# Patient Record
Sex: MALE | Race: WHITE
[De-identification: names, ages, dates, MRNs, and addresses within clinical notes are randomized per-mention and may not be internally consistent; named-entity substitution may affect disease eponyms.]

---

## 2018-06-09 ENCOUNTER — HOSPITAL ENCOUNTER (INPATIENT)
Dept: HOSPITAL 36 - ER | Age: 63
LOS: 6 days | Discharge: NURSING, REHAB OR LONG-TERM CARE | DRG: 872 | End: 2018-06-15
Attending: FAMILY MEDICINE | Admitting: FAMILY MEDICINE
Payer: MEDICARE

## 2018-06-09 VITALS — SYSTOLIC BLOOD PRESSURE: 102 MMHG | DIASTOLIC BLOOD PRESSURE: 65 MMHG

## 2018-06-09 DIAGNOSIS — L03.116: ICD-10-CM

## 2018-06-09 DIAGNOSIS — S51.811A: ICD-10-CM

## 2018-06-09 DIAGNOSIS — A41.9: Primary | ICD-10-CM

## 2018-06-09 DIAGNOSIS — S81.811A: ICD-10-CM

## 2018-06-09 DIAGNOSIS — L03.115: ICD-10-CM

## 2018-06-09 DIAGNOSIS — E87.1: ICD-10-CM
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
## 2018-06-09 DIAGNOSIS — D22.5: ICD-10-CM

## 2018-06-09 DIAGNOSIS — E44.1: ICD-10-CM

## 2018-06-09 DIAGNOSIS — D69.6: ICD-10-CM
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
## 2018-06-09 DIAGNOSIS — Z93.3: ICD-10-CM
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
## 2018-06-09 LAB
ALBUMIN SERPL-MCNC: 3.3 GM/DL (ref 4.2–5.5)
ALBUMIN/GLOB SERPL: 0.8 {RATIO} (ref 1–1.8)
ALP SERPL-CCNC: 119 U/L (ref 34–104)
ALT SERPL-CCNC: 15 U/L (ref 7–52)
ANION GAP SERPL CALC-SCNC: 16.9 MMOL/L (ref 7–16)
AST SERPL-CCNC: 26 U/L (ref 13–39)
BASOPHILS # BLD AUTO: 0 TH/CUMM (ref 0–0.2)
BASOPHILS NFR BLD AUTO: 0.3 % (ref 0–2)
BILIRUB SERPL-MCNC: 0.8 MG/DL (ref 0.3–1)
BUN SERPL-MCNC: 17 MG/DL (ref 7–25)
CALCIUM SERPL-MCNC: 8.7 MG/DL (ref 8.6–10.3)
CHLORIDE SERPL-SCNC: 93 MEQ/L (ref 98–107)
CO2 SERPL-SCNC: 18.4 MEQ/L (ref 21–31)
CREAT SERPL-MCNC: 1.1 MG/DL (ref 0.7–1.3)
EOSINOPHIL # BLD AUTO: 0 TH/CMM (ref 0.1–0.4)
EOSINOPHIL NFR BLD AUTO: 0.2 % (ref 0–5)
ERYTHROCYTE [DISTWIDTH] IN BLOOD BY AUTOMATED COUNT: 14.8 % (ref 11.5–20)
GLOBULIN SER-MCNC: 4.1 GM/DL
GLUCOSE SERPL-MCNC: 112 MG/DL (ref 70–105)
HCT VFR BLD CALC: 39.6 % (ref 41–60)
HGB BLD-MCNC: 13.4 GM/DL (ref 12–16)
INR PPP: 1.12 (ref 0.5–1.4)
LYMPHOCYTE AB SER FC-ACNC: 0.5 TH/CMM (ref 1.5–3)
LYMPHOCYTES NFR BLD AUTO: 3.5 % (ref 20–50)
MCH RBC QN AUTO: 29.5 PG (ref 26–30)
MCHC RBC AUTO-ENTMCNC: 33.9 PG (ref 28–36)
MCV RBC AUTO: 87.2 FL (ref 80–99)
MONOCYTES # BLD AUTO: 1.2 TH/CMM (ref 0.3–1)
MONOCYTES NFR BLD AUTO: 7.9 % (ref 2–10)
NEUTROPHILS # BLD: 13.7 TH/CMM (ref 1.8–8)
NEUTROPHILS NFR BLD AUTO: 88.1 % (ref 40–80)
PLATELET # BLD: 110 TH/CMM (ref 150–400)
PMV BLD AUTO: 7.8 FL
POTASSIUM SERPL-SCNC: 3.3 MEQ/L (ref 3.5–5.1)
PROTHROMBIN TIME: 11.8 SECONDS (ref 9.5–11.5)
RBC # BLD AUTO: 4.54 MIL/CMM (ref 4.3–5.7)
SODIUM SERPL-SCNC: 125 MEQ/L (ref 136–145)
WBC # BLD AUTO: 15.4 TH/CMM (ref 4.8–10.8)

## 2018-06-09 PROCEDURE — X7704: HCPCS

## 2018-06-09 PROCEDURE — V2790 AMNIOTIC MEMBRANE: HCPCS

## 2018-06-09 PROCEDURE — X6258: HCPCS

## 2018-06-09 PROCEDURE — Z7610: HCPCS

## 2018-06-09 RX ADMIN — ENOXAPARIN SODIUM SCH MG: 100 INJECTION SUBCUTANEOUS at 23:36
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
## 2018-06-10 LAB
ALBUMIN SERPL-MCNC: 3 GM/DL (ref 4.2–5.5)
ALBUMIN/GLOB SERPL: 0.8 {RATIO} (ref 1–1.8)
ALP SERPL-CCNC: 103 U/L (ref 34–104)
ALT SERPL-CCNC: 14 U/L (ref 7–52)
ANION GAP SERPL CALC-SCNC: 11.5 MMOL/L (ref 7–16)
AST SERPL-CCNC: 29 U/L (ref 13–39)
BASOPHILS # BLD AUTO: 0.1 TH/CUMM (ref 0–0.2)
BASOPHILS NFR BLD AUTO: 0.5 % (ref 0–2)
BILIRUB SERPL-MCNC: 0.7 MG/DL (ref 0.3–1)
BUN SERPL-MCNC: 16 MG/DL (ref 7–25)
CALCIUM SERPL-MCNC: 8.5 MG/DL (ref 8.6–10.3)
CHLORIDE SERPL-SCNC: 100 MEQ/L (ref 98–107)
CO2 SERPL-SCNC: 22 MEQ/L (ref 21–31)
CREAT SERPL-MCNC: 0.8 MG/DL (ref 0.7–1.3)
EOSINOPHIL # BLD AUTO: 0 TH/CMM (ref 0.1–0.4)
EOSINOPHIL NFR BLD AUTO: 0.4 % (ref 0–5)
ERYTHROCYTE [DISTWIDTH] IN BLOOD BY AUTOMATED COUNT: 15 % (ref 11.5–20)
GLOBULIN SER-MCNC: 3.9 GM/DL
GLUCOSE SERPL-MCNC: 88 MG/DL (ref 70–105)
HCT VFR BLD CALC: 38.3 % (ref 41–60)
HGB BLD-MCNC: 12.7 GM/DL (ref 12–16)
LYMPHOCYTE AB SER FC-ACNC: 1.2 TH/CMM (ref 1.5–3)
LYMPHOCYTES NFR BLD AUTO: 10.7 % (ref 20–50)
MCH RBC QN AUTO: 29.5 PG (ref 26–30)
MCHC RBC AUTO-ENTMCNC: 33.2 PG (ref 28–36)
MCV RBC AUTO: 88.7 FL (ref 80–99)
MONOCYTES # BLD AUTO: 1.4 TH/CMM (ref 0.3–1)
MONOCYTES NFR BLD AUTO: 12.3 % (ref 2–10)
NEUTROPHILS # BLD: 8.3 TH/CMM (ref 1.8–8)
NEUTROPHILS NFR BLD AUTO: 76.1 % (ref 40–80)
PLATELET # BLD: 108 TH/CMM (ref 150–400)
PMV BLD AUTO: 8.3 FL
POTASSIUM SERPL-SCNC: 3.5 MEQ/L (ref 3.5–5.1)
RBC # BLD AUTO: 4.32 MIL/CMM (ref 4.3–5.7)
SODIUM SERPL-SCNC: 130 MEQ/L (ref 136–145)
WBC # BLD AUTO: 11 TH/CMM (ref 4.8–10.8)

## 2018-06-10 RX ADMIN — ENOXAPARIN SODIUM SCH: 100 INJECTION SUBCUTANEOUS at 21:18

## 2018-06-10 RX ADMIN — ENOXAPARIN SODIUM SCH MG: 100 INJECTION SUBCUTANEOUS at 09:33

## 2018-06-10 NOTE — GENERAL PROGRESS NOTE
Subjective





- Review of Systems


Service Date: 06/10/18


Subjective: 





awake and alert


denies pain


resting comfortably in bed


ambulatory





Objective





- Results


Result Diagrams: 


 06/10/18 05:52





 06/10/18 05:52


Recent Labs: 


 Laboratory Last Values











WBC  11.0 Th/cmm (4.8-10.8)  H  06/10/18  05:52    


 


RBC  4.32 Mil/cmm (4.30-5.70)   06/10/18  05:52    


 


Hgb  12.7 gm/dL (12-16)   06/10/18  05:52    


 


Hct  38.3 % (41.0-60)  L  06/10/18  05:52    


 


MCV  88.7 fl (80-99)   06/10/18  05:52    


 


MCH  29.5 pg (26.0-30.0)   06/10/18  05:52    


 


MCHC Differential  33.2 pg (28.0-36.0)   06/10/18  05:52    


 


RDW  15.0 % (11.5-20.0)   06/10/18  05:52    


 


Plt Count  108 Th/cmm (150-400)  L  06/10/18  05:52    


 


MPV  8.3 fl  06/10/18  05:52    


 


Neutrophils %  76.1 % (40.0-80.0)   06/10/18  05:52    


 


Lymphocytes %  10.7 % (20.0-50.0)  L  06/10/18  05:52    


 


Monocytes %  12.3 % (2.0-10.0)  H  06/10/18  05:52    


 


Eosinophils %  0.4 % (0.0-5.0)   06/10/18  05:52    


 


Basophils %  0.5 % (0.0-2.0)   06/10/18  05:52    


 


PT  11.8 SECONDS (9.5-11.5)  H  06/09/18  16:57    


 


INR  1.12  (0.5-1.4)   06/09/18  16:57    


 


PTT (Actin FS)  31.1 SECONDS (26.0-38.0)   06/09/18  16:57    


 


Sodium  130 mEq/L (136-145)  L  06/10/18  05:52    


 


Potassium  3.5 mEq/L (3.5-5.1)   06/10/18  05:52    


 


Chloride  100 mEq/L ()   06/10/18  05:52    


 


Carbon Dioxide  22.0 mEq/L (21.0-31.0)   06/10/18  05:52    


 


Anion Gap  11.5  (7.0-16.0)   06/10/18  05:52    


 


BUN  16 mg/dL (7-25)   06/10/18  05:52    


 


Creatinine  0.8 mg/dL (0.7-1.3)   06/10/18  05:52    


 


Est GFR ( Amer)  > 60.0 ml/min (>90)   06/10/18  05:52    


 


Est GFR (Non-Af Amer)  > 60.0 ml/min  06/10/18  05:52    


 


BUN/Creatinine Ratio  20.0   06/10/18  05:52    


 


Glucose  88 mg/dL ()   06/10/18  05:52    


 


Whole Bld Lactic Acid  2.29 mmol/L (0.60-1.99)  H*  06/09/18  19:35    


 


Calcium  8.5 mg/dL (8.6-10.3)  L  06/10/18  05:52    


 


Total Bilirubin  0.7 mg/dL (0.3-1.0)   06/10/18  05:52    


 


AST  29 U/L (13-39)   06/10/18  05:52    


 


ALT  14 U/L (7-52)   06/10/18  05:52    


 


Alkaline Phosphatase  103 U/L ()   06/10/18  05:52    


 


Troponin I  0.01 ng/mL (0.01-0.05)   06/09/18  16:57    


 


Total Protein  6.9 gm/dL (6.0-8.3)   06/10/18  05:52    


 


Albumin  3.0 gm/dL (4.2-5.5)  L  06/10/18  05:52    


 


Globulin  3.9 gm/dL  06/10/18  05:52    


 


Albumin/Globulin Ratio  0.8  (1.0-1.8)  L  06/10/18  05:52    














- Physical Exam


Vitals and I&O: 


 Vital Signs











Temp  97 F   06/10/18 15:53


 


Pulse  60   06/10/18 15:53


 


Resp  18   06/10/18 15:53


 


BP  102/66   06/10/18 15:53


 


Pulse Ox  94   06/10/18 15:53








 Intake & Output











 06/09/18 06/10/18 06/10/18





 18:59 06:59 18:59


 


Intake Total  1250 250


 


Balance  1250 250


 


Weight (lbs) 79.379 kg 79.379 kg 


 


Intake:   


 


  Intake, IV Amount  1250 250


 


    Sodium Chloride 0.9% 1,  1000 





    000 ml @ Wide Open IV .   





    [facility] Rx#:[id number]   


 


    Vancomycin HCl 1 gm In  250 





    Sodium Chloride 0.9% 250   





    ml @ 165 mls/hr IV 2200   





    [facility] Rx#:[id number]   


 


    Vancomycin HCl 1 gm In   250





    Sodium Chloride 0.9% 250   





    ml @ 165 mls/hr IV Q12H   





    [facility] Rx#:[id number]   


 


Other:   


 


  Stool Characteristics   Liquid





   Brown


 


  Weight Source Patient stated Bedscale 











Active Medications: 


Current Medications





Acetaminophen (Tylenol)  650 mg PO PRN PRN


   PRN Reason: TEMPERATURE >100C


   Stop: 08/08/18 21:13


   Last Admin: 06/10/18 04:06 Dose:  650 mg


Acetaminophen/Hydrocodone Bitart (Norco 10 Mg/325 Mg)  1 tab PO Q6H PRN


   PRN Reason: Pain (Severe)


   Stop: 08/08/18 22:06


Enoxaparin Sodium (Lovenox)  30 mg SUBQ Q12HR [facility]


   Stop: 08/08/18 22:29


   Last Admin: 06/10/18 09:33 Dose:  30 mg


Vancomycin HCl 1 gm/ Sodium (Chloride)  250 mls @ 165 mls/hr IV Q12H [facility]


   Stop: 08/09/18 08:59


   Last Infusion: 06/10/18 14:26 Dose:  Infused


Miscellaneous (Vancomycin Iv Per Pharmacy)  1 ea MC DAILY [facility]


   Stop: 08/09/18 08:59








General: Alert, Oriented x3, Cooperative, No acute distress


HEENT: PERRLA, EOMI


Neck: Supple, JVD


Cardiovascular: Regular rate, Normal S1, Normal S2


Lungs: Clear to auscultation


Abdomen: Bowel sounds, Soft


Extremities: Other (LLE erythema and mild edema; scattered ecchymosis, thenar 

wasting bilateral UE; large scab Rt wrist)


Neurological: Normal gait


Psych/Mental Status: Mental status NL





Assessment/Plan





- Assessment


Assessment: 





LLE cellulitis


sepsis


hyponatremia


protein malnutrition


thrombocytopenia








- Plan


Plan: 





cont IV abx


surgery per Dr Syed


lives alone but has two daughters that con support recovery
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
## 2018-06-11 LAB
ANION GAP SERPL CALC-SCNC: 9.8 MMOL/L (ref 7–16)
APPEARANCE UR: CLEAR
BACTERIA #/AREA URNS HPF: (no result) /HPF
BILIRUB UR-MCNC: NEGATIVE MG/DL
BUN SERPL-MCNC: 15 MG/DL (ref 7–25)
CALCIUM SERPL-MCNC: 8.5 MG/DL (ref 8.6–10.3)
CHLORIDE SERPL-SCNC: 103 MEQ/L (ref 98–107)
CO2 SERPL-SCNC: 21.7 MEQ/L (ref 21–31)
COLOR UR: YELLOW
CREAT SERPL-MCNC: 0.6 MG/DL (ref 0.7–1.3)
EPI CELLS URNS QL MICRO: (no result) /LPF
ERYTHROCYTE [DISTWIDTH] IN BLOOD BY AUTOMATED COUNT: 15.1 % (ref 11.5–20)
GLUCOSE SERPL-MCNC: 93 MG/DL (ref 70–105)
GLUCOSE UR STRIP-MCNC: NEGATIVE MG/DL
HCT VFR BLD CALC: 38 % (ref 41–60)
HGB BLD-MCNC: 12.8 GM/DL (ref 12–16)
INR PPP: 1.06 (ref 0.5–1.4)
KETONES UR STRIP-MCNC: NEGATIVE MG/DL
LEUKOCYTE ESTERASE UR-ACNC: NEGATIVE
LYMPHOCYTES # BLD MANUAL: 22 % (ref 20–50)
MANUAL DIFFERENTIAL PERFORMED BLD QL: YES
MCH RBC QN AUTO: 29.9 PG (ref 26–30)
MCHC RBC AUTO-ENTMCNC: 33.7 PG (ref 28–36)
MCV RBC AUTO: 88.8 FL (ref 80–99)
MICRO URNS: YES
MONOCYTES # BLD MANUAL: 19 % (ref 2–10)
NEUTROPHILS NFR BLD AUTO: 54 % (ref 40–80)
NEUTS BAND NFR BLD: 5 % (ref 0–10)
NITRITE UR QL STRIP: NEGATIVE
PH UR STRIP: 6 [PH] (ref 4.6–8)
PLATELET # BLD: 106 TH/CMM (ref 150–400)
PMV BLD AUTO: 9 FL
POTASSIUM SERPL-SCNC: 3.5 MEQ/L (ref 3.5–5.1)
PROT UR STRIP-MCNC: NEGATIVE MG/DL
PROTHROMBIN TIME: 11 SECONDS (ref 9.5–11.5)
RBC # BLD AUTO: 4.28 MIL/CMM (ref 4.3–5.7)
RBC # UR STRIP: (no result) /UL
RBC #/AREA URNS HPF: (no result) /HPF (ref 0–5)
SODIUM SERPL-SCNC: 131 MEQ/L (ref 136–145)
SP GR UR STRIP: 1.02 (ref 1–1.03)
TOTAL CELLS COUNTED BLD: 100
URINALYSIS COMPLETE PNL UR: (no result)
UROBILINOGEN UR STRIP-ACNC: 0.2 E.U./DL (ref 0.2–1)
WBC # BLD AUTO: 6.8 TH/CMM (ref 4.8–10.8)
WBC #/AREA URNS HPF: (no result) /HPF (ref 0–5)

## 2018-06-11 PROCEDURE — 0HBKXZZ EXCISION OF RIGHT LOWER LEG SKIN, EXTERNAL APPROACH: ICD-10-PCS | Performed by: FAMILY MEDICINE

## 2018-06-11 PROCEDURE — 0HBDXZZ EXCISION OF RIGHT LOWER ARM SKIN, EXTERNAL APPROACH: ICD-10-PCS | Performed by: FAMILY MEDICINE

## 2018-06-11 RX ADMIN — ENOXAPARIN SODIUM SCH: 100 INJECTION SUBCUTANEOUS at 21:58

## 2018-06-11 RX ADMIN — HYDROCODONE BITATRATE AND ACETAMINOPHEN PRN TAB: 10; 325 TABLET ORAL at 11:58

## 2018-06-11 RX ADMIN — HYDROCODONE BITATRATE AND ACETAMINOPHEN PRN TAB: 10; 325 TABLET ORAL at 16:58

## 2018-06-11 RX ADMIN — ENOXAPARIN SODIUM SCH: 100 INJECTION SUBCUTANEOUS at 08:48

## 2018-06-11 NOTE — DIAGNOSTIC IMAGING REPORT
CHEST X-RAY: AP view



INDICATION: Cough, preop



COMPARISON: None



FINDINGS: There is no focal consolidation or pleural effusions The heart

is normal in size.  Degenerative changes of the spine are noted mild

scoliosis.



IMPRESSION: 



No focal consolidation identified.

## 2018-06-11 NOTE — INTERNAL MEDICINE PROG NOTE
Internal Medicine Subjective





- Subjective


Service Date: 06/11/18


Patient seen and examined:: without staff (HE FEELS BETTER,HAD SURGERY TODAY.)


Patient is:: awake, verbal, in bed, talking


Per staff patient has:: no adverse event





Internal Medicine Objective





- Results


Result Diagrams: 


 06/11/18 05:00





 06/11/18 05:00


Recent Labs: 


 Laboratory Last Values











WBC  6.8 Th/cmm (4.8-10.8)   06/11/18  05:00    


 


RBC  4.28 Mil/cmm (4.30-5.70)  L  06/11/18  05:00    


 


Hgb  12.8 gm/dL (12-16)   06/11/18  05:00    


 


Hct  38.0 % (41.0-60)  L  06/11/18  05:00    


 


MCV  88.8 fl (80-99)   06/11/18  05:00    


 


MCH  29.9 pg (26.0-30.0)   06/11/18  05:00    


 


MCHC Differential  33.7 pg (28.0-36.0)   06/11/18  05:00    


 


RDW  15.1 % (11.5-20.0)   06/11/18  05:00    


 


Plt Count  106 Th/cmm (150-400)  L  06/11/18  05:00    


 


MPV  9.0 fl  06/11/18  05:00    


 


Neutrophils %  76.1 % (40.0-80.0)   06/10/18  05:52    


 


Band Neutrophils %  5 % (0-10)   06/11/18  05:00    


 


Lymphocytes %  10.7 % (20.0-50.0)  L  06/10/18  05:52    


 


Monocytes %  12.3 % (2.0-10.0)  H  06/10/18  05:52    


 


Eosinophils %  0.4 % (0.0-5.0)   06/10/18  05:52    


 


Basophils %  0.5 % (0.0-2.0)   06/10/18  05:52    


 


Neutrophils (Manual)  54 % (40-80)   06/11/18  05:00    


 


Lymphocytes  22 % (20-50)   06/11/18  05:00    


 


Monocytes  19 % (2-10)  H  06/11/18  05:00    


 


PT  11.0 SECONDS (9.5-11.5)   06/11/18  05:00    


 


INR  1.06  (0.5-1.4)   06/11/18  05:00    


 


PTT (Actin FS)  29.6 SECONDS (26.0-38.0)   06/11/18  05:00    


 


Sodium  131 mEq/L (136-145)  L  06/11/18  05:00    


 


Potassium  3.5 mEq/L (3.5-5.1)   06/11/18  05:00    


 


Chloride  103 mEq/L ()   06/11/18  05:00    


 


Carbon Dioxide  21.7 mEq/L (21.0-31.0)   06/11/18  05:00    


 


Anion Gap  9.8  (7.0-16.0)   06/11/18  05:00    


 


BUN  15 mg/dL (7-25)   06/11/18  05:00    


 


Creatinine  0.6 mg/dL (0.7-1.3)  L  06/11/18  05:00    


 


Est GFR ( Amer)  > 60.0 ml/min (>90)   06/11/18  05:00    


 


Est GFR (Non-Af Amer)  > 60.0 ml/min  06/11/18  05:00    


 


BUN/Creatinine Ratio  25.0   06/11/18  05:00    


 


Glucose  93 mg/dL ()   06/11/18  05:00    


 


Whole Bld Lactic Acid  2.29 mmol/L (0.60-1.99)  H*  06/09/18  19:35    


 


Calcium  8.5 mg/dL (8.6-10.3)  L  06/11/18  05:00    


 


Total Bilirubin  0.7 mg/dL (0.3-1.0)   06/10/18  05:52    


 


AST  29 U/L (13-39)   06/10/18  05:52    


 


ALT  14 U/L (7-52)   06/10/18  05:52    


 


Alkaline Phosphatase  103 U/L ()   06/10/18  05:52    


 


Troponin I  0.01 ng/mL (0.01-0.05)   06/09/18  16:57    


 


Total Protein  6.9 gm/dL (6.0-8.3)   06/10/18  05:52    


 


Albumin  3.0 gm/dL (4.2-5.5)  L  06/10/18  05:52    


 


Globulin  3.9 gm/dL  06/10/18  05:52    


 


Albumin/Globulin Ratio  0.8  (1.0-1.8)  L  06/10/18  05:52    


 


Urine Source  CLEAN C   06/11/18  03:50    


 


Urine Color  YELLOW   06/11/18  03:50    


 


Urine Clarity  CLEAR  (CLEAR)   06/11/18  03:50    


 


Urine pH  6.0  (4.6 - 8.0)   06/11/18  03:50    


 


Ur Specific Gravity  1.020  (1.005-1.030)   06/11/18  03:50    


 


Urine Protein  NEGATIVE mg/dL (NEGATIVE)   06/11/18  03:50    


 


Urine Glucose (UA)  NEGATIVE mg/dL (NEGATIVE)   06/11/18  03:50    


 


Urine Ketones  NEGATIVE mg/dL (NEGATIVE)   06/11/18  03:50    


 


Urine Blood  LARGE  (NEGATIVE)  H  06/11/18  03:50    


 


Urine Nitrate  NEGATIVE  (NEGATIVE)   06/11/18  03:50    


 


Urine Bilirubin  NEGATIVE  (NEGATIVE)   06/11/18  03:50    


 


Urine Urobilinogen  0.2 E.U./dL (0.2 - 1.0)   06/11/18  03:50    


 


Ur Leukocyte Esterase  NEGATIVE  (NEGATIVE)   06/11/18  03:50    


 


Urine RBC  0-2 /hpf (0-5)  H  06/11/18  03:50    


 


Urine WBC  0-2 /hpf (0-5)   06/11/18  03:50    


 


Ur Epithelial Cells  RARE /lpf (FEW)   06/11/18  03:50    


 


Urine Bacteria  FEW /hpf (NONE SEEN)   06/11/18  03:50    


 


Vancomycin Trough  10.4 ug/mL (5-10)  H  06/11/18  04:00    














- Physical Exam


Vitals and I&O: 


 Vital Signs











Temp  98.2 F   06/11/18 15:41


 


Pulse  64   06/11/18 15:41


 


Resp  17   06/11/18 15:41


 


BP  142/82   06/11/18 15:41


 


Pulse Ox  99   06/11/18 15:41








 Intake & Output











 06/11/18 06/11/18 06/12/18





 06:59 18:59 06:59


 


Intake Total 200 2250 200


 


Output Total 0  


 


Balance 200 2250 200


 


Weight (lbs) 79.379 kg 79.379 kg 79.379 kg


 


Intake:   


 


  Intake, IV Amount  250 


 


    Vancomycin HCl 1 gm In  250 





    Sodium Chloride 0.9% 250   





    ml @ 165 mls/hr IV Q8H   





    [facility] Rx#:[id number]   


 


  Oral 200 2000 200


 


Output:   


 


  Stool 0  


 


Other:   


 


  # Voids 3 2 


 


  # Bowel Movements 0  


 


  Stool Characteristics Liquid Liquid Liquid





 Brown Brown 


 


  Weight Source Bedscale Bedscale Patient stated











Active Medications: 


Current Medications





Acetaminophen (Tylenol)  650 mg PO PRN PRN


   PRN Reason: TEMPERATURE >100C


   Stop: 08/08/18 21:13


   Last Admin: 06/10/18 04:06 Dose:  650 mg


Acetaminophen/Hydrocodone Bitart (Norco 10 Mg/325 Mg)  1 tab PO Q6H PRN


   PRN Reason: Pain (Severe)


   Stop: 08/08/18 22:06


   Last Admin: 06/11/18 16:58 Dose:  1 tab


Enoxaparin Sodium (Lovenox)  30 mg SUBQ Q12HR [facility]


   Stop: 08/08/18 22:29


   Last Admin: 06/11/18 21:58 Dose:  Not Given


Vancomycin HCl 1 gm/ Sodium (Chloride)  250 mls @ 165 mls/hr IV Q8H [facility]


   Stop: 08/10/18 08:59


   Last Admin: 06/11/18 16:09 Dose:  165 mls/hr


Miscellaneous (Vancomycin Iv Per Pharmacy)  1 ea MC DAILY [facility]


   Stop: 08/09/18 08:59








General: alert


HEENT: NC/AT, PERRLA, EOMI, anicteric sclerae, throat clear


Neck: Supple, No JVD, No thyromegaly, No LAD


Lungs: CTAB


Cardiovascular: Normal S1, without murmur


Abdomen: non-tender, non-distended


Extremities: other (RIGHT HAND HAS WOUND AND LEFT LOWER EXTREMETY HAS OPENED 

WOUND.)





Internal Medicine Assmt/Plan





- Assessment


Assessment: 





1.RIGHT HAND SKIN LACERATION AND SP SURGICAL DEBRIDMENT.


2.LEFT LOWER EXTR.OPENED WOUND AND SP SURGICAL DEBRIDMENT.





- Plan


Plan: 





CONTINUE ON CURRENT MEDICATION AND DIET AND WOUND CARE.

## 2018-06-12 LAB
ALBUMIN SERPL-MCNC: 2.6 GM/DL (ref 4.2–5.5)
ALBUMIN/GLOB SERPL: 0.8 {RATIO} (ref 1–1.8)
ALP SERPL-CCNC: 78 U/L (ref 34–104)
ALT SERPL-CCNC: 20 U/L (ref 7–52)
ANION GAP SERPL CALC-SCNC: 9.2 MMOL/L (ref 7–16)
AST SERPL-CCNC: 33 U/L (ref 13–39)
BASOPHILS NFR BLD: 0 % (ref 0–3)
BILIRUB SERPL-MCNC: 0.5 MG/DL (ref 0.3–1)
BUN SERPL-MCNC: 12 MG/DL (ref 7–25)
CALCIUM SERPL-MCNC: 8.2 MG/DL (ref 8.6–10.3)
CHLORIDE SERPL-SCNC: 105 MEQ/L (ref 98–107)
CO2 SERPL-SCNC: 23.4 MEQ/L (ref 21–31)
CREAT SERPL-MCNC: 0.5 MG/DL (ref 0.7–1.3)
EOSINOPHIL NFR BLD: 0 % (ref 0–5)
ERYTHROCYTE [DISTWIDTH] IN BLOOD BY AUTOMATED COUNT: 15.6 % (ref 11.5–20)
GLOBULIN SER-MCNC: 3.4 GM/DL
GLUCOSE SERPL-MCNC: 102 MG/DL (ref 70–105)
HCT VFR BLD CALC: 35.1 % (ref 41–60)
HGB BLD-MCNC: 11.8 GM/DL (ref 12–16)
LYMPHOCYTES # BLD MANUAL: 28 % (ref 20–50)
MANUAL DIFFERENTIAL PERFORMED BLD QL: YES
MCH RBC QN AUTO: 29.8 PG (ref 26–30)
MCHC RBC AUTO-ENTMCNC: 33.6 PG (ref 28–36)
MCV RBC AUTO: 88.7 FL (ref 80–99)
MONOCYTES # BLD MANUAL: 19 % (ref 2–10)
NEUTROPHILS NFR BLD AUTO: 50 % (ref 40–80)
NEUTS BAND NFR BLD: 3 % (ref 0–10)
PLATELET # BLD: 100 TH/CMM (ref 150–400)
PMV BLD AUTO: 9.1 FL
POTASSIUM SERPL-SCNC: 3.6 MEQ/L (ref 3.5–5.1)
RBC # BLD AUTO: 3.96 MIL/CMM (ref 4.3–5.7)
SODIUM SERPL-SCNC: 134 MEQ/L (ref 136–145)
TOTAL CELLS COUNTED BLD: 100
WBC # BLD AUTO: 5.7 TH/CMM (ref 4.8–10.8)

## 2018-06-12 RX ADMIN — ENOXAPARIN SODIUM SCH: 100 INJECTION SUBCUTANEOUS at 08:46

## 2018-06-12 RX ADMIN — ENOXAPARIN SODIUM SCH MG: 100 INJECTION SUBCUTANEOUS at 21:12

## 2018-06-12 NOTE — GENERAL PROGRESS NOTE
Subjective





- Review of Systems


Service Date: 06/12/18


Events since last encounter: 





change dressings in AM





Objective





- Results


Result Diagrams: 


 06/12/18 04:20





 06/12/18 04:20


Recent Labs: 


 Laboratory Last Values











WBC  5.7 Th/cmm (4.8-10.8)   06/12/18  04:20    


 


RBC  3.96 Mil/cmm (4.30-5.70)  L  06/12/18  04:20    


 


Hgb  11.8 gm/dL (12-16)  L  06/12/18  04:20    


 


Hct  35.1 % (41.0-60)  L  06/12/18  04:20    


 


MCV  88.7 fl (80-99)   06/12/18  04:20    


 


MCH  29.8 pg (26.0-30.0)   06/12/18  04:20    


 


MCHC Differential  33.6 pg (28.0-36.0)   06/12/18  04:20    


 


RDW  15.6 % (11.5-20.0)   06/12/18  04:20    


 


Plt Count  100 Th/cmm (150-400)  L  06/12/18  04:20    


 


MPV  9.1 fl  06/12/18  04:20    


 


Neutrophils %  76.1 % (40.0-80.0)   06/10/18  05:52    


 


Band Neutrophils %  3 % (0-10)   06/12/18  04:20    


 


Lymphocytes %  10.7 % (20.0-50.0)  L  06/10/18  05:52    


 


Monocytes %  12.3 % (2.0-10.0)  H  06/10/18  05:52    


 


Eosinophils %  0.4 % (0.0-5.0)   06/10/18  05:52    


 


Basophils %  0.5 % (0.0-2.0)   06/10/18  05:52    


 


Neutrophils (Manual)  50 % (40-80)   06/12/18  04:20    


 


Lymphocytes  28 % (20-50)   06/12/18  04:20    


 


Monocytes  19 % (2-10)  H  06/12/18  04:20    


 


Eosinophils  0 % (0-5)   06/12/18  04:20    


 


Basophils  0 % (0-3)   06/12/18  04:20    


 


PT  11.0 SECONDS (9.5-11.5)   06/11/18  05:00    


 


INR  1.06  (0.5-1.4)   06/11/18  05:00    


 


PTT (Actin FS)  29.6 SECONDS (26.0-38.0)   06/11/18  05:00    


 


Sodium  134 mEq/L (136-145)  L  06/12/18  04:20    


 


Potassium  3.6 mEq/L (3.5-5.1)   06/12/18  04:20    


 


Chloride  105 mEq/L ()   06/12/18  04:20    


 


Carbon Dioxide  23.4 mEq/L (21.0-31.0)   06/12/18  04:20    


 


Anion Gap  9.2  (7.0-16.0)   06/12/18  04:20    


 


BUN  12 mg/dL (7-25)   06/12/18  04:20    


 


Creatinine  0.5 mg/dL (0.7-1.3)  L  06/12/18  04:20    


 


Est GFR ( Amer)  > 60.0 ml/min (>90)   06/12/18  04:20    


 


Est GFR (Non-Af Amer)  > 60.0 ml/min  06/12/18  04:20    


 


BUN/Creatinine Ratio  24.0   06/12/18  04:20    


 


Glucose  102 mg/dL ()   06/12/18  04:20    


 


Whole Bld Lactic Acid  2.29 mmol/L (0.60-1.99)  H*  06/09/18  19:35    


 


Calcium  8.2 mg/dL (8.6-10.3)  L  06/12/18  04:20    


 


Total Bilirubin  0.5 mg/dL (0.3-1.0)   06/12/18  04:20    


 


AST  33 U/L (13-39)   06/12/18  04:20    


 


ALT  20 U/L (7-52)   06/12/18  04:20    


 


Alkaline Phosphatase  78 U/L ()   06/12/18  04:20    


 


Troponin I  0.01 ng/mL (0.01-0.05)   06/09/18  16:57    


 


Total Protein  6.0 gm/dL (6.0-8.3)   06/12/18  04:20    


 


Albumin  2.6 gm/dL (4.2-5.5)  L  06/12/18  04:20    


 


Globulin  3.4 gm/dL  06/12/18  04:20    


 


Albumin/Globulin Ratio  0.8  (1.0-1.8)  L  06/12/18  04:20    


 


Urine Source  CLEAN C   06/11/18  03:50    


 


Urine Color  YELLOW   06/11/18  03:50    


 


Urine Clarity  CLEAR  (CLEAR)   06/11/18  03:50    


 


Urine pH  6.0  (4.6 - 8.0)   06/11/18  03:50    


 


Ur Specific Gravity  1.020  (1.005-1.030)   06/11/18  03:50    


 


Urine Protein  NEGATIVE mg/dL (NEGATIVE)   06/11/18  03:50    


 


Urine Glucose (UA)  NEGATIVE mg/dL (NEGATIVE)   06/11/18  03:50    


 


Urine Ketones  NEGATIVE mg/dL (NEGATIVE)   06/11/18  03:50    


 


Urine Blood  LARGE  (NEGATIVE)  H  06/11/18  03:50    


 


Urine Nitrate  NEGATIVE  (NEGATIVE)   06/11/18  03:50    


 


Urine Bilirubin  NEGATIVE  (NEGATIVE)   06/11/18  03:50    


 


Urine Urobilinogen  0.2 E.U./dL (0.2 - 1.0)   06/11/18  03:50    


 


Ur Leukocyte Esterase  NEGATIVE  (NEGATIVE)   06/11/18  03:50    


 


Urine RBC  0-2 /hpf (0-5)  H  06/11/18  03:50    


 


Urine WBC  0-2 /hpf (0-5)   06/11/18  03:50    


 


Ur Epithelial Cells  RARE /lpf (FEW)   06/11/18  03:50    


 


Urine Bacteria  FEW /hpf (NONE SEEN)   06/11/18  03:50    


 


Vancomycin Trough  16.6 ug/mL (5-10)  H  06/12/18  08:15    














- Physical Exam


Vitals and I&O: 


 Vital Signs











Temp  97.8 F   06/12/18 12:00


 


Pulse  59   06/12/18 12:00


 


Resp  19   06/12/18 12:00


 


BP  96/65   06/12/18 12:00


 


Pulse Ox  98   06/12/18 12:00








 Intake & Output











 06/11/18 06/12/18 06/12/18





 18:59 06:59 18:59


 


Intake Total 2500 870 


 


Balance 2500 870 


 


Weight (lbs) 79.379 kg 83.036 kg 


 


Intake:   


 


  Intake, IV Amount 500 250 


 


    Vancomycin HCl 1 gm In 500 250 





    Sodium Chloride 0.9% 250   





    ml @ 165 mls/hr IV Q8H   





    [facility] Rx#:[id number]   


 


  Oral 2000 620 


 


Other:   


 


  # Voids 2 3 


 


  Stool Characteristics Liquid Liquid Liquid





 Brown  Brown


 


  Weight Source Bedscale Bedscale 











Active Medications: 


Current Medications





Acetaminophen (Tylenol)  650 mg PO PRN PRN


   PRN Reason: TEMPERATURE >100C


   Stop: 08/08/18 21:13


   Last Admin: 06/10/18 04:06 Dose:  650 mg


Acetaminophen/Hydrocodone Bitart (Norco 10 Mg/325 Mg)  1 tab PO Q6H PRN


   PRN Reason: Pain (Severe)


   Stop: 08/08/18 22:06


   Last Admin: 06/11/18 16:58 Dose:  1 tab


Enoxaparin Sodium (Lovenox)  30 mg SUBQ Q12HR [facility]


   Stop: 08/08/18 22:29


   Last Admin: 06/12/18 08:46 Dose:  Not Given


Vancomycin HCl 1 gm/ Sodium (Chloride)  250 mls @ 165 mls/hr IV Q8H [facility]


   Stop: 08/10/18 08:59


   Last Admin: 06/12/18 11:05 Dose:  165 mls/hr


Miscellaneous (Vancomycin Iv Per Pharmacy)  1 ea MC DAILY [facility]


   Stop: 08/09/18 08:59








General: Alert, Oriented x3, Cooperative, No acute distress


HEENT: PERRLA, EOMI


Neck: Supple, JVD


Cardiovascular: Regular rate, Normal S1, Normal S2


Lungs: Clear to auscultation


Abdomen: Bowel sounds, Soft


Extremities: Other (LLE erythema and mild edema; scattered ecchymosis, thenar 

wasting bilateral UE; large scab Rt wrist)


Neurological: Normal gait


Psych/Mental Status: Mental status NL

## 2018-06-13 RX ADMIN — ENOXAPARIN SODIUM SCH MG: 100 INJECTION SUBCUTANEOUS at 21:22

## 2018-06-13 RX ADMIN — NEOMYCIN AND POLYMYXIN B SULFATES AND BACITRACIN ZINC SCH PKT: 400; 3.5; 5 OINTMENT TOPICAL at 13:58

## 2018-06-13 RX ADMIN — ENOXAPARIN SODIUM SCH: 100 INJECTION SUBCUTANEOUS at 08:18

## 2018-06-13 NOTE — GENERAL PROGRESS NOTE
Subjective





- Review of Systems


Service Date: 06/13/18


Events since last encounter: 





redressed, incision right leg is clean


DC with VN - change dressings 3 times a week and apply triple antibiotic oint


oral antibiotics (Cipro 500 bid for 2 weeks


to my office for follow up in 2 weeks





Objective





- Results


Result Diagrams: 


 06/12/18 04:20





 06/12/18 04:20


Recent Labs: 


 Laboratory Last Values











WBC  5.7 Th/cmm (4.8-10.8)   06/12/18  04:20    


 


RBC  3.96 Mil/cmm (4.30-5.70)  L  06/12/18  04:20    


 


Hgb  11.8 gm/dL (12-16)  L  06/12/18  04:20    


 


Hct  35.1 % (41.0-60)  L  06/12/18  04:20    


 


MCV  88.7 fl (80-99)   06/12/18  04:20    


 


MCH  29.8 pg (26.0-30.0)   06/12/18  04:20    


 


MCHC Differential  33.6 pg (28.0-36.0)   06/12/18  04:20    


 


RDW  15.6 % (11.5-20.0)   06/12/18  04:20    


 


Plt Count  100 Th/cmm (150-400)  L  06/12/18  04:20    


 


MPV  9.1 fl  06/12/18  04:20    


 


Neutrophils %  76.1 % (40.0-80.0)   06/10/18  05:52    


 


Band Neutrophils %  3 % (0-10)   06/12/18  04:20    


 


Lymphocytes %  10.7 % (20.0-50.0)  L  06/10/18  05:52    


 


Monocytes %  12.3 % (2.0-10.0)  H  06/10/18  05:52    


 


Eosinophils %  0.4 % (0.0-5.0)   06/10/18  05:52    


 


Basophils %  0.5 % (0.0-2.0)   06/10/18  05:52    


 


Neutrophils (Manual)  50 % (40-80)   06/12/18  04:20    


 


Lymphocytes  28 % (20-50)   06/12/18  04:20    


 


Monocytes  19 % (2-10)  H  06/12/18  04:20    


 


Eosinophils  0 % (0-5)   06/12/18  04:20    


 


Basophils  0 % (0-3)   06/12/18  04:20    


 


PT  11.0 SECONDS (9.5-11.5)   06/11/18  05:00    


 


INR  1.06  (0.5-1.4)   06/11/18  05:00    


 


PTT (Actin FS)  29.6 SECONDS (26.0-38.0)   06/11/18  05:00    


 


Sodium  134 mEq/L (136-145)  L  06/12/18  04:20    


 


Potassium  3.6 mEq/L (3.5-5.1)   06/12/18  04:20    


 


Chloride  105 mEq/L ()   06/12/18  04:20    


 


Carbon Dioxide  23.4 mEq/L (21.0-31.0)   06/12/18  04:20    


 


Anion Gap  9.2  (7.0-16.0)   06/12/18  04:20    


 


BUN  12 mg/dL (7-25)   06/12/18  04:20    


 


Creatinine  0.5 mg/dL (0.7-1.3)  L  06/12/18  04:20    


 


Est GFR ( Amer)  > 60.0 ml/min (>90)   06/12/18  04:20    


 


Est GFR (Non-Af Amer)  > 60.0 ml/min  06/12/18  04:20    


 


BUN/Creatinine Ratio  24.0   06/12/18  04:20    


 


Glucose  102 mg/dL ()   06/12/18  04:20    


 


Whole Bld Lactic Acid  2.29 mmol/L (0.60-1.99)  H*  06/09/18  19:35    


 


Calcium  8.2 mg/dL (8.6-10.3)  L  06/12/18  04:20    


 


Total Bilirubin  0.5 mg/dL (0.3-1.0)   06/12/18  04:20    


 


AST  33 U/L (13-39)   06/12/18  04:20    


 


ALT  20 U/L (7-52)   06/12/18  04:20    


 


Alkaline Phosphatase  78 U/L ()   06/12/18  04:20    


 


Troponin I  0.01 ng/mL (0.01-0.05)   06/09/18  16:57    


 


Total Protein  6.0 gm/dL (6.0-8.3)   06/12/18  04:20    


 


Albumin  2.6 gm/dL (4.2-5.5)  L  06/12/18  04:20    


 


Globulin  3.4 gm/dL  06/12/18  04:20    


 


Albumin/Globulin Ratio  0.8  (1.0-1.8)  L  06/12/18  04:20    


 


Urine Source  CLEAN C   06/11/18  03:50    


 


Urine Color  YELLOW   06/11/18  03:50    


 


Urine Clarity  CLEAR  (CLEAR)   06/11/18  03:50    


 


Urine pH  6.0  (4.6 - 8.0)   06/11/18  03:50    


 


Ur Specific Gravity  1.020  (1.005-1.030)   06/11/18  03:50    


 


Urine Protein  NEGATIVE mg/dL (NEGATIVE)   06/11/18  03:50    


 


Urine Glucose (UA)  NEGATIVE mg/dL (NEGATIVE)   06/11/18  03:50    


 


Urine Ketones  NEGATIVE mg/dL (NEGATIVE)   06/11/18  03:50    


 


Urine Blood  LARGE  (NEGATIVE)  H  06/11/18  03:50    


 


Urine Nitrate  NEGATIVE  (NEGATIVE)   06/11/18  03:50    


 


Urine Bilirubin  NEGATIVE  (NEGATIVE)   06/11/18  03:50    


 


Urine Urobilinogen  0.2 E.U./dL (0.2 - 1.0)   06/11/18  03:50    


 


Ur Leukocyte Esterase  NEGATIVE  (NEGATIVE)   06/11/18  03:50    


 


Urine RBC  0-2 /hpf (0-5)  H  06/11/18  03:50    


 


Urine WBC  0-2 /hpf (0-5)   06/11/18  03:50    


 


Ur Epithelial Cells  RARE /lpf (FEW)   06/11/18  03:50    


 


Urine Bacteria  FEW /hpf (NONE SEEN)   06/11/18  03:50    


 


Vancomycin Trough  16.6 ug/mL (5-10)  H  06/12/18  08:15    














- Physical Exam


Vitals and I&O: 


 Vital Signs











Temp  96.4 F   06/13/18 08:00


 


Pulse  63   06/13/18 08:00


 


Resp  19   06/13/18 08:00


 


BP  112/69   06/13/18 08:00


 


Pulse Ox  100   06/13/18 08:00








 Intake & Output











 06/12/18 06/13/18 06/13/18





 18:59 06:59 18:59


 


Intake Total 2200 250 


 


Output Total 750  


 


Balance 1450 250 


 


Weight (lbs) 83.098 kg  


 


Intake:   


 


  Intake, IV Amount 500 250 


 


    Vancomycin HCl 1 gm In 500 250 





    Sodium Chloride 0.9% 250   





    ml @ 165 mls/hr IV Q8H   





    [id number]   


 


  Oral 1700  


 


Output:   


 


  Stool 750  


 


Other:   


 


  # Voids 3  


 


  Stool Characteristics Liquid Liquid 





 Brown Brown 


 


  Weight Source Bedscale  











Active Medications: 


Current Medications





Acetaminophen (Tylenol)  650 mg PO PRN PRN


   PRN Reason: TEMPERATURE >100C


   Stop: 08/08/18 21:13


   Last Admin: 06/10/18 04:06 Dose:  650 mg


Acetaminophen/Hydrocodone Bitart (Norco 10 Mg/325 Mg)  1 tab PO Q6H PRN


   PRN Reason: Pain (Severe)


   Stop: 08/08/18 22:06


   Last Admin: 06/11/18 16:58 Dose:  1 tab


Enoxaparin Sodium (Lovenox)  30 mg SUBQ [facility]12HR [facility]


   Stop: 08/08/18 22:29


   Last Admin: 06/13/18 08:18 Dose:  Not Given


Vancomycin HCl 1 gm/ Sodium (Chloride)  250 mls @ 165 mls/hr IV [facility]H [facility]


   Stop: 08/10/18 08:59


   Last Admin: 06/13/18 08:15 Dose:  165 mls/hr


Miscellaneous (Vancomycin Iv Per Pharmacy)  1 ea [facility]


   Stop: 08/09/18 08:59








General: Alert, Oriented x3, Cooperative, No acute distress


HEENT: PERRLA, EOMI


Neck: Supple, JVD


Cardiovascular: Regular rate, Normal S1, Normal S2


Lungs: Clear to auscultation


Abdomen: Bowel sounds, Soft


Extremities: Other (LLE erythema and mild edema; scattered ecchymosis, thenar 

wasting bilateral UE; large scab Rt wrist)


Neurological: Normal gait


Psych/Mental Status: Mental status NL

## 2018-06-13 NOTE — INTERNAL MEDICINE PROG NOTE
Internal Medicine Subjective





- Subjective


Service Date: 18


Patient seen and examined:: without staff (he feels better,less pain.)


Patient is:: awake, verbal, in bed, talking


Per staff patient has:: no adverse event





Internal Medicine Objective





- Results


Result Diagrams: 


 18 04:20





 18 04:20


Recent Labs: 


 Laboratory Last Values











WBC  5.7 Th/cmm (4.8-10.8)   18  04:20    


 


RBC  3.96 Mil/cmm (4.30-5.70)  L  18  04:20    


 


Hgb  11.8 gm/dL (12-16)  L  18  04:20    


 


Hct  35.1 % (41.0-60)  L  18  04:20    


 


MCV  88.7 fl (80-99)   18  04:20    


 


MCH  29.8 pg (26.0-30.0)   18  04:20    


 


MCHC Differential  33.6 pg (28.0-36.0)   18  04:20    


 


RDW  15.6 % (11.5-20.0)   18  04:20    


 


Plt Count  100 Th/cmm (150-400)  L  18  04:20    


 


MPV  9.1 fl  18  04:20    


 


Neutrophils %  76.1 % (40.0-80.0)   06/10/18  05:52    


 


Band Neutrophils %  3 % (0-10)   18  04:20    


 


Lymphocytes %  10.7 % (20.0-50.0)  L  06/10/18  05:52    


 


Monocytes %  12.3 % (2.0-10.0)  H  06/10/18  05:52    


 


Eosinophils %  0.4 % (0.0-5.0)   06/10/18  05:52    


 


Basophils %  0.5 % (0.0-2.0)   06/10/18  05:52    


 


Neutrophils (Manual)  50 % (40-80)   18  04:20    


 


Lymphocytes  28 % (20-50)   18  04:20    


 


Monocytes  19 % (2-10)  H  18  04:20    


 


Eosinophils  0 % (0-5)   18  04:20    


 


Basophils  0 % (0-3)   18  04:20    


 


PT  11.0 SECONDS (9.5-11.5)   18  05:00    


 


INR  1.06  (0.5-1.4)   18  05:00    


 


PTT (Actin FS)  29.6 SECONDS (26.0-38.0)   18  05:00    


 


Sodium  134 mEq/L (136-145)  L  18  04:20    


 


Potassium  3.6 mEq/L (3.5-5.1)   18  04:20    


 


Chloride  105 mEq/L ()   18  04:20    


 


Carbon Dioxide  23.4 mEq/L (21.0-31.0)   18  04:20    


 


Anion Gap  9.2  (7.0-16.0)   18  04:20    


 


BUN  12 mg/dL (7-25)   18  04:20    


 


Creatinine  0.5 mg/dL (0.7-1.3)  L  18  04:20    


 


Est GFR ( Amer)  > 60.0 ml/min (>90)   18  04:20    


 


Est GFR (Non-Af Amer)  > 60.0 ml/min  18  04:20    


 


BUN/Creatinine Ratio  24.0   18  04:20    


 


Glucose  102 mg/dL ()   18  04:20    


 


Whole Bld Lactic Acid  2.29 mmol/L (0.60-1.99)  H*  18  19:35    


 


Calcium  8.2 mg/dL (8.6-10.3)  L  18  04:20    


 


Total Bilirubin  0.5 mg/dL (0.3-1.0)   18  04:20    


 


AST  33 U/L (13-39)   18  04:20    


 


ALT  20 U/L (7-52)   18  04:20    


 


Alkaline Phosphatase  78 U/L ()   18  04:20    


 


Troponin I  0.01 ng/mL (0.01-0.05)   18  16:57    


 


Total Protein  6.0 gm/dL (6.0-8.3)   18  04:20    


 


Albumin  2.6 gm/dL (4.2-5.5)  L  18  04:20    


 


Globulin  3.4 gm/dL  18  04:20    


 


Albumin/Globulin Ratio  0.8  (1.0-1.8)  L  18  04:20    


 


Urine Source  CLEAN C   18  03:50    


 


Urine Color  YELLOW   18  03:50    


 


Urine Clarity  CLEAR  (CLEAR)   18  03:50    


 


Urine pH  6.0  (4.6 - 8.0)   18  03:50    


 


Ur Specific Gravity  1.020  (1.005-1.030)   18  03:50    


 


Urine Protein  NEGATIVE mg/dL (NEGATIVE)   18  03:50    


 


Urine Glucose (UA)  NEGATIVE mg/dL (NEGATIVE)   18  03:50    


 


Urine Ketones  NEGATIVE mg/dL (NEGATIVE)   18  03:50    


 


Urine Blood  LARGE  (NEGATIVE)  H  18  03:50    


 


Urine Nitrate  NEGATIVE  (NEGATIVE)   18  03:50    


 


Urine Bilirubin  NEGATIVE  (NEGATIVE)   18  03:50    


 


Urine Urobilinogen  0.2 E.U./dL (0.2 - 1.0)   18  03:50    


 


Ur Leukocyte Esterase  NEGATIVE  (NEGATIVE)   18  03:50    


 


Urine RBC  0-2 /hpf (0-5)  H  18  03:50    


 


Urine WBC  0-2 /hpf (0-5)   18  03:50    


 


Ur Epithelial Cells  RARE /lpf (FEW)   18  03:50    


 


Urine Bacteria  FEW /hpf (NONE SEEN)   18  03:50    


 


Vancomycin Trough  16.6 ug/mL (5-10)  H  18  08:15    














- Physical Exam


Vitals and I&O: 


 Vital Signs











Temp  98.0 F   18 15:37


 


Pulse  68   18 15:37


 


Resp  18   18 15:37


 


BP  124/80   18 15:37


 


Pulse Ox  99   18 15:37








 Intake & Output











 18





 06:59 18:59 06:59


 


Intake Total 250 1400 


 


Output Total  600 


 


Balance 250 800 


 


Weight (lbs)  83.098 kg 


 


Intake:   


 


  Intake, IV Amount 250 250 


 


    Vancomycin HCl 1 gm In 250 250 





    Sodium Chloride 0.9% 250   





    ml @ 165 mls/hr IV Q8H   





    [facility] Rx#:[id number]   


 


  Oral  1150 


 


Output:   


 


  Stool  600 


 


Other:   


 


  # Voids  3 


 


  Stool Characteristics Liquid Liquid 





 Brown Brown 


 


  Weight Source  Bedscale 











Active Medications: 


Current Medications





Acetaminophen (Tylenol)  650 mg PO PRN PRN


   PRN Reason: TEMPERATURE >100C


   Stop: 18 21:13


   Last Admin: 06/10/18 04:06 Dose:  650 mg


Acetaminophen/Hydrocodone Bitart (Norco 10 Mg/325 Mg)  1 tab PO Q6H PRN


   PRN Reason: Pain (Severe)


   Stop: 18 22:06


   Last Admin: 18 16:58 Dose:  1 tab


Enoxaparin Sodium (Lovenox)  30 mg SUBQ Q12HR [facility]


   Stop: 18 22:29


   Last Admin: 18 08:18 Dose:  Not Given


Vancomycin HCl 1 gm/ Sodium (Chloride)  250 mls @ 165 mls/hr IV Q8H [facility]


   Stop: 08/10/18 08:59


   Last Admin: 18 16:08 Dose:  165 mls/hr


Miscellaneous (Vancomycin Iv Per Pharmacy)  1 ea MC DAILY [facility]


   Stop: 18 08:59


Neomycin/Polymyxin/Bacitracin (Triple Antibiotic Pkt)  1 pkt TP DAILY [facility]


   Stop: 18 11:29


   Last Admin: 18 13:58 Dose:  1 pkt








General: alert


HEENT: NC/AT, PERRLA, EOMI, anicteric sclerae, throat clear


Neck: Supple, No JVD, No thyromegaly, No LAD


Lungs: CTAB


Cardiovascular: Normal S1, without murmur


Abdomen: non-tender, non-distended


Extremities: other (RIGHT HAND HAS WOUND AND LEFT LOWER EXTREMETY HAS OPENED 

WOUND.)





Internal Medicine Assmt/Plan





- Assessment


Assessment: 





1.RIGHT HAND SKIN LACERATION AND SP SURGICAL DEBRIDMENT.


2.RIGHT LOWER EXTR.OPENED WOUND AND SP SURGICAL DEBRIDMENT.





- Plan


Plan: 





CONTINUE ON CURRENT MEDICATION AND DIET AND WOUND CARE.





Nutritional Asmnt/Malnutr-PDOC





- Dietary Evaluation


Malnutrition Findings (Please click <Entered> for more info): 








Nutritional Asmnt/Malnutrition                             Start:  18 14:

35


Text:                                                      Status: Complete    

  


Freq:                                                                          

  


Protocol:                                                                      

  


 Document     18 14:35  DOMINIKKINSEY  (Rec: 18 14:39  DOMINIKKINSEY [id number])


 Nutritional Asmnt/Malnutrition


     Patient General Information


      Nutritional Screening                      Moderate Risk


      Diagnosis                                  right lower leg cellulitis


      Pertinent Medical Hx/Surgical Hx           negative


      Subjective Information                     Pt seen sitting up in bed,


                                                 awake and alert s/p debredment


                                                 on . Pt stated good


                                                 appetite. Pt has colostomy bag


                                                 noted. Per EMR, PO intake 100


                                                 %.


      Current Diet Order/ Nutrition Support      regular


      Pertinent Medications                      vancomycin


      Pertinent Labs                              Na 134, Cr 0.5, Ca 8.2


     Nutritional Hx/Data


      Height                                     1.88 m


      Height (Calculated Centimeters)            188.0


      Current Weight (lbs)                       83.007 kg


      Weight (Calculated Kilograms)              83.0


      Weight (Calculated Grams)                  15296.4


      Ideal Body Weight                          190


      Body Mass Index (BMI)                      23.5


      Weight Status                              Approriate


     GI Symptoms


      GI Symptoms                                None


      Last BM                                    none


      Difficult in:                              None


      Skin Integrity/Comment:                    non pitting edema, bruise


      Current %PO                                Good (%)


     Estimated Nutritional Goals


      BEE in Kcals:                              Using Current wt


      Calories/Kcals/Kg                          25-30


      Kcals Calculated                           8020-3627


      Protein:                                   Using Current wt


      Protein g/k


      Protein Calculated                         83


      Fluid: ml                                  -2490ml (1ml/kcal)


     Nutritional Problem


      No current Nutrition Prob


       Problem                                   N/A


     Malnutrition Alert


      Is there a minimum of two criteria         No


       selected?                                 


       Query Text:Check all the applicable       


       criteria. A minimum of two criteria are   


       recommended for diagnosis of either       


       severe or non-severe malnutrition.        


     Malnutrition Related to Morbid Obesity


      Malnutrition related to morbid obesity     No


     Intervention/Recommendation


      Comments                                   1. Continue with current diet


                                                 as ordered.


                                                 2. Monitor PO intake, wt, labs


                                                 and skin integrity


                                                 3. F/U as low risk in 7 days,


                                                 


     Expected Outcomes/Goals


      Expected Outcomes/Goals                    1. PO intake to meet at least


                                                 75% of nutritional needs.


                                                 2. Wt stability, skin to


                                                 remain intact, labs to


                                                 approach WNL.

## 2018-06-14 LAB
ANION GAP SERPL CALC-SCNC: 9.4 MMOL/L (ref 7–16)
BUN SERPL-MCNC: 8 MG/DL (ref 7–25)
CALCIUM SERPL-MCNC: 8.9 MG/DL (ref 8.6–10.3)
CHLORIDE SERPL-SCNC: 105 MEQ/L (ref 98–107)
CO2 SERPL-SCNC: 25.4 MEQ/L (ref 21–31)
CREAT SERPL-MCNC: 0.5 MG/DL (ref 0.7–1.3)
GLUCOSE SERPL-MCNC: 104 MG/DL (ref 70–105)
POTASSIUM SERPL-SCNC: 3.8 MEQ/L (ref 3.5–5.1)
SODIUM SERPL-SCNC: 136 MEQ/L (ref 136–145)

## 2018-06-14 RX ADMIN — NEOMYCIN AND POLYMYXIN B SULFATES AND BACITRACIN ZINC SCH PKT: 400; 3.5; 5 OINTMENT TOPICAL at 09:24

## 2018-06-14 RX ADMIN — ENOXAPARIN SODIUM SCH MG: 100 INJECTION SUBCUTANEOUS at 09:24

## 2018-06-14 NOTE — INTERNAL MEDICINE PROG NOTE
Internal Medicine Subjective





- Subjective


Service Date: 18


Patient seen and examined:: without staff (HE FEELS BETTER,LESS PAIN)


Patient is:: awake, verbal, in bed, talking


Per staff patient has:: no adverse event





Internal Medicine Objective





- Results


Result Diagrams: 


 18 04:20





 18 08:09


Recent Labs: 


 Laboratory Last Values











WBC  5.7 Th/cmm (4.8-10.8)   18  04:20    


 


RBC  3.96 Mil/cmm (4.30-5.70)  L  18  04:20    


 


Hgb  11.8 gm/dL (12-16)  L  18  04:20    


 


Hct  35.1 % (41.0-60)  L  18  04:20    


 


MCV  88.7 fl (80-99)   18  04:20    


 


MCH  29.8 pg (26.0-30.0)   18  04:20    


 


MCHC Differential  33.6 pg (28.0-36.0)   18  04:20    


 


RDW  15.6 % (11.5-20.0)   18  04:20    


 


Plt Count  100 Th/cmm (150-400)  L  18  04:20    


 


MPV  9.1 fl  18  04:20    


 


Neutrophils %  76.1 % (40.0-80.0)   06/10/18  05:52    


 


Band Neutrophils %  3 % (0-10)   18  04:20    


 


Lymphocytes %  10.7 % (20.0-50.0)  L  06/10/18  05:52    


 


Monocytes %  12.3 % (2.0-10.0)  H  06/10/18  05:52    


 


Eosinophils %  0.4 % (0.0-5.0)   06/10/18  05:52    


 


Basophils %  0.5 % (0.0-2.0)   06/10/18  05:52    


 


Neutrophils (Manual)  50 % (40-80)   18  04:20    


 


Lymphocytes  28 % (20-50)   18  04:20    


 


Monocytes  19 % (2-10)  H  18  04:20    


 


Eosinophils  0 % (0-5)   18  04:20    


 


Basophils  0 % (0-3)   18  04:20    


 


PT  11.0 SECONDS (9.5-11.5)   18  05:00    


 


INR  1.06  (0.5-1.4)   18  05:00    


 


PTT (Actin FS)  29.6 SECONDS (26.0-38.0)   18  05:00    


 


Sodium  136 mEq/L (136-145)   18  08:09    


 


Potassium  3.8 mEq/L (3.5-5.1)   18  08:09    


 


Chloride  105 mEq/L ()   18  08:09    


 


Carbon Dioxide  25.4 mEq/L (21.0-31.0)   18  08:09    


 


Anion Gap  9.4  (7.0-16.0)   18  08:09    


 


BUN  8 mg/dL (7-25)   18  08:09    


 


Creatinine  0.5 mg/dL (0.7-1.3)  L  18  08:09    


 


Est GFR ( Amer)  > 60.0 ml/min (>90)   18  08:09    


 


Est GFR (Non-Af Amer)  > 60.0 ml/min  18  08:09    


 


BUN/Creatinine Ratio  16.0   18  08:09    


 


Glucose  104 mg/dL ()   18  08:09    


 


Whole Bld Lactic Acid  2.29 mmol/L (0.60-1.99)  H*  18  19:35    


 


Calcium  8.9 mg/dL (8.6-10.3)   18  08:09    


 


Total Bilirubin  0.5 mg/dL (0.3-1.0)   18  04:20    


 


AST  33 U/L (13-39)   18  04:20    


 


ALT  20 U/L (7-52)   18  04:20    


 


Alkaline Phosphatase  78 U/L ()   18  04:20    


 


Troponin I  0.01 ng/mL (0.01-0.05)   18  16:57    


 


Total Protein  6.0 gm/dL (6.0-8.3)   18  04:20    


 


Albumin  2.6 gm/dL (4.2-5.5)  L  18  04:20    


 


Globulin  3.4 gm/dL  18  04:20    


 


Albumin/Globulin Ratio  0.8  (1.0-1.8)  L  18  04:20    


 


Urine Source  CLEAN C   18  03:50    


 


Urine Color  YELLOW   18  03:50    


 


Urine Clarity  CLEAR  (CLEAR)   18  03:50    


 


Urine pH  6.0  (4.6 - 8.0)   18  03:50    


 


Ur Specific Gravity  1.020  (1.005-1.030)   18  03:50    


 


Urine Protein  NEGATIVE mg/dL (NEGATIVE)   18  03:50    


 


Urine Glucose (UA)  NEGATIVE mg/dL (NEGATIVE)   18  03:50    


 


Urine Ketones  NEGATIVE mg/dL (NEGATIVE)   18  03:50    


 


Urine Blood  LARGE  (NEGATIVE)  H  18  03:50    


 


Urine Nitrate  NEGATIVE  (NEGATIVE)   18  03:50    


 


Urine Bilirubin  NEGATIVE  (NEGATIVE)   18  03:50    


 


Urine Urobilinogen  0.2 E.U./dL (0.2 - 1.0)   18  03:50    


 


Ur Leukocyte Esterase  NEGATIVE  (NEGATIVE)   18  03:50    


 


Urine RBC  0-2 /hpf (0-5)  H  18  03:50    


 


Urine WBC  0-2 /hpf (0-5)   18  03:50    


 


Ur Epithelial Cells  RARE /lpf (FEW)   18  03:50    


 


Urine Bacteria  FEW /hpf (NONE SEEN)   18  03:50    


 


Vancomycin Trough  17.1 ug/mL (5-10)  H  18  08:09    














- Physical Exam


Vitals and I&O: 


 Vital Signs











Temp  97.9 F   18 11:45


 


Pulse  63   18 11:45


 


Resp  18   18 11:45


 


BP  107/71   18 11:45


 


Pulse Ox  100   18 11:45








 Intake & Output











 18





 18:59 06:59 18:59


 


Intake Total 1650 250 


 


Output Total 600  


 


Balance 1050 250 


 


Weight (lbs) 83.098 kg 83.007 kg 


 


Intake:   


 


  Intake, IV Amount 500 250 


 


    Vancomycin HCl 1 gm In 500 250 





    Sodium Chloride 0.9% 250   





    ml @ 165 mls/hr IV Q8H   





    [facility] Rx#:[id number]   


 


  Oral 1150  


 


Output:   


 


  Stool 600  


 


Other:   


 


  # Voids 3  


 


  Stool Characteristics Liquid Liquid 





 Brown Brown 


 


  Weight Source Bedscale Bedscale 











Active Medications: 


Current Medications





Acetaminophen (Tylenol)  650 mg PO PRN PRN


   PRN Reason: TEMPERATURE >100C


   Stop: 18 21:13


   Last Admin: 06/10/18 04:06 Dose:  650 mg


Acetaminophen/Hydrocodone Bitart (Norco 10 Mg/325 Mg)  1 tab PO Q6H PRN


   PRN Reason: Pain (Severe)


   Stop: 18 22:06


   Last Admin: 18 16:58 Dose:  1 tab


Enoxaparin Sodium (Lovenox)  30 mg SUBQ Q12HR [facility]


   Stop: 18 22:29


   Last Admin: 18 09:24 Dose:  30 mg


Vancomycin HCl 1 gm/ Sodium (Chloride)  250 mls @ 165 mls/hr IV Q8H [facility]


   Stop: 08/10/18 08:59


   Last Admin: 18 09:24 Dose:  165 mls/hr


Miscellaneous (Vancomycin Iv Per Pharmacy)  1 ea MC DAILY [facility]


   Stop: 18 08:59


Neomycin/Polymyxin/Bacitracin (Triple Antibiotic Pkt)  1 pkt TP DAILY [facility]


   Stop: 18 11:29


   Last Admin: 18 09:24 Dose:  1 pkt








General: alert


HEENT: NC/AT, PERRLA, EOMI, anicteric sclerae, throat clear


Neck: Supple, No JVD, No thyromegaly, No LAD


Lungs: CTAB


Cardiovascular: Normal S1, without murmur


Abdomen: non-tender, non-distended


Extremities: other (RIGHT HAND HAS WOUND AND LEFT LOWER EXTREMETY HAS OPENED 

WOUND.)





Internal Medicine Assmt/Plan





- Assessment


Assessment: 





1.RIGHT HAND SKIN LACERATION AND SP SURGICAL DEBRIDMENT.


2.RIGHT LOWER EXTR.OPENED WOUND AND SP SURGICAL DEBRIDMENT.





- Plan


Plan: 





CONTINUE ON CURRENT MEDICATION AND DIET AND WOUND CARE.





Nutritional Asmnt/Malnutr-PDOC





- Dietary Evaluation


Malnutrition Findings (Please click <Entered> for more info): 








Nutritional Asmnt/Malnutrition                             Start:  18 14:

35


Text:                                                      Status: Complete    

  


Freq:                                                                          

  


Protocol:                                                                      

  


 Document     18 14:35  LCKINSEY  (Rec: 18 14:39  DOMINIKKINSEY [email or web address])


 Nutritional Asmnt/Malnutrition


     Patient General Information


      Nutritional Screening                      Moderate Risk


      Diagnosis                                  right lower leg cellulitis


      Pertinent Medical Hx/Surgical Hx           negative


      Subjective Information                     Pt seen sitting up in bed,


                                                 awake and alert s/p debredment


                                                 on . Pt stated good


                                                 appetite. Pt has colostomy bag


                                                 noted. Per EMR, PO intake 100


                                                 %.


      Current Diet Order/ Nutrition Support      regular


      Pertinent Medications                      vancomycin


      Pertinent Labs                              Na 134, Cr 0.5, Ca 8.2


     Nutritional Hx/Data


      Height                                     1.88 m


      Height (Calculated Centimeters)            188.0


      Current Weight (lbs)                       83.007 kg


      Weight (Calculated Kilograms)              83.0


      Weight (Calculated Grams)                  22882.4


      Ideal Body Weight                          190


      Body Mass Index (BMI)                      23.5


      Weight Status                              Approriate


     GI Symptoms


      GI Symptoms                                None


      Last BM                                    none


      Difficult in:                              None


      Skin Integrity/Comment:                    non pitting edema, bruise


      Current %PO                                Good (%)


     Estimated Nutritional Goals


      BEE in Kcals:                              Using Current wt


      Calories/Kcals/Kg                          25-30


      Kcals Calculated                           7852-3263


      Protein:                                   Using Current wt


      Protein g/k


      Protein Calculated                         83


      Fluid: ml                                  5-2490ml (1ml/kcal)


     Nutritional Problem


      No current Nutrition Prob


       Problem                                   N/A


     Malnutrition Alert


      Is there a minimum of two criteria         No


       selected?                                 


       Query Text:Check all the applicable       


       criteria. A minimum of two criteria are   


       recommended for diagnosis of either       


       severe or non-severe malnutrition.        


     Malnutrition Related to Morbid Obesity


      Malnutrition related to morbid obesity     No


     Intervention/Recommendation


      Comments                                   1. Continue with current diet


                                                 as ordered.


                                                 2. Monitor PO intake, wt, labs


                                                 and skin integrity


                                                 3. F/U as low risk in 7 days,


                                                 


     Expected Outcomes/Goals


      Expected Outcomes/Goals                    1. PO intake to meet at least


                                                 75% of nutritional needs.


                                                 2. Wt stability, skin to


                                                 remain intact, labs to


                                                 approach WNL.

## 2018-06-15 LAB
BASOPHILS # BLD AUTO: 0.1 TH/CUMM (ref 0–0.2)
BASOPHILS NFR BLD AUTO: 0.9 % (ref 0–2)
EOSINOPHIL # BLD AUTO: 0.1 TH/CMM (ref 0.1–0.4)
EOSINOPHIL NFR BLD AUTO: 1.5 % (ref 0–5)
ERYTHROCYTE [DISTWIDTH] IN BLOOD BY AUTOMATED COUNT: 15.6 % (ref 11.5–20)
HCT VFR BLD CALC: 37.8 % (ref 41–60)
HGB BLD-MCNC: 12.6 GM/DL (ref 12–16)
LYMPHOCYTE AB SER FC-ACNC: 1.9 TH/CMM (ref 1.5–3)
LYMPHOCYTES NFR BLD AUTO: 25.6 % (ref 20–50)
MCH RBC QN AUTO: 29.6 PG (ref 26–30)
MCHC RBC AUTO-ENTMCNC: 33.4 PG (ref 28–36)
MCV RBC AUTO: 88.8 FL (ref 80–99)
MONOCYTES # BLD AUTO: 0.7 TH/CMM (ref 0.3–1)
MONOCYTES NFR BLD AUTO: 10.2 % (ref 2–10)
NEUTROPHILS # BLD: 4.5 TH/CMM (ref 1.8–8)
NEUTROPHILS NFR BLD AUTO: 61.8 % (ref 40–80)
PLATELET # BLD: 211 TH/CMM (ref 150–400)
PMV BLD AUTO: 8.5 FL
RBC # BLD AUTO: 4.26 MIL/CMM (ref 4.3–5.7)
WBC # BLD AUTO: 7.3 TH/CMM (ref 4.8–10.8)

## 2018-06-15 RX ADMIN — NEOMYCIN AND POLYMYXIN B SULFATES AND BACITRACIN ZINC SCH PKT: 400; 3.5; 5 OINTMENT TOPICAL at 09:42

## 2018-06-15 RX ADMIN — ENOXAPARIN SODIUM SCH MG: 100 INJECTION SUBCUTANEOUS at 11:56

## 2018-06-15 RX ADMIN — HYDROCODONE BITATRATE AND ACETAMINOPHEN PRN TAB: 10; 325 TABLET ORAL at 00:58

## 2018-06-15 RX ADMIN — ENOXAPARIN SODIUM SCH: 100 INJECTION SUBCUTANEOUS at 00:47
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
## 2018-06-15 NOTE — GENERAL PROGRESS NOTE
Subjective





- Review of Systems


Service Date: 06/15/18


Events since last encounter: 





right leg wound is clean and healing


patients wants 1. reversal of colostomy 2. biopsy of mole left chest


will schedule for Monday





Objective





- Results


Result Diagrams: 


 06/15/18 09:44





 18 08:09


Recent Labs: 


 Laboratory Last Values











WBC  7.3 Th/cmm (4.8-10.8)   06/15/18  09:44    


 


RBC  4.26 Mil/cmm (4.30-5.70)  L  06/15/18  09:44    


 


Hgb  12.6 gm/dL (12-16)   06/15/18  09:44    


 


Hct  37.8 % (41.0-60)  L  06/15/18  09:44    


 


MCV  88.8 fl (80-99)   06/15/18  09:44    


 


MCH  29.6 pg (26.0-30.0)   06/15/18  09:44    


 


MCHC Differential  33.4 pg (28.0-36.0)   06/15/18  09:44    


 


RDW  15.6 % (11.5-20.0)   06/15/18  09:44    


 


Plt Count  211 Th/cmm (150-400)   06/15/18  09:44    


 


MPV  8.5 fl  06/15/18  09:44    


 


Neutrophils %  61.8 % (40.0-80.0)   06/15/18  09:44    


 


Band Neutrophils %  3 % (0-10)   18  04:20    


 


Lymphocytes %  25.6 % (20.0-50.0)   06/15/18  09:44    


 


Monocytes %  10.2 % (2.0-10.0)  H  06/15/18  09:44    


 


Eosinophils %  1.5 % (0.0-5.0)   06/15/18  09:44    


 


Basophils %  0.9 % (0.0-2.0)   06/15/18  09:44    


 


Neutrophils (Manual)  50 % (40-80)   18  04:20    


 


Lymphocytes  28 % (20-50)   18  04:20    


 


Monocytes  19 % (2-10)  H  18  04:20    


 


Eosinophils  0 % (0-5)   18  04:20    


 


Basophils  0 % (0-3)   18  04:20    


 


PT  11.0 SECONDS (9.5-11.5)   18  05:00    


 


INR  1.06  (0.5-1.4)   18  05:00    


 


PTT (Actin FS)  29.6 SECONDS (26.0-38.0)   18  05:00    


 


Sodium  136 mEq/L (136-145)   18  08:09    


 


Potassium  3.8 mEq/L (3.5-5.1)   18  08:09    


 


Chloride  105 mEq/L ()   18  08:09    


 


Carbon Dioxide  25.4 mEq/L (21.0-31.0)   18  08:09    


 


Anion Gap  9.4  (7.0-16.0)   18  08:09    


 


BUN  8 mg/dL (7-25)   18  08:09    


 


Creatinine  0.5 mg/dL (0.7-1.3)  L  18  08:09    


 


Est GFR ( Amer)  > 60.0 ml/min (>90)   18  08:09    


 


Est GFR (Non-Af Amer)  > 60.0 ml/min  18  08:09    


 


BUN/Creatinine Ratio  16.0   18  08:09    


 


Glucose  104 mg/dL ()   18  08:09    


 


Whole Bld Lactic Acid  2.29 mmol/L (0.60-1.99)  H*  18  19:35    


 


Calcium  8.9 mg/dL (8.6-10.3)   18  08:09    


 


Total Bilirubin  0.5 mg/dL (0.3-1.0)   18  04:20    


 


AST  33 U/L (13-39)   18  04:20    


 


ALT  20 U/L (7-52)   18  04:20    


 


Alkaline Phosphatase  78 U/L ()   18  04:20    


 


Troponin I  0.01 ng/mL (0.01-0.05)   18  16:57    


 


Total Protein  6.0 gm/dL (6.0-8.3)   18  04:20    


 


Albumin  2.6 gm/dL (4.2-5.5)  L  18  04:20    


 


Globulin  3.4 gm/dL  18  04:20    


 


Albumin/Globulin Ratio  0.8  (1.0-1.8)  L  18  04:20    


 


Urine Source  CLEAN C   18  03:50    


 


Urine Color  YELLOW   18  03:50    


 


Urine Clarity  CLEAR  (CLEAR)   18  03:50    


 


Urine pH  6.0  (4.6 - 8.0)   18  03:50    


 


Ur Specific Gravity  1.020  (1.005-1.030)   18  03:50    


 


Urine Protein  NEGATIVE mg/dL (NEGATIVE)   18  03:50    


 


Urine Glucose (UA)  NEGATIVE mg/dL (NEGATIVE)   18  03:50    


 


Urine Ketones  NEGATIVE mg/dL (NEGATIVE)   18  03:50    


 


Urine Blood  LARGE  (NEGATIVE)  H  18  03:50    


 


Urine Nitrate  NEGATIVE  (NEGATIVE)   18  03:50    


 


Urine Bilirubin  NEGATIVE  (NEGATIVE)   18  03:50    


 


Urine Urobilinogen  0.2 E.U./dL (0.2 - 1.0)   18  03:50    


 


Ur Leukocyte Esterase  NEGATIVE  (NEGATIVE)   18  03:50    


 


Urine RBC  0-2 /hpf (0-5)  H  18  03:50    


 


Urine WBC  0-2 /hpf (0-5)   18  03:50    


 


Ur Epithelial Cells  RARE /lpf (FEW)   18  03:50    


 


Urine Bacteria  FEW /hpf (NONE SEEN)   18  03:50    


 


Vancomycin Trough  17.1 ug/mL (5-10)  H  18  08:09    














- Physical Exam


Vitals and I&O: 


 Vital Signs











Temp  97 F   06/15/18 07:55


 


Pulse  54   06/15/18 07:55


 


Resp  18   06/15/18 07:55


 


BP  117/73   06/15/18 07:55


 


Pulse Ox  98   06/15/18 07:55








 Intake & Output











 06/14/18 06/15/18 06/15/18





 18:59 06:59 18:59


 


Intake Total 250 500 


 


Balance 250 500 


 


Weight (lbs)  83.007 kg 


 


Intake:   


 


  Intake, IV Amount 250 500 


 


    Vancomycin HCl 1 gm In 250 500 





    Sodium Chloride 0.9% 250   





    ml @ 165 mls/hr IV Q8H   





    [id number]   


 


Other:   


 


  Stool Characteristics  Soft 


 


  Weight Source  Patient stated 











Active Medications: 


Current Medications





Acetaminophen (Tylenol)  650 mg PO PRN PRN


   PRN Reason: TEMPERATURE >100C


   Stop: 18 21:13


   Last Admin: 06/10/18 04:06 Dose:  650 mg


Acetaminophen/Hydrocodone Bitart (Norco 10 Mg/325 Mg)  1 tab PO Q6H PRN


   PRN Reason: Pain (Severe)


   Stop: 18 22:06


   Last Admin: 06/15/18 00:58 Dose:  1 tab


Enoxaparin Sodium (Lovenox)  30 mg SUBQ [facility]HR [facility]


   Stop: 18 22:29


   Last Admin: 06/15/18 00:47 Dose:  Not Given


Vancomycin HCl 1 gm/ Sodium (Chloride)  250 mls @ 165 mls/hr IV [facility]H [facility]


   Stop: 08/10/18 08:59


   Last Admin: 06/15/18 09:42 Dose:  165 mls/hr


Miscellaneous (Vancomycin Iv Per Pharmacy)  1 ea [facility]ILY [facility]


   Stop: 18 08:59


Neomycin/Polymyxin/Bacitracin (Triple Antibiotic Pkt)  1 pkt TP [facility]ILY [facility]


   Stop: 18 11:29


   Last Admin: 06/15/18 09:42 Dose:  1 pkt








General: Alert, Oriented x3, Cooperative, No acute distress


HEENT: PERRLA, EOMI


Neck: Supple, JVD


Cardiovascular: Regular rate, Normal S1, Normal S2


Lungs: Clear to auscultation


Abdomen: Bowel sounds, Soft


Extremities: Other (LLE erythema and mild edema; scattered ecchymosis, thenar 

wasting bilateral UE; large scab Rt wrist)


Neurological: Normal gait


Psych/Mental Status: Mental status NL





Nutritional Asmnt/Malnutr-PDOC





- Dietary Evaluation


Malnutrition Findings (Please click <Entered> for more info): 








Nutritional Asmnt/Malnutrition                             Start:  18 14:

35


Text:                                                      Status: Complete    

  


Freq:                                                                          

  


Protocol:                                                                      

  


 Document     18 14:35  LCHENG  (Rec: 18 14:39  KAYE  [id number])


 Nutritional Asmnt/Malnutrition


     Patient General Information


      Nutritional Screening                      Moderate Risk


      Diagnosis                                  right lower leg cellulitis


      Pertinent Medical Hx/Surgical Hx           negative


      Subjective Information                     Pt seen sitting up in bed,


                                                 awake and alert s/p debredment


                                                 on . Pt stated good


                                                 appetite. Pt has colostomy bag


                                                 noted. Per EMR, PO intake 100


                                                 %.


      Current Diet Order/ Nutrition Support      regular


      Pertinent Medications                      vancomycin


      Pertinent Labs                              Na 134, Cr 0.5, Ca 8.2


     Nutritional Hx/Data


      Height                                     1.88 m


      Height (Calculated Centimeters)            188.0


      Current Weight (lbs)                       83.007 kg


      Weight (Calculated Kilograms)              83.0


      Weight (Calculated Grams)                  18984.4


      Ideal Body Weight                          190


      Body Mass Index (BMI)                      23.5


      Weight Status                              Approriate


     GI Symptoms


      GI Symptoms                                None


      Last BM                                    none


      Difficult in:                              None


      Skin Integrity/Comment:                    non pitting edema, bruise


      Current %PO                                Good (%)


     Estimated Nutritional Goals


      BEE in Kcals:                              Using Current wt


      Calories/Kcals/Kg                          25-30


      Kcals Calculated                           9783-4219


      Protein:                                   Using Current wt


      Protein g/k


      Protein Calculated                         83


      Fluid: ml                                  2075-2490ml (1ml/kcal)


     Nutritional Problem


      No current Nutrition Prob


       Problem                                   N/A


     Malnutrition Alert


      Is there a minimum of two criteria         No


       selected?                                 


       Query Text:Check all the applicable       


       criteria. A minimum of two criteria are   


       recommended for diagnosis of either       


       severe or non-severe malnutrition.        


     Malnutrition Related to Morbid Obesity


      Malnutrition related to morbid obesity     No


     Intervention/Recommendation


      Comments                                   1. Continue with current diet


                                                 as ordered.


                                                 2. Monitor PO intake, wt, labs


                                                 and skin integrity


                                                 3. F/U as low risk in 7 days,


                                                 


     Expected Outcomes/Goals


      Expected Outcomes/Goals                    1. PO intake to meet at least


                                                 75% of nutritional needs.


                                                 2. Wt stability, skin to


                                                 remain intact, labs to


                                                 approach WNL.

## 2018-06-18 ENCOUNTER — HOSPITAL ENCOUNTER (INPATIENT)
Dept: HOSPITAL 36 - MSI | Age: 63
LOS: 17 days | Discharge: NURSING, REHAB OR LONG-TERM CARE | DRG: 853 | End: 2018-07-05
Attending: FAMILY MEDICINE | Admitting: FAMILY MEDICINE
Payer: MEDICARE

## 2018-06-18 DIAGNOSIS — J69.0: ICD-10-CM

## 2018-06-18 DIAGNOSIS — N17.9: ICD-10-CM

## 2018-06-18 DIAGNOSIS — A41.9: Primary | ICD-10-CM
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
## 2018-06-18 DIAGNOSIS — K56.609: ICD-10-CM

## 2018-06-18 DIAGNOSIS — E87.6: ICD-10-CM
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
## 2018-06-18 DIAGNOSIS — R19.7: ICD-10-CM

## 2018-06-18 DIAGNOSIS — J44.0: ICD-10-CM

## 2018-06-18 DIAGNOSIS — Z99.11: ICD-10-CM

## 2018-06-18 DIAGNOSIS — S51.811A: ICD-10-CM

## 2018-06-18 DIAGNOSIS — J96.00: ICD-10-CM

## 2018-06-18 DIAGNOSIS — E43: ICD-10-CM

## 2018-06-18 DIAGNOSIS — E87.1: ICD-10-CM

## 2018-06-18 DIAGNOSIS — Z87.891: ICD-10-CM

## 2018-06-18 DIAGNOSIS — Z43.3: ICD-10-CM

## 2018-06-18 DIAGNOSIS — L03.116: ICD-10-CM

## 2018-06-18 DIAGNOSIS — J44.9: ICD-10-CM
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
## 2018-06-18 LAB
ALBUMIN SERPL-MCNC: 3 GM/DL (ref 4.2–5.5)
ALBUMIN/GLOB SERPL: 0.7 {RATIO} (ref 1–1.8)
ALP SERPL-CCNC: 81 U/L (ref 34–104)
ALT SERPL-CCNC: 57 U/L (ref 7–52)
ANION GAP SERPL CALC-SCNC: 11.2 MMOL/L (ref 7–16)
AST SERPL-CCNC: 52 U/L (ref 13–39)
BASOPHILS # BLD AUTO: 0.1 TH/CUMM (ref 0–0.2)
BASOPHILS NFR BLD AUTO: 1.4 % (ref 0–2)
BILIRUB SERPL-MCNC: 0.6 MG/DL (ref 0.3–1)
BUN SERPL-MCNC: 10 MG/DL (ref 7–25)
CALCIUM SERPL-MCNC: 9 MG/DL (ref 8.6–10.3)
CHLORIDE SERPL-SCNC: 104 MEQ/L (ref 98–107)
CO2 SERPL-SCNC: 25.7 MEQ/L (ref 21–31)
CREAT SERPL-MCNC: 0.6 MG/DL (ref 0.7–1.3)
EOSINOPHIL # BLD AUTO: 0.1 TH/CMM (ref 0.1–0.4)
EOSINOPHIL NFR BLD AUTO: 0.8 % (ref 0–5)
ERYTHROCYTE [DISTWIDTH] IN BLOOD BY AUTOMATED COUNT: 15.2 % (ref 11.5–20)
GLOBULIN SER-MCNC: 4.1 GM/DL
GLUCOSE SERPL-MCNC: 93 MG/DL (ref 70–105)
HCT VFR BLD CALC: 38.4 % (ref 41–60)
HGB BLD-MCNC: 12.8 GM/DL (ref 12–16)
INR PPP: 1.13 (ref 0.5–1.4)
LYMPHOCYTE AB SER FC-ACNC: 1.4 TH/CMM (ref 1.5–3)
LYMPHOCYTES NFR BLD AUTO: 20.2 % (ref 20–50)
MCH RBC QN AUTO: 29.5 PG (ref 26–30)
MCHC RBC AUTO-ENTMCNC: 33.3 PG (ref 28–36)
MCV RBC AUTO: 88.7 FL (ref 80–99)
MONOCYTES # BLD AUTO: 0.6 TH/CMM (ref 0.3–1)
MONOCYTES NFR BLD AUTO: 9.6 % (ref 2–10)
NEUTROPHILS # BLD: 4.5 TH/CMM (ref 1.8–8)
NEUTROPHILS NFR BLD AUTO: 68 % (ref 40–80)
PLATELET # BLD: 243 TH/CMM (ref 150–400)
PMV BLD AUTO: 8.4 FL
POTASSIUM SERPL-SCNC: 4.9 MEQ/L (ref 3.5–5.1)
PROTHROMBIN TIME: 11.9 SECONDS (ref 9.5–11.5)
RBC # BLD AUTO: 4.33 MIL/CMM (ref 4.3–5.7)
SODIUM SERPL-SCNC: 136 MEQ/L (ref 136–145)
WBC # BLD AUTO: 6.7 TH/CMM (ref 4.8–10.8)

## 2018-06-18 PROCEDURE — 0DNB0ZZ RELEASE ILEUM, OPEN APPROACH: ICD-10-PCS

## 2018-06-18 PROCEDURE — Z7610: HCPCS

## 2018-06-18 PROCEDURE — X6024: HCPCS

## 2018-06-18 PROCEDURE — X5716: HCPCS

## 2018-06-18 PROCEDURE — X6598: HCPCS

## 2018-06-18 PROCEDURE — X6258: HCPCS

## 2018-06-18 PROCEDURE — V2790 AMNIOTIC MEMBRANE: HCPCS

## 2018-06-18 PROCEDURE — C9113 INJ PANTOPRAZOLE SODIUM, VIA: HCPCS

## 2018-06-18 PROCEDURE — 0DBB0ZZ EXCISION OF ILEUM, OPEN APPROACH: ICD-10-PCS

## 2018-06-18 PROCEDURE — 0HB5XZZ EXCISION OF CHEST SKIN, EXTERNAL APPROACH: ICD-10-PCS

## 2018-06-18 RX ADMIN — METRONIDAZOLE SCH MLS/HR: 500 INJECTION, SOLUTION INTRAVENOUS at 20:24

## 2018-06-18 RX ADMIN — HYDROMORPHONE HYDROCHLORIDE PRN MG: 1 INJECTION, SOLUTION INTRAMUSCULAR; INTRAVENOUS; SUBCUTANEOUS at 20:11

## 2018-06-18 NOTE — HISTORY & PHYSICAL
ADMIT DATE:  06/18/2018



CHIEF COMPLAINT:  Revision of colostomy and excision and biopsy of the skin

lesion on the left side of the chest.



HISTORY OF PRESENT ILLNESS:  The patient is a 62-year-old male who was admitted

to [facility] 2 weeks ago with acute laceration of the skin of the right

upper and right lower extremities.  The patient underwent surgical debridement

by Dr. Syed.  The patient transferred to [facility] for

continuation of medication ____.  Also, the patient was diagnosed with a skin

lesion on the left side of the chest wall and the patient requests to reverse

the colostomy, which he had it two years ago.  The patient is scheduled for

surgery on the 06/18/2018 and the patient underwent a revision of the colostomy

and excision and biopsy of the lesion on the left chest wall by Dr. Syed.  The

patient tolerated the surgery well.  He has some pain at the site of surgery. 

No fever, no chills, no nausea, no vomiting.



PAST MEDICAL HISTORY:  Significant for acute perforation of diverticulitis,

status post exploratory laparotomy and diverting colostomy placement.  The

patient has a history of alcohol dependency.



PAST SURGICAL HISTORY:  As mentioned above.



ALLERGIES:  None.



MEDICATIONS:  Follow admission reconciliation.



SOCIAL HISTORY:  No smoking, no drugs.



FAMILY HISTORY:  Noncontributory.



REVIEW OF SYSTEMS:

IMMUNOSYSTEM:  No history of chronic renal disorder.

CARDIOVASCULAR SYSTEM:  No coronary artery disease.

ENDOCRINE SYSTEM:  No diabetes or thyroid problem.

GASTROINTESTINAL SYSTEM:  No upper or lower GI bleeding.

NEUROLOGICAL SYSTEM:  No seizure disorder.

MUSCULOSKELETAL SYSTEM:  No muscular dystrophy.

HEMATOLOGIC SYSTEM:  No bleeding tendencies.

RESPIRATORY SYSTEM:  No asthma.

GENITOURINARY:  No dysuria or hematuria.



PHYSICAL EXAMINATION:

GENERAL:  He is awake, alert, oriented, in no distress.

VITAL SIGNS:  Temperature is 98.4, heart rate 81, and blood pressure 102/74.

HEENT:  Normocephalic.  Pupils reactive to light and accommodation.  Sclerae

clear.

NECK:  Supple.  Negative for lymphadenopathy, JVD, or bruit.

CHEST:  Air bilateral normal.  No rhonchi or wheezing.

HEART:  S1, S2 normal.  No gallop rhythm.

ABDOMEN:  Soft, bowel sounds positive.

EXTREMITIES:  No edema.

NEUROLOGIC:  He is awake, alert, and oriented.  No focal motor or sensory

deficit.  Cranial nerves II through XII is intact.



LABORATORY DATA:  White blood 6.7, hemoglobin 12.8, hematocrit 38.4, and

platelet is 243.  INR 1.13.  Sodium 136, potassium 4.9, BUN 10, and creatinine

0.6.



ASSESSMENT:

1.  Status post revision of diverting colostomy.

2.  Status post excision biopsy of the left-sided chest wall lesion.

3.  History of skin laceration of the right upper and lower extremities.

4.  History of surgical debridement of the right upper and lower extremities,

skin laceration.



PLAN:  The patient admitted to the hospital under Dr. Stephens's service.  Start

him on IV fluid, pain medication, resume his medication and diet.





DD: 06/18/2018 19:37

DT: 06/18/2018 21:03

JOB# [id number]  [id number]

## 2018-06-18 NOTE — DIAGNOSTIC IMAGING REPORT
CHEST X-RAY: AP view



INDICATION: Cough, preop



COMPARISON: 6/11/2018



FINDINGS: Mild chronic lung changes are noted.  There is no focal

consolidation or pleural effusions The heart is normal in size. The

osseous structures demonstrate degenerative changes.



IMPRESSION: 



Mild chronic lung changes and possible COPD.  No focal consolidation

identified.

## 2018-06-19 LAB
ALBUMIN SERPL-MCNC: 2.7 GM/DL (ref 4.2–5.5)
ALBUMIN/GLOB SERPL: 0.8 {RATIO} (ref 1–1.8)
ALP SERPL-CCNC: 66 U/L (ref 34–104)
ALT SERPL-CCNC: 47 U/L (ref 7–52)
ANION GAP SERPL CALC-SCNC: 8.5 MMOL/L (ref 7–16)
AST SERPL-CCNC: 36 U/L (ref 13–39)
BASOPHILS # BLD AUTO: 0 TH/CUMM (ref 0–0.2)
BASOPHILS NFR BLD AUTO: 0.4 % (ref 0–2)
BILIRUB SERPL-MCNC: 0.7 MG/DL (ref 0.3–1)
BUN SERPL-MCNC: 11 MG/DL (ref 7–25)
CALCIUM SERPL-MCNC: 8.5 MG/DL (ref 8.6–10.3)
CHLORIDE SERPL-SCNC: 103 MEQ/L (ref 98–107)
CO2 SERPL-SCNC: 30 MEQ/L (ref 21–31)
CREAT SERPL-MCNC: 0.7 MG/DL (ref 0.7–1.3)
EOSINOPHIL # BLD AUTO: 0 TH/CMM (ref 0.1–0.4)
EOSINOPHIL NFR BLD AUTO: 0.4 % (ref 0–5)
ERYTHROCYTE [DISTWIDTH] IN BLOOD BY AUTOMATED COUNT: 15.3 % (ref 11.5–20)
GLOBULIN SER-MCNC: 3.6 GM/DL
GLUCOSE SERPL-MCNC: 110 MG/DL (ref 70–105)
HCT VFR BLD CALC: 36 % (ref 41–60)
HGB BLD-MCNC: 12.2 GM/DL (ref 12–16)
LYMPHOCYTE AB SER FC-ACNC: 1.5 TH/CMM (ref 1.5–3)
LYMPHOCYTES NFR BLD AUTO: 12.7 % (ref 20–50)
MCH RBC QN AUTO: 30.1 PG (ref 26–30)
MCHC RBC AUTO-ENTMCNC: 33.9 PG (ref 28–36)
MCV RBC AUTO: 88.6 FL (ref 80–99)
MONOCYTES # BLD AUTO: 0.8 TH/CMM (ref 0.3–1)
MONOCYTES NFR BLD AUTO: 6.5 % (ref 2–10)
NEUTROPHILS # BLD: 9.8 TH/CMM (ref 1.8–8)
NEUTROPHILS NFR BLD AUTO: 80 % (ref 40–80)
PLATELET # BLD: 236 TH/CMM (ref 150–400)
PMV BLD AUTO: 8.6 FL
POTASSIUM SERPL-SCNC: 4.5 MEQ/L (ref 3.5–5.1)
RBC # BLD AUTO: 4.07 MIL/CMM (ref 4.3–5.7)
SODIUM SERPL-SCNC: 137 MEQ/L (ref 136–145)
WBC # BLD AUTO: 12.1 TH/CMM (ref 4.8–10.8)

## 2018-06-19 RX ADMIN — ENOXAPARIN SODIUM SCH MG: 100 INJECTION SUBCUTANEOUS at 21:08

## 2018-06-19 RX ADMIN — METRONIDAZOLE SCH MLS/HR: 500 INJECTION, SOLUTION INTRAVENOUS at 22:35

## 2018-06-19 RX ADMIN — METRONIDAZOLE SCH MLS/HR: 500 INJECTION, SOLUTION INTRAVENOUS at 14:12

## 2018-06-19 RX ADMIN — METRONIDAZOLE SCH MLS/HR: 500 INJECTION, SOLUTION INTRAVENOUS at 04:03

## 2018-06-19 NOTE — INTERNAL MEDICINE PROG NOTE
Internal Medicine Subjective





- Subjective


Service Date: 06/19/18


Patient seen and examined:: with staff (HE FEELS BETTER)


Patient is:: awake, verbal, in bed, talking


Per staff patient has:: no adverse event





Internal Medicine Objective





- Results


Result Diagrams: 


 06/19/18 05:20





 06/19/18 05:20


Recent Labs: 


 Laboratory Last Values











WBC  12.1 Th/cmm (4.8-10.8)  H D 06/19/18  05:20    


 


RBC  4.07 Mil/cmm (4.30-5.70)  L  06/19/18  05:20    


 


Hgb  12.2 gm/dL (12-16)   06/19/18  05:20    


 


Hct  36.0 % (41.0-60)  L  06/19/18  05:20    


 


MCV  88.6 fl (80-99)   06/19/18  05:20    


 


MCH  30.1 pg (26.0-30.0)  H  06/19/18  05:20    


 


MCHC Differential  33.9 pg (28.0-36.0)   06/19/18  05:20    


 


RDW  15.3 % (11.5-20.0)   06/19/18  05:20    


 


Plt Count  236 Th/cmm (150-400)   06/19/18  05:20    


 


MPV  8.6 fl  06/19/18  05:20    


 


Neutrophils %  80.0 % (40.0-80.0)   06/19/18  05:20    


 


Lymphocytes %  12.7 % (20.0-50.0)  L  06/19/18  05:20    


 


Monocytes %  6.5 % (2.0-10.0)   06/19/18  05:20    


 


Eosinophils %  0.4 % (0.0-5.0)   06/19/18  05:20    


 


Basophils %  0.4 % (0.0-2.0)   06/19/18  05:20    


 


PT  11.9 SECONDS (9.5-11.5)  H  06/18/18  12:08    


 


INR  1.13  (0.5-1.4)   06/18/18  12:08    


 


PTT (Actin FS)  22.3 SECONDS (26.0-38.0)  L  06/18/18  12:08    


 


Sodium  137 mEq/L (136-145)   06/19/18  05:20    


 


Potassium  4.5 mEq/L (3.5-5.1)   06/19/18  05:20    


 


Chloride  103 mEq/L ()   06/19/18  05:20    


 


Carbon Dioxide  30.0 mEq/L (21.0-31.0)   06/19/18  05:20    


 


Anion Gap  8.5  (7.0-16.0)   06/19/18  05:20    


 


BUN  11 mg/dL (7-25)   06/19/18  05:20    


 


Creatinine  0.7 mg/dL (0.7-1.3)   06/19/18  05:20    


 


Est GFR ( Amer)  > 60.0 ml/min (>90)   06/19/18  05:20    


 


Est GFR (Non-Af Amer)  > 60.0 ml/min  06/19/18  05:20    


 


BUN/Creatinine Ratio  15.7   06/19/18  05:20    


 


Glucose  110 mg/dL ()  H  06/19/18  05:20    


 


Calcium  8.5 mg/dL (8.6-10.3)  L  06/19/18  05:20    


 


Total Bilirubin  0.7 mg/dL (0.3-1.0)   06/19/18  05:20    


 


AST  36 U/L (13-39)   06/19/18  05:20    


 


ALT  47 U/L (7-52)   06/19/18  05:20    


 


Alkaline Phosphatase  66 U/L ()   06/19/18  05:20    


 


Total Protein  6.3 gm/dL (6.0-8.3)   06/19/18  05:20    


 


Albumin  2.7 gm/dL (4.2-5.5)  L  06/19/18  05:20    


 


Globulin  3.6 gm/dL  06/19/18  05:20    


 


Albumin/Globulin Ratio  0.8  (1.0-1.8)  L  06/19/18  05:20    














- Physical Exam


Vitals and I&O: 


 Vital Signs











Temp  97.8 F   06/19/18 11:53


 


Pulse  77   06/19/18 11:53


 


Resp  18   06/19/18 11:53


 


BP  114/75   06/19/18 11:53


 


Pulse Ox  95   06/19/18 11:53








 Intake & Output











 06/19/18 06/19/18 06/20/18





 06:59 18:59 06:59


 


Intake Total 500 850 


 


Output Total 600 300 


 


Balance -100 550 


 


Weight (lbs) 89.176 kg 89.176 kg 


 


Intake:   


 


  Intake, IV Amount 300 50 


 


    Piperacillin Sodium/ 100 50 





    Tazobact 3.375 gm In   





    Sodium Chloride 0.9% 50   





    ml @ 100 mls/hr IV Q8HR   





    [facility] Rx#:[id number]   


 


    metroNIDAZOLE 500mg/  





    100mL 500 mg In 100 ml @   





    100 mls/hr IV Q8HR [facility] Rx   





    #:[id number]   


 


  Oral 200 800 


 


Output:   


 


  Urine 600 300 


 


Other:   


 


  # Bowel Movements 0  


 


  Stool Characteristics Liquid  


 


  Weight Source Bedscale Bedscale 











Active Medications: 


Current Medications





Hydromorphone HCl (Dilaudid)  1 mg IVP Q3HR PRN


   PRN Reason: SEVERE PAIN


   Stop: 08/17/18 16:25


   Last Admin: 06/18/18 20:11 Dose:  1 mg


Dextrose/Sodium Chloride (D5-0.9%Ns)  1,000 mls @ 100 mls/hr IV .Q10H [facility]


   Stop: 08/17/18 16:29


Piperacillin Sod/Tazobactam (Sod 3.375 gm/ Sodium Chloride)  50 mls @ 100 mls/

hr IV Q8HR [facility]


   Stop: 08/17/18 20:59


   Last Infusion: 06/19/18 13:33 Dose:  Infused


Metronidazole (Flagyl)  500 mg in 100 mls @ 100 mls/hr IV Q8HR [facility]


   Stop: 08/17/18 20:59


   Last Admin: 06/19/18 14:12 Dose:  100 mls/hr


Ketorolac Tromethamine (Toradol)  30 mg IVP Q6HR PRN


   PRN Reason: Pain (Mild)


   Stop: 06/23/18 16:26


   Last Admin: 06/19/18 15:40 Dose:  30 mg








General: alert


HEENT: NC/AT, PERRLA, EOMI, anicteric sclerae, throat clear


Neck: Supple, No JVD, No thyromegaly, +2 carotid pulse wo bruit, No LAD


Lungs: CTAB


Cardiovascular: RRR, Normal S1, Normal S2, without murmur


Abdomen: tender


Extremities: clear


Neurological: no change





- Procedures


Procedures: 


 Procedures











Procedure Code Date


 


EXCISION OF RIGHT LOWER ARM SKIN, EXTERNAL APPROACH 0HBDXZZ 06/09/18


 


EXCISION OF RIGHT LOWER LEG SKIN, EXTERNAL APPROACH 0HBKXZZ 06/09/18














Internal Medicine Assmt/Plan





- Assessment


Assessment: 





1.SP COLOSTOMY REVISION.


2.SP EXCISION BIOPSY FOR CHEST WALL LESION.





- Plan


Plan: 





CONTINUE ON CURRENT MEDICATION AND DIET.

## 2018-06-19 NOTE — OPERATIVE REPORT
DATE OF SURGERY:     06/18/2018



PREOPERATIVE DIAGNOSES:  

1.  Diverting ileostomy.

2.  Mole left chest.

3.  Lacerations right lower leg and forearm.



POSTOPERATIVE DIAGNOSES:

1.  Diverting ileostomy.

2.  Mole left chest.

3.  Lacerations right lower leg and forearm.



OPERATION DONE:  Exploratory laparotomy with:

1.  Resection of a diverting ileostomy.

2.  Ileocolic anastomosis.

3.  Lysis of dense adhesions.

4.  Excision of mole, left chest.



SURGEON:  Dr. Syed.



ANESTHESIA:  General.



ANESTHESIOLOGIST:  Renetta Carlin M.D.



INDICATIONS FOR SURGERY:  The patient with diverting colostomy per his

information, but actually was an ileostomy.  The patient also has a growing mole

in the left chest, which has darkened, raising the suspicion for malignancy.



OPERATIVE FINDINGS:  What was thought to be a diverting colostomy was in fact

distal ileum with stoma in the left lower quadrant.  The whole large bowel was

traced.  No mass was noted and no pathology except a dilated cecum with feces

that were soft. Rest of abdominal exploration was unremarkable except for dense

adhesions of the small bowel. The liver is chirrhotic.



DESCRIPTION OF PROCEDURE:  The patient was given general anesthesia.  The

abdomen was prepped with Betadine and draped in appropriate manner.  A midline

incision was made.  Bleeders were electrocoagulated.  The incision was extended

superiorly to determine the course of the whole colon which was traced all the

way from the cecum to the distal sigmoid.  The small bowel also was traced from

the ligament of Treitz.  There were adhesions along the course and these were

lysed.  Finally, the terminal ileum was in fact placed as a diverting ileostomy

in the left lower quadrant of the abdomen.



Following satisfactory removal of all adhesions, the whole small bowel was

straightened out.  The distal ileum was transected at the level of the abdominal

wall and the cecum was identified. A side-to-side anastomosis was then performed

between the ileum and the cecum utilizing a NILSA instrument. There was a culture

taken of the stool in the colon. 



The anastomosis was reinforced with a 3-0 silk and the mesentery was closed with

same material. Following satisfactory hemostasis, a Sylvester drain was placed in

the pelvis and in the right lateral gutter. The stoma of the ileostomy was

removed and the fascial defect was closed with #1 Vicryl. The incision was then

closed with a running suture of #1 PDS from above and below meeting at the

middle.  This was then tied together. The skin was closed with staples.  The

ileostomy stoma was closed with 3-0 Vicryl subcutaneously and the skin with

staples.



The left chest lesion was prepped with Betadine and draped.  Wide excision was

done to allow for margins about 6 mm in width.  The lesion was sent for

histologic examination.  The skin was closed with 4-0 nylon.  Sterile dressing

placed over this.  The patient tolerated the procedure well.





DD: 06/19/2018 10:52

DT: 06/19/2018 16:13

JOB# [id number]  [id number]

[email or web address]

## 2018-06-19 NOTE — GENERAL PROGRESS NOTE
Subjective





- Review of Systems


Service Date: 06/19/18


Events since last encounter: 





labs noted


moderate pain


Sylvester  80 cc, serous


may have ice chips


bladder train and DC watts





Objective





- Results


Result Diagrams: 


 06/19/18 05:20





 06/19/18 05:20


Recent Labs: 


 Laboratory Last Values











WBC  12.1 Th/cmm (4.8-10.8)  H D 06/19/18  05:20    


 


RBC  4.07 Mil/cmm (4.30-5.70)  L  06/19/18  05:20    


 


Hgb  12.2 gm/dL (12-16)   06/19/18  05:20    


 


Hct  36.0 % (41.0-60)  L  06/19/18  05:20    


 


MCV  88.6 fl (80-99)   06/19/18  05:20    


 


MCH  30.1 pg (26.0-30.0)  H  06/19/18  05:20    


 


MCHC Differential  33.9 pg (28.0-36.0)   06/19/18  05:20    


 


RDW  15.3 % (11.5-20.0)   06/19/18  05:20    


 


Plt Count  236 Th/cmm (150-400)   06/19/18  05:20    


 


MPV  8.6 fl  06/19/18  05:20    


 


Neutrophils %  80.0 % (40.0-80.0)   06/19/18  05:20    


 


Lymphocytes %  12.7 % (20.0-50.0)  L  06/19/18  05:20    


 


Monocytes %  6.5 % (2.0-10.0)   06/19/18  05:20    


 


Eosinophils %  0.4 % (0.0-5.0)   06/19/18  05:20    


 


Basophils %  0.4 % (0.0-2.0)   06/19/18  05:20    


 


PT  11.9 SECONDS (9.5-11.5)  H  06/18/18  12:08    


 


INR  1.13  (0.5-1.4)   06/18/18  12:08    


 


PTT (Actin FS)  22.3 SECONDS (26.0-38.0)  L  06/18/18  12:08    


 


Sodium  137 mEq/L (136-145)   06/19/18  05:20    


 


Potassium  4.5 mEq/L (3.5-5.1)   06/19/18  05:20    


 


Chloride  103 mEq/L ()   06/19/18  05:20    


 


Carbon Dioxide  30.0 mEq/L (21.0-31.0)   06/19/18  05:20    


 


Anion Gap  8.5  (7.0-16.0)   06/19/18  05:20    


 


BUN  11 mg/dL (7-25)   06/19/18  05:20    


 


Creatinine  0.7 mg/dL (0.7-1.3)   06/19/18  05:20    


 


Est GFR ( Amer)  > 60.0 ml/min (>90)   06/19/18  05:20    


 


Est GFR (Non-Af Amer)  > 60.0 ml/min  06/19/18  05:20    


 


BUN/Creatinine Ratio  15.7   06/19/18  05:20    


 


Glucose  110 mg/dL ()  H  06/19/18  05:20    


 


Calcium  8.5 mg/dL (8.6-10.3)  L  06/19/18  05:20    


 


Total Bilirubin  0.7 mg/dL (0.3-1.0)   06/19/18  05:20    


 


AST  36 U/L (13-39)   06/19/18  05:20    


 


ALT  47 U/L (7-52)   06/19/18  05:20    


 


Alkaline Phosphatase  66 U/L ()   06/19/18  05:20    


 


Total Protein  6.3 gm/dL (6.0-8.3)   06/19/18  05:20    


 


Albumin  2.7 gm/dL (4.2-5.5)  L  06/19/18  05:20    


 


Globulin  3.6 gm/dL  06/19/18  05:20    


 


Albumin/Globulin Ratio  0.8  (1.0-1.8)  L  06/19/18  05:20    














- Physical Exam


Vitals and I&O: 


 Vital Signs











Temp  97.6 F   06/19/18 07:44


 


Pulse  60   06/19/18 08:08


 


Resp  16   06/19/18 08:08


 


BP  99/67   06/19/18 07:44


 


Pulse Ox  95   06/19/18 08:08








 Intake & Output











 06/18/18 06/19/18 06/19/18





 18:59 06:59 18:59


 


Intake Total  350 


 


Output Total 200 600 


 


Balance -200 -250 


 


Weight (lbs) 83.007 kg 89.176 kg 


 


Intake:   


 


  Intake, IV Amount  150 


 


    Piperacillin Sodium/  50 





    Tazobact 3.375 gm In   





    Sodium Chloride 0.9% 50   





    ml @ 100 mls/hr IV Q8HR   





    [facility] Rx#:[id number]   


 


    metroNIDAZOLE 500mg/NS  100 





    100mL 500 mg In 100 ml @   





    100 mls/hr IV Q8HR [facility] Rx   





    #:[id number]   


 


  Oral  200 


 


Output:   


 


  Urine 200 600 


 


Other:   


 


  # Bowel Movements 0 0 


 


  Stool Characteristics Liquid Liquid 


 


  Weight Source Bedscale Bedscale 











Active Medications: 


Current Medications





Hydromorphone HCl (Dilaudid)  1 mg IVP Q3HR PRN


   PRN Reason: SEVERE PAIN


   Stop: 08/17/18 16:25


   Last Admin: 06/18/18 20:11 Dose:  1 mg


Dextrose/Sodium Chloride (D5-0.9%Ns)  1,000 mls @ 100 mls/hr IV .Q10H [facility]


   Stop: 08/17/18 16:29


Piperacillin Sod/Tazobactam (Sod 3.375 gm/ Sodium Chloride)  50 mls @ 100 mls/

hr IV Q8HR [facility]


   Stop: 08/17/18 20:59


   Last Admin: 06/19/18 05:32 Dose:  100 mls/hr


Metronidazole (Flagyl)  500 mg in 100 mls @ 100 mls/hr IV Q8HR [facility]


   Stop: 08/17/18 20:59


   Last Admin: 06/19/18 04:03 Dose:  100 mls/hr


Ketorolac Tromethamine (Toradol)  30 mg IVP Q6HR PRN


   PRN Reason: Pain (Mild)


   Stop: 06/23/18 16:26


   Last Admin: 06/19/18 01:16 Dose:  30 mg











- Procedures


Procedures: 


 Procedures











Procedure Code Date


 


EXCISION OF RIGHT LOWER ARM SKIN, EXTERNAL APPROACH 0HBDXZZ 06/09/18


 


EXCISION OF RIGHT LOWER LEG SKIN, EXTERNAL APPROACH 0HBKXZZ 06/09/18

## 2018-06-19 NOTE — OPERATIVE REPORT
DATE OF SURGERY:     06/11/2018



PREOPERATIVE DIAGNOSES:

1.  Laceration, right leg.

2.  Abrasions, right forearm.

3.  Status post diverting ileostomy.



POSTOPERATIVE DIAGNOSES:  

1.  Laceration, right leg.

2.  Abrasions, right forearm.

3.  Status post diverting ileostomy.



OPERATION DONE:

1.  Debridement of right leg laceration with primary closure

2.  Debridement of right forearm abrasions.



SIZE OF LESIONS:

1.  Right leg anterior lateral aspect, 10 cm with a flap.

2.  Abrasion, right forearm stage abrading the epidermis, size, distal is 3 x 2

cm and more proximal in the forearm, 2 x 3 cm. 



ANESTHESIA:  MAC.



ANESTHESIOLOGIST: Renetta Carlin M.D.



SURGEON:  Miguel Syed M.D. 



PROCEDURE:  The patient was given IV sedation.  The right leg was prepped with

Betadine and draped.  Debridement was carried out of necrotic tissues. 

Following satisfactory hemostasis and determination of viability of the flap,

the wound was closed with running suture of 4-0 nylon.  Sterile dressing placed

over this.



The right forearm was prepped with Betadine and the scales and scabs were

completely removed.  This is about 10 days old.  Antibiotic ointment was then

placed over the wounds.  The patient tolerated the procedure.





DD: 06/19/2018 10:44

DT: 06/19/2018 15:24

JOB# [id number]  [id number]

[email or web address]

## 2018-06-20 LAB
ALBUMIN SERPL-MCNC: 2.5 GM/DL (ref 4.2–5.5)
ALBUMIN/GLOB SERPL: 0.8 {RATIO} (ref 1–1.8)
ALP SERPL-CCNC: 56 U/L (ref 34–104)
ALT SERPL-CCNC: 31 U/L (ref 7–52)
ANION GAP SERPL CALC-SCNC: 7.8 MMOL/L (ref 7–16)
AST SERPL-CCNC: 24 U/L (ref 13–39)
BASOPHILS # BLD AUTO: 0.1 TH/CUMM (ref 0–0.2)
BASOPHILS NFR BLD AUTO: 1.2 % (ref 0–2)
BILIRUB SERPL-MCNC: 0.9 MG/DL (ref 0.3–1)
BUN SERPL-MCNC: 8 MG/DL (ref 7–25)
CALCIUM SERPL-MCNC: 7.9 MG/DL (ref 8.6–10.3)
CHLORIDE SERPL-SCNC: 104 MEQ/L (ref 98–107)
CO2 SERPL-SCNC: 24.6 MEQ/L (ref 21–31)
CREAT SERPL-MCNC: 0.7 MG/DL (ref 0.7–1.3)
EOSINOPHIL # BLD AUTO: 0.1 TH/CMM (ref 0.1–0.4)
EOSINOPHIL NFR BLD AUTO: 0.9 % (ref 0–5)
ERYTHROCYTE [DISTWIDTH] IN BLOOD BY AUTOMATED COUNT: 15.2 % (ref 11.5–20)
GLOBULIN SER-MCNC: 3.2 GM/DL
GLUCOSE SERPL-MCNC: 107 MG/DL (ref 70–105)
HCT VFR BLD CALC: 32.6 % (ref 41–60)
HGB BLD-MCNC: 10.9 GM/DL (ref 12–16)
LYMPHOCYTE AB SER FC-ACNC: 1.1 TH/CMM (ref 1.5–3)
LYMPHOCYTES NFR BLD AUTO: 13 % (ref 20–50)
MCH RBC QN AUTO: 29.9 PG (ref 26–30)
MCHC RBC AUTO-ENTMCNC: 33.6 PG (ref 28–36)
MCV RBC AUTO: 89 FL (ref 80–99)
MONOCYTES # BLD AUTO: 0.7 TH/CMM (ref 0.3–1)
MONOCYTES NFR BLD AUTO: 9.1 % (ref 2–10)
NEUTROPHILS # BLD: 6.2 TH/CMM (ref 1.8–8)
NEUTROPHILS NFR BLD AUTO: 75.8 % (ref 40–80)
PLATELET # BLD: 178 TH/CMM (ref 150–400)
PMV BLD AUTO: 8.3 FL
POTASSIUM SERPL-SCNC: 3.4 MEQ/L (ref 3.5–5.1)
RBC # BLD AUTO: 3.66 MIL/CMM (ref 4.3–5.7)
SODIUM SERPL-SCNC: 133 MEQ/L (ref 136–145)
WBC # BLD AUTO: 8.2 TH/CMM (ref 4.8–10.8)

## 2018-06-20 RX ADMIN — DEXTROSE AND SODIUM CHLORIDE SCH MLS/HR: 5; .9 INJECTION, SOLUTION INTRAVENOUS at 15:41

## 2018-06-20 RX ADMIN — HYDROCODONE BITATRATE AND ACETAMINOPHEN PRN TAB: 10; 325 TABLET ORAL at 17:23

## 2018-06-20 RX ADMIN — ENOXAPARIN SODIUM SCH MG: 100 INJECTION SUBCUTANEOUS at 09:18

## 2018-06-20 RX ADMIN — METRONIDAZOLE SCH MLS/HR: 500 INJECTION, SOLUTION INTRAVENOUS at 20:14

## 2018-06-20 RX ADMIN — DEXTROSE AND SODIUM CHLORIDE SCH MLS/HR: 5; .9 INJECTION, SOLUTION INTRAVENOUS at 04:38

## 2018-06-20 RX ADMIN — METRONIDAZOLE SCH MLS/HR: 500 INJECTION, SOLUTION INTRAVENOUS at 12:59

## 2018-06-20 RX ADMIN — METRONIDAZOLE SCH MLS/HR: 500 INJECTION, SOLUTION INTRAVENOUS at 06:04

## 2018-06-20 RX ADMIN — ENOXAPARIN SODIUM SCH MG: 100 INJECTION SUBCUTANEOUS at 20:14

## 2018-06-20 RX ADMIN — HYDROMORPHONE HYDROCHLORIDE PRN MG: 1 INJECTION, SOLUTION INTRAMUSCULAR; INTRAVENOUS; SUBCUTANEOUS at 04:39

## 2018-06-20 NOTE — INTERNAL MEDICINE PROG NOTE
Internal Medicine Subjective





- Subjective


Service Date: 18


Patient seen and examined:: with staff (he feels better,less pain)


Patient is:: awake, verbal, in bed, talking


Per staff patient has:: no adverse event





Internal Medicine Objective





- Results


Result Diagrams: 


 18 05:45





 18 05:45


Recent Labs: 


 Laboratory Last Values











WBC  8.2 Th/cmm (4.8-10.8)   18  05:45    


 


RBC  3.66 Mil/cmm (4.30-5.70)  L  18  05:45    


 


Hgb  10.9 gm/dL (12-16)  L  18  05:45    


 


Hct  32.6 % (41.0-60)  L  18  05:45    


 


MCV  89.0 fl (80-99)   18  05:45    


 


MCH  29.9 pg (26.0-30.0)   18  05:45    


 


MCHC Differential  33.6 pg (28.0-36.0)   18  05:45    


 


RDW  15.2 % (11.5-20.0)   18  05:45    


 


Plt Count  178 Th/cmm (150-400)   18  05:45    


 


MPV  8.3 fl  18  05:45    


 


Neutrophils %  75.8 % (40.0-80.0)   18  05:45    


 


Lymphocytes %  13.0 % (20.0-50.0)  L  18  05:45    


 


Monocytes %  9.1 % (2.0-10.0)   18  05:45    


 


Eosinophils %  0.9 % (0.0-5.0)   18  05:45    


 


Basophils %  1.2 % (0.0-2.0)   18  05:45    


 


PT  11.9 SECONDS (9.5-11.5)  H  18  12:08    


 


INR  1.13  (0.5-1.4)   18  12:08    


 


PTT (Actin FS)  22.3 SECONDS (26.0-38.0)  L  18  12:08    


 


Sodium  133 mEq/L (136-145)  L  18  05:45    


 


Potassium  3.4 mEq/L (3.5-5.1)  L  18  05:45    


 


Chloride  104 mEq/L ()   18  05:45    


 


Carbon Dioxide  24.6 mEq/L (21.0-31.0)   18  05:45    


 


Anion Gap  7.8  (7.0-16.0)   18  05:45    


 


BUN  8 mg/dL (7-25)   18  05:45    


 


Creatinine  0.7 mg/dL (0.7-1.3)   18  05:45    


 


Est GFR ( Amer)  > 60.0 ml/min (>90)   18  05:45    


 


Est GFR (Non-Af Amer)  > 60.0 ml/min  18  05:45    


 


BUN/Creatinine Ratio  11.4   18  05:45    


 


Glucose  107 mg/dL ()  H  18  05:45    


 


Calcium  7.9 mg/dL (8.6-10.3)  L  18  05:45    


 


Total Bilirubin  0.9 mg/dL (0.3-1.0)   18  05:45    


 


AST  24 U/L (13-39)   18  05:45    


 


ALT  31 U/L (7-52)   18  05:45    


 


Alkaline Phosphatase  56 U/L ()   18  05:45    


 


Total Protein  5.7 gm/dL (6.0-8.3)  L  18  05:45    


 


Albumin  2.5 gm/dL (4.2-5.5)  L  18  05:45    


 


Globulin  3.2 gm/dL  18  05:45    


 


Albumin/Globulin Ratio  0.8  (1.0-1.8)  L  18  05:45    














- Physical Exam


Vitals and I&O: 


 Vital Signs











Temp  99.1 F   18 20:00


 


Pulse  80   18 20:00


 


Resp  20   18 20:00


 


BP  128/77   18 20:00


 


Pulse Ox  95   18 20:00








 Intake & Output











 06/20/18 06/20/18 06/21/18





 06:59 18:59 06:59


 


Intake Total 200 1250 


 


Output Total 460  150


 


Balance -260 1250 -150


 


Weight (lbs) 86.636 kg  78.925 kg


 


Intake:   


 


  Intake, IV Amount 200 1250 


 


    D5-0.9%Ns 1,000 ml @ 100  1000 





    mls/hr IV .Q10H [facility] Rx#:   





    [id number]   


 


    Piperacillin Sodium/ 100 50 





    Tazobact 3.375 gm In   





    Sodium Chloride 0.9% 50   





    ml @ 100 mls/hr IV Q8HR   





    [facility] Rx#:[id number]   


 


    metroNIDAZOLE 500mg/ 200 





    100mL 500 mg In 100 ml @   





    100 mls/hr IV Q8HR [facility] Rx   





    #:[id number]   


 


Output:   


 


  Drainage 460  150


 


    Right Lower Abdomen 460  150


 


Other:   


 


  Weight Source Bedscale  Estimated











Active Medications: 


Current Medications





Acetaminophen/Hydrocodone Bitart (Norco 10 Mg/325 Mg)  1 tab PO Q6H PRN


   PRN Reason: Pain (Severe)


   Stop: 18 20:04


   Last Admin: 18 17:23 Dose:  1 tab


Enoxaparin Sodium (Lovenox)  30 mg SUBQ Q12HR [facility]


   Stop: 18 20:59


   Last Admin: 18 20:14 Dose:  30 mg


Hydromorphone HCl (Dilaudid)  1 mg IVP Q3HR PRN


   PRN Reason: SEVERE PAIN


   Stop: 18 16:25


   Last Admin: 18 04:39 Dose:  1 mg


Dextrose/Sodium Chloride (D5-0.9%Ns)  1,000 mls @ 100 mls/hr IV .Q10H [facility]


   Stop: 18 16:29


   Last Admin: 18 15:41 Dose:  100 mls/hr


Piperacillin Sod/Tazobactam (Sod 3.375 gm/ Sodium Chloride)  50 mls @ 100 mls/

hr IV Q8HR [facility]


   Stop: 18 20:59


   Last Admin: 18 21:24 Dose:  100 mls/hr


Metronidazole (Flagyl)  500 mg in 100 mls @ 100 mls/hr IV Q8HR [facility]


   Stop: 18 20:59


   Last Admin: 18 20:14 Dose:  100 mls/hr


Vancomycin HCl 1.5 gm/ Sodium (Chloride)  500 mls @ 250 mls/hr IV Q12HR@0900,

2100 [facility]


   Stop: 18 09:59


   Last Admin: 18 12:51 Dose:  250 mls/hr


Ketorolac Tromethamine (Toradol)  30 mg IVP Q6HR PRN


   PRN Reason: Pain (Mild)


   Stop: 18 16:26


   Last Admin: 18 15:40 Dose:  30 mg


Miscellaneous (Vancomycin Iv Per Pharmacy)  1 ea [facility] DAILY [facility]


   Stop: 18 09:59


   Last Admin: 18 13:04 Dose:  1 ea








General: alert


HEENT: NC/AT, PERRLA, EOMI, anicteric sclerae, throat clear


Neck: Supple, No JVD, No thyromegaly, +2 carotid pulse wo bruit, No LAD


Lungs: CTAB


Cardiovascular: RRR, Normal S1, Normal S2, without murmur


Abdomen: tender


Extremities: clear


Neurological: no change





- Procedures


Procedures: 


 Procedures











Procedure Code Date


 


EXCISION OF CHEST SKIN, EXTERNAL APPROACH [id number] 18


 


EXCISION OF ILEUM, OPEN APPROACH [id number] 18


 


EXCISION OF RIGHT LOWER ARM SKIN, EXTERNAL APPROACH [id number] 18


 


EXCISION OF RIGHT LOWER LEG SKIN, EXTERNAL APPROACH [id number] 18


 


RELEASE ILEUM, OPEN APPROACH [id number] 18














Internal Medicine Assmt/Plan





- Assessment


Assessment: 





1.SP COLOSTOMY REVISION.


2.SP EXCISION BIOPSY FOR CHEST WALL LESION.





- Plan


Plan: 





CONTINUE ON CURRENT MEDICATION AND DIET.





Nutritional Asmnt/Malnutr-PDOC





- Dietary Evaluation


Malnutrition Findings (Please click <Entered> for more info): 








Nutritional Asmnt/Malnutrition                             Start:  18 14:

24


Text:                                                      Status: Active      

  


Freq:                                                                          

  


Protocol:                                                                      

  


 Document     18 14:24  KAYE  (Rec: 18 14:35  KAYE [email or web address])


 Nutritional Asmnt/Malnutrition


     Patient General Information


      Nutritional Screening                      High Risk


                                                 Consult


      Diagnosis                                  exploratory laparotomy with


                                                 recersal colostomy


      Pertinent Medical Hx/Surgical Hx           acute perforation of


                                                 diverticulitis, s/p


                                                 exploratory laparotomy and


                                                 divertin gcolostomy placement


      Subjective Information                     Pt seen lying in bed at time


                                                 of visit, awake and alert. Pt


                                                 stated he tolerated clear


                                                 liquid well, adequte amount


                                                 for him at this time. Pt has


                                                 RAQUEL drain noted. Per EMR, PO


                                                 intake % of clear liquid


                                                 .


      Current Diet Order/ Nutrition Support      clear liquid


      Pertinent Medications                      D5-0.9%ns, piperacillin,


                                                 vancomycin


      Pertinent Labs                              Na 133, K 3.4, Cr 0.7,


                                                 glucose 107, Ca 7.9


     Nutritional Hx/Data


      Height                                     1.88 m


      Height (Calculated Centimeters)            188.0


      Current Weight (lbs)                       86.636 kg


      Weight (Calculated Kilograms)              86.6


      Weight (Calculated Grams)                  21683.1


      Ideal Body Weight                          190


      Body Mass Index (BMI)                      24.5


      Weight Status                              Approriate


     GI Symptoms


      GI Symptoms                                None


      Last BM                                    none


      Difficult in:                              None


      Skin Integrity/Comment:                    RIGHT LOWER LEG HEALING


                                                 incision to right arm


     Estimated Nutritional Goals


      BEE in Kcals:                              Using Current wt


      Calories/Kcals/Kg                          25-30


      Kcals Calculated                           2235-6964


      Protein:                                   Using Current wt


      Protein g/k.8-1


      Protein Calculated                         69-86


      Fluid: ml                                  2150-2580ml (1ml/kcal)


     Nutritional Problem


      1. Problem


       Problem                                   altered GI function


       Etiology                                  s/p reversal colostomy


       Signs/Symptoms:                           pt on clear liquid


     Intervention/Recommendation


      Comments                                   1. Continue with clear liquid


                                                 diet as ordered.


                                                 2. Monitor PO intake, wt, labs


                                                 and skin integrity


                                                 3. F/U as high risk in 2-3


                                                 days, -


     Expected Outcomes/Goals


      Expected Outcomes/Goals                    1. PO intake to meet at least


                                                 75% of nutritional needs.


                                                 2. Wt stability, skin to


                                                 remain intact, labs to


                                                 approach WNL.

## 2018-06-20 NOTE — GENERAL PROGRESS NOTE
Subjective





- Review of Systems


Service Date: 06/20/18


Events since last encounter: 





labs ok


had a lot of ascites drainage


cont liquids, oob to chair





Objective





- Results


Result Diagrams: 


 06/20/18 05:45





 06/20/18 05:45


Recent Labs: 


 Laboratory Last Values











WBC  8.2 Th/cmm (4.8-10.8)   06/20/18  05:45    


 


RBC  3.66 Mil/cmm (4.30-5.70)  L  06/20/18  05:45    


 


Hgb  10.9 gm/dL (12-16)  L  06/20/18  05:45    


 


Hct  32.6 % (41.0-60)  L  06/20/18  05:45    


 


MCV  89.0 fl (80-99)   06/20/18  05:45    


 


MCH  29.9 pg (26.0-30.0)   06/20/18  05:45    


 


MCHC Differential  33.6 pg (28.0-36.0)   06/20/18  05:45    


 


RDW  15.2 % (11.5-20.0)   06/20/18  05:45    


 


Plt Count  178 Th/cmm (150-400)   06/20/18  05:45    


 


MPV  8.3 fl  06/20/18  05:45    


 


Neutrophils %  75.8 % (40.0-80.0)   06/20/18  05:45    


 


Lymphocytes %  13.0 % (20.0-50.0)  L  06/20/18  05:45    


 


Monocytes %  9.1 % (2.0-10.0)   06/20/18  05:45    


 


Eosinophils %  0.9 % (0.0-5.0)   06/20/18  05:45    


 


Basophils %  1.2 % (0.0-2.0)   06/20/18  05:45    


 


PT  11.9 SECONDS (9.5-11.5)  H  06/18/18  12:08    


 


INR  1.13  (0.5-1.4)   06/18/18  12:08    


 


PTT (Actin FS)  22.3 SECONDS (26.0-38.0)  L  06/18/18  12:08    


 


Sodium  133 mEq/L (136-145)  L  06/20/18  05:45    


 


Potassium  3.4 mEq/L (3.5-5.1)  L  06/20/18  05:45    


 


Chloride  104 mEq/L ()   06/20/18  05:45    


 


Carbon Dioxide  24.6 mEq/L (21.0-31.0)   06/20/18  05:45    


 


Anion Gap  7.8  (7.0-16.0)   06/20/18  05:45    


 


BUN  8 mg/dL (7-25)   06/20/18  05:45    


 


Creatinine  0.7 mg/dL (0.7-1.3)   06/20/18  05:45    


 


Est GFR ( Amer)  > 60.0 ml/min (>90)   06/20/18  05:45    


 


Est GFR (Non-Af Amer)  > 60.0 ml/min  06/20/18  05:45    


 


BUN/Creatinine Ratio  11.4   06/20/18  05:45    


 


Glucose  107 mg/dL ()  H  06/20/18  05:45    


 


Calcium  7.9 mg/dL (8.6-10.3)  L  06/20/18  05:45    


 


Total Bilirubin  0.9 mg/dL (0.3-1.0)   06/20/18  05:45    


 


AST  24 U/L (13-39)   06/20/18  05:45    


 


ALT  31 U/L (7-52)   06/20/18  05:45    


 


Alkaline Phosphatase  56 U/L ()   06/20/18  05:45    


 


Total Protein  5.7 gm/dL (6.0-8.3)  L  06/20/18  05:45    


 


Albumin  2.5 gm/dL (4.2-5.5)  L  06/20/18  05:45    


 


Globulin  3.2 gm/dL  06/20/18  05:45    


 


Albumin/Globulin Ratio  0.8  (1.0-1.8)  L  06/20/18  05:45    














- Physical Exam


Vitals and I&O: 


 Vital Signs











Temp  98.7 F   06/19/18 23:00


 


Pulse  67   06/19/18 23:00


 


Resp  16   06/19/18 23:00


 


BP  116/78   06/19/18 23:00


 


Pulse Ox  94   06/19/18 23:00








 Intake & Output











 06/19/18 06/20/18 06/20/18





 18:59 06:59 18:59


 


Intake Total 950 150 


 


Output Total 300 460 


 


Balance 650 -310 


 


Weight (lbs) 89.176 kg 86.636 kg 


 


Intake:   


 


  Intake, IV Amount 150 150 


 


    Piperacillin Sodium/ 50 50 





    Tazobact 3.375 gm In   





    Sodium Chloride 0.9% 50   





    ml @ 100 mls/hr IV Q8HR   





    [facility] Rx#:[id number]   


 


    metroNIDAZOLE 500mg/ 100 





    100mL 500 mg In 100 ml @   





    100 mls/hr IV Q8HR [facility] Rx   





    #:[id number]   


 


  Oral 800  


 


Output:   


 


  Drainage  460 


 


    Right Lower Abdomen  460 


 


  Urine 300  


 


Other:   


 


  Weight Source Bedscale Bedscale 











Active Medications: 


Current Medications





Acetaminophen/Hydrocodone Bitart (Norco 10 Mg/325 Mg)  1 tab PO Q6H PRN


   PRN Reason: Pain (Severe)


   Stop: 08/18/18 20:04


Enoxaparin Sodium (Lovenox)  30 mg SUBQ Q12HR [facility]


   Stop: 08/18/18 20:59


   Last Admin: 06/19/18 21:08 Dose:  30 mg


Hydromorphone HCl (Dilaudid)  1 mg IVP Q3HR PRN


   PRN Reason: SEVERE PAIN


   Stop: 08/17/18 16:25


   Last Admin: 06/20/18 04:39 Dose:  1 mg


Dextrose/Sodium Chloride (D5-0.9%Ns)  1,000 mls @ 100 mls/hr IV .Q10H [facility]


   Stop: 08/17/18 16:29


   Last Admin: 06/20/18 04:38 Dose:  100 mls/hr


Piperacillin Sod/Tazobactam (Sod 3.375 gm/ Sodium Chloride)  50 mls @ 100 mls/

hr IV Q8HR [facility]


   Stop: 08/17/18 20:59


   Last Admin: 06/20/18 04:39 Dose:  100 mls/hr


Metronidazole (Flagyl)  500 mg in 100 mls @ 100 mls/hr IV Q8HR [facility]


   Stop: 08/17/18 20:59


   Last Admin: 06/20/18 06:04 Dose:  100 mls/hr


Ketorolac Tromethamine (Toradol)  30 mg IVP Q6HR PRN


   PRN Reason: Pain (Mild)


   Stop: 06/23/18 16:26


   Last Admin: 06/19/18 15:40 Dose:  30 mg


Miscellaneous (Vancomycin Hcl [Vancomycin])  1 gm IV Q12H [facility]


   Stop: 08/18/18 20:14











- Procedures


Procedures: 


 Procedures











Procedure Code Date


 


EXCISION OF RIGHT LOWER ARM SKIN, EXTERNAL APPROACH [id number] 06/09/18


 


EXCISION OF RIGHT LOWER LEG SKIN, EXTERNAL APPROACH [id number] 06/09/18

## 2018-06-21 LAB
ALBUMIN SERPL-MCNC: 2.5 GM/DL (ref 4.2–5.5)
ALBUMIN/GLOB SERPL: 0.8 {RATIO} (ref 1–1.8)
ALP SERPL-CCNC: 53 U/L (ref 34–104)
ALT SERPL-CCNC: 22 U/L (ref 7–52)
ANION GAP SERPL CALC-SCNC: 8.3 MMOL/L (ref 7–16)
AST SERPL-CCNC: 17 U/L (ref 13–39)
BASOPHILS # BLD AUTO: 0 TH/CUMM (ref 0–0.2)
BASOPHILS NFR BLD AUTO: 0 % (ref 0–2)
BILIRUB SERPL-MCNC: 0.8 MG/DL (ref 0.3–1)
BUN SERPL-MCNC: 5 MG/DL (ref 7–25)
CALCIUM SERPL-MCNC: 7.9 MG/DL (ref 8.6–10.3)
CHLORIDE SERPL-SCNC: 104 MEQ/L (ref 98–107)
CO2 SERPL-SCNC: 24.9 MEQ/L (ref 21–31)
CREAT SERPL-MCNC: 0.6 MG/DL (ref 0.7–1.3)
EOSINOPHIL # BLD AUTO: 0.1 TH/CMM (ref 0.1–0.4)
EOSINOPHIL NFR BLD AUTO: 0.9 % (ref 0–5)
ERYTHROCYTE [DISTWIDTH] IN BLOOD BY AUTOMATED COUNT: 15 % (ref 11.5–20)
GLOBULIN SER-MCNC: 3.1 GM/DL
GLUCOSE SERPL-MCNC: 107 MG/DL (ref 70–105)
HCT VFR BLD CALC: 32 % (ref 41–60)
HGB BLD-MCNC: 10.7 GM/DL (ref 12–16)
LYMPHOCYTE AB SER FC-ACNC: 1.3 TH/CMM (ref 1.5–3)
LYMPHOCYTES NFR BLD AUTO: 15.7 % (ref 20–50)
MCH RBC QN AUTO: 30 PG (ref 26–30)
MCHC RBC AUTO-ENTMCNC: 33.5 PG (ref 28–36)
MCV RBC AUTO: 89.5 FL (ref 80–99)
MONOCYTES # BLD AUTO: 0.4 TH/CMM (ref 0.3–1)
MONOCYTES NFR BLD AUTO: 5.5 % (ref 2–10)
NEUTROPHILS # BLD: 6.3 TH/CMM (ref 1.8–8)
NEUTROPHILS NFR BLD AUTO: 77.9 % (ref 40–80)
PLATELET # BLD: 190 TH/CMM (ref 150–400)
PMV BLD AUTO: 8.6 FL
POTASSIUM SERPL-SCNC: 3.2 MEQ/L (ref 3.5–5.1)
RBC # BLD AUTO: 3.57 MIL/CMM (ref 4.3–5.7)
SODIUM SERPL-SCNC: 134 MEQ/L (ref 136–145)
WBC # BLD AUTO: 8.1 TH/CMM (ref 4.8–10.8)

## 2018-06-21 RX ADMIN — ENOXAPARIN SODIUM SCH MG: 100 INJECTION SUBCUTANEOUS at 08:15

## 2018-06-21 RX ADMIN — HYDROMORPHONE HYDROCHLORIDE PRN MG: 1 INJECTION, SOLUTION INTRAMUSCULAR; INTRAVENOUS; SUBCUTANEOUS at 12:57

## 2018-06-21 RX ADMIN — DEXTROSE AND SODIUM CHLORIDE SCH MLS/HR: 5; .9 INJECTION, SOLUTION INTRAVENOUS at 08:13

## 2018-06-21 RX ADMIN — METRONIDAZOLE SCH MLS/HR: 500 INJECTION, SOLUTION INTRAVENOUS at 04:03

## 2018-06-21 RX ADMIN — ENOXAPARIN SODIUM SCH MG: 100 INJECTION SUBCUTANEOUS at 20:50

## 2018-06-21 RX ADMIN — HYDROMORPHONE HYDROCHLORIDE PRN MG: 1 INJECTION, SOLUTION INTRAMUSCULAR; INTRAVENOUS; SUBCUTANEOUS at 03:47

## 2018-06-21 RX ADMIN — METRONIDAZOLE SCH MLS/HR: 500 INJECTION, SOLUTION INTRAVENOUS at 21:41

## 2018-06-21 RX ADMIN — METRONIDAZOLE SCH MLS/HR: 500 INJECTION, SOLUTION INTRAVENOUS at 13:07

## 2018-06-21 NOTE — GENERAL PROGRESS NOTE
Subjective





- Review of Systems


Service Date: 18


Events since last encounter: 





feels  bloated


labs ok


decrease ascitis


fleet enema





Objective





- Results


Result Diagrams: 


 18 07:43





 18 07:43


Recent Labs: 


 Laboratory Last Values











WBC  8.1 Th/cmm (4.8-10.8)   18  07:43    


 


RBC  3.57 Mil/cmm (4.30-5.70)  L  18  07:43    


 


Hgb  10.7 gm/dL (12-16)  L  18  07:43    


 


Hct  32.0 % (41.0-60)  L  18  07:43    


 


MCV  89.5 fl (80-99)   18  07:43    


 


MCH  30.0 pg (26.0-30.0)   18  07:43    


 


MCHC Differential  33.5 pg (28.0-36.0)   18  07:43    


 


RDW  15.0 % (11.5-20.0)   18  07:43    


 


Plt Count  190 Th/cmm (150-400)   18  07:43    


 


MPV  8.6 fl  18  07:43    


 


Neutrophils %  77.9 % (40.0-80.0)   18  07:43    


 


Lymphocytes %  15.7 % (20.0-50.0)  L  18  07:43    


 


Monocytes %  5.5 % (2.0-10.0)   18  07:43    


 


Eosinophils %  0.9 % (0.0-5.0)   18  07:43    


 


Basophils %  0.0 % (0.0-2.0)   18  07:43    


 


PT  11.9 SECONDS (9.5-11.5)  H  18  12:08    


 


INR  1.13  (0.5-1.4)   18  12:08    


 


PTT (Actin FS)  22.3 SECONDS (26.0-38.0)  L  18  12:08    


 


Sodium  134 mEq/L (136-145)  L  18  07:43    


 


Potassium  3.2 mEq/L (3.5-5.1)  L  18  07:43    


 


Chloride  104 mEq/L ()   18  07:43    


 


Carbon Dioxide  24.9 mEq/L (21.0-31.0)   18  07:43    


 


Anion Gap  8.3  (7.0-16.0)   18  07:43    


 


BUN  5 mg/dL (7-25)  L  18  07:43    


 


Creatinine  0.6 mg/dL (0.7-1.3)  L  18  07:43    


 


Est GFR ( Amer)  > 60.0 ml/min (>90)   18  07:43    


 


Est GFR (Non-Af Amer)  > 60.0 ml/min  18  07:43    


 


BUN/Creatinine Ratio  8.3   18  07:43    


 


Glucose  107 mg/dL ()  H  18  07:43    


 


Calcium  7.9 mg/dL (8.6-10.3)  L  18  07:43    


 


Total Bilirubin  0.8 mg/dL (0.3-1.0)   18  07:43    


 


AST  17 U/L (13-39)   18  07:43    


 


ALT  22 U/L (7-52)   18  07:43    


 


Alkaline Phosphatase  53 U/L ()   18  07:43    


 


Total Protein  5.6 gm/dL (6.0-8.3)  L  18  07:43    


 


Albumin  2.5 gm/dL (4.2-5.5)  L  18  07:43    


 


Globulin  3.1 gm/dL  18  07:43    


 


Albumin/Globulin Ratio  0.8  (1.0-1.8)  L  18  07:43    


 


Vancomycin Trough  14.7 ug/mL (5-10)  H  18  07:43    














- Physical Exam


Vitals and I&O: 


 Vital Signs











Temp  98.6 F   18 07:41


 


Pulse  63   18 07:41


 


Resp  17   18 07:41


 


BP  112/73   18 07:41


 


Pulse Ox  95   18 07:41








 Intake & Output











 18





 18:59 06:59 18:59


 


Intake Total 4645 100 4752


 


Output Total  310 


 


Balance 4910 723 4127


 


Weight (lbs)  78.925 kg 


 


Intake:   


 


  Intake, IV Amount 7722 165 5652


 


    D5-0.9%Ns 1,000 ml @ 100 1000  1000





    mls/hr IV .Q10H [facility] Rx#:   





    [id number]   


 


    Piperacillin Sodium/ 50 50 





    Tazobact 3.375 gm In   





    Sodium Chloride 0.9% 50   





    ml @ 100 mls/hr IV Q8HR   





    [facility] Rx#:[id number]   


 


    Vancomycin HCl 1.5 gm In 500 500 





    Sodium Chloride 0.9% 500   





    ml @ 250 mls/hr IV Q12HR@   





    0900,2100 [facility] Rx#:   





    [id number]   


 


    metroNIDAZOLE 500mg/ 100 





    100mL 500 mg In 100 ml @   





    100 mls/hr IV Q8HR [facility] Rx   





    #:[id number]   


 


Output:   


 


  Drainage  310 


 


    Right Lower Abdomen  310 


 


Other:   


 


  Weight Source  Bedscale 











Active Medications: 


Current Medications





Acetaminophen/Hydrocodone Bitart (Norco 10 Mg/325 Mg)  1 tab PO Q6H PRN


   PRN Reason: Pain (Severe)


   Stop: 18 20:04


   Last Admin: 18 17:23 Dose:  1 tab


Enoxaparin Sodium (Lovenox)  30 mg SUBQ Q12HR [facility]


   Stop: 18 20:59


   Last Admin: 18 08:15 Dose:  30 mg


Hydromorphone HCl (Dilaudid)  1 mg IVP Q3HR PRN


   PRN Reason: SEVERE PAIN


   Stop: 18 16:25


   Last Admin: 18 03:47 Dose:  1 mg


Dextrose/Sodium Chloride (D5-0.9%Ns)  1,000 mls @ 100 mls/hr IV .Q10H [facility]


   Stop: 18 16:29


   Last Admin: 18 08:13 Dose:  100 mls/hr


Piperacillin Sod/Tazobactam (Sod 3.375 gm/ Sodium Chloride)  50 mls @ 100 mls/

hr IV Q8HR [facility]


   Stop: 18 20:59


   Last Admin: 18 05:13 Dose:  100 mls/hr


Metronidazole (Flagyl)  500 mg in 100 mls @ 100 mls/hr IV Q8HR [facility]


   Stop: 18 20:59


   Last Admin: 18 04:03 Dose:  100 mls/hr


Vancomycin HCl 1.5 gm/ Sodium (Chloride)  500 mls @ 250 mls/hr IV Q12HR@0900,

2100 [facility]


   Stop: 18 09:59


   Last Admin: 18 10:03 Dose:  250 mls/hr


Ketorolac Tromethamine (Toradol)  30 mg IVP Q6HR PRN


   PRN Reason: Pain (Mild)


   Stop: 18 16:26


   Last Admin: 18 15:40 Dose:  30 mg


Miscellaneous (Vancomycin Iv Per Pharmacy)  1 ea [facility] DAILY [facility]


   Stop: 18 09:59


   Last Admin: 18 13:04 Dose:  1 ea


Sodium Phosphate (Fleet Enema)  135 ml RC X1 ONE


   Stop: 18 11:00











- Procedures


Procedures: 


 Procedures











Procedure Code Date


 


EXCISION OF CHEST SKIN, EXTERNAL APPROACH [id number] 18


 


EXCISION OF ILEUM, OPEN APPROACH 3MGH1DP 18


 


EXCISION OF RIGHT LOWER ARM SKIN, EXTERNAL APPROACH [id number] 18


 


EXCISION OF RIGHT LOWER LEG SKIN, EXTERNAL APPROACH [id number] 18


 


RELEASE ILEUM, OPEN APPROACH 8FUV1MP 18














Nutritional Asmnt/Malnutr-PDOC





- Dietary Evaluation


Malnutrition Findings (Please click <Entered> for more info): 








Nutritional Asmnt/Malnutrition                             Start:  18 14:

24


Text:                                                      Status: Active      

  


Freq:                                                                          

  


Protocol:                                                                      

  


 Document     18 14:24  LCHENG  (Rec: 18 14:35  LCHEN  [id number])


 Nutritional Asmnt/Malnutrition


     Patient General Information


      Nutritional Screening                      High Risk


                                                 Consult


      Diagnosis                                  exploratory laparotomy with


                                                 recersal colostomy


      Pertinent Medical Hx/Surgical Hx           acute perforation of


                                                 diverticulitis, s/p


                                                 exploratory laparotomy and


                                                 divertin gcolostomy placement


      Subjective Information                     Pt seen lying in bed at time


                                                 of visit, awake and alert. Pt


                                                 stated he tolerated clear


                                                 liquid well, adequte amount


                                                 for him at this time. Pt has


                                                 RAQUEL drain noted. Per EMR, PO


                                                 intake % of clear liquid


                                                 .


      Current Diet Order/ Nutrition Support      clear liquid


      Pertinent Medications                      D5-0.9%ns, piperacillin,


                                                 vancomycin


      Pertinent Labs                              Na 133, K 3.4, Cr 0.7,


                                                 glucose 107, Ca 7.9


     Nutritional Hx/Data


      Height                                     1.88 m


      Height (Calculated Centimeters)            188.0


      Current Weight (lbs)                       86.636 kg


      Weight (Calculated Kilograms)              86.6


      Weight (Calculated Grams)                  70643.1


      Ideal Body Weight                          190


      Body Mass Index (BMI)                      24.5


      Weight Status                              Approriate


     GI Symptoms


      GI Symptoms                                None


      Last BM                                    none


      Difficult in:                              None


      Skin Integrity/Comment:                    RIGHT LOWER LEG HEALING


                                                 incision to right arm


     Estimated Nutritional Goals


      BEE in Kcals:                              Using Current wt


      Calories/Kcals/Kg                          25-30


      Kcals Calculated                           1945-1730


      Protein:                                   Using Current wt


      Protein g/k.8-1


      Protein Calculated                         69-86


      Fluid: ml                                  2150-2580ml (1ml/kcal)


     Nutritional Problem


      1. Problem


       Problem                                   altered GI function


       Etiology                                  s/p reversal colostomy


       Signs/Symptoms:                           pt on clear liquid


     Intervention/Recommendation


      Comments                                   1. Continue with clear liquid


                                                 diet as ordered.


                                                 2. Monitor PO intake, wt, labs


                                                 and skin integrity


                                                 3. F/U as high risk in 2-3


                                                 days, -


     Expected Outcomes/Goals


      Expected Outcomes/Goals                    1. PO intake to meet at least


                                                 75% of nutritional needs.


                                                 2. Wt stability, skin to


                                                 remain intact, labs to


                                                 approach WNL.

## 2018-06-21 NOTE — PATHOLOGY REPORT
Collection date:  06/18/2018

Surgeon:  Dr. SARMAD Syed

Specimen Description:  

1. Ileostomy stoma

2. Left chest mole



Gross Description: Part I:  Received in formalin is a 4.5 cm in length tubular

portion of tan-pink tissue with a diameter of 2.0 cm.  The outer surface of the

specimen shows a donut shaped opening measuring 0.9 cm and is lined by tan

glistening mucosa.  Opening the specimen and sectioning shows no focal lesions. 

Representative sections are submitted in three cassettes labeled A1 to A3. 



Gross Description: Part II:  Received in formalin is an elliptical excision of

tan-gray skin measuring 2.7 cm in length x 1.4 cm in width, excised to a depth

of 1.0 cm.  The surface of the skin shows a slightly raised bumpy dark brown

lesion measuring  0.9 cm.  The margins are inked in blue color and the specimen

is sectioned and totally submitted in three cassettes labeled B1 to B3.

  

Microscopic Description:  Part I:  The histologic sections show a portion of

small bowel consistent with ileum and ileostomy.  The mucosa is lined by intact

intestinal villi and muscular wall with no focal lesions identified.  



Diagnosis:   Part I: Portion of ileum consistent with reversal of ileostomy.  



Microscopic Description:  Part II:  The histologic sections show a portion of

skin with a benign proliferation of squamous cells with a raised hyperkeratotic

and hyperpigmented surface. 



Diagnosis:   Part II: Benign seborrheic keratosis, left chest.







DD: 06/21/2018 09:43

DT: 06/21/2018 10:23

[facility]# [id number]  [id number]

[facility]

## 2018-06-21 NOTE — INTERNAL MEDICINE PROG NOTE
Internal Medicine Subjective





- Subjective


Service Date: 18


Patient seen and examined:: without staff (HE FEELS BETTER)


Patient is:: awake, verbal, in bed, talking


Per staff patient has:: no adverse event





Internal Medicine Objective





- Results


Result Diagrams: 


 18 07:43





 18 07:43


Recent Labs: 


 Laboratory Last Values











WBC  8.1 Th/cmm (4.8-10.8)   18  07:43    


 


RBC  3.57 Mil/cmm (4.30-5.70)  L  18  07:43    


 


Hgb  10.7 gm/dL (12-16)  L  18  07:43    


 


Hct  32.0 % (41.0-60)  L  18  07:43    


 


MCV  89.5 fl (80-99)   18  07:43    


 


MCH  30.0 pg (26.0-30.0)   18  07:43    


 


MCHC Differential  33.5 pg (28.0-36.0)   18  07:43    


 


RDW  15.0 % (11.5-20.0)   18  07:43    


 


Plt Count  190 Th/cmm (150-400)   18  07:43    


 


MPV  8.6 fl  18  07:43    


 


Neutrophils %  77.9 % (40.0-80.0)   18  07:43    


 


Lymphocytes %  15.7 % (20.0-50.0)  L  18  07:43    


 


Monocytes %  5.5 % (2.0-10.0)   18  07:43    


 


Eosinophils %  0.9 % (0.0-5.0)   18  07:43    


 


Basophils %  0.0 % (0.0-2.0)   18  07:43    


 


PT  11.9 SECONDS (9.5-11.5)  H  18  12:08    


 


INR  1.13  (0.5-1.4)   18  12:08    


 


PTT (Actin FS)  22.3 SECONDS (26.0-38.0)  L  18  12:08    


 


Sodium  134 mEq/L (136-145)  L  18  07:43    


 


Potassium  3.2 mEq/L (3.5-5.1)  L  18  07:43    


 


Chloride  104 mEq/L ()   18  07:43    


 


Carbon Dioxide  24.9 mEq/L (21.0-31.0)   18  07:43    


 


Anion Gap  8.3  (7.0-16.0)   18  07:43    


 


BUN  5 mg/dL (7-25)  L  18  07:43    


 


Creatinine  0.6 mg/dL (0.7-1.3)  L  18  07:43    


 


Est GFR ( Amer)  > 60.0 ml/min (>90)   18  07:43    


 


Est GFR (Non-Af Amer)  > 60.0 ml/min  18  07:43    


 


BUN/Creatinine Ratio  8.3   18  07:43    


 


Glucose  107 mg/dL ()  H  18  07:43    


 


Calcium  7.9 mg/dL (8.6-10.3)  L  18  07:43    


 


Total Bilirubin  0.8 mg/dL (0.3-1.0)   18  07:43    


 


AST  17 U/L (13-39)   18  07:43    


 


ALT  22 U/L (7-52)   18  07:43    


 


Alkaline Phosphatase  53 U/L ()   18  07:43    


 


Total Protein  5.6 gm/dL (6.0-8.3)  L  18  07:43    


 


Albumin  2.5 gm/dL (4.2-5.5)  L  18  07:43    


 


Globulin  3.1 gm/dL  18  07:43    


 


Albumin/Globulin Ratio  0.8  (1.0-1.8)  L  18  07:43    


 


Vancomycin Trough  14.7 ug/mL (5-10)  H  18  07:43    














- Physical Exam


Vitals and I&O: 


 Vital Signs











Temp  99.5 F   18 15:54


 


Pulse  99   18 19:49


 


Resp  18   18 19:49


 


BP  115/81   18 15:54


 


Pulse Ox  93   18 19:49








 Intake & Output











 18





 06:59 18:59 06:59


 


Intake Total 800 1000 


 


Output Total 310  


 


Balance 490 1000 


 


Weight (lbs) 78.925 kg  


 


Intake:   


 


  Intake, IV Amount 800 1000 


 


    D5-0.9%Ns 1,000 ml @ 100  1000 





    mls/hr IV .Q10H [facility] Rx#:   





    [id number]   


 


    Piperacillin Sodium/ 100  





    Tazobact 3.375 gm In   





    Sodium Chloride 0.9% 50   





    ml @ 100 mls/hr IV Q8HR   





    [facility] Rx#:[id number]   


 


    Vancomycin HCl 1.5 gm In 500  





    Sodium Chloride 0.9% 500   





    ml @ 250 mls/hr IV Q12HR@   





    0900,2100 [facility] Rx#:   





    [id number]   


 


    metroNIDAZOLE 500mg/  





    100mL 500 mg In 100 ml @   





    100 mls/hr IV Q8HR [facility] Rx   





    #:[id number]   


 


Output:   


 


  Drainage 310  


 


    Right Lower Abdomen 310  


 


Other:   


 


  Weight Source Bedscale  











Active Medications: 


Current Medications





Acetaminophen/Hydrocodone Bitart (Norco 10 Mg/325 Mg)  1 tab PO Q6H PRN


   PRN Reason: Pain (Severe)


   Stop: 18 20:04


   Last Admin: 18 17:23 Dose:  1 tab


Enoxaparin Sodium (Lovenox)  30 mg SUBQ Q12HR [facility]


   Stop: 18 20:59


   Last Admin: 18 08:15 Dose:  30 mg


Hydromorphone HCl (Dilaudid)  1 mg IVP Q3HR PRN


   PRN Reason: SEVERE PAIN


   Stop: 18 16:25


   Last Admin: 18 12:57 Dose:  1 mg


Dextrose/Sodium Chloride (D5-0.9%Ns)  1,000 mls @ 100 mls/hr IV .Q10H [facility]


   Stop: 18 16:29


   Last Admin: 18 08:13 Dose:  100 mls/hr


Piperacillin Sod/Tazobactam (Sod 3.375 gm/ Sodium Chloride)  50 mls @ 100 mls/

hr IV Q8HR [facility]


   Stop: 18 20:59


   Last Admin: 18 13:56 Dose:  100 mls/hr


Metronidazole (Flagyl)  500 mg in 100 mls @ 100 mls/hr IV Q8HR [facility]


   Stop: 18 20:59


   Last Admin: 18 13:07 Dose:  100 mls/hr


Vancomycin HCl 1.5 gm/ Sodium (Chloride)  500 mls @ 250 mls/hr IV Q12HR@0900,

2100 [facility]


   Stop: 18 09:59


   Last Admin: 18 10:03 Dose:  250 mls/hr


Ketorolac Tromethamine (Toradol)  30 mg IVP Q6HR PRN


   PRN Reason: Pain (Mild)


   Stop: 18 16:26


   Last Admin: 18 15:40 Dose:  30 mg


Miscellaneous (Vancomycin Iv Per Pharmacy)  1 ea [facility] DAILY [facility]


   Stop: 18 09:59


   Last Admin: 18 13:04 Dose:  1 ea








General: alert


HEENT: NC/AT, PERRLA, EOMI, anicteric sclerae, throat clear


Neck: Supple, No JVD, No thyromegaly, +2 carotid pulse wo bruit, No LAD


Lungs: CTAB


Cardiovascular: RRR, Normal S1, Normal S2, without murmur


Abdomen: tender


Extremities: clear


Neurological: no change





- Procedures


Procedures: 


 Procedures











Procedure Code Date


 


EXCISION OF CHEST SKIN, EXTERNAL APPROACH [id number] 18


 


EXCISION OF ILEUM, OPEN APPROACH [id number] 18


 


EXCISION OF RIGHT LOWER ARM SKIN, EXTERNAL APPROACH [id number] 18


 


EXCISION OF RIGHT LOWER LEG SKIN, EXTERNAL APPROACH 0HBKXZZ 18


 


RELEASE ILEUM, OPEN APPROACH [id number] 18














Internal Medicine Assmt/Plan





- Assessment


Assessment: 





1.SP COLOSTOMY REVISION.


2.SP EXCISION BIOPSY FOR CHEST WALL LESION.





- Plan


Plan: 





CONTINUE ON CURRENT MEDICATION AND DIET.





Nutritional Asmnt/Malnutr-PDOC





- Dietary Evaluation


Malnutrition Findings (Please click <Entered> for more info): 








Nutritional Asmnt/Malnutrition                             Start:  18 14:

24


Text:                                                      Status: Active      

  


Freq:                                                                          

  


Protocol:                                                                      

  


 Document     18 14:24  KAYE  (Rec: 18 14:35  [facility]  [id number])


 Nutritional Asmnt/Malnutrition


     Patient General Information


      Nutritional Screening                      High Risk


                                                 Consult


      Diagnosis                                  exploratory laparotomy with


                                                 recersal colostomy


      Pertinent Medical Hx/Surgical Hx           acute perforation of


                                                 diverticulitis, s/p


                                                 exploratory laparotomy and


                                                 divertin gcolostomy placement


      Subjective Information                     Pt seen lying in bed at time


                                                 of visit, awake and alert. Pt


                                                 stated he tolerated clear


                                                 liquid well, adequte amount


                                                 for him at this time. Pt has


                                                 RAQUEL drain noted. Per EMR, PO


                                                 intake % of clear liquid


                                                 .


      Current Diet Order/ Nutrition Support      clear liquid


      Pertinent Medications                      D5-0.9%ns, piperacillin,


                                                 vancomycin


      Pertinent Labs                              Na 133, K 3.4, Cr 0.7,


                                                 glucose 107, Ca 7.9


     Nutritional Hx/Data


      Height                                     1.88 m


      Height (Calculated Centimeters)            188.0


      Current Weight (lbs)                       86.636 kg


      Weight (Calculated Kilograms)              86.6


      Weight (Calculated Grams)                  97576.1


      Ideal Body Weight                          190


      Body Mass Index (BMI)                      24.5


      Weight Status                              Approriate


     GI Symptoms


      GI Symptoms                                None


      Last BM                                    none


      Difficult in:                              None


      Skin Integrity/Comment:                    RIGHT LOWER LEG HEALING


                                                 incision to right arm


     Estimated Nutritional Goals


      BEE in Kcals:                              Using Current wt


      Calories/Kcals/Kg                          25-30


      Kcals Calculated                           2837-2009


      Protein:                                   Using Current wt


      Protein g/k.8-1


      Protein Calculated                         69-86


      Fluid: ml                                  2150-2580ml (1ml/kcal)


     Nutritional Problem


      1. Problem


       Problem                                   altered GI function


       Etiology                                  s/p reversal colostomy


       Signs/Symptoms:                           pt on clear liquid


     Intervention/Recommendation


      Comments                                   1. Continue with clear liquid


                                                 diet as ordered.


                                                 2. Monitor PO intake, wt, labs


                                                 and skin integrity


                                                 3. F/U as high risk in 2-3


                                                 days, -


     Expected Outcomes/Goals


      Expected Outcomes/Goals                    1. PO intake to meet at least


                                                 75% of nutritional needs.


                                                 2. Wt stability, skin to


                                                 remain intact, labs to


                                                 approach WNL.

## 2018-06-22 LAB
ANION GAP SERPL CALC-SCNC: 12.8 MMOL/L (ref 7–16)
BUN SERPL-MCNC: 8 MG/DL (ref 7–25)
CALCIUM SERPL-MCNC: 8 MG/DL (ref 8.6–10.3)
CHLORIDE SERPL-SCNC: 102 MEQ/L (ref 98–107)
CO2 SERPL-SCNC: 22.9 MEQ/L (ref 21–31)
CREAT SERPL-MCNC: 1.1 MG/DL (ref 0.7–1.3)
GLUCOSE SERPL-MCNC: 167 MG/DL (ref 70–105)
POTASSIUM SERPL-SCNC: 2.7 MEQ/L (ref 3.5–5.1)
SODIUM SERPL-SCNC: 135 MEQ/L (ref 136–145)

## 2018-06-22 RX ADMIN — ENOXAPARIN SODIUM SCH MG: 100 INJECTION SUBCUTANEOUS at 20:48

## 2018-06-22 RX ADMIN — HYDROMORPHONE HYDROCHLORIDE PRN MG: 1 INJECTION, SOLUTION INTRAMUSCULAR; INTRAVENOUS; SUBCUTANEOUS at 12:36

## 2018-06-22 RX ADMIN — METRONIDAZOLE SCH MLS/HR: 500 INJECTION, SOLUTION INTRAVENOUS at 05:00

## 2018-06-22 RX ADMIN — POTASSIUM CHLORIDE SCH MLS/HR: 14.9 INJECTION, SOLUTION INTRAVENOUS at 14:19

## 2018-06-22 RX ADMIN — HYDROMORPHONE HYDROCHLORIDE PRN MG: 1 INJECTION, SOLUTION INTRAMUSCULAR; INTRAVENOUS; SUBCUTANEOUS at 02:09

## 2018-06-22 RX ADMIN — POTASSIUM CHLORIDE SCH MLS/HR: 14.9 INJECTION, SOLUTION INTRAVENOUS at 18:06

## 2018-06-22 RX ADMIN — ENOXAPARIN SODIUM SCH MG: 100 INJECTION SUBCUTANEOUS at 08:23

## 2018-06-22 RX ADMIN — DEXTROSE AND SODIUM CHLORIDE SCH MLS/HR: 5; .9 INJECTION, SOLUTION INTRAVENOUS at 05:03

## 2018-06-22 RX ADMIN — HYDROMORPHONE HYDROCHLORIDE PRN MG: 1 INJECTION, SOLUTION INTRAMUSCULAR; INTRAVENOUS; SUBCUTANEOUS at 16:38

## 2018-06-22 NOTE — INTERNAL MEDICINE PROG NOTE
Internal Medicine Subjective





- Subjective


Service Date: 18


Patient seen and examined:: with staff (H3E FEELS BETTER)


Patient is:: awake, verbal, in bed, talking


Per staff patient has:: no adverse event





Internal Medicine Objective





- Results


Result Diagrams: 


 18 07:43





 18 08:45


Recent Labs: 


 Laboratory Last Values











WBC  8.1 Th/cmm (4.8-10.8)   18  07:43    


 


RBC  3.57 Mil/cmm (4.30-5.70)  L  18  07:43    


 


Hgb  10.7 gm/dL (12-16)  L  18  07:43    


 


Hct  32.0 % (41.0-60)  L  18  07:43    


 


MCV  89.5 fl (80-99)   18  07:43    


 


MCH  30.0 pg (26.0-30.0)   18  07:43    


 


MCHC Differential  33.5 pg (28.0-36.0)   18  07:43    


 


RDW  15.0 % (11.5-20.0)   18  07:43    


 


Plt Count  190 Th/cmm (150-400)   18  07:43    


 


MPV  8.6 fl  18  07:43    


 


Neutrophils %  77.9 % (40.0-80.0)   18  07:43    


 


Lymphocytes %  15.7 % (20.0-50.0)  L  18  07:43    


 


Monocytes %  5.5 % (2.0-10.0)   18  07:43    


 


Eosinophils %  0.9 % (0.0-5.0)   18  07:43    


 


Basophils %  0.0 % (0.0-2.0)   18  07:43    


 


PT  11.9 SECONDS (9.5-11.5)  H  18  12:08    


 


INR  1.13  (0.5-1.4)   18  12:08    


 


PTT (Actin FS)  22.3 SECONDS (26.0-38.0)  L  18  12:08    


 


Sodium  135 mEq/L (136-145)  L  18  08:45    


 


Potassium  2.7 mEq/L (3.5-5.1)  L*  18  08:45    


 


Chloride  102 mEq/L ()   18  08:45    


 


Carbon Dioxide  22.9 mEq/L (21.0-31.0)   18  08:45    


 


Anion Gap  12.8  (7.0-16.0)   18  08:45    


 


BUN  8 mg/dL (7-25)   18  08:45    


 


Creatinine  1.1 mg/dL (0.7-1.3)   18  08:45    


 


Est GFR ( Amer)  > 60.0 ml/min (>90)   18  08:45    


 


Est GFR (Non-Af Amer)  > 60.0 ml/min  18  08:45    


 


BUN/Creatinine Ratio  7.3   18  08:45    


 


Glucose  167 mg/dL ()  H  18  08:45    


 


Calcium  8.0 mg/dL (8.6-10.3)  L  18  08:45    


 


Total Bilirubin  0.8 mg/dL (0.3-1.0)   18  07:43    


 


AST  17 U/L (13-39)   18  07:43    


 


ALT  22 U/L (7-52)   18  07:43    


 


Alkaline Phosphatase  53 U/L ()   18  07:43    


 


Total Protein  5.6 gm/dL (6.0-8.3)  L  18  07:43    


 


Albumin  2.5 gm/dL (4.2-5.5)  L  18  07:43    


 


Globulin  3.1 gm/dL  18  07:43    


 


Albumin/Globulin Ratio  0.8  (1.0-1.8)  L  18  07:43    


 


Vancomycin Trough  14.7 ug/mL (5-10)  H  18  07:43    














- Physical Exam


Vitals and I&O: 


 Vital Signs











Temp  98.3 F   18 12:00


 


Pulse  87   18 12:00


 


Resp  20   18 12:00


 


BP  140/81   18 12:00


 


Pulse Ox  100   06/22/18 12:00








 Intake & Output











 1818 18





 18:59 06:59 18:59


 


Intake Total 2650 68.334 600


 


Output Total  330 353


 


Balance 2650 -261.666 247


 


Weight (lbs)   78.925 kg


 


Intake:   


 


  Intake, IV Amount 2650 68.334 


 


    D5-0.9%Ns 1,000 ml @ 100 2000  





    mls/hr IV .Q10H [facility] Rx#:   





    [id number]   


 


    Piperacillin Sodium/ 50 68.334 





    Tazobact 3.375 gm In   





    Sodium Chloride 0.9% 50   





    ml @ 100 mls/hr IV Q8HR   





    [facility] Rx#:[id number]   


 


    Vancomycin HCl 1.5 gm In 500  





    Sodium Chloride 0.9% 500   





    ml @ 250 mls/hr IV Q12HR@   





    0900,2100 [facility] Rx#:   





    [id number]   


 


    metroNIDAZOLE 500mg/  





    100mL 500 mg In 100 ml @   





    100 mls/hr IV Q8HR [facility] Rx   





    #:[id number]   


 


  Oral   600


 


Output:   


 


  Drainage  330 350


 


    Right Lower Abdomen  330 350


 


  Stool   3


 


Other:   


 


  # Voids   5


 


  # Bowel Movements   3


 


  Stool Characteristics   Soft


 


  Weight Source   Bedscale











Active Medications: 


Current Medications





Acetaminophen/Hydrocodone Bitart (Norco 10 Mg/325 Mg)  1 tab PO Q6H PRN


   PRN Reason: Pain (Severe)


   Stop: 18 20:04


   Last Admin: 18 17:23 Dose:  1 tab


Enoxaparin Sodium (Lovenox)  30 mg SUBQ Q12HR [facility]


   Stop: 18 20:59


   Last Admin: 18 08:23 Dose:  30 mg


Hydromorphone HCl (Dilaudid)  1 mg IVP Q3HR PRN


   PRN Reason: SEVERE PAIN


   Stop: 18 16:25


   Last Admin: 18 12:36 Dose:  1 mg


Dextrose/Sodium Chloride (D5-0.9%Ns)  1,000 mls @ 100 mls/hr IV .Q10H [facility]


   Stop: 18 16:29


   Last Admin: 18 05:03 Dose:  100 mls/hr


Piperacillin Sod/Tazobactam (Sod 3.375 gm/ Sodium Chloride)  50 mls @ 100 mls/

hr IV Q8HR [facility]


   Stop: 18 20:59


   Last Admin: 18 14:27 Dose:  100 mls/hr


Metronidazole (Flagyl)  500 mg in 100 mls @ 100 mls/hr IV Q8HR [facility]


   Stop: 18 20:59


   Last Admin: 18 21:41 Dose:  100 mls/hr


Vancomycin HCl 1.5 gm/ Sodium (Chloride)  500 mls @ 250 mls/hr IV Q12HR@0900,

2100 [facility]


   Stop: 18 09:59


   Last Admin: 18 22:55 Dose:  250 mls/hr


Potassium Chloride (Potassium Chloride)  20 meq in 100 mls @ 50 mls/hr IV Q2H 

[facility]


   Stop: 18 15:59


   Last Admin: 18 14:19 Dose:  50 mls/hr


Ketorolac Tromethamine (Toradol)  30 mg IVP Q6HR PRN


   PRN Reason: Pain (Mild)


   Stop: 18 16:26


   Last Admin: 18 15:40 Dose:  30 mg


Miscellaneous (Vancomycin Iv Per Pharmacy)  1 ea MC DAILY [facility]


   Stop: 18 09:59


   Last Admin: 18 13:04 Dose:  1 ea


Ondansetron HCl (Zofran)  4 mg IV Q4H PRN


   PRN Reason: Nausea / Vomiting


   Stop: 18 05:53








General: alert


HEENT: NC/AT, PERRLA, EOMI, anicteric sclerae, throat clear


Neck: Supple, No JVD, No thyromegaly, +2 carotid pulse wo bruit, No LAD


Lungs: CTAB


Cardiovascular: RRR, Normal S1, Normal S2, without murmur


Abdomen: tender


Extremities: clear


Neurological: no change





- Procedures


Procedures: 


 Procedures











Procedure Code Date


 


EXCISION OF CHEST SKIN, EXTERNAL APPROACH [id number] 18


 


EXCISION OF ILEUM, OPEN APPROACH [id number] 18


 


EXCISION OF RIGHT LOWER ARM SKIN, EXTERNAL APPROACH [id number] 18


 


EXCISION OF RIGHT LOWER LEG SKIN, EXTERNAL APPROACH [id number] 18


 


RELEASE ILEUM, OPEN APPROACH 0ZKA4MP 18














Internal Medicine Assmt/Plan





- Assessment


Assessment: 





1.SP COLOSTOMY REVISION.


2.SP EXCISION BIOPSY FOR CHEST WALL LESION.





- Plan


Plan: 





CONTINUE ON CURRENT MEDICATION AND DIET.





Nutritional Asmnt/Malnutr-PDOC





- Dietary Evaluation


Malnutrition Findings (Please click <Entered> for more info): 








Nutritional Asmnt/Malnutrition                             Start:  18 14:

24


Text:                                                      Status: Active      

  


Freq:                                                                          

  


Protocol:                                                                      

  


 Document     18 14:24  LCHENG  (Rec: 18 14:35  LCHENG  [email or web address])


 Nutritional Asmnt/Malnutrition


     Patient General Information


      Nutritional Screening                      High Risk


                                                 Consult


      Diagnosis                                  exploratory laparotomy with


                                                 recersal colostomy


      Pertinent Medical Hx/Surgical Hx           acute perforation of


                                                 diverticulitis, s/p


                                                 exploratory laparotomy and


                                                 divertin gcolostomy placement


      Subjective Information                     Pt seen lying in bed at time


                                                 of visit, awake and alert. Pt


                                                 stated he tolerated clear


                                                 liquid well, adequte amount


                                                 for him at this time. Pt has


                                                 RAQUEL drain noted. Per EMR, PO


                                                 intake % of clear liquid


                                                 .


      Current Diet Order/ Nutrition Support      clear liquid


      Pertinent Medications                      D5-0.9%ns, piperacillin,


                                                 vancomycin


      Pertinent Labs                              Na 133, K 3.4, Cr 0.7,


                                                 glucose 107, Ca 7.9


     Nutritional Hx/Data


      Height                                     1.88 m


      Height (Calculated Centimeters)            188.0


      Current Weight (lbs)                       86.636 kg


      Weight (Calculated Kilograms)              86.6


      Weight (Calculated Grams)                  24069.1


      Ideal Body Weight                          190


      Body Mass Index (BMI)                      24.5


      Weight Status                              Approriate


     GI Symptoms


      GI Symptoms                                None


      Last BM                                    none


      Difficult in:                              None


      Skin Integrity/Comment:                    RIGHT LOWER LEG HEALING


                                                 incision to right arm


     Estimated Nutritional Goals


      BEE in Kcals:                              Using Current wt


      Calories/Kcals/Kg                          25-30


      Kcals Calculated                           8876-6278


      Protein:                                   Using Current wt


      Protein g/k.8-1


      Protein Calculated                         69-86


      Fluid: ml                                  2150-2580ml (1ml/kcal)


     Nutritional Problem


      1. Problem


       Problem                                   altered GI function


       Etiology                                  s/p reversal colostomy


       Signs/Symptoms:                           pt on clear liquid


     Intervention/Recommendation


      Comments                                   1. Continue with clear liquid


                                                 diet as ordered.


                                                 2. Monitor PO intake, wt, labs


                                                 and skin integrity


                                                 3. F/U as high risk in 2-3


                                                 days, -


     Expected Outcomes/Goals


      Expected Outcomes/Goals                    1. PO intake to meet at least


                                                 75% of nutritional needs.


                                                 2. Wt stability, skin to


                                                 remain intact, labs to


                                                 approach WNL.

## 2018-06-22 NOTE — GENERAL PROGRESS NOTE
Subjective





- Review of Systems


Service Date: 18


Events since last encounter: 





labs ok


has had BM


incision dressings changed, healing well


increase diet with possible DC, Sylvester drain still high (serous) due to ascites





Objective





- Results


Result Diagrams: 


 18 07:43





 18 07:43


Recent Labs: 


 Laboratory Last Values











WBC  8.1 Th/cmm (4.8-10.8)   18  07:43    


 


RBC  3.57 Mil/cmm (4.30-5.70)  L  18  07:43    


 


Hgb  10.7 gm/dL (12-16)  L  18  07:43    


 


Hct  32.0 % (41.0-60)  L  18  07:43    


 


MCV  89.5 fl (80-99)   18  07:43    


 


MCH  30.0 pg (26.0-30.0)   18  07:43    


 


MCHC Differential  33.5 pg (28.0-36.0)   18  07:43    


 


RDW  15.0 % (11.5-20.0)   18  07:43    


 


Plt Count  190 Th/cmm (150-400)   18  07:43    


 


MPV  8.6 fl  18  07:43    


 


Neutrophils %  77.9 % (40.0-80.0)   18  07:43    


 


Lymphocytes %  15.7 % (20.0-50.0)  L  18  07:43    


 


Monocytes %  5.5 % (2.0-10.0)   18  07:43    


 


Eosinophils %  0.9 % (0.0-5.0)   18  07:43    


 


Basophils %  0.0 % (0.0-2.0)   18  07:43    


 


PT  11.9 SECONDS (9.5-11.5)  H  18  12:08    


 


INR  1.13  (0.5-1.4)   18  12:08    


 


PTT (Actin FS)  22.3 SECONDS (26.0-38.0)  L  18  12:08    


 


Sodium  134 mEq/L (136-145)  L  18  07:43    


 


Potassium  3.2 mEq/L (3.5-5.1)  L  18  07:43    


 


Chloride  104 mEq/L ()   18  07:43    


 


Carbon Dioxide  24.9 mEq/L (21.0-31.0)   18  07:43    


 


Anion Gap  8.3  (7.0-16.0)   18  07:43    


 


BUN  5 mg/dL (7-25)  L  18  07:43    


 


Creatinine  0.6 mg/dL (0.7-1.3)  L  18  07:43    


 


Est GFR ( Amer)  > 60.0 ml/min (>90)   18  07:43    


 


Est GFR (Non-Af Amer)  > 60.0 ml/min  18  07:43    


 


BUN/Creatinine Ratio  8.3   18  07:43    


 


Glucose  107 mg/dL ()  H  18  07:43    


 


Calcium  7.9 mg/dL (8.6-10.3)  L  18  07:43    


 


Total Bilirubin  0.8 mg/dL (0.3-1.0)   18  07:43    


 


AST  17 U/L (13-39)   18  07:43    


 


ALT  22 U/L (7-52)   18  07:43    


 


Alkaline Phosphatase  53 U/L ()   18  07:43    


 


Total Protein  5.6 gm/dL (6.0-8.3)  L  18  07:43    


 


Albumin  2.5 gm/dL (4.2-5.5)  L  18  07:43    


 


Globulin  3.1 gm/dL  18  07:43    


 


Albumin/Globulin Ratio  0.8  (1.0-1.8)  L  18  07:43    


 


Vancomycin Trough  14.7 ug/mL (5-10)  H  18  07:43    














- Physical Exam


Vitals and I&O: 


 Vital Signs











Temp  98.0 F   18 04:00


 


Pulse  61   18 04:00


 


Resp  18   18 04:00


 


BP  117/69   18 04:00


 


Pulse Ox  98   18 04:00








 Intake & Output











 18





 18:59 06:59 18:59


 


Intake Total 2650 68.334 600


 


Output Total  330 353


 


Balance 2650 -261.666 247


 


Weight (lbs)   78.925 kg


 


Intake:   


 


  Intake, IV Amount 2650 68.334 


 


    D5-0.9%Ns 1,000 ml @ 100 2000  





    mls/hr IV .Q10H [facility] Rx#:   





    [id number]   


 


    Piperacillin Sodium/ 50 68.334 





    Tazobact 3.375 gm In   





    Sodium Chloride 0.9% 50   





    ml @ 100 mls/hr IV Q8HR   





    [facility] Rx#:[id number]   


 


    Vancomycin HCl 1.5 gm In 500  





    Sodium Chloride 0.9% 500   





    ml @ 250 mls/hr IV Q12HR@   





    0900,2100 [facility] Rx#:   





    [id number]   


 


    metroNIDAZOLE 500mg/  





    100mL 500 mg In 100 ml @   





    100 mls/hr IV Q8HR [facility] Rx   





    #:[id number]   


 


  Oral   600


 


Output:   


 


  Drainage  330 350


 


    Right Lower Abdomen  330 350


 


  Stool   3


 


Other:   


 


  # Voids   5


 


  # Bowel Movements   3


 


  Weight Source   Bedscale











Active Medications: 


Current Medications





Acetaminophen/Hydrocodone Bitart (Norco 10 Mg/325 Mg)  1 tab PO Q6H PRN


   PRN Reason: Pain (Severe)


   Stop: 18 20:04


   Last Admin: 18 17:23 Dose:  1 tab


Enoxaparin Sodium (Lovenox)  30 mg SUBQ Q12HR [facility]


   Stop: 18 20:59


   Last Admin: 18 08:23 Dose:  30 mg


Hydromorphone HCl (Dilaudid)  1 mg IVP Q3HR PRN


   PRN Reason: SEVERE PAIN


   Stop: 18 16:25


   Last Admin: 18 02:09 Dose:  1 mg


Dextrose/Sodium Chloride (D5-0.9%Ns)  1,000 mls @ 100 mls/hr IV .Q10H [facility]


   Stop: 18 16:29


   Last Admin: 18 05:03 Dose:  100 mls/hr


Piperacillin Sod/Tazobactam (Sod 3.375 gm/ Sodium Chloride)  50 mls @ 100 mls/

hr IV Q8HR [facility]


   Stop: 18 20:59


   Last Infusion: 18 05:02 Dose:  Infused


Metronidazole (Flagyl)  500 mg in 100 mls @ 100 mls/hr IV Q8HR [facility]


   Stop: 18 20:59


   Last Admin: 18 21:41 Dose:  100 mls/hr


Vancomycin HCl 1.5 gm/ Sodium (Chloride)  500 mls @ 250 mls/hr IV Q12HR@0900,

2100 [facility]


   Stop: 18 09:59


   Last Admin: 18 22:55 Dose:  250 mls/hr


Ketorolac Tromethamine (Toradol)  30 mg IVP Q6HR PRN


   PRN Reason: Pain (Mild)


   Stop: 18 16:26


   Last Admin: 18 15:40 Dose:  30 mg


Miscellaneous (Vancomycin Iv Per Pharmacy)  1 ea MC DAILY [facility]


   Stop: 18 09:59


   Last Admin: 18 13:04 Dose:  1 ea


Ondansetron HCl (Zofran)  4 mg IV Q4H PRN


   PRN Reason: Nausea / Vomiting


   Stop: 18 05:53


Potassium Chloride (Klor-Con)  40 meq PO X1 [facility]


   Stop: 18 07:49











- Procedures


Procedures: 


 Procedures











Procedure Code Date


 


EXCISION OF CHEST SKIN, EXTERNAL APPROACH [id number] 18


 


EXCISION OF ILEUM, OPEN APPROACH 8WFM1SE 18


 


EXCISION OF RIGHT LOWER ARM SKIN, EXTERNAL APPROACH [id number] 18


 


EXCISION OF RIGHT LOWER LEG SKIN, EXTERNAL APPROACH [id number] 18


 


RELEASE ILEUM, OPEN APPROACH [id number] 18














Nutritional Asmnt/Malnutr-PDOC





- Dietary Evaluation


Malnutrition Findings (Please click <Entered> for more info): 








Nutritional Asmnt/Malnutrition                             Start:  18 14:

24


Text:                                                      Status: Active      

  


Freq:                                                                          

  


Protocol:                                                                      

  


 Document     18 14:24  KAYE  (Rec: 18 14:35  KAYE [email or web address])


 Nutritional Asmnt/Malnutrition


     Patient General Information


      Nutritional Screening                      High Risk


                                                 Consult


      Diagnosis                                  exploratory laparotomy with


                                                 recersal colostomy


      Pertinent Medical Hx/Surgical Hx           acute perforation of


                                                 diverticulitis, s/p


                                                 exploratory laparotomy and


                                                 divertin gcolostomy placement


      Subjective Information                     Pt seen lying in bed at time


                                                 of visit, awake and alert. Pt


                                                 stated he tolerated clear


                                                 liquid well, adequte amount


                                                 for him at this time. Pt has


                                                 RAQUEL drain noted. Per EMR, PO


                                                 intake % of clear liquid


                                                 .


      Current Diet Order/ Nutrition Support      clear liquid


      Pertinent Medications                      D5-0.9%ns, piperacillin,


                                                 vancomycin


      Pertinent Labs                              Na 133, K 3.4, Cr 0.7,


                                                 glucose 107, Ca 7.9


     Nutritional Hx/Data


      Height                                     1.88 m


      Height (Calculated Centimeters)            188.0


      Current Weight (lbs)                       86.636 kg


      Weight (Calculated Kilograms)              86.6


      Weight (Calculated Grams)                  09497.1


      Ideal Body Weight                          190


      Body Mass Index (BMI)                      24.5


      Weight Status                              Approriate


     GI Symptoms


      GI Symptoms                                None


      Last BM                                    none


      Difficult in:                              None


      Skin Integrity/Comment:                    RIGHT LOWER LEG HEALING


                                                 incision to right arm


     Estimated Nutritional Goals


      BEE in Kcals:                              Using Current wt


      Calories/Kcals/Kg                          25-30


      Kcals Calculated                           2098-0002


      Protein:                                   Using Current wt


      Protein g/k.8-1


      Protein Calculated                         69-86


      Fluid: ml                                  2150-2580ml (1ml/kcal)


     Nutritional Problem


      1. Problem


       Problem                                   altered GI function


       Etiology                                  s/p reversal colostomy


       Signs/Symptoms:                           pt on clear liquid


     Intervention/Recommendation


      Comments                                   1. Continue with clear liquid


                                                 diet as ordered.


                                                 2. Monitor PO intake, wt, labs


                                                 and skin integrity


                                                 3. F/U as high risk in 2-3


                                                 days, -


     Expected Outcomes/Goals


      Expected Outcomes/Goals                    1. PO intake to meet at least


                                                 75% of nutritional needs.


                                                 2. Wt stability, skin to


                                                 remain intact, labs to


                                                 approach WNL.

## 2018-06-23 VITALS — DIASTOLIC BLOOD PRESSURE: 79 MMHG | SYSTOLIC BLOOD PRESSURE: 118 MMHG

## 2018-06-23 LAB
AMORPH SED URNS QL MICRO: (no result)
ANION GAP SERPL CALC-SCNC: 11.2 MMOL/L (ref 7–16)
APPEARANCE UR: (no result)
BACTERIA #/AREA URNS HPF: (no result) /HPF
BASOPHILS # BLD AUTO: 0 TH/CUMM (ref 0–0.2)
BASOPHILS NFR BLD AUTO: 0.4 % (ref 0–2)
BILIRUB UR-MCNC: NEGATIVE MG/DL
BUN SERPL-MCNC: 14 MG/DL (ref 7–25)
CALCIUM SERPL-MCNC: 8 MG/DL (ref 8.6–10.3)
CHLORIDE SERPL-SCNC: 104 MEQ/L (ref 98–107)
CO2 SERPL-SCNC: 23.7 MEQ/L (ref 21–31)
COLOR UR: YELLOW
CREAT SERPL-MCNC: 1.6 MG/DL (ref 0.7–1.3)
EOSINOPHIL # BLD AUTO: 0.1 TH/CMM (ref 0.1–0.4)
EOSINOPHIL NFR BLD AUTO: 0.7 % (ref 0–5)
EPI CELLS URNS QL MICRO: (no result) /LPF
ERYTHROCYTE [DISTWIDTH] IN BLOOD BY AUTOMATED COUNT: 14.9 % (ref 11.5–20)
GLUCOSE SERPL-MCNC: 132 MG/DL (ref 70–105)
GLUCOSE UR STRIP-MCNC: NEGATIVE MG/DL
HCT VFR BLD CALC: 32.1 % (ref 41–60)
HGB BLD-MCNC: 10.9 GM/DL (ref 12–16)
KETONES UR STRIP-MCNC: NEGATIVE MG/DL
LEUKOCYTE ESTERASE UR-ACNC: (no result)
LYMPHOCYTE AB SER FC-ACNC: 0.8 TH/CMM (ref 1.5–3)
LYMPHOCYTES NFR BLD AUTO: 8.7 % (ref 20–50)
MCH RBC QN AUTO: 30.1 PG (ref 27–31)
MCHC RBC AUTO-ENTMCNC: 34 PG (ref 28–36)
MCV RBC AUTO: 88.7 FL (ref 80–99)
MICRO URNS: YES
MONOCYTES # BLD AUTO: 1 TH/CMM (ref 0.3–1)
MONOCYTES NFR BLD AUTO: 11.1 % (ref 2–10)
NEUTROPHILS # BLD: 6.8 TH/CMM (ref 1.8–8)
NEUTROPHILS NFR BLD AUTO: 79.1 % (ref 40–80)
NITRITE UR QL STRIP: NEGATIVE
PH UR STRIP: 5 [PH] (ref 4.6–8)
PLATELET # BLD: 203 TH/CMM (ref 150–400)
PMV BLD AUTO: 8.6 FL
POTASSIUM SERPL-SCNC: 2.9 MEQ/L (ref 3.5–5.1)
PROT UR STRIP-MCNC: 30 MG/DL
RBC # BLD AUTO: 3.62 MIL/CMM (ref 3.8–5.8)
RBC # UR STRIP: (no result) /UL
RBC #/AREA URNS HPF: (no result) /HPF (ref 0–5)
SODIUM SERPL-SCNC: 136 MEQ/L (ref 136–145)
SP GR UR STRIP: 1.02 (ref 1–1.03)
URINALYSIS COMPLETE PNL UR: (no result)
UROBILINOGEN UR STRIP-ACNC: 0.2 E.U./DL (ref 0.2–1)
WBC # BLD AUTO: 8.7 TH/CMM (ref 4.8–10.8)
WBC #/AREA URNS HPF: (no result) /HPF (ref 0–5)

## 2018-06-23 RX ADMIN — HYDROMORPHONE HYDROCHLORIDE PRN MG: 1 INJECTION, SOLUTION INTRAMUSCULAR; INTRAVENOUS; SUBCUTANEOUS at 03:20

## 2018-06-23 RX ADMIN — METRONIDAZOLE SCH MLS/HR: 500 INJECTION, SOLUTION INTRAVENOUS at 13:07

## 2018-06-23 RX ADMIN — ENOXAPARIN SODIUM SCH MG: 100 INJECTION SUBCUTANEOUS at 08:28

## 2018-06-23 RX ADMIN — POTASSIUM CHLORIDE SCH MLS/HR: 14.9 INJECTION, SOLUTION INTRAVENOUS at 12:24

## 2018-06-23 RX ADMIN — ENOXAPARIN SODIUM SCH MG: 100 INJECTION SUBCUTANEOUS at 21:01

## 2018-06-23 RX ADMIN — DEXTROSE AND SODIUM CHLORIDE SCH MLS/HR: 5; .9 INJECTION, SOLUTION INTRAVENOUS at 18:07

## 2018-06-23 RX ADMIN — DEXTROSE AND SODIUM CHLORIDE SCH MLS/HR: 5; .9 INJECTION, SOLUTION INTRAVENOUS at 03:33

## 2018-06-23 RX ADMIN — POTASSIUM CHLORIDE SCH MLS/HR: 14.9 INJECTION, SOLUTION INTRAVENOUS at 10:15

## 2018-06-23 NOTE — GENERAL PROGRESS NOTE
Subjective





- Review of Systems


Service Date: 18


Events since last encounter: 





18


post op #4


having BM, tolerating diet


K 2.6, additional Kcl po


Sylvester drain out





Objective





- Results


Result Diagrams: 


 18 06:28





 18 06:28


Recent Labs: 


 Laboratory Last Values











WBC  8.7 Th/cmm (4.8-10.8)   18  06:28    


 


RBC  3.62 Mil/cmm (3.80-5.80)  L  18  06:28    


 


Hgb  10.9 gm/dL (12-16)  L  18  06:28    


 


Hct  32.1 % (41.0-60)  L  18  06:    


 


MCV  88.7 fl (80-99)   18  06:    


 


MCH  30.1 pg (27.0-31.0)   18  06:    


 


MCHC Differential  34.0 pg (28.0-36.0)   18  06:28    


 


RDW  14.9 % (11.5-20.0)   18  06:28    


 


Plt Count  203 Th/cmm (150-400)   18  06:28    


 


MPV  8.6 fl  18  06:28    


 


Neutrophils %  79.1 % (40.0-80.0)   18  06:    


 


Lymphocytes %  8.7 % (20.0-50.0)  L  18  06:    


 


Monocytes %  11.1 % (2.0-10.0)  H  18  06:28    


 


Eosinophils %  0.7 % (0.0-5.0)   18  06:28    


 


Basophils %  0.4 % (0.0-2.0)   18  06:28    


 


PT  11.9 SECONDS (9.5-11.5)  H  18  12:08    


 


INR  1.13  (0.5-1.4)   18  12:08    


 


PTT (Actin FS)  22.3 SECONDS (26.0-38.0)  L  18  12:08    


 


Sodium  136 mEq/L (136-145)   18  06:28    


 


Potassium  2.9 mEq/L (3.5-5.1)  L*  18  06:28    


 


Chloride  104 mEq/L ()   18  06:28    


 


Carbon Dioxide  23.7 mEq/L (21.0-31.0)   18  06:28    


 


Anion Gap  11.2  (7.0-16.0)   18  06:28    


 


BUN  14 mg/dL (7-25)   18  06:28    


 


Creatinine  1.6 mg/dL (0.7-1.3)  H  18  06:28    


 


Est GFR ( Amer)  56.1 ml/min (>90)   18  06:28    


 


Est GFR (Non-Af Amer)  46.3 ml/min  18  06:28    


 


BUN/Creatinine Ratio  8.8   18  06:28    


 


Glucose  132 mg/dL ()  H  18  06:28    


 


Calcium  8.0 mg/dL (8.6-10.3)  L  18  06:28    


 


Total Bilirubin  0.8 mg/dL (0.3-1.0)   18  07:43    


 


AST  17 U/L (13-39)   18  07:43    


 


ALT  22 U/L (7-52)   18  07:43    


 


Alkaline Phosphatase  53 U/L ()   18  07:43    


 


Total Protein  5.6 gm/dL (6.0-8.3)  L  18  07:43    


 


Albumin  2.5 gm/dL (4.2-5.5)  L  18  07:43    


 


Globulin  3.1 gm/dL  18  07:43    


 


Albumin/Globulin Ratio  0.8  (1.0-1.8)  L  18  07:43    


 


Vancomycin Trough  16.8 ug/mL (5-10)  H  18  09:30    














- Physical Exam


Vitals and I&O: 


 Vital Signs











Temp  96.7 F   18 09:09


 


Pulse  64   18 09:09


 


Resp  18   18 09:09


 


BP  125/80   18 09:09


 


Pulse Ox  94   18 09:09








 Intake & Output











 18





 18:59 06:59 18:59


 


Intake Total 1750 2100 


 


Output Total 378 398 


 


Balance 1372 1702 


 


Weight (lbs) 81.647 kg 81.647 kg 


 


Intake:   


 


  Intake, IV Amount 1150 50 


 


    D5-0.9%Ns 1,000 ml @ 100 1000  





    mls/hr IV .Q10H [facility] Rx#:   





    [id number]   


 


    KCL 20mEq/100mL Premix 20 100  





    meq In 100 ml @ 50 mls/   





    hr IV Q2H [facility] Rx#:   





    [id number]   


 


    Piperacillin Sodium/ 50 50 





    Tazobact 3.375 gm In   





    Sodium Chloride 0.9% 50   





    ml @ 100 mls/hr IV Q8HR   





    [facility] Rx#:[id number]   


 


  Oral 600 1800 


 


  Other  250 


 


Output:   


 


  Drainage 375 398 


 


    Right Lower Abdomen 375 398 


 


  Stool 3  


 


Other:   


 


  # Voids 5 2 


 


  # Bowel Movements 3 1 


 


  Stool Characteristics Soft Soft 


 


  Weight Source Estimated Bedscale 











Active Medications: 


Current Medications





Acetaminophen/Hydrocodone Bitart (Norco 10 Mg/325 Mg)  1 tab PO Q6H PRN


   PRN Reason: Pain (Severe)


   Stop: 18 20:04


   Last Admin: 18 17:23 Dose:  1 tab


Enoxaparin Sodium (Lovenox)  30 mg SUBQ Q12HR [facility]


   Stop: 18 20:59


   Last Admin: 18 08:28 Dose:  30 mg


Hydromorphone HCl (Dilaudid)  1 mg IVP Q3HR PRN


   PRN Reason: SEVERE PAIN


   Stop: 18 16:25


   Last Admin: 18 03:20 Dose:  1 mg


Dextrose/Sodium Chloride (D5-0.9%Ns)  1,000 mls @ 100 mls/hr IV .Q10H [facility]


   Stop: 18 16:29


   Last Admin: 18 03:33 Dose:  100 mls/hr


Piperacillin Sod/Tazobactam (Sod 3.375 gm/ Sodium Chloride)  50 mls @ 100 mls/

hr IV Q8HR [facility]


   Stop: 18 20:59


   Last Admin: 18 04:56 Dose:  100 mls/hr


Metronidazole (Flagyl)  500 mg in 100 mls @ 100 mls/hr IV Q8HR [facility]


   Stop: 18 20:59


   Last Admin: 18 05:00 Dose:  100 mls/hr


Potassium Chloride (Potassium Chloride)  20 meq in 100 mls @ 50 mls/hr IV Q2H 

[facility]


   Stop: 18 12:14


Vancomycin HCl 1.5 gm/ Sodium (Chloride)  500 mls @ 250 mls/hr IV 1200 ONE


   Stop: 18 13:59


Ketorolac Tromethamine (Toradol)  30 mg IVP Q6HR PRN


   PRN Reason: Pain (Mild)


   Stop: 18 16:26


   Last Admin: 18 15:40 Dose:  30 mg


Miscellaneous (Vancomycin Iv Per Pharmacy)  1 ea [facility] DAILY [facility]


   Stop: 18 09:59


   Last Admin: 18 13:04 Dose:  1 ea


Ondansetron HCl (Zofran)  4 mg IV Q4H PRN


   PRN Reason: Nausea / Vomiting


   Stop: 18 05:53


Potassium Chloride (Klor-Con)  40 meq PO X1 [facility]


   Stop: 18 10:16











- Procedures


Procedures: 


 Procedures











Procedure Code Date


 


EXCISION OF CHEST SKIN, EXTERNAL APPROACH 9LL6LUF 18


 


EXCISION OF ILEUM, OPEN APPROACH [id number] 18


 


EXCISION OF RIGHT LOWER ARM SKIN, EXTERNAL APPROACH [id number] 18


 


EXCISION OF RIGHT LOWER LEG SKIN, EXTERNAL APPROACH [id number] 18


 


RELEASE ILEUM, OPEN APPROACH [id number] 18














Nutritional Asmnt/Malnutr-PDOC





- Dietary Evaluation


Malnutrition Findings (Please click <Entered> for more info): 








Nutritional Asmnt/Malnutrition                             Start:  18 14:

24


Text:                                                      Status: Active      

  


Freq:                                                                          

  


Protocol:                                                                      

  


 Document     18 14:24  LCHENG  (Rec: 18 14:35  LCHENG  [id number])


 Nutritional Asmnt/Malnutrition


     Patient General Information


      Nutritional Screening                      High Risk


                                                 Consult


      Diagnosis                                  exploratory laparotomy with


                                                 recersal colostomy


      Pertinent Medical Hx/Surgical Hx           acute perforation of


                                                 diverticulitis, s/p


                                                 exploratory laparotomy and


                                                 divertin gcolostomy placement


      Subjective Information                     Pt seen lying in bed at time


                                                 of visit, awake and alert. Pt


                                                 stated he tolerated clear


                                                 liquid well, adequte amount


                                                 for him at this time. Pt has


                                                 RAQUEL drain noted. Per EMR, PO


                                                 intake % of clear liquid


                                                 .


      Current Diet Order/ Nutrition Support      clear liquid


      Pertinent Medications                      D5-0.9%ns, piperacillin,


                                                 vancomycin


      Pertinent Labs                              Na 133, K 3.4, Cr 0.7,


                                                 glucose 107, Ca 7.9


     Nutritional Hx/Data


      Height                                     1.88 m


      Height (Calculated Centimeters)            188.0


      Current Weight (lbs)                       86.636 kg


      Weight (Calculated Kilograms)              86.6


      Weight (Calculated Grams)                  54007.1


      Ideal Body Weight                          190


      Body Mass Index (BMI)                      24.5


      Weight Status                              Approriate


     GI Symptoms


      GI Symptoms                                None


      Last BM                                    none


      Difficult in:                              None


      Skin Integrity/Comment:                    RIGHT LOWER LEG HEALING


                                                 incision to right arm


     Estimated Nutritional Goals


      BEE in Kcals:                              Using Current wt


      Calories/Kcals/Kg                          25-30


      Kcals Calculated                           3480-3566


      Protein:                                   Using Current wt


      Protein g/k.8-1


      Protein Calculated                         69-86


      Fluid: ml                                  2150-2580ml (1ml/kcal)


     Nutritional Problem


      1. Problem


       Problem                                   altered GI function


       Etiology                                  s/p reversal colostomy


       Signs/Symptoms:                           pt on clear liquid


     Intervention/Recommendation


      Comments                                   1. Continue with clear liquid


                                                 diet as ordered.


                                                 2. Monitor PO intake, wt, labs


                                                 and skin integrity


                                                 3. F/U as high risk in 2-3


                                                 days, -


     Expected Outcomes/Goals


      Expected Outcomes/Goals                    1. PO intake to meet at least


                                                 75% of nutritional needs.


                                                 2. Wt stability, skin to


                                                 remain intact, labs to


                                                 approach WNL.

## 2018-06-24 LAB
ALBUMIN SERPL-MCNC: 2.5 GM/DL (ref 4.2–5.5)
ALBUMIN/GLOB SERPL: 0.8 {RATIO} (ref 1–1.8)
ALP SERPL-CCNC: 42 U/L (ref 34–104)
ALT SERPL-CCNC: 14 U/L (ref 7–52)
ANION GAP SERPL CALC-SCNC: 12.5 MMOL/L (ref 7–16)
AST SERPL-CCNC: 18 U/L (ref 13–39)
BASOPHILS # BLD AUTO: 0 TH/CUMM (ref 0–0.2)
BASOPHILS NFR BLD: 1 % (ref 0–3)
BILIRUB SERPL-MCNC: 0.6 MG/DL (ref 0.3–1)
BUN SERPL-MCNC: 14 MG/DL (ref 7–25)
CALCIUM SERPL-MCNC: 7.7 MG/DL (ref 8.6–10.3)
CHLORIDE SERPL-SCNC: 105 MEQ/L (ref 98–107)
CO2 SERPL-SCNC: 18.4 MEQ/L (ref 21–31)
CREAT SERPL-MCNC: 1.7 MG/DL (ref 0.7–1.3)
EOSINOPHIL # BLD AUTO: 0.1 TH/CMM (ref 0.1–0.4)
EOSINOPHIL NFR BLD: 1 % (ref 0–5)
ERYTHROCYTE [DISTWIDTH] IN BLOOD BY AUTOMATED COUNT: 15 % (ref 11.5–20)
GLOBULIN SER-MCNC: 3 GM/DL
GLUCOSE SERPL-MCNC: 114 MG/DL (ref 70–105)
HCT VFR BLD CALC: 31.8 % (ref 41–60)
HGB BLD-MCNC: 10.6 GM/DL (ref 12–16)
LYMPHOCYTE AB SER FC-ACNC: 0.6 TH/CMM (ref 1.5–3)
LYMPHOCYTES # BLD MANUAL: 12 % (ref 20–50)
MCH RBC QN AUTO: 29.6 PG (ref 27–31)
MCHC RBC AUTO-ENTMCNC: 33.4 PG (ref 28–36)
MCV RBC AUTO: 88.8 FL (ref 80–99)
MONOCYTES # BLD AUTO: 1 TH/CMM (ref 0.3–1)
MONOCYTES # BLD MANUAL: 16 % (ref 2–10)
NEUTROPHILS # BLD: 4.3 TH/CMM (ref 1.8–8)
NEUTROPHILS NFR BLD AUTO: 59 % (ref 40–80)
NEUTS BAND NFR BLD: 11 % (ref 0–10)
PLATELET # BLD EST: ADEQUATE 10*3/UL
PLATELET # BLD: 172 TH/CMM (ref 150–400)
PMV BLD AUTO: 8.9 FL
POTASSIUM SERPL-SCNC: 2.9 MEQ/L (ref 3.5–5.1)
RBC # BLD AUTO: 3.58 MIL/CMM (ref 3.8–5.8)
SODIUM SERPL-SCNC: 133 MEQ/L (ref 136–145)
TOTAL CELLS COUNTED BLD: 100
WBC # BLD AUTO: 6 TH/CMM (ref 4.8–10.8)

## 2018-06-24 RX ADMIN — METRONIDAZOLE SCH MLS/HR: 500 INJECTION, SOLUTION INTRAVENOUS at 06:17

## 2018-06-24 RX ADMIN — HYDROMORPHONE HYDROCHLORIDE PRN MG: 1 INJECTION, SOLUTION INTRAMUSCULAR; INTRAVENOUS; SUBCUTANEOUS at 12:36

## 2018-06-24 RX ADMIN — ENOXAPARIN SODIUM SCH MG: 100 INJECTION SUBCUTANEOUS at 08:53

## 2018-06-24 RX ADMIN — HYDROMORPHONE HYDROCHLORIDE PRN MG: 1 INJECTION, SOLUTION INTRAMUSCULAR; INTRAVENOUS; SUBCUTANEOUS at 03:35

## 2018-06-24 RX ADMIN — POTASSIUM CHLORIDE SCH MLS/HR: 14.9 INJECTION, SOLUTION INTRAVENOUS at 11:05

## 2018-06-24 RX ADMIN — HYDROCODONE BITATRATE AND ACETAMINOPHEN PRN TAB: 10; 325 TABLET ORAL at 19:56

## 2018-06-24 RX ADMIN — METRONIDAZOLE SCH MLS/HR: 500 INJECTION, SOLUTION INTRAVENOUS at 22:00

## 2018-06-24 RX ADMIN — METRONIDAZOLE SCH MLS/HR: 500 INJECTION, SOLUTION INTRAVENOUS at 12:34

## 2018-06-24 RX ADMIN — DEXTROSE AND SODIUM CHLORIDE SCH MLS/HR: 5; .9 INJECTION, SOLUTION INTRAVENOUS at 05:14

## 2018-06-24 RX ADMIN — HYDROMORPHONE HYDROCHLORIDE PRN MG: 1 INJECTION, SOLUTION INTRAMUSCULAR; INTRAVENOUS; SUBCUTANEOUS at 23:37

## 2018-06-24 RX ADMIN — POTASSIUM CHLORIDE SCH MLS/HR: 14.9 INJECTION, SOLUTION INTRAVENOUS at 08:52

## 2018-06-24 RX ADMIN — ENOXAPARIN SODIUM SCH MG: 100 INJECTION SUBCUTANEOUS at 22:33

## 2018-06-24 RX ADMIN — HYDROMORPHONE HYDROCHLORIDE PRN MG: 1 INJECTION, SOLUTION INTRAMUSCULAR; INTRAVENOUS; SUBCUTANEOUS at 23:49

## 2018-06-24 NOTE — CONSULTATION
DATE OF CONSULTATION:     06/24/2018



INFECTIOUS DISEASE CONSULTATION



REFERRING PHYSICIAN:  Fortunato Stephens M.D.



REASON FOR CONSULTATION:  Diarrhea, rule out C. diff.



HISTORY OF PRESENT ILLNESS:  The patient is a 62-year-old male with a past

medical history of small bowel perforation requiring small bowel resection and

ileostomy 2 years ago.  At this time, he came to the ER for a laceration wound

of the right lower extremity after [place] the side of [place].  The laceration wound of the right leg and debridement of the

right forearm performed on 06/09/2018.  Besides this, he had resection of

diverting ileostomy and ileocolic anastomosis performed by exploratory

laparotomy on 06/18/218 by Dr. Syed.  Afterwards, he developed some diarrhea. 

Meanwhile, he was receiving Zosyn and Flagyl was added.  ID consult was called

for further antibiotic management.



PAST MEDICAL HISTORY:  None significant.



ALLERGIES:  NKDA.



PAST SURGICAL HISTORY:  Includes small bowel resection for small bowel

perforation 2 years ago, ileostomy.  Ileostomy was closed at this time.



MEDICATIONS:  As per medication reconciliation sheet.  Antibiotic wise, the

patient is receiving Zosyn and Flagyl.



SOCIAL HISTORY:  The patient is a chronic smoker.  He drinks socially.  No

drugs.



FAMILY HISTORY:  Noncontributory.



REVIEW OF SYSTEMS:

GENERAL:  The patient has no fever, no chills.

HEENT:  No diplopia.  No photophobia.  No sore throat.

RESPIRATORY:  No cough.  No shortness of breath.

CVS:  No chest pain.  No palpitation.

GASTROINTESTINAL:  No nausea.  No vomiting.  The patient has had 6 bowel

movements yesterday in small amounts, brown in color.

MUSCULOSKELETAL:  No muscle pain.  No joint pain.

NEUROLOGIC:  No headache.  No dizziness.  No focal weakness.



PHYSICAL EXAMINATION:

CURRENT VITAL SIGNS:  Showed temperature is 98.8 degrees Fahrenheit, pulse 87,

respirations 18, blood pressure 129/88.

GENERAL:  The patient is comfortable, lying in the bed, not in acute distress.

HEENT:  Head is normocephalic, atraumatic.  Oral cavity moist.  Pink tongue. 

Eyes:  No pallor.  No icterus.  PERRLA, EOMI.

NECK:  Supple.  No JVD.  No carotid bruit.  Trachea in midline.

CHEST:  Bilateral breath sounds.  No crackles or wheezing.

CARDIOVASCULAR:  S1, S2 within normal limits.  Regular rhythm.  No murmur.  No

gallop.

ABDOMEN:  Soft, nontender, nondistended.  Surgical incision is intact with

staples, although there is some serous drainage soaking the gauze.  There is no

active discharge or erythema of the incision.  Even, there is no significant

drainage or erythema around the closed colostomy site on the left lower

quadrant.

EXTREMITIES:  No cyanosis.  No clubbing.  No edema.

NEUROLOGIC:  Alert, awake, oriented x 3.



LABORATORY DATA:  Current lab shows WBC count is 6000, hemoglobin 10.6,

hematocrit 31.8, platelets are 172,000, neutrophils 59%, bands are 11%.  Sodium

133, potassium 2.9, chloride 105, bicarbonate is 18.4, BUN is 14, creatinine

1.7, glucose is 114.  Urinalysis showed leukocyte esterase trace, wbc 0-2, and a

few bacteria.  Vancomycin level was 18.4.  Ileostomy site wound grew E. coli,

Klebsiella oxytoca and Bacteroides fragilis.  MRSA screen is negative.



RADIOLOGY:  Chest x-ray showed mild chronic lung disease, possible COPD.



IMPRESSION:

1.  Diarrhea, rule out Clostridium difficile.

2.  Colostomy cultures are basically stool cultures, and the patient has

received 5 days of Zosyn, appropriate antibiotics for 5 days.

3.  Chronic obstructive pulmonary disease.

4.  Status post debridement of laceration wound of the right leg and right

forearm.

5.  Acute renal failure, acute kidney injury.



RECOMMENDATIONS:  Discontinue vancomycin IV and Zosyn.  Continue Flagyl.

 Wound care.  Monitor labs.  Check stool for C. diff toxin; if negative, 

discontinue Flagyl. Start Questran.



Thank you, Dr. Stephens for involving me in taking care of this patient.





DD: 06/24/2018 09:11

DT: 06/24/2018 14:09

JOB# [id number]  [id number]

GRACIELA

## 2018-06-24 NOTE — CONSULTATION
Consult Note





- Consult Note


Service Date: 06/24/18


Consult Note: 


PHYSICIAN Consultation Note:





Date of Admission: 06/18/18





Purpose of Consultation: 





Chief Complaint: 





History of Present Illness:


Patient HUSAM JOHNSON was admitted to [facility] Telemetry with EXPLORATORY 

LAPAROTOMY W/ REVERSAL COLOSTOMY.


Admitted to hospital for left leg infection


now noted increased serum creat


on vanco


Past Medical History: 





Diagnoses





ENCOUNTER FOR ATTENTION TO COLOSTOMY (06/18/18)





Allergies











Allergy/AdvReac Type Severity Reaction Status Date / Time


 


No Known Allergies Allergy   Verified 06/09/18 16:22








 Vital Signs











Temp  98.8 F   06/24/18 13:00


 


Pulse  78   06/24/18 13:00


 


Resp  17   06/24/18 13:00


 


BP  127/75   06/24/18 13:00


 


Pulse Ox  96   06/24/18 13:00








 Intake & Output











 06/23/18 06/24/18 06/24/18





 18:59 06:59 18:59


 


Intake Total 2850 1350 200


 


Output Total 3  


 


Balance 2847 1350 200


 


Weight (lbs) 81.647 kg  


 


Intake:   


 


  Intake, IV Amount 1850 1350 200


 


    D5-0.9%Ns 1,000 ml @ 100 1000 800 





    mls/hr IV .Q10H [facility] Rx#:   





    [id number]   


 


    KCL 20mEq/100mL Premix 20 100  





    meq In 100 ml @ 50 mls/   





    hr IV Q2H [facility] Rx#:   





    [id number]   


 


    KCL 20mEq/100mL Premix 20   100





    meq In 100 ml @ 50 mls/   





    hr IV Q2H [facility] Rx#:   





    [id number]   


 


    Piperacillin Sodium/ 50 50 





    Tazobact 3.375 gm In   





    Sodium Chloride 0.9% 50   





    ml @ 100 mls/hr IV Q8HR   





    [facility] Rx#:[id number]   


 


    metroNIDAZOLE 500mg/  100





    100mL 500 mg In 100 ml @   





    100 mls/hr IV Q8HR [facility] Rx   





    #:[id number]   


 


  Oral 1000  


 


Output:   


 


  Stool 3  


 


Other:   


 


  # Voids 4  


 


  # Bowel Movements 3  


 


  Stool Characteristics Soft Soft Soft


 


  Weight Source Bedscale  








 Laboratory Results - last 24 hr











  06/23/18 06/24/18 06/24/18





  17:35 06:05 06:05


 


WBC   


 


RBC   


 


Hgb   


 


Hct   


 


MCV   


 


MCH   


 


MCHC Differential   


 


RDW   


 


Plt Count   


 


MPV   


 


Band Neutrophils %   


 


Neutrophils (Manual)   


 


Lymphocytes   


 


Monocytes   


 


Eosinophils   


 


Basophils   


 


Platelet Estimate   


 


Sodium    133 L


 


Potassium    2.9 L*


 


Chloride    105


 


Carbon Dioxide    18.4 L


 


Anion Gap    12.5


 


BUN    14


 


Creatinine    1.7 H


 


Est GFR ( Amer)    52.3


 


Est GFR (Non-Af Amer)    43.2


 


BUN/Creatinine Ratio    8.2


 


Glucose    114 H


 


Calcium    7.7 L


 


Total Bilirubin    0.6


 


AST    18


 


ALT    14


 


Alkaline Phosphatase    42


 


Total Protein    5.5 L


 


Albumin    2.5 L


 


Globulin    3.0


 


Albumin/Globulin Ratio    0.8 L


 


Urine Source  CLEAN C  


 


Urine Color  YELLOW  


 


Urine Clarity  HAZY  


 


Urine pH  5.0  


 


Ur Specific Gravity  1.020  


 


Urine Protein  30 H  


 


Urine Glucose (UA)  NEGATIVE  


 


Urine Ketones  NEGATIVE  


 


Urine Blood  TRACE  


 


Urine Nitrate  NEGATIVE  


 


Urine Bilirubin  NEGATIVE  


 


Urine Urobilinogen  0.2  


 


Ur Leukocyte Esterase  TRACE H  


 


Urine RBC  0-2 H  


 


Urine WBC  0-2  


 


Ur Epithelial Cells  OCCASIONAL  


 


Amorphous Sediment  FEW URATES  


 


Urine Bacteria  FEW  


 


Random Vancomycin   18.4 














  06/24/18





  06:05


 


WBC  6.0


 


RBC  3.58 L


 


Hgb  10.6 L


 


Hct  31.8 L


 


MCV  88.8


 


MCH  29.6


 


MCHC Differential  33.4


 


RDW  15.0


 


Plt Count  172


 


MPV  8.9


 


Band Neutrophils %  11 H


 


Neutrophils (Manual)  59


 


Lymphocytes  12 L


 


Monocytes  16 H


 


Eosinophils  1


 


Basophils  1


 


Platelet Estimate  ADEQUATE


 


Sodium 


 


Potassium 


 


Chloride 


 


Carbon Dioxide 


 


Anion Gap 


 


BUN 


 


Creatinine 


 


Est GFR ( Amer) 


 


Est GFR (Non-Af Amer) 


 


BUN/Creatinine Ratio 


 


Glucose 


 


Calcium 


 


Total Bilirubin 


 


AST 


 


ALT 


 


Alkaline Phosphatase 


 


Total Protein 


 


Albumin 


 


Globulin 


 


Albumin/Globulin Ratio 


 


Urine Source 


 


Urine Color 


 


Urine Clarity 


 


Urine pH 


 


Ur Specific Gravity 


 


Urine Protein 


 


Urine Glucose (UA) 


 


Urine Ketones 


 


Urine Blood 


 


Urine Nitrate 


 


Urine Bilirubin 


 


Urine Urobilinogen 


 


Ur Leukocyte Esterase 


 


Urine RBC 


 


Urine WBC 


 


Ur Epithelial Cells 


 


Amorphous Sediment 


 


Urine Bacteria 


 


Random Vancomycin 








Home Medication











 Medication  Instructions  Recorded  Type


 


Acetaminophen [Tylenol] 650 mg PO PRN PRN  tab 06/15/18 Rx


 


Bacitracin Zinc/Neomy/Poly [Triple 1 pkt TP DAILY  packet 06/15/18 Rx





Antibiotic Pkt]   


 


Enoxaparin [Lovenox] 30 mg SUBQ Q12HR  syr 06/15/18 Rx


 


Hydrocodone/APAP 10 mg/325 mg 1 tab PO Q6H PRN  tab 06/15/18 Rx





[Norco 10 mg/325 mg]   


 


Vancomycin HCl [Vancomycin] 1 gm IV Q12H  vial 06/15/18 Rx








Current Medications











Generic Name Dose Route Start Last Admin





  Trade Name Freq  PRN Reason Stop Dose Admin


 


Acetaminophen/Hydrocodone Bitart  1 tab  06/19/18 20:05  06/20/18 17:23





  Norco 10 Mg/325 Mg  PO  08/18/18 20:04  1 tab





  Q6H PRN   Administration





  Pain (Severe)   





     





     





     


 


Cholestyramine Resin  4 gm  06/24/18 17:00  





  Questran  PO  08/23/18 16:59  





  BID KATHY   





     





     





     





     


 


Enoxaparin Sodium  30 mg  06/19/18 21:00  06/24/18 08:53





  Lovenox  SUBQ  08/18/18 20:59  30 mg





  Q12HR [facility]   Administration





     





     





     





     


 


Famotidine  20 mg  06/23/18 22:00  06/24/18 08:53





  Pepcid  PO  08/22/18 21:59  20 mg





  BID KATHY   Administration





     





     





     





     


 


Hydromorphone HCl  1 mg  06/18/18 16:26  06/24/18 12:36





  Dilaudid  IVP  08/17/18 16:25  1 mg





  Q3HR PRN   Administration





  SEVERE PAIN   





     





     





     


 


Dextrose/Sodium Chloride  1,000 mls @ 100 mls/hr  06/18/18 16:30  06/24/18 05:14





  D5-0.9%Ns  IV  08/17/18 16:29  100 mls/hr





  .Q10H [facility]   Administration





     





     





     





     


 


Metronidazole  500 mg in 100 mls @ 100 mls/hr  06/18/18 21:00  06/24/18 12:34





  Flagyl  IV  08/17/18 20:59  100 mls/hr





  Q8HR [facility]   Administration





     





     





     





     


 


Miscellaneous  1 ea  06/20/18 10:00  06/20/18 13:04





  Vancomycin Iv Per Pharmacy    08/19/18 09:59  1 ea





  DAILY [facility]   Administration





     





     





     





     


 


Ondansetron HCl  4 mg  06/22/18 05:54  





  Zofran  IV  08/21/18 05:53  





  Q4H PRN   





  Nausea / Vomiting   





     





     





     


 


Zolpidem Tartrate  5 mg  06/23/18 20:56  06/23/18 22:07





  Ambien  PO  08/22/18 20:55  5 mg





  HS PRN   Administration





  Insomnia   





     





     





     








Review of Systems:


A 12 point ROS was reviewed with the pertinent positive and negatives noted in 

the HPI.





Social History





Smoking Status                   Never smoker


Drug Use                         No


Alcohol Use                      No





Family Medical History





Family Medical History                                     Start:  06/18/18 11:

41


Freq:   ONCE                                               Status: Active      

  


Protocol:                                                                      

  


 Document     06/18/18 13:13  DAYSI  (Rec: 06/18/18 13:13  DAYSI [email or web address])


 Family Medical History


     Mother


      History Unknown                            Yes





Physical Exam:





General: alert and oriented x 3





HEENT: elliot eomi





Neck: supple no jvd





Cardio: s1s2





Respiratory: clear





Abdominal: soft





Extremities: no edema





Neurological: alert non focal





Assessment: 


ARF 


left leg cellulitis








Plan: 


iv fluids


check renal us


ua








Signed,





Gumaro Hairston M.D.


06/[id number]

## 2018-06-24 NOTE — GENERAL PROGRESS NOTE
Subjective





- Review of Systems


Service Date: 18


Subjective: 





awake and alert


no distress


still has diarrhea





Objective





- Results


Result Diagrams: 


 18 06:05





 18 06:05


Recent Labs: 


 Laboratory Last Values











WBC  6.0 Th/cmm (4.8-10.8)   18  06:05    


 


RBC  3.58 Mil/cmm (3.80-5.80)  L  18  06:05    


 


Hgb  10.6 gm/dL (12-16)  L  18  06:05    


 


Hct  31.8 % (41.0-60)  L  18  06:05    


 


MCV  88.8 fl (80-99)   18  06:05    


 


MCH  29.6 pg (27.0-31.0)   18  06:05    


 


MCHC Differential  33.4 pg (28.0-36.0)   18  06:05    


 


RDW  15.0 % (11.5-20.0)   18  06:05    


 


Plt Count  172 Th/cmm (150-400)   18  06:05    


 


MPV  8.9 fl  18  06:05    


 


Neutrophils %  79.1 % (40.0-80.0)   18  06:28    


 


Band Neutrophils %  11 % (0-10)  H  18  06:05    


 


Lymphocytes %  8.7 % (20.0-50.0)  L  18  06:28    


 


Monocytes %  11.1 % (2.0-10.0)  H  18  06:28    


 


Eosinophils %  0.7 % (0.0-5.0)   18  06:28    


 


Basophils %  0.4 % (0.0-2.0)   18  06:28    


 


Neutrophils (Manual)  59 % (40-80)   18  06:05    


 


Lymphocytes  12 % (20-50)  L  18  06:05    


 


Monocytes  16 % (2-10)  H  18  06:05    


 


Eosinophils  1 % (0-5)   18  06:05    


 


Basophils  1 % (0-3)   18  06:05    


 


Platelet Estimate  ADEQUATE  (NORMAL)   18  06:05    


 


PT  11.9 SECONDS (9.5-11.5)  H  18  12:08    


 


INR  1.13  (0.5-1.4)   18  12:08    


 


PTT (Actin FS)  22.3 SECONDS (26.0-38.0)  L  18  12:08    


 


Sodium  133 mEq/L (136-145)  L  18  06:05    


 


Potassium  2.9 mEq/L (3.5-5.1)  L*  18  06:05    


 


Chloride  105 mEq/L ()   18  06:05    


 


Carbon Dioxide  18.4 mEq/L (21.0-31.0)  L  18  06:05    


 


Anion Gap  12.5  (7.0-16.0)   18  06:05    


 


BUN  14 mg/dL (7-25)   18  06:05    


 


Creatinine  1.7 mg/dL (0.7-1.3)  H  18  06:05    


 


Est GFR ( Amer)  52.3 ml/min (>90)   18  06:05    


 


Est GFR (Non-Af Amer)  43.2 ml/min  18  06:05    


 


BUN/Creatinine Ratio  8.2   18  06:05    


 


Glucose  114 mg/dL ()  H  18  06:05    


 


Calcium  7.7 mg/dL (8.6-10.3)  L  18  06:05    


 


Total Bilirubin  0.6 mg/dL (0.3-1.0)   18  06:05    


 


AST  18 U/L (13-39)   18  06:05    


 


ALT  14 U/L (7-52)   18  06:05    


 


Alkaline Phosphatase  42 U/L ()   18  06:05    


 


Total Protein  5.5 gm/dL (6.0-8.3)  L  18  06:05    


 


Albumin  2.5 gm/dL (4.2-5.5)  L  18  06:05    


 


Globulin  3.0 gm/dL  18  06:05    


 


Albumin/Globulin Ratio  0.8  (1.0-1.8)  L  06/24/18  06:05    


 


Urine Source  CLEAN C   18  17:35    


 


Urine Color  YELLOW   18  17:35    


 


Urine Clarity  HAZY  (CLEAR)   18  17:35    


 


Urine pH  5.0  (4.6 - 8.0)   18  17:35    


 


Ur Specific Gravity  1.020  (1.005-1.030)   18  17:35    


 


Urine Protein  30 mg/dL (NEGATIVE)  H  18  17:35    


 


Urine Glucose (UA)  NEGATIVE mg/dL (NEGATIVE)   18  17:35    


 


Urine Ketones  NEGATIVE mg/dL (NEGATIVE)   18  17:35    


 


Urine Blood  TRACE  (NEGATIVE)   18  17:35    


 


Urine Nitrate  NEGATIVE  (NEGATIVE)   18  17:35    


 


Urine Bilirubin  NEGATIVE  (NEGATIVE)   18  17:35    


 


Urine Urobilinogen  0.2 E.U./dL (0.2 - 1.0)   18  17:35    


 


Ur Leukocyte Esterase  TRACE  (NEGATIVE)  H  18  17:35    


 


Urine RBC  0-2 /hpf (0-5)  H  18  17:35    


 


Urine WBC  0-2 /hpf (0-5)   18  17:35    


 


Ur Epithelial Cells  OCCASIONAL /lpf (FEW)   18  17:35    


 


Amorphous Sediment  FEW URATES  (NONE SEEN)   18  17:35    


 


Urine Bacteria  FEW /hpf (NONE SEEN)   18  17:35    


 


Vancomycin Trough  16.8 ug/mL (5-10)  H  18  09:30    


 


Random Vancomycin  18.4 ug/mL (5.0-40.0)   18  06:05    














- Physical Exam


Vitals and I&O: 


 Vital Signs











Temp  98.8 F   18 13:00


 


Pulse  78   18 13:00


 


Resp  17   18 13:00


 


BP  127/75   18 13:00


 


Pulse Ox  96   18 13:00








 Intake & Output











 18





 18:59 06:59 18:59


 


Intake Total 2850 1350 200


 


Output Total 3  


 


Balance 2847 1350 200


 


Weight (lbs) 81.647 kg  


 


Intake:   


 


  Intake, IV Amount 1850 1350 200


 


    D5-0.9%Ns 1,000 ml @ 100 1000 800 





    mls/hr IV .Q10H [facility] Rx#:   





    [id number]   


 


    KCL 20mEq/100mL Premix 20 100  





    meq In 100 ml @ 50 mls/   





    hr IV Q2H [facility] Rx#:   





    [id number]   


 


    KCL 20mEq/100mL Premix 20   100





    meq In 100 ml @ 50 mls/   





    hr IV Q2H [facility] Rx#:   





    [id number]   


 


    Piperacillin Sodium/ 50 50 





    Tazobact 3.375 gm In   





    Sodium Chloride 0.9% 50   





    ml @ 100 mls/hr IV Q8HR   





    [facility] Rx#:[id number]   


 


    metroNIDAZOLE 500mg/  100





    100mL 500 mg In 100 ml @   





    100 mls/hr IV Q8HR [facility] Rx   





    #:[id number]   


 


  Oral 1000  


 


Output:   


 


  Stool 3  


 


Other:   


 


  # Voids 4  


 


  # Bowel Movements 3  


 


  Stool Characteristics Soft Soft Soft


 


  Weight Source Bedscale  











Active Medications: 


Current Medications





Acetaminophen/Hydrocodone Bitart (Norco 10 Mg/325 Mg)  1 tab PO Q6H PRN


   PRN Reason: Pain (Severe)


   Stop: 18 20:04


   Last Admin: 18 17:23 Dose:  1 tab


Cholestyramine Resin (Questran)  4 gm PO BID [facility]


   Stop: 18 16:59


Enoxaparin Sodium (Lovenox)  30 mg SUBQ Q12HR [facility]


   Stop: 18 20:59


   Last Admin: 18 08:53 Dose:  30 mg


Famotidine (Pepcid)  20 mg PO BID [facility]


   Stop: 18 21:59


   Last Admin: 18 08:53 Dose:  20 mg


Hydromorphone HCl (Dilaudid)  1 mg IVP Q3HR PRN


   PRN Reason: SEVERE PAIN


   Stop: 18 16:25


   Last Admin: 18 12:36 Dose:  1 mg


Dextrose/Sodium Chloride (D5-0.9%Ns)  1,000 mls @ 100 mls/hr IV .Q10H [facility]


   Stop: 18 16:29


   Last Admin: 18 05:14 Dose:  100 mls/hr


Metronidazole (Flagyl)  500 mg in 100 mls @ 100 mls/hr IV Q8HR [facility]


   Stop: 18 20:59


   Last Admin: 18 12:34 Dose:  100 mls/hr


Miscellaneous (Vancomycin Iv Per Pharmacy)  1 ea [facility] DAILY [facility]


   Stop: 18 09:59


   Last Admin: 18 14:16 Dose:  1 ea


Ondansetron HCl (Zofran)  4 mg IV Q4H PRN


   PRN Reason: Nausea / Vomiting


   Stop: 18 05:53


Zolpidem Tartrate (Ambien)  5 mg PO HS PRN


   PRN Reason: Insomnia


   Stop: 18 20:55


   Last Admin: 18 22:07 Dose:  5 mg








General: Alert, Cooperative, No acute distress


HEENT: Atraumatic


Neck: Supple


Cardiovascular: Regular rate, Normal S1, Normal S2


Lungs: Clear to auscultation


Abdomen: Bowel sounds, Soft





- Procedures


Procedures: 


 Procedures











Procedure Code Date


 


EXCISION OF CHEST SKIN, EXTERNAL APPROACH [id number] 18


 


EXCISION OF ILEUM, OPEN APPROACH [id number] 18


 


EXCISION OF RIGHT LOWER ARM SKIN, EXTERNAL APPROACH [id number] 18


 


EXCISION OF RIGHT LOWER LEG SKIN, EXTERNAL APPROACH 0HBKXZZ 18


 


RELEASE ILEUM, OPEN APPROACH [id number] 18














Assessment/Plan





- Assessment


Assessment: 





1.SP COLOSTOMY REVISION.


2.SP EXCISION BIOPSY FOR CHEST WALL LESION.


3.Left leg Infection


4.Hypokalemia





- Plan


Plan: 





cont current treatment


strict I&O


D/W RN, patient


awaiting C diff culture


replete K





Nutritional Asmnt/Malnutr-PDOC





- Dietary Evaluation


Malnutrition Findings (Please click <Entered> for more info): 








Nutritional Asmnt/Malnutrition                             Start:  18 14:

24


Text:                                                      Status: Active      

  


Freq:                                                                          

  


Protocol:                                                                      

  


 Document     18 14:24  LCHENG  (Rec: 18 14:35  LCHENG  [id number])


 Nutritional Asmnt/Malnutrition


     Patient General Information


      Nutritional Screening                      High Risk


                                                 Consult


      Diagnosis                                  exploratory laparotomy with


                                                 recersal colostomy


      Pertinent Medical Hx/Surgical Hx           acute perforation of


                                                 diverticulitis, s/p


                                                 exploratory laparotomy and


                                                 divertin gcolostomy placement


      Subjective Information                     Pt seen lying in bed at time


                                                 of visit, awake and alert. Pt


                                                 stated he tolerated clear


                                                 liquid well, adequte amount


                                                 for him at this time. Pt has


                                                 RAQUEL drain noted. Per EMR, PO


                                                 intake % of clear liquid


                                                 .


      Current Diet Order/ Nutrition Support      clear liquid


      Pertinent Medications                      D5-0.9%ns, piperacillin,


                                                 vancomycin


      Pertinent Labs                              Na 133, K 3.4, Cr 0.7,


                                                 glucose 107, Ca 7.9


     Nutritional Hx/Data


      Height                                     1.88 m


      Height (Calculated Centimeters)            188.0


      Current Weight (lbs)                       86.636 kg


      Weight (Calculated Kilograms)              86.6


      Weight (Calculated Grams)                  70689.1


      Ideal Body Weight                          190


      Body Mass Index (BMI)                      24.5


      Weight Status                              Approriate


     GI Symptoms


      GI Symptoms                                None


      Last BM                                    none


      Difficult in:                              None


      Skin Integrity/Comment:                    RIGHT LOWER LEG HEALING


                                                 incision to right arm


     Estimated Nutritional Goals


      BEE in Kcals:                              Using Current wt


      Calories/Kcals/Kg                          25-30


      Kcals Calculated                           0928-3034


      Protein:                                   Using Current wt


      Protein g/k.8-1


      Protein Calculated                         69-86


      Fluid: ml                                  2150-2580ml (1ml/kcal)


     Nutritional Problem


      1. Problem


       Problem                                   altered GI function


       Etiology                                  s/p reversal colostomy


       Signs/Symptoms:                           pt on clear liquid


     Intervention/Recommendation


      Comments                                   1. Continue with clear liquid


                                                 diet as ordered.


                                                 2. Monitor PO intake, wt, labs


                                                 and skin integrity


                                                 3. F/U as high risk in 2-3


                                                 days, -


     Expected Outcomes/Goals


      Expected Outcomes/Goals                    1. PO intake to meet at least


                                                 75% of nutritional needs.


                                                 2. Wt stability, skin to


                                                 remain intact, labs to


                                                 approach WNL.

## 2018-06-25 LAB
ANION GAP SERPL CALC-SCNC: 10.8 MMOL/L (ref 7–16)
ANION GAP SERPL CALC-SCNC: 12.2 MMOL/L (ref 7–16)
BASOPHILS # BLD AUTO: 0 TH/CUMM (ref 0–0.2)
BASOPHILS NFR BLD: 0 % (ref 0–3)
BASOPHILS NFR BLD: 0 % (ref 0–3)
BUN SERPL-MCNC: 15 MG/DL (ref 7–25)
BUN SERPL-MCNC: 17 MG/DL (ref 7–25)
CALCIUM SERPL-MCNC: 8 MG/DL (ref 8.6–10.3)
CALCIUM SERPL-MCNC: 8.1 MG/DL (ref 8.6–10.3)
CHLORIDE SERPL-SCNC: 105 MEQ/L (ref 98–107)
CHLORIDE SERPL-SCNC: 105 MEQ/L (ref 98–107)
CO2 SERPL-SCNC: 21 MEQ/L (ref 21–31)
CO2 SERPL-SCNC: 22.8 MEQ/L (ref 21–31)
CREAT SERPL-MCNC: 1.7 MG/DL (ref 0.7–1.3)
CREAT SERPL-MCNC: 1.7 MG/DL (ref 0.7–1.3)
EOSINOPHIL # BLD AUTO: 0 TH/CMM (ref 0.1–0.4)
EOSINOPHIL NFR BLD: 0 % (ref 0–5)
EOSINOPHIL NFR BLD: 0 % (ref 0–5)
ERYTHROCYTE [DISTWIDTH] IN BLOOD BY AUTOMATED COUNT: 14.6 % (ref 11.5–20)
ERYTHROCYTE [DISTWIDTH] IN BLOOD BY AUTOMATED COUNT: 14.9 % (ref 11.5–20)
ERYTHROCYTE [DISTWIDTH] IN BLOOD BY AUTOMATED COUNT: 15 % (ref 11.5–20)
GLUCOSE SERPL-MCNC: 138 MG/DL (ref 70–105)
GLUCOSE SERPL-MCNC: 138 MG/DL (ref 70–105)
HCT VFR BLD CALC: 35.7 % (ref 41–60)
HCT VFR BLD CALC: 36.4 % (ref 41–60)
HCT VFR BLD CALC: 38.7 % (ref 41–60)
HGB BLD-MCNC: 12 GM/DL (ref 12–16)
HGB BLD-MCNC: 12.3 GM/DL (ref 12–16)
HGB BLD-MCNC: 12.9 GM/DL (ref 12–16)
LYMPHOCYTE AB SER FC-ACNC: 0.8 TH/CMM (ref 1.5–3)
LYMPHOCYTES # BLD MANUAL: 17 % (ref 20–50)
LYMPHOCYTES # BLD MANUAL: 18 % (ref 20–50)
LYMPHOCYTES # BLD MANUAL: 28 % (ref 20–50)
MCH RBC QN AUTO: 29.4 PG (ref 27–31)
MCH RBC QN AUTO: 29.4 PG (ref 27–31)
MCH RBC QN AUTO: 29.9 PG (ref 27–31)
MCHC RBC AUTO-ENTMCNC: 33.2 PG (ref 28–36)
MCHC RBC AUTO-ENTMCNC: 33.5 PG (ref 28–36)
MCHC RBC AUTO-ENTMCNC: 33.7 PG (ref 28–36)
MCV RBC AUTO: 87.7 FL (ref 80–99)
MCV RBC AUTO: 88.5 FL (ref 80–99)
MCV RBC AUTO: 88.7 FL (ref 80–99)
MONOCYTES # BLD AUTO: 1.6 TH/CMM (ref 0.3–1)
MONOCYTES # BLD MANUAL: 10 % (ref 2–10)
MONOCYTES # BLD MANUAL: 10 % (ref 2–10)
MONOCYTES # BLD MANUAL: 15 % (ref 2–10)
NEUTROPHILS # BLD: 6.6 TH/CMM (ref 1.8–8)
NEUTROPHILS NFR BLD AUTO: 40 % (ref 40–80)
NEUTROPHILS NFR BLD AUTO: 58 % (ref 40–80)
NEUTROPHILS NFR BLD AUTO: 59 % (ref 40–80)
NEUTS BAND NFR BLD: 14 % (ref 0–10)
NEUTS BAND NFR BLD: 22 % (ref 0–10)
NEUTS BAND NFR BLD: 9 % (ref 0–10)
PLAT MORPH BLD: NORMAL
PLAT MORPH BLD: NORMAL
PLATELET # BLD EST: ADEQUATE 10*3/UL
PLATELET # BLD EST: ADEQUATE 10*3/UL
PLATELET # BLD: 229 TH/CMM (ref 150–400)
PLATELET # BLD: 237 TH/CMM (ref 150–400)
PLATELET # BLD: 267 TH/CMM (ref 150–400)
PMV BLD AUTO: 8.4 FL
PMV BLD AUTO: 8.6 FL
PMV BLD AUTO: 8.8 FL
POTASSIUM SERPL-SCNC: 3.2 MEQ/L (ref 3.5–5.1)
POTASSIUM SERPL-SCNC: 3.6 MEQ/L (ref 3.5–5.1)
RBC # BLD AUTO: 4.07 MIL/CMM (ref 3.8–5.8)
RBC # BLD AUTO: 4.11 MIL/CMM (ref 3.8–5.8)
RBC # BLD AUTO: 4.37 MIL/CMM (ref 3.8–5.8)
RBC MORPH BLD: NORMAL
RBC MORPH BLD: NORMAL
SODIUM SERPL-SCNC: 135 MEQ/L (ref 136–145)
SODIUM SERPL-SCNC: 135 MEQ/L (ref 136–145)
TOTAL CELLS COUNTED BLD: 100
WBC # BLD AUTO: 7.3 TH/CMM (ref 4.8–10.8)
WBC # BLD AUTO: 8 TH/CMM (ref 4.8–10.8)
WBC # BLD AUTO: 9 TH/CMM (ref 4.8–10.8)

## 2018-06-25 RX ADMIN — METRONIDAZOLE SCH MLS/HR: 500 INJECTION, SOLUTION INTRAVENOUS at 22:15

## 2018-06-25 RX ADMIN — ENOXAPARIN SODIUM SCH MG: 100 INJECTION SUBCUTANEOUS at 20:18

## 2018-06-25 RX ADMIN — METRONIDAZOLE SCH MLS/HR: 500 INJECTION, SOLUTION INTRAVENOUS at 12:30

## 2018-06-25 RX ADMIN — ENOXAPARIN SODIUM SCH MG: 100 INJECTION SUBCUTANEOUS at 08:08

## 2018-06-25 RX ADMIN — SODIUM PHOSPHATE, DIBASIC AND SODIUM PHOSPHATE, MONOBASIC ONE: 7; 19 ENEMA RECTAL at 09:07

## 2018-06-25 RX ADMIN — METRONIDAZOLE SCH MLS/HR: 500 INJECTION, SOLUTION INTRAVENOUS at 05:17

## 2018-06-25 RX ADMIN — IPRATROPIUM BROMIDE AND ALBUTEROL SULFATE SCH ML: .5; 3 SOLUTION RESPIRATORY (INHALATION) at 22:56

## 2018-06-25 RX ADMIN — SODIUM PHOSPHATE, DIBASIC AND SODIUM PHOSPHATE, MONOBASIC ONE ML: 7; 19 ENEMA RECTAL at 08:09

## 2018-06-25 RX ADMIN — DEXTROSE AND SODIUM CHLORIDE SCH MLS/HR: 5; .9 INJECTION, SOLUTION INTRAVENOUS at 10:40

## 2018-06-25 RX ADMIN — HYDROCODONE BITATRATE AND ACETAMINOPHEN PRN TAB: 10; 325 TABLET ORAL at 03:03

## 2018-06-25 RX ADMIN — IPRATROPIUM BROMIDE AND ALBUTEROL SULFATE SCH ML: .5; 3 SOLUTION RESPIRATORY (INHALATION) at 18:38

## 2018-06-25 RX ADMIN — POTASSIUM CHLORIDE SCH MLS/HR: 14.9 INJECTION, SOLUTION INTRAVENOUS at 17:38

## 2018-06-25 RX ADMIN — METOCLOPRAMIDE SCH MG: 5 INJECTION, SOLUTION INTRAMUSCULAR; INTRAVENOUS at 17:37

## 2018-06-25 RX ADMIN — HYDROMORPHONE HYDROCHLORIDE PRN MG: 1 INJECTION, SOLUTION INTRAMUSCULAR; INTRAVENOUS; SUBCUTANEOUS at 16:09

## 2018-06-25 RX ADMIN — HYDROMORPHONE HYDROCHLORIDE PRN MG: 1 INJECTION, SOLUTION INTRAMUSCULAR; INTRAVENOUS; SUBCUTANEOUS at 22:50

## 2018-06-25 RX ADMIN — POTASSIUM CHLORIDE SCH MLS/HR: 14.9 INJECTION, SOLUTION INTRAVENOUS at 20:18

## 2018-06-25 NOTE — INFECTIOUS DISEASE PROG NOTE
Infectious Disease Subjective





- Review of Systems


Service Date: 18


Events since last encounter: 





Patient had had low grade fever, 100.7 F,yesterday, and had low urine output. 

abdomen distended.  short of breath on oxygen via NC.


Subjective: 





There is no new change, no fever. Diarrhea better.





Infectious Disease Objective





- Results


Result Diagrams: 


 18 04:50





 18 04:50


Recent Labs: 


 Laboratory Last Values











WBC  8.0 Th/cmm (4.8-10.8)   18  04:50    


 


RBC  4.07 Mil/cmm (3.80-5.80)   18  04:50    


 


Hgb  12.0 gm/dL (12-16)   18  04:50    


 


Hct  35.7 % (41.0-60)  L  18  04:50    


 


MCV  87.7 fl (80-99)   18  04:50    


 


MCH  29.4 pg (27.0-31.0)   18  04:50    


 


MCHC Differential  33.5 pg (28.0-36.0)   18  04:50    


 


RDW  14.9 % (11.5-20.0)   18  04:50    


 


Plt Count  229 Th/cmm (150-400)  D 18  04:50    


 


MPV  8.4 fl  18  04:50    


 


Neutrophils %  79.1 % (40.0-80.0)   18  06:28    


 


Band Neutrophils %  9 % (0-10)   18  04:50    


 


Lymphocytes %  8.7 % (20.0-50.0)  L  18  06:28    


 


Monocytes %  11.1 % (2.0-10.0)  H  18  06:28    


 


Eosinophils %  0.7 % (0.0-5.0)   18  06:28    


 


Basophils %  0.4 % (0.0-2.0)   18  06:28    


 


Neutrophils (Manual)  59 % (40-80)   18  04:50    


 


Lymphocytes  17 % (20-50)  L  18  04:50    


 


Monocytes  15 % (2-10)  H  18  04:50    


 


Eosinophils  1 % (0-5)   18  06:05    


 


Basophils  1 % (0-3)   18  06:05    


 


Platelet Estimate  ADEQUATE  (NORMAL)   18  06:05    


 


PT  11.9 SECONDS (9.5-11.5)  H  18  12:08    


 


INR  1.13  (0.5-1.4)   18  12:08    


 


PTT (Actin FS)  22.3 SECONDS (26.0-38.0)  L  18  12:08    


 


Sodium  135 mEq/L (136-145)  L  18  04:50    


 


Potassium  3.6 mEq/L (3.5-5.1)   18  04:50    


 


Chloride  105 mEq/L ()   18  04:50    


 


Carbon Dioxide  22.8 mEq/L (21.0-31.0)   18  04:50    


 


Anion Gap  10.8  (7.0-16.0)   18  04:50    


 


BUN  15 mg/dL (7-25)   18  04:50    


 


Creatinine  1.7 mg/dL (0.7-1.3)  H  18  04:50    


 


Est GFR ( Amer)  52.3 ml/min (>90)   18  04:50    


 


Est GFR (Non-Af Amer)  43.2 ml/min  18  04:50    


 


BUN/Creatinine Ratio  8.8   18  04:50    


 


Glucose  138 mg/dL ()  H  18  04:50    


 


Calcium  8.0 mg/dL (8.6-10.3)  L  18  04:50    


 


Total Bilirubin  0.6 mg/dL (0.3-1.0)   18  06:05    


 


AST  18 U/L (13-39)   18  06:05    


 


ALT  14 U/L (7-52)   18  06:05    


 


Alkaline Phosphatase  42 U/L ()   18  06:05    


 


Total Protein  5.5 gm/dL (6.0-8.3)  L  18  06:05    


 


Albumin  2.5 gm/dL (4.2-5.5)  L  18  06:05    


 


Globulin  3.0 gm/dL  18  06:05    


 


Albumin/Globulin Ratio  0.8  (1.0-1.8)  L  18  06:05    


 


Urine Source  CLEAN C   18  17:35    


 


Urine Color  YELLOW   18  17:35    


 


Urine Clarity  HAZY  (CLEAR)   18  17:35    


 


Urine pH  5.0  (4.6 - 8.0)   18  17:35    


 


Ur Specific Gravity  1.020  (1.005-1.030)   18  17:35    


 


Urine Protein  30 mg/dL (NEGATIVE)  H  18  17:35    


 


Urine Glucose (UA)  NEGATIVE mg/dL (NEGATIVE)   18  17:35    


 


Urine Ketones  NEGATIVE mg/dL (NEGATIVE)   18  17:35    


 


Urine Blood  TRACE  (NEGATIVE)   18  17:35    


 


Urine Nitrate  NEGATIVE  (NEGATIVE)   18  17:35    


 


Urine Bilirubin  NEGATIVE  (NEGATIVE)   18  17:35    


 


Urine Urobilinogen  0.2 E.U./dL (0.2 - 1.0)   18  17:35    


 


Ur Leukocyte Esterase  TRACE  (NEGATIVE)  H  18  17:35    


 


Urine RBC  0-2 /hpf (0-5)  H  18  17:35    


 


Urine WBC  0-2 /hpf (0-5)   18  17:35    


 


Ur Epithelial Cells  OCCASIONAL /lpf (FEW)   18  17:35    


 


Amorphous Sediment  FEW URATES  (NONE SEEN)   18  17:35    


 


Urine Bacteria  FEW /hpf (NONE SEEN)   18  17:35    


 


Vancomycin Trough  16.8 ug/mL (5-10)  H  18  09:30    


 


Random Vancomycin  19.2 ug/mL (5.0-40.0)   18  04:50    














- Physical Exam


Vitals and I&O: 


 Vital Signs











Temp  98.3 F   18 11:48


 


Pulse  104   18 11:48


 


Resp  18   18 11:48


 


BP  114/80   18 11:48


 


Pulse Ox  100   18 11:48








 Intake & Output











 06/24/18 06/25/18 06/25/18





 18:59 06:59 18:59


 


Intake Total 4400 740 


 


Output Total 650 850 


 


Balance 3750 -110 


 


Weight (lbs) 81.647 kg 90.718 kg 


 


Intake:   


 


  Intake, IV Amount 1200 300 


 


    D5-0.9%Ns 1,000 ml @ 100 1000  





    mls/hr IV .Q10H [facility] Rx#:   





    [id number]   


 


    KCL 20mEq/100mL Premix 20 100  





    meq In 100 ml @ 50 mls/   





    hr IV Q2H [facility] Rx#:   





    [id number]   


 


    metroNIDAZOLE 500mg/ 300 





    100mL 500 mg In 100 ml @   





    100 mls/hr IV Q8HR [facility] Rx   





    #:[id number]   


 


  Oral 3200 440 


 


Output:   


 


  Urine 650 800 


 


  Emesis  50 


 


Other:   


 


  # Bowel Movements 5 1 


 


  Stool Characteristics Soft Soft 


 


  Weight Source Bedscale Bedscale 











Active Medications: 


Current Medications





Acetaminophen/Hydrocodone Bitart (Norco 10 Mg/325 Mg)  1 tab PO Q6H PRN


   PRN Reason: Pain (Severe)


   Stop: 18 20:04


   Last Admin: 18 03:03 Dose:  1 tab


Cholestyramine Resin (Questran)  4 gm PO BID [facility]


   Stop: 18 16:59


   Last Admin: 18 08:08 Dose:  4 gm


Enoxaparin Sodium (Lovenox)  30 mg SUBQ Q12HR [facility]


   Stop: 18 20:59


   Last Admin: 18 08:08 Dose:  30 mg


Famotidine (Pepcid)  20 mg PO BID [facility]


   Stop: 18 21:59


   Last Admin: 18 08:08 Dose:  20 mg


Hydromorphone HCl (Dilaudid)  1 mg IVP Q3HR PRN


   PRN Reason: SEVERE PAIN


   Stop: 18 16:25


   Last Admin: 18 23:49 Dose:  1 mg


Dextrose/Sodium Chloride (D5-0.9%Ns)  1,000 mls @ 100 mls/hr IV .Q10H [facility]


   Stop: 18 16:29


   Last Admin: 18 10:40 Dose:  100 mls/hr


Metronidazole (Flagyl)  500 mg in 100 mls @ 100 mls/hr IV Q8HR [facility]


   Stop: 18 20:59


   Last Admin: 18 12:30 Dose:  100 mls/hr


Vancomycin HCl 1.5 gm/ Sodium (Chloride)  500 mls @ 250 mls/hr IV ONCE ONE


   Stop: 18 22:59


Miscellaneous (Vancomycin Iv Per Pharmacy)  1 ea [facility] DAILY [facility]


   Stop: 18 09:59


   Last Admin: 18 08:14 Dose:  Not Given


Ondansetron HCl (Zofran)  4 mg IV Q4H PRN


   PRN Reason: Nausea / Vomiting


   Stop: 18 05:53


   Last Admin: 18 03:50 Dose:  4 mg


Zolpidem Tartrate (Ambien)  5 mg PO HS PRN


   PRN Reason: Insomnia


   Stop: 18 20:55


   Last Admin: 18 22:32 Dose:  5 mg








General: no acute distress, well developed, well nourished


HEENT: atraumatic, normocephalic, PERRLA


Neck: supple, no thyromegaly


Cardiovascular: S1S2, regular


Lungs: clear to auscultation bilaterally, clear to percussion


Abdomen: soft, tender, distended, other (surgical wound ok.  Left lower 

quadrant ileostomy wound closure, okay.), no mass, no rebound


Extremities: no cyanosis, no clubbing


Neurological: awake, alert, oriented


Skin: intact





- Procedures


Procedures: 


 Procedures











Procedure Code Date


 


EXCISION OF CHEST SKIN, EXTERNAL APPROACH 3AE2KML 18


 


EXCISION OF ILEUM, OPEN APPROACH [id number] 18


 


EXCISION OF RIGHT LOWER ARM SKIN, EXTERNAL APPROACH [id number] 18


 


EXCISION OF RIGHT LOWER LEG SKIN, EXTERNAL APPROACH 0HBKXZZ 18


 


RELEASE ILEUM, OPEN APPROACH 8ZBP0HX 18














Infectious Disease Assmt/Plan





- Assessment


Assessment: 





1. Diarrhea improving.  Stool for C. difficile negative.


2.  Fever, will do fever w/u.


3.  COPD.


4.  Status post closure of diverting ileostomy.


5.  Status post debridement of laceration wound of the right leg and right 

forearm.


6.  Acute renal failure, acute kidney injury.


7.  Distended abdomen, likely ileus.





- Plan


Plan: 





Sepsis workup.  KUB.  He started Levaquin and continue Flagyl.





Nutritional Asmnt/Malnutr-PDOC





- Dietary Evaluation


Malnutrition Findings (Please click <Entered> for more info): 








Nutritional Asmnt/Malnutrition                             Start:  18 14:

24


Text:                                                      Status: Active      

  


Freq:                                                                          

  


Protocol:                                                                      

  


 Document     18 14:24  DOMINIKKINSEY  (Rec: 18 14:35  DOMINIKKINSEY [email or web address])


 Nutritional Asmnt/Malnutrition


     Patient General Information


      Nutritional Screening                      High Risk


                                                 Consult


      Diagnosis                                  exploratory laparotomy with


                                                 recersal colostomy


      Pertinent Medical Hx/Surgical Hx           acute perforation of


                                                 diverticulitis, s/p


                                                 exploratory laparotomy and


                                                 divertin gcolostomy placement


      Subjective Information                     Pt seen lying in bed at time


                                                 of visit, awake and alert. Pt


                                                 stated he tolerated clear


                                                 liquid well, adequte amount


                                                 for him at this time. Pt has


                                                 RAQUEL drain noted. Per EMR, PO


                                                 intake % of clear liquid


                                                 .


      Current Diet Order/ Nutrition Support      clear liquid


      Pertinent Medications                      D5-0.9%ns, piperacillin,


                                                 vancomycin


      Pertinent Labs                              Na 133, K 3.4, Cr 0.7,


                                                 glucose 107, Ca 7.9


     Nutritional Hx/Data


      Height                                     1.88 m


      Height (Calculated Centimeters)            188.0


      Current Weight (lbs)                       86.636 kg


      Weight (Calculated Kilograms)              86.6


      Weight (Calculated Grams)                  23875.1


      Ideal Body Weight                          190


      Body Mass Index (BMI)                      24.5


      Weight Status                              Approriate


     GI Symptoms


      GI Symptoms                                None


      Last BM                                    none


      Difficult in:                              None


      Skin Integrity/Comment:                    RIGHT LOWER LEG HEALING


                                                 incision to right arm


     Estimated Nutritional Goals


      BEE in Kcals:                              Using Current wt


      Calories/Kcals/Kg                          25-30


      Kcals Calculated                           4777-0165


      Protein:                                   Using Current wt


      Protein g/k.8-1


      Protein Calculated                         69-86


      Fluid: ml                                  2150-2580ml (1ml/kcal)


     Nutritional Problem


      1. Problem


       Problem                                   altered GI function


       Etiology                                  s/p reversal colostomy


       Signs/Symptoms:                           pt on clear liquid


     Intervention/Recommendation


      Comments                                   1. Continue with clear liquid


                                                 diet as ordered.


                                                 2. Monitor PO intake, wt, labs


                                                 and skin integrity


                                                 3. F/U as high risk in 2-3


                                                 days, -


     Expected Outcomes/Goals


      Expected Outcomes/Goals                    1. PO intake to meet at least


                                                 75% of nutritional needs.


                                                 2. Wt stability, skin to


                                                 remain intact, labs to


                                                 approach WNL.

## 2018-06-25 NOTE — GENERAL PROGRESS NOTE
Subjective





- Review of Systems


Service Date: 18


Events since last encounter: 





abdomen distended from ascites


having BM, tolerating oral intake








Objective





- Results


Result Diagrams: 


 18 04:50





 18 04:50


Recent Labs: 


 Laboratory Last Values











WBC  8.0 Th/cmm (4.8-10.8)   18  04:50    


 


RBC  4.07 Mil/cmm (3.80-5.80)   18  04:50    


 


Hgb  12.0 gm/dL (12-16)   18  04:50    


 


Hct  35.7 % (41.0-60)  L  18  04:50    


 


MCV  87.7 fl (80-99)   18  04:50    


 


MCH  29.4 pg (27.0-31.0)   18  04:50    


 


MCHC Differential  33.5 pg (28.0-36.0)   18  04:50    


 


RDW  14.9 % (11.5-20.0)   18  04:50    


 


Plt Count  229 Th/cmm (150-400)  D 18  04:50    


 


MPV  8.4 fl  18  04:50    


 


Neutrophils %  79.1 % (40.0-80.0)   18  06:28    


 


Band Neutrophils %  9 % (0-10)   18  04:50    


 


Lymphocytes %  8.7 % (20.0-50.0)  L  18  06:28    


 


Monocytes %  11.1 % (2.0-10.0)  H  18  06:28    


 


Eosinophils %  0.7 % (0.0-5.0)   18  06:28    


 


Basophils %  0.4 % (0.0-2.0)   18  06:28    


 


Neutrophils (Manual)  59 % (40-80)   18  04:50    


 


Lymphocytes  17 % (20-50)  L  18  04:50    


 


Monocytes  15 % (2-10)  H  18  04:50    


 


Eosinophils  1 % (0-5)   18  06:05    


 


Basophils  1 % (0-3)   18  06:05    


 


Platelet Estimate  ADEQUATE  (NORMAL)   18  06:05    


 


PT  11.9 SECONDS (9.5-11.5)  H  18  12:08    


 


INR  1.13  (0.5-1.4)   18  12:08    


 


PTT (Actin FS)  22.3 SECONDS (26.0-38.0)  L  18  12:08    


 


Sodium  135 mEq/L (136-145)  L  18  04:50    


 


Potassium  3.6 mEq/L (3.5-5.1)   18  04:50    


 


Chloride  105 mEq/L ()   18  04:50    


 


Carbon Dioxide  22.8 mEq/L (21.0-31.0)   18  04:50    


 


Anion Gap  10.8  (7.0-16.0)   18  04:50    


 


BUN  15 mg/dL (7-25)   18  04:50    


 


Creatinine  1.7 mg/dL (0.7-1.3)  H  18  04:50    


 


Est GFR ( Amer)  52.3 ml/min (>90)   18  04:50    


 


Est GFR (Non-Af Amer)  43.2 ml/min  18  04:50    


 


BUN/Creatinine Ratio  8.8   18  04:50    


 


Glucose  138 mg/dL ()  H  18  04:50    


 


Calcium  8.0 mg/dL (8.6-10.3)  L  18  04:50    


 


Total Bilirubin  0.6 mg/dL (0.3-1.0)   18  06:05    


 


AST  18 U/L (13-39)   18  06:05    


 


ALT  14 U/L (7-52)   18  06:05    


 


Alkaline Phosphatase  42 U/L ()   18  06:05    


 


Total Protein  5.5 gm/dL (6.0-8.3)  L  18  06:05    


 


Albumin  2.5 gm/dL (4.2-5.5)  L  18  06:05    


 


Globulin  3.0 gm/dL  18  06:05    


 


Albumin/Globulin Ratio  0.8  (1.0-1.8)  L  18  06:05    


 


Urine Source  CLEAN C   18  17:35    


 


Urine Color  YELLOW   18  17:35    


 


Urine Clarity  HAZY  (CLEAR)   18  17:35    


 


Urine pH  5.0  (4.6 - 8.0)   18  17:35    


 


Ur Specific Gravity  1.020  (1.005-1.030)   18  17:35    


 


Urine Protein  30 mg/dL (NEGATIVE)  H  18  17:35    


 


Urine Glucose (UA)  NEGATIVE mg/dL (NEGATIVE)   18  17:35    


 


Urine Ketones  NEGATIVE mg/dL (NEGATIVE)   18  17:35    


 


Urine Blood  TRACE  (NEGATIVE)   18  17:35    


 


Urine Nitrate  NEGATIVE  (NEGATIVE)   18  17:35    


 


Urine Bilirubin  NEGATIVE  (NEGATIVE)   18  17:35    


 


Urine Urobilinogen  0.2 E.U./dL (0.2 - 1.0)   18  17:35    


 


Ur Leukocyte Esterase  TRACE  (NEGATIVE)  H  18  17:35    


 


Urine RBC  0-2 /hpf (0-5)  H  18  17:35    


 


Urine WBC  0-2 /hpf (0-5)   18  17:35    


 


Ur Epithelial Cells  OCCASIONAL /lpf (FEW)   18  17:35    


 


Amorphous Sediment  FEW URATES  (NONE SEEN)   18  17:35    


 


Urine Bacteria  FEW /hpf (NONE SEEN)   18  17:35    


 


Vancomycin Trough  16.8 ug/mL (5-10)  H  18  09:30    


 


Random Vancomycin  19.2 ug/mL (5.0-40.0)   18  04:50    














- Physical Exam


Vitals and I&O: 


 Vital Signs











Temp  96.5 F   18 07:32


 


Pulse  106   18 07:32


 


Resp  18   18 07:32


 


BP  141/93   18 07:32


 


Pulse Ox  97   18 07:32








 Intake & Output











 18





 18:59 06:59 18:59


 


Intake Total 3400 640 


 


Output Total 650 850 


 


Balance 2750 -210 


 


Weight (lbs) 81.647 kg 90.718 kg 


 


Intake:   


 


  Intake, IV Amount 200 200 


 


    KCL 20mEq/100mL Premix 20 100  





    meq In 100 ml @ 50 mls/   





    hr IV Q2H [facility]#:   





    [id number]   


 


    metroNIDAZOLE 500mg/ 200 





    100mL 500 mg In 100 ml @   





    100 mls/hr IV Q8HR [facility] Rx   





    #:[id number]   


 


  Oral 3200 440 


 


Output:   


 


  Urine 650 800 


 


  Emesis  50 


 


Other:   


 


  # Bowel Movements 5 1 


 


  Stool Characteristics Soft Soft 


 


  Weight Source Bedscale Bedscale 











Active Medications: 


Current Medications





Acetaminophen/Hydrocodone Bitart (Norco 10 Mg/325 Mg)  1 tab PO Q6H PRN


   PRN Reason: Pain (Severe)


   Stop: 18 20:04


   Last Admin: 18 03:03 Dose:  1 tab


Cholestyramine Resin (Questran)  4 gm PO BID [facility]


   Stop: 18 16:59


   Last Admin: 18 08:08 Dose:  4 gm


Enoxaparin Sodium (Lovenox)  30 mg SUBQ Q12HR [facility]


   Stop: 18 20:59


   Last Admin: 18 08:08 Dose:  30 mg


Famotidine (Pepcid)  20 mg PO BID [facility]


   Stop: 18 21:59


   Last Admin: 18 08:08 Dose:  20 mg


Hydromorphone HCl (Dilaudid)  1 mg IVP Q3HR PRN


   PRN Reason: SEVERE PAIN


   Stop: 18 16:25


   Last Admin: 18 23:49 Dose:  1 mg


Dextrose/Sodium Chloride (D5-0.9%Ns)  1,000 mls @ 100 mls/hr IV .Q10H [facility]


   Stop: 18 16:29


   Last Admin: 18 05:14 Dose:  100 mls/hr


Metronidazole (Flagyl)  500 mg in 100 mls @ 100 mls/hr IV Q8HR [facility]


   Stop: 18 20:59


   Last Admin: 18 05:17 Dose:  100 mls/hr


Vancomycin HCl 1.5 gm/ Sodium (Chloride)  500 mls @ 250 mls/hr IV ONCE ONE


   Stop: 18 22:59


Miscellaneous (Vancomycin Iv Per Pharmacy)  1 ea [facility] DAILY [facility]


   Stop: 18 09:59


   Last Admin: 18 08:14 Dose:  Not Given


Ondansetron HCl (Zofran)  4 mg IV Q4H PRN


   PRN Reason: Nausea / Vomiting


   Stop: 18 05:53


   Last Admin: 18 03:50 Dose:  4 mg


Zolpidem Tartrate (Ambien)  5 mg PO HS PRN


   PRN Reason: Insomnia


   Stop: 18 20:55


   Last Admin: 18 22:32 Dose:  5 mg








General: Alert, Cooperative, No acute distress


HEENT: Atraumatic


Neck: Supple


Cardiovascular: Regular rate, Normal S1, Normal S2


Lungs: Clear to auscultation


Abdomen: Bowel sounds, Soft





- Procedures


Procedures: 


 Procedures











Procedure Code Date


 


EXCISION OF CHEST SKIN, EXTERNAL APPROACH 3HW8TYB 18


 


EXCISION OF ILEUM, OPEN APPROACH [id number] 18


 


EXCISION OF RIGHT LOWER ARM SKIN, EXTERNAL APPROACH [id number] 18


 


EXCISION OF RIGHT LOWER LEG SKIN, EXTERNAL APPROACH 0HBKXZZ 18


 


RELEASE ILEUM, OPEN APPROACH [id number] 18














Nutritional Asmnt/Malnutr-PDOC





- Dietary Evaluation


Malnutrition Findings (Please click <Entered> for more info): 








Nutritional Asmnt/Malnutrition                             Start:  18 14:

24


Text:                                                      Status: Active      

  


Freq:                                                                          

  


Protocol:                                                                      

  


 Document     18 14:24  HEN  (Rec: 18 14:35  HEN  [id number])


 Nutritional Asmnt/Malnutrition


     Patient General Information


      Nutritional Screening                      High Risk


                                                 Consult


      Diagnosis                                  exploratory laparotomy with


                                                 recersal colostomy


      Pertinent Medical Hx/Surgical Hx           acute perforation of


                                                 diverticulitis, s/p


                                                 exploratory laparotomy and


                                                 divertin gcolostomy placement


      Subjective Information                     Pt seen lying in bed at time


                                                 of visit, awake and alert. Pt


                                                 stated he tolerated clear


                                                 liquid well, adequte amount


                                                 for him at this time. Pt has


                                                 RAQUEL drain noted. Per EMR, PO


                                                 intake % of clear liquid


                                                 .


      Current Diet Order/ Nutrition Support      clear liquid


      Pertinent Medications                      D5-0.9%ns, piperacillin,


                                                 vancomycin


      Pertinent Labs                              Na 133, K 3.4, Cr 0.7,


                                                 glucose 107, Ca 7.9


     Nutritional Hx/Data


      Height                                     1.88 m


      Height (Calculated Centimeters)            188.0


      Current Weight (lbs)                       86.636 kg


      Weight (Calculated Kilograms)              86.6


      Weight (Calculated Grams)                  83938.1


      Ideal Body Weight                          190


      Body Mass Index (BMI)                      24.5


      Weight Status                              Approriate


     GI Symptoms


      GI Symptoms                                None


      Last BM                                    none


      Difficult in:                              None


      Skin Integrity/Comment:                    RIGHT LOWER LEG HEALING


                                                 incision to right arm


     Estimated Nutritional Goals


      BEE in Kcals:                              Using Current wt


      Calories/Kcals/Kg                          25-30


      Kcals Calculated                           4485-1742


      Protein:                                   Using Current wt


      Protein g/k.8-1


      Protein Calculated                         69-86


      Fluid: ml                                  2150-2580ml (1ml/kcal)


     Nutritional Problem


      1. Problem


       Problem                                   altered GI function


       Etiology                                  s/p reversal colostomy


       Signs/Symptoms:                           pt on clear liquid


     Intervention/Recommendation


      Comments                                   1. Continue with clear liquid


                                                 diet as ordered.


                                                 2. Monitor PO intake, wt, labs


                                                 and skin integrity


                                                 3. F/U as high risk in 2-3


                                                 days, -


     Expected Outcomes/Goals


      Expected Outcomes/Goals                    1. PO intake to meet at least


                                                 75% of nutritional needs.


                                                 2. Wt stability, skin to


                                                 remain intact, labs to


                                                 approach WNL.

## 2018-06-25 NOTE — DIAGNOSTIC IMAGING REPORT
KUB abdominal film



HISTORY: Abdominal distention



The exam demonstrates several mildly dilated loops of large and small

bowel.  Findings may be associated with an a dynamic ileus.  However,

clinical correlation is needed.  Surgical changes with staples noted

over the abdomen.  No definite free intraperitoneal air is seen.  Severe

degenerative changes noted to the spine.



IMPRESSION:

1.  Mildly dilated large and small bowel.  The combination suggest

changes associated with a dynamic ileus.  However, clinical correlation

and follow-up recommended.

2.  Surgical changes

## 2018-06-25 NOTE — DIAGNOSTIC IMAGING REPORT
Portable chest x-ray



HISTORY: Shortness of breath



There is a very poor inspiration.  The heart is enlarged.  There is poor

visualization of the left lower lobe with haziness along the left

hemidiaphragm.  Infiltrate within the left lower lobe or small effusion

cannot be excluded.  Clinical correlation and follow-up recommended.



IMPRESSION:

1.  Poor inspiration

2.  Slight increased density along the left hemidiaphragm.  Faint

infiltrate or pleural fluid cannot be excluded.  Clinical correlation

and follow-up recommended

2.  Cardiomegaly

## 2018-06-25 NOTE — DIAGNOSTIC IMAGING REPORT
Renal ultrasound



HISTORY: Abnormal renal function test



The right kidney somewhat generous in size (12.5 x 5.8 x 5.3 cm).  No

focal lesions.  No hydronephrosis.



The left kidney is also increased in size (13.3 x 6.4 x 6.3 cm.  No

focal lesions.  No hydronephrosis.



The urinary bladder cannot be evaluated due to lack of distention.



IMPRESSION:

1.  Increase in renal sizes bilaterally

2.  Somewhat poor delineation of the margins of the left kidney.  No

focal lesions or hydronephrosis.

## 2018-06-25 NOTE — GENERAL PROGRESS NOTE
Subjective





- Review of Systems


Service Date: 18


Subjective: 


pt is in mild respiratory distress


sitting up


Green vomit








Objective





- Results


Result Diagrams: 


 18 14:10





 18 14:10


Recent Labs: 


 Laboratory Last Values











WBC  7.3 Th/cmm (4.8-10.8)   18  14:10    


 


RBC  4.11 Mil/cmm (3.80-5.80)   18  14:10    


 


Hgb  12.3 gm/dL (12-16)   18  14:10    


 


Hct  36.4 % (41.0-60)  L  18  14:10    


 


MCV  88.7 fl (80-99)   18  14:10    


 


MCH  29.9 pg (27.0-31.0)   18  14:10    


 


MCHC Differential  33.7 pg (28.0-36.0)   18  14:10    


 


RDW  14.6 % (11.5-20.0)   18  14:10    


 


Plt Count  237 Th/cmm (150-400)   18  14:10    


 


MPV  8.8 fl  18  14:10    


 


Neutrophils %  79.1 % (40.0-80.0)   18  06:28    


 


Band Neutrophils %  14 % (0-10)  H  18  14:10    


 


Lymphocytes %  8.7 % (20.0-50.0)  L  18  06:28    


 


Monocytes %  11.1 % (2.0-10.0)  H  18  06:28    


 


Eosinophils %  0.7 % (0.0-5.0)   18  06:28    


 


Basophils %  0.4 % (0.0-2.0)   18  06:28    


 


Neutrophils (Manual)  58 % (40-80)   18  14:10    


 


Lymphocytes  18 % (20-50)  L  18  14:10    


 


Monocytes  10 % (2-10)   18  14:10    


 


Eosinophils  0 % (0-5)   18  14:10    


 


Basophils  0 % (0-3)   18  14:10    


 


Platelet Estimate  ADEQUATE  (NORMAL)   18  14:10    


 


Platelet Morphology  NORMAL  (NORMAL)   18  14:10    


 


RBC Morph Micro Appear  NORMAL  (NORMAL)   18  14:10    


 


PT  11.9 SECONDS (9.5-11.5)  H  18  12:08    


 


INR  1.13  (0.5-1.4)   18  12:08    


 


PTT (Actin FS)  22.3 SECONDS (26.0-38.0)  L  18  12:08    


 


Sodium  135 mEq/L (136-145)  L  18  14:10    


 


Potassium  3.2 mEq/L (3.5-5.1)  L  18  14:10    


 


Chloride  105 mEq/L ()   18  14:10    


 


Carbon Dioxide  21.0 mEq/L (21.0-31.0)   18  14:10    


 


Anion Gap  12.2  (7.0-16.0)   18  14:10    


 


BUN  17 mg/dL (7-25)   18  14:10    


 


Creatinine  1.7 mg/dL (0.7-1.3)  H  18  14:10    


 


Est GFR ( Amer)  52.3 ml/min (>90)   18  14:10    


 


Est GFR (Non-Af Amer)  43.2 ml/min  18  14:10    


 


BUN/Creatinine Ratio  10.0   18  14:10    


 


Glucose  138 mg/dL ()  H  18  14:10    


 


Calcium  8.1 mg/dL (8.6-10.3)  L  18  14:10    


 


Total Bilirubin  0.6 mg/dL (0.3-1.0)   18  06:05    


 


AST  18 U/L (13-39)   18  06:05    


 


ALT  14 U/L (7-52)   18  06:05    


 


Alkaline Phosphatase  42 U/L ()   18  06:05    


 


Total Protein  5.5 gm/dL (6.0-8.3)  L  18  06:05    


 


Albumin  2.5 gm/dL (4.2-5.5)  L  18  06:05    


 


Globulin  3.0 gm/dL  18  06:05    


 


Albumin/Globulin Ratio  0.8  (1.0-1.8)  L  18  06:05    


 


Urine Source  CLEAN C   18  17:35    


 


Urine Color  YELLOW   18  17:35    


 


Urine Clarity  HAZY  (CLEAR)   18  17:35    


 


Urine pH  5.0  (4.6 - 8.0)   18  17:35    


 


Ur Specific Gravity  1.020  (1.005-1.030)   18  17:35    


 


Urine Protein  30 mg/dL (NEGATIVE)  H  18  17:35    


 


Urine Glucose (UA)  NEGATIVE mg/dL (NEGATIVE)   18  17:35    


 


Urine Ketones  NEGATIVE mg/dL (NEGATIVE)   18  17:35    


 


Urine Blood  TRACE  (NEGATIVE)   18  17:35    


 


Urine Nitrate  NEGATIVE  (NEGATIVE)   18  17:35    


 


Urine Bilirubin  NEGATIVE  (NEGATIVE)   18  17:35    


 


Urine Urobilinogen  0.2 E.U./dL (0.2 - 1.0)   18  17:35    


 


Ur Leukocyte Esterase  TRACE  (NEGATIVE)  H  18  17:35    


 


Urine RBC  0-2 /hpf (0-5)  H  18  17:35    


 


Urine WBC  0-2 /hpf (0-5)   18  17:35    


 


Ur Epithelial Cells  OCCASIONAL /lpf (FEW)   18  17:35    


 


Amorphous Sediment  FEW URATES  (NONE SEEN)   18  17:35    


 


Urine Bacteria  FEW /hpf (NONE SEEN)   18  17:35    


 


Vancomycin Trough  16.8 ug/mL (5-10)  H  18  09:30    


 


Random Vancomycin  19.2 ug/mL (5.0-40.0)   18  04:50    














- Physical Exam


Vitals and I&O: 


 Vital Signs











Temp  98.3 F   18 11:48


 


Pulse  104   18 11:48


 


Resp  18   18 11:48


 


BP  114/80   18 11:48


 


Pulse Ox  100   18 11:48








 Intake & Output











 18





 18:59 06:59 18:59


 


Intake Total 4400 740 


 


Output Total 650 850 


 


Balance 3750 -110 


 


Weight (lbs) 81.647 kg 90.718 kg 


 


Intake:   


 


  Intake, IV Amount 1200 300 


 


    D5-0.9%Ns 1,000 ml @ 100 1000  





    mls/hr IV .Q10H [facility] Rx#:   





    [id number]   


 


    KCL 20mEq/100mL Premix 20 100  





    meq In 100 ml @ 50 mls/   





    hr IV Q2H [facility] Rx#:   





    [id number]   


 


    metroNIDAZOLE 500mg/ 300 





    100mL 500 mg In 100 ml @   





    100 mls/hr IV Q8HR [facility] Rx   





    #:[id number]   


 


  Oral 3200 440 


 


Output:   


 


  Urine 650 800 


 


  Emesis  50 


 


Other:   


 


  # Bowel Movements 5 1 


 


  Stool Characteristics Soft Soft 


 


  Weight Source Bedscale Bedscale 











Active Medications: 


Current Medications





Acetaminophen/Hydrocodone Bitart (Norco 10 Mg/325 Mg)  1 tab PO Q6H PRN


   PRN Reason: Pain (Severe)


   Stop: 18 20:04


   Last Admin: 18 03:03 Dose:  1 tab


Cholestyramine Resin (Questran)  4 gm PO BID [facility]


   Stop: 18 16:59


   Last Admin: 18 16:09 Dose:  4 gm


Enoxaparin Sodium (Lovenox)  30 mg SUBQ Q12HR [facility]


   Stop: 18 20:59


   Last Admin: 18 08:08 Dose:  30 mg


Famotidine (Pepcid)  20 mg PO BID [facility]


   Stop: 18 21:59


   Last Admin: 18 16:09 Dose:  20 mg


Hydromorphone HCl (Dilaudid)  1 mg IVP Q3HR PRN


   PRN Reason: SEVERE PAIN


   Stop: 18 16:25


   Last Admin: 18 16:09 Dose:  1 mg


Dextrose/Sodium Chloride (D5-0.9%Ns)  1,000 mls @ 100 mls/hr IV .Q10H [facility]


   Stop: 18 16:29


   Last Admin: 18 10:40 Dose:  100 mls/hr


Metronidazole (Flagyl)  500 mg in 100 mls @ 100 mls/hr IV Q8HR [facility]


   Stop: 18 20:59


   Last Admin: 18 12:30 Dose:  100 mls/hr


Vancomycin HCl 1.5 gm/ Sodium (Chloride)  500 mls @ 250 mls/hr IV ONCE ONE


   Stop: 18 22:59


Metoclopramide HCl (Reglan)  10 mg IVP Q6HR [facility]


   Stop: 18 17:59


Miscellaneous (Vancomycin Iv Per Pharmacy)  1 ea MC DAILY [facility]


   Stop: 18 09:59


   Last Admin: 18 08:14 Dose:  Not Given


Ondansetron HCl (Zofran)  4 mg IV Q4H PRN


   PRN Reason: Nausea / Vomiting


   Stop: 18 05:53


   Last Admin: 18 03:50 Dose:  4 mg


Zolpidem Tartrate (Ambien)  5 mg PO HS PRN


   PRN Reason: Insomnia


   Stop: 18 20:55


   Last Admin: 18 22:32 Dose:  5 mg








General: Alert, Cooperative, No acute distress


HEENT: Atraumatic


Neck: Supple


Cardiovascular: Regular rate, Normal S1, Normal S2


Lungs: Clear to auscultation


Abdomen: Distended





- Procedures


Procedures: 


 Procedures











Procedure Code Date


 


EXCISION OF CHEST SKIN, EXTERNAL APPROACH [id number] 18


 


EXCISION OF ILEUM, OPEN APPROACH [id number] 18


 


EXCISION OF RIGHT LOWER ARM SKIN, EXTERNAL APPROACH [id number] 18


 


EXCISION OF RIGHT LOWER LEG SKIN, EXTERNAL APPROACH [id number] 18


 


RELEASE ILEUM, OPEN APPROACH [id number] 18














Assessment/Plan





- Assessment


Assessment: 





distended abdomen


arf


resp distress





- Plan


Plan: 


transfer to ICU


get CT of abdomen





Nutritional Asmnt/Malnutr-PDOC





- Dietary Evaluation


Malnutrition Findings (Please click <Entered> for more info): 








Nutritional Asmnt/Malnutrition                             Start:  18 14:

24


Text:                                                      Status: Active      

  


Freq:                                                                          

  


Protocol:                                                                      

  


 Document     18 14:24  KAYE  (Rec: 18 14:35  KAYE  [id number])


 Nutritional Asmnt/Malnutrition


     Patient General Information


      Nutritional Screening                      High Risk


                                                 Consult


      Diagnosis                                  exploratory laparotomy with


                                                 recersal colostomy


      Pertinent Medical Hx/Surgical Hx           acute perforation of


                                                 diverticulitis, s/p


                                                 exploratory laparotomy and


                                                 divertin gcolostomy placement


      Subjective Information                     Pt seen lying in bed at time


                                                 of visit, awake and alert. Pt


                                                 stated he tolerated clear


                                                 liquid well, adequte amount


                                                 for him at this time. Pt has


                                                 RAQUEL drain noted. Per EMR, PO


                                                 intake % of clear liquid


                                                 .


      Current Diet Order/ Nutrition Support      clear liquid


      Pertinent Medications                      D5-0.9%ns, piperacillin,


                                                 vancomycin


      Pertinent Labs                              Na 133, K 3.4, Cr 0.7,


                                                 glucose 107, Ca 7.9


     Nutritional Hx/Data


      Height                                     1.88 m


      Height (Calculated Centimeters)            188.0


      Current Weight (lbs)                       86.636 kg


      Weight (Calculated Kilograms)              86.6


      Weight (Calculated Grams)                  06812.1


      Ideal Body Weight                          190


      Body Mass Index (BMI)                      24.5


      Weight Status                              Approriate


     GI Symptoms


      GI Symptoms                                None


      Last BM                                    none


      Difficult in:                              None


      Skin Integrity/Comment:                    RIGHT LOWER LEG HEALING


                                                 incision to right arm


     Estimated Nutritional Goals


      BEE in Kcals:                              Using Current wt


      Calories/Kcals/Kg                          25-30


      Kcals Calculated                           5068-5099


      Protein:                                   Using Current wt


      Protein g/k.8-1


      Protein Calculated                         69-86


      Fluid: ml                                  2150-2580ml (1ml/kcal)


     Nutritional Problem


      1. Problem


       Problem                                   altered GI function


       Etiology                                  s/p reversal colostomy


       Signs/Symptoms:                           pt on clear liquid


     Intervention/Recommendation


      Comments                                   1. Continue with clear liquid


                                                 diet as ordered.


                                                 2. Monitor PO intake, wt, labs


                                                 and skin integrity


                                                 3. F/U as high risk in 2-3


                                                 days, -


     Expected Outcomes/Goals


      Expected Outcomes/Goals                    1. PO intake to meet at least


                                                 75% of nutritional needs.


                                                 2. Wt stability, skin to


                                                 remain intact, labs to


                                                 approach WNL.

## 2018-06-25 NOTE — INTERNAL MEDICINE PROG NOTE
Internal Medicine Subjective





- Subjective


Service Date: 18


Patient seen and examined:: with staff (HE HAS ABDOMINAL DIASENTION NAND 

TACHYCARDIA.CT OF ABDOMIN SIGNIFICANT FOR ACUTE ILIUS)


Patient is:: awake, verbal, in bed, talking


Per staff patient has:: no adverse event





Internal Medicine Objective





- Results


Result Diagrams: 


 18 14:10





 18 14:10


Recent Labs: 


 Laboratory Last Values











WBC  7.3 Th/cmm (4.8-10.8)   18  14:10    


 


RBC  4.11 Mil/cmm (3.80-5.80)   18  14:10    


 


Hgb  12.3 gm/dL (12-16)   18  14:10    


 


Hct  36.4 % (41.0-60)  L  18  14:10    


 


MCV  88.7 fl (80-99)   18  14:10    


 


MCH  29.9 pg (27.0-31.0)   18  14:10    


 


MCHC Differential  33.7 pg (28.0-36.0)   18  14:10    


 


RDW  14.6 % (11.5-20.0)   18  14:10    


 


Plt Count  237 Th/cmm (150-400)   18  14:10    


 


MPV  8.8 fl  18  14:10    


 


Neutrophils %  79.1 % (40.0-80.0)   18  06:28    


 


Band Neutrophils %  14 % (0-10)  H  18  14:10    


 


Lymphocytes %  8.7 % (20.0-50.0)  L  18  06:28    


 


Monocytes %  11.1 % (2.0-10.0)  H  18  06:28    


 


Eosinophils %  0.7 % (0.0-5.0)   18  06:28    


 


Basophils %  0.4 % (0.0-2.0)   18  06:28    


 


Neutrophils (Manual)  58 % (40-80)   18  14:10    


 


Lymphocytes  18 % (20-50)  L  18  14:10    


 


Monocytes  10 % (2-10)   18  14:10    


 


Eosinophils  0 % (0-5)   18  14:10    


 


Basophils  0 % (0-3)   18  14:10    


 


Platelet Estimate  ADEQUATE  (NORMAL)   18  14:10    


 


Platelet Morphology  NORMAL  (NORMAL)   18  14:10    


 


RBC Morph Micro Appear  NORMAL  (NORMAL)   18  14:10    


 


PT  11.9 SECONDS (9.5-11.5)  H  18  12:08    


 


INR  1.13  (0.5-1.4)   18  12:08    


 


PTT (Actin FS)  22.3 SECONDS (26.0-38.0)  L  18  12:08    


 


Sodium  135 mEq/L (136-145)  L  18  14:10    


 


Potassium  3.2 mEq/L (3.5-5.1)  L  18  14:10    


 


Chloride  105 mEq/L ()   18  14:10    


 


Carbon Dioxide  21.0 mEq/L (21.0-31.0)   18  14:10    


 


Anion Gap  12.2  (7.0-16.0)   18  14:10    


 


BUN  17 mg/dL (7-25)   18  14:10    


 


Creatinine  1.7 mg/dL (0.7-1.3)  H  18  14:10    


 


Est GFR ( Amer)  52.3 ml/min (>90)   18  14:10    


 


Est GFR (Non-Af Amer)  43.2 ml/min  18  14:10    


 


BUN/Creatinine Ratio  10.0   18  14:10    


 


Glucose  138 mg/dL ()  H  18  14:10    


 


Calcium  8.1 mg/dL (8.6-10.3)  L  18  14:10    


 


Total Bilirubin  0.6 mg/dL (0.3-1.0)   18  06:05    


 


AST  18 U/L (13-39)   18  06:05    


 


ALT  14 U/L (7-52)   18  06:05    


 


Alkaline Phosphatase  42 U/L ()   18  06:05    


 


Total Protein  5.5 gm/dL (6.0-8.3)  L  18  06:05    


 


Albumin  2.5 gm/dL (4.2-5.5)  L  18  06:05    


 


Globulin  3.0 gm/dL  18  06:05    


 


Albumin/Globulin Ratio  0.8  (1.0-1.8)  L  18  06:05    


 


Urine Source  CLEAN C   18  17:35    


 


Urine Color  YELLOW   18  17:35    


 


Urine Clarity  HAZY  (CLEAR)   18  17:35    


 


Urine pH  5.0  (4.6 - 8.0)   18  17:35    


 


Ur Specific Gravity  1.020  (1.005-1.030)   18  17:35    


 


Urine Protein  30 mg/dL (NEGATIVE)  H  18  17:35    


 


Urine Glucose (UA)  NEGATIVE mg/dL (NEGATIVE)   18  17:35    


 


Urine Ketones  NEGATIVE mg/dL (NEGATIVE)   18  17:35    


 


Urine Blood  TRACE  (NEGATIVE)   18  17:35    


 


Urine Nitrate  NEGATIVE  (NEGATIVE)   18  17:35    


 


Urine Bilirubin  NEGATIVE  (NEGATIVE)   18  17:35    


 


Urine Urobilinogen  0.2 E.U./dL (0.2 - 1.0)   18  17:35    


 


Ur Leukocyte Esterase  TRACE  (NEGATIVE)  H  18  17:35    


 


Urine RBC  0-2 /hpf (0-5)  H  18  17:35    


 


Urine WBC  0-2 /hpf (0-5)   18  17:35    


 


Ur Epithelial Cells  OCCASIONAL /lpf (FEW)   18  17:35    


 


Amorphous Sediment  FEW URATES  (NONE SEEN)   18  17:35    


 


Urine Bacteria  FEW /hpf (NONE SEEN)   18  17:35    


 


Vancomycin Trough  16.8 ug/mL (5-10)  H  18  09:30    


 


Random Vancomycin  19.2 ug/mL (5.0-40.0)   18  04:50    














- Physical Exam


Vitals and I&O: 


 Vital Signs











Temp  98.3 F   18 11:48


 


Pulse  95   18 16:50


 


Resp  28   18 16:50


 


BP  114/80   18 11:48


 


Pulse Ox  118   18 16:50








 Intake & Output











 18





 18:59 06:59 18:59


 


Intake Total 4400 740 


 


Output Total 650 850 


 


Balance 3750 -110 


 


Weight (lbs) 81.647 kg 90.718 kg 


 


Intake:   


 


  Intake, IV Amount 1200 300 


 


    D5-0.9%Ns 1,000 ml @ 100 1000  





    mls/hr IV .Q10H [facility] Rx#:   





    [id number]   


 


    KCL 20mEq/100mL Premix 20 100  





    meq In 100 ml @ 50 mls/   





    hr IV Q2H [facility] Rx#:   





    [id number]   


 


    metroNIDAZOLE 500mg/ 300 





    100mL 500 mg In 100 ml @   





    100 mls/hr IV Q8HR [facility] Rx   





    #:[id number]   


 


  Oral 3200 440 


 


Output:   


 


  Urine 650 800 


 


  Emesis  50 


 


Other:   


 


  # Bowel Movements 5 1 


 


  Stool Characteristics Soft Soft 


 


  Weight Source Bedscale Bedscale 











Active Medications: 


Current Medications





Acetaminophen/Hydrocodone Bitart (Norco 10 Mg/325 Mg)  1 tab PO Q6H PRN


   PRN Reason: Pain (Severe)


   Stop: 18 20:04


   Last Admin: 18 03:03 Dose:  1 tab


Albuterol/Ipratropium (Duoneb Neb)  3 ml HHN Q4HRT [facility]; Protocol


   Stop: 18 18:59


Cholestyramine Resin (Questran)  4 gm PO BID [facility]


   Stop: 18 16:59


   Last Admin: 18 16:09 Dose:  4 gm


Enoxaparin Sodium (Lovenox)  30 mg SUBQ Q12HR [facility]


   Stop: 18 20:59


   Last Admin: 18 08:08 Dose:  30 mg


Hydromorphone HCl (Dilaudid)  1 mg IVP Q3HR PRN


   PRN Reason: SEVERE PAIN


   Stop: 18 16:25


   Last Admin: 18 16:09 Dose:  1 mg


Dextrose/Sodium Chloride (D5-0.9%Ns)  1,000 mls @ 100 mls/hr IV .Q10H [facility]


   Stop: 18 16:29


   Last Admin: 18 10:40 Dose:  100 mls/hr


Vancomycin HCl 1.5 gm/ Sodium (Chloride)  500 mls @ 250 mls/hr IV ONCE ONE


   Stop: 18 22:59


Potassium Chloride (Potassium Chloride)  20 meq in 100 mls @ 50 mls/hr IV Q2H 

[facility]


   Stop: 18 20:29


   Last Admin: 18 17:38 Dose:  50 mls/hr


Metronidazole (Flagyl)  500 mg in 100 mls @ 100 mls/hr IV Q8H [facility]


   Stop: 18 20:59


Metoclopramide HCl (Reglan)  10 mg IVP Q6HR [facility]


   Stop: 18 17:59


   Last Admin: 18 17:37 Dose:  10 mg


Miscellaneous (Vancomycin Iv Per Pharmacy)  1 ea MC DAILY [facility]


   Stop: 18 09:59


   Last Admin: 18 08:14 Dose:  Not Given


Ondansetron HCl (Zofran)  4 mg IV Q4H PRN


   PRN Reason: Nausea / Vomiting


   Stop: 18 05:53


   Last Admin: 18 03:50 Dose:  4 mg


Pantoprazole Sodium (Protonix)  40 mg IVP BID [facility]


   Stop: 18 08:59


Zolpidem Tartrate (Ambien)  5 mg PO HS PRN


   PRN Reason: Insomnia


   Stop: 18 20:55


   Last Admin: 18 22:32 Dose:  5 mg








General: alert


HEENT: NC/AT, PERRLA, EOMI, anicteric sclerae, throat clear


Neck: Supple, No JVD, No thyromegaly, +2 carotid pulse wo bruit, No LAD


Lungs: CTAB


Cardiovascular: RRR, Normal S1, Normal S2, without murmur


Abdomen: tender, distended


Extremities: clear


Neurological: no change





- Procedures


Procedures: 


 Procedures











Procedure Code Date


 


EXCISION OF CHEST SKIN, EXTERNAL APPROACH [id number] 18


 


EXCISION OF ILEUM, OPEN APPROACH [id number] 18


 


EXCISION OF RIGHT LOWER ARM SKIN, EXTERNAL APPROACH [id number] 18


 


EXCISION OF RIGHT LOWER LEG SKIN, EXTERNAL APPROACH [id number] 18


 


RELEASE ILEUM, OPEN APPROACH [id number] 18














Internal Medicine Assmt/Plan





- Assessment


Assessment: 





1.SP COLOSTOMY REVISION.


2.SP EXCISION BIOPSY FOR CHEST WALL LESION.


3.ACUTE BOWEL OBSTRUCTION





- Plan


Plan: 


1.NPO.


2.NGT PLACEMENT UNDER SUCTION.


3.IV PROTONIX.


4.CBC AND CMP IN AM.





Nutritional Asmnt/Malnutr-PDOC





- Dietary Evaluation


Malnutrition Findings (Please click <Entered> for more info): 








Nutritional Asmnt/Malnutrition                             Start:  18 14:

24


Text:                                                      Status: Active      

  


Freq:                                                                          

  


Protocol:                                                                      

  


 Document     18 14:24  KAYE  (Rec: 18 14:35  [id number])


 Nutritional Asmnt/Malnutrition


     Patient General Information


      Nutritional Screening                      High Risk


                                                 Consult


      Diagnosis                                  exploratory laparotomy with


                                                 recersal colostomy


      Pertinent Medical Hx/Surgical Hx           acute perforation of


                                                 diverticulitis, s/p


                                                 exploratory laparotomy and


                                                 divertin gcolostomy placement


      Subjective Information                     Pt seen lying in bed at time


                                                 of visit, awake and alert. Pt


                                                 stated he tolerated clear


                                                 liquid well, adequte amount


                                                 for him at this time. Pt has


                                                 RAQUEL drain noted. Per EMR, PO


                                                 intake % of clear liquid


                                                 .


      Current Diet Order/ Nutrition Support      clear liquid


      Pertinent Medications                      D5-0.9%ns, piperacillin,


                                                 vancomycin


      Pertinent Labs                              Na 133, K 3.4, Cr 0.7,


                                                 glucose 107, Ca 7.9


     Nutritional Hx/Data


      Height                                     1.88 m


      Height (Calculated Centimeters)            188.0


      Current Weight (lbs)                       86.636 kg


      Weight (Calculated Kilograms)              86.6


      Weight (Calculated Grams)                  14028.1


      Ideal Body Weight                          190


      Body Mass Index (BMI)                      24.5


      Weight Status                              Approriate


     GI Symptoms


      GI Symptoms                                None


      Last BM                                    none


      Difficult in:                              None


      Skin Integrity/Comment:                    RIGHT LOWER LEG HEALING


                                                 incision to right arm


     Estimated Nutritional Goals


      BEE in Kcals:                              Using Current wt


      Calories/Kcals/Kg                          25-30


      Kcals Calculated                           0757-6701


      Protein:                                   Using Current wt


      Protein g/k.8-1


      Protein Calculated                         69-86


      Fluid: ml                                  2150-2580ml (1ml/kcal)


     Nutritional Problem


      1. Problem


       Problem                                   altered GI function


       Etiology                                  s/p reversal colostomy


       Signs/Symptoms:                           pt on clear liquid


     Intervention/Recommendation


      Comments                                   1. Continue with clear liquid


                                                 diet as ordered.


                                                 2. Monitor PO intake, wt, labs


                                                 and skin integrity


                                                 3. F/U as high risk in 2-3


                                                 days, -


     Expected Outcomes/Goals


      Expected Outcomes/Goals                    1. PO intake to meet at least


                                                 75% of nutritional needs.


                                                 2. Wt stability, skin to


                                                 remain intact, labs to


                                                 approach WNL.

## 2018-06-26 LAB
ALBUMIN SERPL-MCNC: 2.7 GM/DL (ref 4.2–5.5)
ALBUMIN/GLOB SERPL: 0.8 {RATIO} (ref 1–1.8)
ALP SERPL-CCNC: 42 U/L (ref 34–104)
ALT SERPL-CCNC: 12 U/L (ref 7–52)
ANION GAP SERPL CALC-SCNC: 10.7 MMOL/L (ref 7–16)
AST SERPL-CCNC: 14 U/L (ref 13–39)
BASOPHILS # BLD AUTO: 0 TH/CUMM (ref 0–0.2)
BASOPHILS NFR BLD AUTO: 0.2 % (ref 0–2)
BILIRUB SERPL-MCNC: 0.5 MG/DL (ref 0.3–1)
BUN SERPL-MCNC: 22 MG/DL (ref 7–25)
CALCIUM SERPL-MCNC: 8 MG/DL (ref 8.6–10.3)
CHLORIDE SERPL-SCNC: 107 MEQ/L (ref 98–107)
CO2 SERPL-SCNC: 24.2 MEQ/L (ref 21–31)
CREAT SERPL-MCNC: 1.9 MG/DL (ref 0.7–1.3)
EOSINOPHIL # BLD AUTO: 0 TH/CMM (ref 0.1–0.4)
EOSINOPHIL NFR BLD AUTO: 0.1 % (ref 0–5)
ERYTHROCYTE [DISTWIDTH] IN BLOOD BY AUTOMATED COUNT: 14.9 % (ref 11.5–20)
GLOBULIN SER-MCNC: 3.4 GM/DL
GLUCOSE SERPL-MCNC: 135 MG/DL (ref 70–105)
HCT VFR BLD CALC: 37.4 % (ref 41–60)
HGB BLD-MCNC: 12.4 GM/DL (ref 12–16)
LYMPHOCYTE AB SER FC-ACNC: 1 TH/CMM (ref 1.5–3)
LYMPHOCYTES NFR BLD AUTO: 9.7 % (ref 20–50)
MAGNESIUM SERPL-MCNC: 1.6 MG/DL (ref 1.9–2.7)
MCH RBC QN AUTO: 29.2 PG (ref 27–31)
MCHC RBC AUTO-ENTMCNC: 33.1 PG (ref 28–36)
MCV RBC AUTO: 88.2 FL (ref 80–99)
MONOCYTES # BLD AUTO: 1.5 TH/CMM (ref 0.3–1)
MONOCYTES NFR BLD AUTO: 14.9 % (ref 2–10)
NEUTROPHILS # BLD: 7.6 TH/CMM (ref 1.8–8)
NEUTROPHILS NFR BLD AUTO: 75.1 % (ref 40–80)
PCO2 BLDA: 55 MMHG (ref 35–45)
PHOSPHATE SERPL-MCNC: 2.9 MG/DL (ref 2.5–5)
PLATELET # BLD: 256 TH/CMM (ref 150–400)
PMV BLD AUTO: 8.8 FL
PO2 BLDA: 80 MMHG (ref 80–100)
POTASSIUM SERPL-SCNC: 3.9 MEQ/L (ref 3.5–5.1)
RBC # BLD AUTO: 4.24 MIL/CMM (ref 3.8–5.8)
SAO2 % BLDA: 94 % (ref 92–100)
SODIUM SERPL-SCNC: 138 MEQ/L (ref 136–145)
WBC # BLD AUTO: 10.1 TH/CMM (ref 4.8–10.8)

## 2018-06-26 PROCEDURE — 02HV33Z INSERTION OF INFUSION DEVICE INTO SUPERIOR VENA CAVA, PERCUTANEOUS APPROACH: ICD-10-PCS

## 2018-06-26 RX ADMIN — HYDROMORPHONE HYDROCHLORIDE PRN MG: 1 INJECTION, SOLUTION INTRAMUSCULAR; INTRAVENOUS; SUBCUTANEOUS at 20:53

## 2018-06-26 RX ADMIN — IPRATROPIUM BROMIDE AND ALBUTEROL SULFATE SCH ML: .5; 3 SOLUTION RESPIRATORY (INHALATION) at 14:50

## 2018-06-26 RX ADMIN — HYDROMORPHONE HYDROCHLORIDE PRN MG: 1 INJECTION, SOLUTION INTRAMUSCULAR; INTRAVENOUS; SUBCUTANEOUS at 16:46

## 2018-06-26 RX ADMIN — INSULIN ASPART SCH: 100 INJECTION, SOLUTION INTRAVENOUS; SUBCUTANEOUS at 17:01

## 2018-06-26 RX ADMIN — METOCLOPRAMIDE SCH MG: 5 INJECTION, SOLUTION INTRAMUSCULAR; INTRAVENOUS at 17:01

## 2018-06-26 RX ADMIN — DEXTROSE AND SODIUM CHLORIDE SCH MLS/HR: 5; .9 INJECTION, SOLUTION INTRAVENOUS at 15:00

## 2018-06-26 RX ADMIN — IPRATROPIUM BROMIDE AND ALBUTEROL SULFATE SCH ML: .5; 3 SOLUTION RESPIRATORY (INHALATION) at 22:26

## 2018-06-26 RX ADMIN — ENOXAPARIN SODIUM SCH MG: 100 INJECTION SUBCUTANEOUS at 09:16

## 2018-06-26 RX ADMIN — IPRATROPIUM BROMIDE AND ALBUTEROL SULFATE SCH ML: .5; 3 SOLUTION RESPIRATORY (INHALATION) at 07:27

## 2018-06-26 RX ADMIN — IPRATROPIUM BROMIDE AND ALBUTEROL SULFATE SCH ML: .5; 3 SOLUTION RESPIRATORY (INHALATION) at 10:28

## 2018-06-26 RX ADMIN — INSULIN ASPART SCH: 100 INJECTION, SOLUTION INTRAVENOUS; SUBCUTANEOUS at 11:52

## 2018-06-26 RX ADMIN — HYDROMORPHONE HYDROCHLORIDE PRN MG: 1 INJECTION, SOLUTION INTRAMUSCULAR; INTRAVENOUS; SUBCUTANEOUS at 13:50

## 2018-06-26 RX ADMIN — METRONIDAZOLE SCH MLS/HR: 500 INJECTION, SOLUTION INTRAVENOUS at 04:42

## 2018-06-26 RX ADMIN — HYDROMORPHONE HYDROCHLORIDE PRN MG: 1 INJECTION, SOLUTION INTRAMUSCULAR; INTRAVENOUS; SUBCUTANEOUS at 03:57

## 2018-06-26 RX ADMIN — ENOXAPARIN SODIUM SCH MG: 100 INJECTION SUBCUTANEOUS at 20:30

## 2018-06-26 RX ADMIN — METOCLOPRAMIDE SCH MG: 5 INJECTION, SOLUTION INTRAMUSCULAR; INTRAVENOUS at 11:39

## 2018-06-26 RX ADMIN — INSULIN ASPART SCH UNITS: 100 INJECTION, SOLUTION INTRAVENOUS; SUBCUTANEOUS at 23:59

## 2018-06-26 RX ADMIN — HYDROMORPHONE HYDROCHLORIDE PRN MG: 1 INJECTION, SOLUTION INTRAMUSCULAR; INTRAVENOUS; SUBCUTANEOUS at 09:16

## 2018-06-26 RX ADMIN — IPRATROPIUM BROMIDE AND ALBUTEROL SULFATE SCH ML: .5; 3 SOLUTION RESPIRATORY (INHALATION) at 05:00

## 2018-06-26 RX ADMIN — METOCLOPRAMIDE SCH MG: 5 INJECTION, SOLUTION INTRAMUSCULAR; INTRAVENOUS at 05:30

## 2018-06-26 RX ADMIN — METOCLOPRAMIDE SCH MG: 5 INJECTION, SOLUTION INTRAMUSCULAR; INTRAVENOUS at 00:09

## 2018-06-26 RX ADMIN — METRONIDAZOLE SCH MLS/HR: 500 INJECTION, SOLUTION INTRAVENOUS at 12:03

## 2018-06-26 RX ADMIN — METOCLOPRAMIDE SCH MG: 5 INJECTION, SOLUTION INTRAMUSCULAR; INTRAVENOUS at 23:47

## 2018-06-26 RX ADMIN — IPRATROPIUM BROMIDE AND ALBUTEROL SULFATE SCH ML: .5; 3 SOLUTION RESPIRATORY (INHALATION) at 18:14

## 2018-06-26 RX ADMIN — METRONIDAZOLE SCH MLS/HR: 500 INJECTION, SOLUTION INTRAVENOUS at 20:30

## 2018-06-26 NOTE — DIAGNOSTIC IMAGING REPORT
CHEST X-RAY: AP view



INDICATION: NG tube placement



COMPARISON: Chest x-ray 6/25/2018 at 1344



FINDINGS: NG tube is in place with tip in the stomach.  Distended

stomach is noted.  Distended generalized gaseous filled loops of bowel

noted.  Postsurgical changes of the abdomen are noted.  Low lung lungs

are noted.



IMPRESSION:



NG tube in the stomach.



Distended stomach.



Probable postoperative ileus.

## 2018-06-26 NOTE — GENERAL PROGRESS NOTE
Subjective





- Review of Systems


Service Date: 19


Events since last encounter: 





post op day # 8


labs ok


had 2500 cc per NGT since insertion last night


Ct scan compatible with ileus - on Reglan


has ascites, drained around 500 cc daily post op before removal few days ago





Objective





- Results


Result Diagrams: 


 18 04:15





 18 04:15


Recent Labs: 


 Laboratory Last Values











WBC  10.1 Th/cmm (4.8-10.8)   18  04:15    


 


RBC  4.24 Mil/cmm (3.80-5.80)   18  04:15    


 


Hgb  12.4 gm/dL (12-16)   18  04:15    


 


Hct  37.4 % (41.0-60)  L  18  04:15    


 


MCV  88.2 fl (80-99)   18  04:15    


 


MCH  29.2 pg (27.0-31.0)   18  04:15    


 


MCHC Differential  33.1 pg (28.0-36.0)   18  04:15    


 


RDW  14.9 % (11.5-20.0)   18  04:15    


 


Plt Count  256 Th/cmm (150-400)   18  04:15    


 


MPV  8.8 fl  18  04:15    


 


Neutrophils %  75.1 % (40.0-80.0)   18  04:15    


 


Band Neutrophils %  22 % (0-10)  H  18  18:24    


 


Lymphocytes %  9.7 % (20.0-50.0)  L  18  04:15    


 


Monocytes %  14.9 % (2.0-10.0)  H  18  04:15    


 


Eosinophils %  0.1 % (0.0-5.0)   18  04:15    


 


Basophils %  0.2 % (0.0-2.0)   18  04:15    


 


Neutrophils (Manual)  40 % (40-80)   18  18:24    


 


Lymphocytes  28 % (20-50)   18  18:24    


 


Monocytes  10 % (2-10)   18  18:24    


 


Eosinophils  0 % (0-5)   18  18:24    


 


Basophils  0 % (0-3)   18  18:24    


 


Platelet Estimate  ADEQUATE  (NORMAL)   18  18:24    


 


Platelet Morphology  NORMAL  (NORMAL)   18  18:24    


 


RBC Morph Micro Appear  NORMAL  (NORMAL)   18  18:24    


 


PT  11.9 SECONDS (9.5-11.5)  H  18  12:08    


 


INR  1.13  (0.5-1.4)   18  12:08    


 


PTT (Actin FS)  22.3 SECONDS (26.0-38.0)  L  18  12:08    


 


Sodium  138 mEq/L (136-145)   18  04:15    


 


Potassium  3.9 mEq/L (3.5-5.1)   18  04:15    


 


Chloride  107 mEq/L ()   18  04:15    


 


Carbon Dioxide  24.2 mEq/L (21.0-31.0)   18  04:15    


 


Anion Gap  10.7  (7.0-16.0)   18  04:15    


 


BUN  22 mg/dL (7-25)   18  04:15    


 


Creatinine  1.9 mg/dL (0.7-1.3)  H  18  04:15    


 


Est GFR ( Amer)  46.0 ml/min (>90)   18  04:15    


 


Est GFR (Non-Af Amer)  38.0 ml/min  18  04:15    


 


BUN/Creatinine Ratio  11.6   18  04:15    


 


Glucose  135 mg/dL ()  H  18  04:15    


 


Calcium  8.0 mg/dL (8.6-10.3)  L  18  04:15    


 


Phosphorus  2.9 mg/dL (2.5-5.0)   18  04:15    


 


Magnesium  1.6 mg/dL (1.9-2.7)  L  18  04:15    


 


Total Bilirubin  0.5 mg/dL (0.3-1.0)   18  04:15    


 


AST  14 U/L (13-39)   18  04:15    


 


ALT  12 U/L (7-52)   18  04:15    


 


Alkaline Phosphatase  42 U/L ()   18  04:15    


 


Total Protein  6.1 gm/dL (6.0-8.3)   18  04:15    


 


Albumin  2.7 gm/dL (4.2-5.5)  L  18  04:15    


 


Globulin  3.4 gm/dL  18  04:15    


 


Albumin/Globulin Ratio  0.8  (1.0-1.8)  L  18  04:15    


 


Triglycerides  87 mg/dL (<150)   18  04:15    


 


Cholesterol  66 mg/dL (<200)   18  04:15    


 


Urine Source  CLEAN C   18  17:35    


 


Urine Color  YELLOW   18  17:35    


 


Urine Clarity  HAZY  (CLEAR)   18  17:35    


 


Urine pH  5.0  (4.6 - 8.0)   18  17:35    


 


Ur Specific Gravity  1.020  (1.005-1.030)   18  17:35    


 


Urine Protein  30 mg/dL (NEGATIVE)  H  18  17:35    


 


Urine Glucose (UA)  NEGATIVE mg/dL (NEGATIVE)   18  17:35    


 


Urine Ketones  NEGATIVE mg/dL (NEGATIVE)   18  17:35    


 


Urine Blood  TRACE  (NEGATIVE)   18  17:35    


 


Urine Nitrate  NEGATIVE  (NEGATIVE)   18  17:35    


 


Urine Bilirubin  NEGATIVE  (NEGATIVE)   18  17:35    


 


Urine Urobilinogen  0.2 E.U./dL (0.2 - 1.0)   18  17:35    


 


Ur Leukocyte Esterase  TRACE  (NEGATIVE)  H  18  17:35    


 


Urine RBC  0-2 /hpf (0-5)  H  18  17:35    


 


Urine WBC  0-2 /hpf (0-5)   18  17:35    


 


Ur Epithelial Cells  OCCASIONAL /lpf (FEW)   18  17:35    


 


Amorphous Sediment  FEW URATES  (NONE SEEN)   18  17:35    


 


Urine Bacteria  FEW /hpf (NONE SEEN)   18  17:35    


 


Vancomycin Trough  16.8 ug/mL (5-10)  H  18  09:30    


 


Random Vancomycin  19.2 ug/mL (5.0-40.0)   18  04:50    














- Physical Exam


Vitals and I&O: 


 Vital Signs











Temp  98.1 F   18 10:00


 


Pulse  108   18 10:28


 


Resp  15   18 10:28


 


BP  98/65   18 10:00


 


Pulse Ox  92   18 10:28








 Intake & Output











 18





 18:59 06:59 18:59


 


Intake Total 5 800 


 


Output Total 900 1800 


 


Balance -895 -1000 


 


Weight (lbs) 95.708 kg 93.44 kg 


 


Intake:   


 


  Intake, IV Amount  800 


 


    KCL 20mEq/100mL Premix 20  100 





    meq In 100 ml @ 50 mls/   





    hr IV Q2H [facility] Rx#:   





    [id number]   


 


    Vancomycin HCl 1.5 gm In  500 





    Sodium Chloride 0.9% 500   





    ml @ 250 mls/hr IV ONCE   





    ONE Rx#:[id number]   


 


    metroNIDAZOLE 500mg/NS  200 





    100mL 500 mg In 100 ml @   





    100 mls/hr IV Q8H [facility] Rx#   





    :[id number]   


 


  Oral 5  


 


Output:   


 


  Gastric Drainage 300 1400 


 


  Urine 600 400 


 


Other:   


 


  # Bowel Movements 0  


 


  Weight Source Bedscale Bedscale 











Active Medications: 


Current Medications





Acetaminophen/Hydrocodone Bitart (Norco 10 Mg/325 Mg)  1 tab PO Q6H PRN


   PRN Reason: Pain (Severe)


   Stop: 18 20:04


   Last Admin: 18 03:03 Dose:  1 tab


Albuterol/Ipratropium (Duoneb Neb)  3 ml HHN Q4HRT [facility]; Protocol


   Stop: 18 18:59


   Last Admin: 18 10:28 Dose:  3 ml


Cholestyramine Resin (Questran)  4 gm PO BID [facility]


   Stop: 18 16:59


   Last Admin: 18 09:16 Dose:  Not Given


Enoxaparin Sodium (Lovenox)  30 mg SUBQ Q12HR [facility]


   Stop: 18 20:59


   Last Admin: 18 09:16 Dose:  30 mg


Hydromorphone HCl (Dilaudid)  1 mg IVP Q3HR PRN


   PRN Reason: SEVERE PAIN


   Stop: 18 16:25


   Last Admin: 18 09:16 Dose:  1 mg


Dextrose/Sodium Chloride (D5-0.9%Ns)  1,000 mls @ 100 mls/hr IV .Q10H [facility]


   Stop: 18 14:59


   Last Admin: 18 10:40 Dose:  100 mls/hr


Metronidazole (Flagyl)  500 mg in 100 mls @ 100 mls/hr IV Q8H [facility]


   Stop: 18 20:59


   Last Infusion: 18 05:55 Dose:  Infused


Dextrose/Sodium Chloride (D5-0.9%Ns)  1,000 mls @ 60 mls/hr IV .[id number] [facility]


   Stop: 18 14:59


Multivitamins/Minerals 10 ml/Dextrose/ Amino Acids/Electrolytes  1,200 mls @ 50 

mls/hr IV .Q24H [facility]


   Stop: 18 14:59


Insulin Aspart (Novolog Insulin Sliding Scale)  0 units SUBQ Q6H [facility]; Protocol


   Stop: 18 11:59


Metoclopramide HCl (Reglan)  10 mg IVP Q6HR [facility]


   Stop: 18 17:59


   Last Admin: 18 05:30 Dose:  10 mg


Miscellaneous (Tpn Per Pharmacy)  1 ea MC PRN [facility]


   Stop: 18 17:59


Ondansetron HCl (Zofran)  4 mg IV Q4H PRN


   PRN Reason: Nausea / Vomiting


   Stop: 18 05:53


   Last Admin: 18 22:50 Dose:  4 mg


Pantoprazole Sodium (Protonix)  40 mg IVP BID [facility]


   Stop: 18 08:59


   Last Admin: 18 09:16 Dose:  40 mg


Zolpidem Tartrate (Ambien)  5 mg PO HS PRN


   PRN Reason: Insomnia


   Stop: 18 20:55


   Last Admin: 18 21:59 Dose:  5 mg








General: Alert, Cooperative, No acute distress


HEENT: Atraumatic


Neck: Supple


Cardiovascular: Regular rate, Normal S1, Normal S2


Lungs: Clear to auscultation


Abdomen: Distended





- Procedures


Procedures: 


 Procedures











Procedure Code Date


 


EXCISION OF CHEST SKIN, EXTERNAL APPROACH [id number] 18


 


EXCISION OF ILEUM, OPEN APPROACH [id number] 18


 


EXCISION OF RIGHT LOWER ARM SKIN, EXTERNAL APPROACH [id number] 18


 


EXCISION OF RIGHT LOWER LEG SKIN, EXTERNAL APPROACH [id number] 18


 


RELEASE ILEUM, OPEN APPROACH 1YQC1YQ 18














Nutritional Asmnt/Malnutr-PDOC





- Dietary Evaluation


Malnutrition Findings (Please click <Entered> for more info): 








Nutritional Asmnt/Malnutrition                             Start:  18 14:

24


Text:                                                      Status: Active      

  


Freq:                                                                          

  


Protocol:                                                                      

  


 Document     18 14:24  HEN  (Rec: 18 14:35  HEN  [id number])


 Nutritional Asmnt/Malnutrition


     Patient General Information


      Nutritional Screening                      High Risk


                                                 Consult


      Diagnosis                                  exploratory laparotomy with


                                                 recersal colostomy


      Pertinent Medical Hx/Surgical Hx           acute perforation of


                                                 diverticulitis, s/p


                                                 exploratory laparotomy and


                                                 divertin gcolostomy placement


      Subjective Information                     Pt seen lying in bed at time


                                                 of visit, awake and alert. Pt


                                                 stated he tolerated clear


                                                 liquid well, adequte amount


                                                 for him at this time. Pt has


                                                 RAQUEL drain noted. Per EMR, PO


                                                 intake % of clear liquid


                                                 .


      Current Diet Order/ Nutrition Support      clear liquid


      Pertinent Medications                      D5-0.9%ns, piperacillin,


                                                 vancomycin


      Pertinent Labs                              Na 133, K 3.4, Cr 0.7,


                                                 glucose 107, Ca 7.9


     Nutritional Hx/Data


      Height                                     1.88 m


      Height (Calculated Centimeters)            188.0


      Current Weight (lbs)                       86.636 kg


      Weight (Calculated Kilograms)              86.6


      Weight (Calculated Grams)                  56083.1


      Ideal Body Weight                          190


      Body Mass Index (BMI)                      24.5


      Weight Status                              Approriate


     GI Symptoms


      GI Symptoms                                None


      Last BM                                    none


      Difficult in:                              None


      Skin Integrity/Comment:                    RIGHT LOWER LEG HEALING


                                                 incision to right arm


     Estimated Nutritional Goals


      BEE in Kcals:                              Using Current wt


      Calories/Kcals/Kg                          25-30


      Kcals Calculated                           4754-0287


      Protein:                                   Using Current wt


      Protein g/k.8-1


      Protein Calculated                         69-86


      Fluid: ml                                  2150-2580ml (1ml/kcal)


     Nutritional Problem


      1. Problem


       Problem                                   altered GI function


       Etiology                                  s/p reversal colostomy


       Signs/Symptoms:                           pt on clear liquid


     Intervention/Recommendation


      Comments                                   1. Continue with clear liquid


                                                 diet as ordered.


                                                 2. Monitor PO intake, wt, labs


                                                 and skin integrity


                                                 3. F/U as high risk in 2-3


                                                 days, -


     Expected Outcomes/Goals


      Expected Outcomes/Goals                    1. PO intake to meet at least


                                                 75% of nutritional needs.


                                                 2. Wt stability, skin to


                                                 remain intact, labs to


                                                 approach WNL.

## 2018-06-26 NOTE — DIAGNOSTIC IMAGING REPORT
CT abdomen and pelvis without intravenous contrast



Indication: Abdominal pain and distention



Comparison: None,



Technique: Axial images were obtained from the lung bases to the

bilateral proximal femurs without IV contrast.  Coronal reconstructions

were made.  total DLP: 826    ,  CTDI13.2



FINDINGS:



Bibasal infiltrates are noted, left greater than right, with bilateral

small effusions and bibasilar passive atelectatic and consolidative

changes.  Coronary atherosclerosis is noted.  



Evaluation of solid organs is limited due to lack of IV contrast.  There

is severely distended and fluid-filled stomach and multiple bowel loops

including small bowel loops with areas of bowel wall thickening. 

Adjacent pockets of air is seen along the bowel wall.  Postsurgical

changes are seen along the right lower quadrant.  Postsurgical changes

of the rectum are also noted with 2 cm x 2 cm fat density in this

region.  Dilated fluid-filled esophagus is noted.  



No obvious focal hepatic lesions.  No focal splenic lesions.  Pancreatic

gland atrophy is noted with possible punctate calcifications within it

due to old inflammatory process.  No focal adrenal lesions.  No evidence

of hydronephrosis.  Nonspecific bilateral perinephric inflammatory

changes are seen left greater than right.  Urinary bladder is collapsed

containing air and Almon[place] catheter.  Diffuse atherosclerosis is noted.  



Small amount of free fluid is noted within the abdomen and pelvis.  No

free air.  Postsurgical changes of anterior abdominal wall are noted. 

Diffuse anasarca is noted.  Degenerative changes of the spine are noted

with large marginal osteophytic spurs.



IMPRESSION:



Severely distended loops of small bowel, stomach, and additional fluid

distention of the esophagus.  Areas of small bowel thickening are also

noted.  Pockets of gas are seen adjacent to the small bowel wall.  These

are nonspecific.  Pneumatosis is considered less likely but cannot be

completely excluded.  Postsurgical changes are seen in the right lower

quadrant.  There appear to be additional dilated fluid-filled large

bowel loops also.  Findings are indeterminate but favor distal

obstructive process and possible superimposed ileus as postoperative

changes are also noted.  Clinical correlation recommended



Additional postoperative changes of rectal region with indeterminate 2

cm area in this region containing fat density.  Correlation should be

made with surgical history.



Mild free fluid in the abdomen.



Anasarca.



Almonte catheter and pockets of air in the urinary bladder.



Bilateral effusions and bibasilar infiltrates, left greater than right,

and bibasal consolidative changes.



Atherosclerosis.

## 2018-06-26 NOTE — DIAGNOSTIC IMAGING REPORT
CHEST X-RAY: AP view



INDICATION: PICC line placement



COMPARISON: 6/25/2018



FINDINGS: Right PICC line is in place with tip in the cavoatrial

junction.  NG tube is seen with tip along the fundus of the stomach. 

Low lung volumes are seen with diffuse pulmonary infiltrates and small

effusions.  Cardiomegaly is noted.  Gas-filled loops of bowel upper

abdomen are noted.



IMPRESSION:



Right PICC line with tip in the cavoatrial junction



Low lung volumes and diffuse pulmonary infiltrates.

## 2018-06-26 NOTE — INTERNAL MEDICINE PROG NOTE
Internal Medicine Subjective





- Subjective


Service Date: 18


Patient seen and examined:: with staff (THE PATIENT HAS DIFFUCULTY WITH 

BREATHING.)


Patient is:: awake, verbal, in bed, talking


Patient Complaints of:: SOB


Per staff patient has:: other (SOB)





Internal Medicine Objective





- Results


Result Diagrams: 


 18 04:15





 18 04:15


Recent Labs: 


 Laboratory Last Values











WBC  10.1 Th/cmm (4.8-10.8)   18  04:15    


 


RBC  4.24 Mil/cmm (3.80-5.80)   18  04:15    


 


Hgb  12.4 gm/dL (12-16)   18  04:15    


 


Hct  37.4 % (41.0-60)  L  18  04:15    


 


MCV  88.2 fl (80-99)   18  04:15    


 


MCH  29.2 pg (27.0-31.0)   18  04:15    


 


MCHC Differential  33.1 pg (28.0-36.0)   18  04:15    


 


RDW  14.9 % (11.5-20.0)   18  04:15    


 


Plt Count  256 Th/cmm (150-400)   18  04:15    


 


MPV  8.8 fl  18  04:15    


 


Neutrophils %  75.1 % (40.0-80.0)   18  04:15    


 


Band Neutrophils %  22 % (0-10)  H  18  18:24    


 


Lymphocytes %  9.7 % (20.0-50.0)  L  18  04:15    


 


Monocytes %  14.9 % (2.0-10.0)  H  18  04:15    


 


Eosinophils %  0.1 % (0.0-5.0)   18  04:15    


 


Basophils %  0.2 % (0.0-2.0)   18  04:15    


 


Neutrophils (Manual)  40 % (40-80)   18  18:24    


 


Lymphocytes  28 % (20-50)   18  18:24    


 


Monocytes  10 % (2-10)   18  18:24    


 


Eosinophils  0 % (0-5)   18  18:24    


 


Basophils  0 % (0-3)   18  18:24    


 


Platelet Estimate  ADEQUATE  (NORMAL)   18  18:24    


 


Platelet Morphology  NORMAL  (NORMAL)   18  18:24    


 


RBC Morph Micro Appear  NORMAL  (NORMAL)   18  18:24    


 


PT  11.9 SECONDS (9.5-11.5)  H  18  12:08    


 


INR  1.13  (0.5-1.4)   18  12:08    


 


PTT (Actin FS)  22.3 SECONDS (26.0-38.0)  L  18  12:08    


 


Specimen Source  arterial   18  18:40    


 


Sample Site  Right Radial   18  18:40    


 


pH  7.26  (7.35-7.45)  L  18  18:40    


 


pCO2  55.0 mmHg (35.0-45.0)  H  18  18:40    


 


pO2  80.0 mmHg (80.0-100.0)   18  18:40    


 


HCO3  22.5 mEq/L (20.0-26.0)   18  18:40    


 


Base Excess  -3.0 mEq/L (-3.0-3.0)   18  18:40    


 


O2 Saturation  94.0 % (92.0-100.0)   18  18:40    


 


Ceferino Test  Positive   18  18:40    


 


Vent Rate  N/A   18  18:40    


 


Inspired O2  100   18  18:40    


 


Tidal Volume  N/A   18  18:40    


 


PEEP  N/A   18  18:40    


 


Pressure (ins/psv/peep)  N/A   18  18:40    


 


Critical Value  DV   18  18:40    


 


Sodium  138 mEq/L (136-145)   18  04:15    


 


Potassium  3.9 mEq/L (3.5-5.1)   18  04:15    


 


Chloride  107 mEq/L ()   18  04:15    


 


Carbon Dioxide  24.2 mEq/L (21.0-31.0)   18  04:15    


 


Anion Gap  10.7  (7.0-16.0)   18  04:15    


 


BUN  22 mg/dL (7-25)   18  04:15    


 


Creatinine  1.9 mg/dL (0.7-1.3)  H  18  04:15    


 


Est GFR ( Amer)  46.0 ml/min (>90)   18  04:15    


 


Est GFR (Non-Af Amer)  38.0 ml/min  18  04:15    


 


BUN/Creatinine Ratio  11.6   18  04:15    


 


Glucose  135 mg/dL ()  H  18  04:15    


 


POC Glucose  131 MG/DL (70 - 105)  H  18  16:49    


 


Calcium  8.0 mg/dL (8.6-10.3)  L  18  04:15    


 


Phosphorus  2.9 mg/dL (2.5-5.0)   18  04:15    


 


Magnesium  1.6 mg/dL (1.9-2.7)  L  18  04:15    


 


Total Bilirubin  0.5 mg/dL (0.3-1.0)   18  04:15    


 


AST  14 U/L (13-39)   18  04:15    


 


ALT  12 U/L (7-52)   18  04:15    


 


Alkaline Phosphatase  42 U/L ()   18  04:15    


 


Total Protein  6.1 gm/dL (6.0-8.3)   18  04:15    


 


Albumin  2.7 gm/dL (4.2-5.5)  L  18  04:15    


 


Globulin  3.4 gm/dL  18  04:15    


 


Albumin/Globulin Ratio  0.8  (1.0-1.8)  L  18  04:15    


 


Triglycerides  87 mg/dL (<150)   18  04:15    


 


Cholesterol  66 mg/dL (<200)   18  04:15    


 


Urine Source  CLEAN C   18  17:35    


 


Urine Color  YELLOW   18  17:35    


 


Urine Clarity  HAZY  (CLEAR)   18  17:35    


 


Urine pH  5.0  (4.6 - 8.0)   18  17:35    


 


Ur Specific Gravity  1.020  (1.005-1.030)   18  17:35    


 


Urine Protein  30 mg/dL (NEGATIVE)  H  18  17:35    


 


Urine Glucose (UA)  NEGATIVE mg/dL (NEGATIVE)   18  17:35    


 


Urine Ketones  NEGATIVE mg/dL (NEGATIVE)   18  17:35    


 


Urine Blood  TRACE  (NEGATIVE)   18  17:35    


 


Urine Nitrate  NEGATIVE  (NEGATIVE)   18  17:35    


 


Urine Bilirubin  NEGATIVE  (NEGATIVE)   18  17:35    


 


Urine Urobilinogen  0.2 E.U./dL (0.2 - 1.0)   18  17:35    


 


Ur Leukocyte Esterase  TRACE  (NEGATIVE)  H  18  17:35    


 


Urine RBC  0-2 /hpf (0-5)  H  18  17:35    


 


Urine WBC  0-2 /hpf (0-5)   18  17:35    


 


Ur Epithelial Cells  OCCASIONAL /lpf (FEW)   18  17:35    


 


Amorphous Sediment  FEW URATES  (NONE SEEN)   18  17:35    


 


Urine Bacteria  FEW /hpf (NONE SEEN)   18  17:35    


 


Vancomycin Trough  16.8 ug/mL (5-10)  H  18  09:30    


 


Random Vancomycin  19.2 ug/mL (5.0-40.0)   18  04:50    














- Physical Exam


Vitals and I&O: 


 Vital Signs











Temp  97.5 F   18 18:59


 


Pulse  159   18 19:22


 


Resp  35   18 19:22


 


BP  126/79   18 18:59


 


Pulse Ox  75   18 19:22








 Intake & Output











 18





 06:59 18:59 06:59


 


Intake Total 800 350 


 


Output Total 1800 2200 


 


Balance -1000 -1850 


 


Weight (lbs) 93.44 kg 93.157 kg 


 


Intake:   


 


  Intake, IV Amount 800 100 


 


    KCL 20mEq/100mL Premix 20 100  





    meq In 100 ml @ 50 mls/   





    hr IV Q2H [facility] Rx#:   





    [id number]   


 


    Vancomycin HCl 1.5 gm In 500  





    Sodium Chloride 0.9% 500   





    ml @ 250 mls/hr IV ONCE   





    ONE Rx#:[id number]   


 


    metroNIDAZOLE 500mg/ 100 





    100mL 500 mg In 100 ml @   





    100 mls/hr IV Q8H [facility] Rx#   





    :[id number]   


 


  Oral  250 


 


Output:   


 


  Gastric Drainage 1400 1600 


 


  Urine 400 600 


 


Other:   


 


  # Bowel Movements  0 


 


  Weight Source Bedscale Bedscale 











Active Medications: 


Current Medications





Acetaminophen/Hydrocodone Bitart (Norco 10 Mg/325 Mg)  1 tab PO Q6H PRN


   PRN Reason: Pain (Severe)


   Stop: 18 20:04


   Last Admin: 18 03:03 Dose:  1 tab


Albuterol/Ipratropium (Duoneb Neb)  3 ml HHN Q4HRT [facility]; Protocol


   Stop: 18 18:59


   Last Admin: 18 18:14 Dose:  3 ml


Cholestyramine Resin (Questran)  4 gm PO BID [facility]


   Stop: 18 16:59


   Last Admin: 18 16:52 Dose:  Not Given


Enoxaparin Sodium (Lovenox)  30 mg SUBQ Q12HR [facility]


   Stop: 18 20:59


   Last Admin: 18 20:30 Dose:  30 mg


Hydromorphone HCl (Dilaudid)  1 mg IVP Q3HR PRN


   PRN Reason: SEVERE PAIN


   Stop: 18 16:25


   Last Admin: 18 20:53 Dose:  1 mg


Metronidazole (Flagyl)  500 mg in 100 mls @ 100 mls/hr IV Q8H [facility]


   Stop: 18 20:59


   Last Admin: 18 20:30 Dose:  100 mls/hr


Dextrose/Sodium Chloride (D5-0.9%Ns)  1,000 mls @ 60 mls/hr IV .[id number] [facility]


   Stop: 18 14:59


   Last Admin: 18 15:00 Dose:  60 mls/hr


Multivitamins/Minerals 10 ml/Dextrose/ Amino Acids/Electrolytes  1,200 mls @ 50 

mls/hr IV .Q24H [facility]


   Stop: 18 14:59


   Last Admin: 18 15:00 Dose:  50 mls/hr


Cefepime HCl 1 gm/ Dextrose  50 mls @ 100 mls/hr IV Q24H [facility]


   Stop: 18 20:59


Insulin Aspart (Novolog Insulin Sliding Scale)  0 units SUBQ Q6H [facility]; Protocol


   Stop: 18 11:59


   Last Admin: 18 17:01 Dose:  Not Given


Lorazepam (Ativan)  0.5 mg IVP Q6H PRN; Protocol


   PRN Reason: Anxiety


   Stop: 18 20:59


Methylprednisolone Sodium Succinate (Solu-Medrol)  125 mg IVP X1 ONE


   Stop: 18 20:51


Methylprednisolone Sodium Succinate (Solu-Medrol)  60 mg IVP Q6HR KATHY


   Stop: 18 00:00


Metoclopramide HCl (Reglan)  10 mg IVP Q6HR KATHY


   Stop: 18 17:59


   Last Admin: 18 17:01 Dose:  10 mg


Miscellaneous (Tpn Per Pharmacy)  1 ea MC PRN KATHY


   Stop: 18 17:59


Ondansetron HCl (Zofran)  4 mg IV Q4H PRN


   PRN Reason: Nausea / Vomiting


   Stop: 18 05:53


   Last Admin: 18 22:50 Dose:  4 mg


Pantoprazole Sodium (Protonix)  40 mg IVP BID KATHY


   Stop: 18 08:59


   Last Admin: 18 16:46 Dose:  40 mg


Zolpidem Tartrate (Ambien)  5 mg PO HS PRN


   PRN Reason: Insomnia


   Stop: 18 20:55


   Last Admin: 18 21:59 Dose:  5 mg








General: alert


HEENT: NC/AT, PERRLA, EOMI, anicteric sclerae, throat clear


Neck: Supple, No JVD, No thyromegaly, +2 carotid pulse wo bruit, No LAD


Lungs: wheezing, ronchi


Cardiovascular: RRR, Normal S1, Normal S2, without murmur, tachy


Abdomen: tender, distended


Extremities: clear


Neurological: no change





- Procedures


Procedures: 


 Procedures











Procedure Code Date


 


EXCISION OF CHEST SKIN, EXTERNAL APPROACH 5MB0LOA 18


 


EXCISION OF ILEUM, OPEN APPROACH [id number] 18


 


EXCISION OF RIGHT LOWER ARM SKIN, EXTERNAL APPROACH [id number] 18


 


EXCISION OF RIGHT LOWER LEG SKIN, EXTERNAL APPROACH 0HBKXZZ 18


 


RELEASE ILEUM, OPEN APPROACH 9KVC2YY 18














Internal Medicine Assmt/Plan





- Assessment


Assessment: 





1.SP COLOSTOMY REVISION.


2.SP EXCISION BIOPSY FOR CHEST WALL LESION.


3.ACUTE BOWEL OBSTRUCTION


4.ACUTE RESPIRATORY FAILURE.





- Plan


Plan: 


1.BIPAP.


2.SOLUMEDROL IV


3.ATIVAN IV 0.5 MG Q 6H.


4.ECHO IN AM.


5.PULMONOLOGY CONSULTATION.





Nutritional Asmnt/Malnutr-PDOC





- Dietary Evaluation


Malnutrition Findings (Please click <Entered> for more info): 








Nutritional Asmnt/Malnutrition                             Start:  18 14:

24


Text:                                                      Status: Active      

  


Freq:                                                                          

  


Protocol:                                                                      

  


 Document     18 14:24  [facility]  (Rec: 18 14:35  [id number])


 Nutritional Asmnt/Malnutrition


     Patient General Information


      Nutritional Screening                      High Risk


                                                 Consult


      Diagnosis                                  exploratory laparotomy with


                                                 recersal colostomy


      Pertinent Medical Hx/Surgical Hx           acute perforation of


                                                 diverticulitis, s/p


                                                 exploratory laparotomy and


                                                 divertin gcolostomy placement


      Subjective Information                     Pt seen lying in bed at time


                                                 of visit, awake and alert. Pt


                                                 stated he tolerated clear


                                                 liquid well, adequte amount


                                                 for him at this time. Pt has


                                                 RAQUEL drain noted. Per EMR, PO


                                                 intake % of clear liquid


                                                 .


      Current Diet Order/ Nutrition Support      clear liquid


      Pertinent Medications                      D5-0.9%ns, piperacillin,


                                                 vancomycin


      Pertinent Labs                              Na 133, K 3.4, Cr 0.7,


                                                 glucose 107, Ca 7.9


     Nutritional Hx/Data


      Height                                     1.88 m


      Height (Calculated Centimeters)            188.0


      Current Weight (lbs)                       86.636 kg


      Weight (Calculated Kilograms)              86.6


      Weight (Calculated Grams)                  01171.1


      Ideal Body Weight                          190


      Body Mass Index (BMI)                      24.5


      Weight Status                              Approriate


     GI Symptoms


      GI Symptoms                                None


      Last BM                                    none


      Difficult in:                              None


      Skin Integrity/Comment:                    RIGHT LOWER LEG HEALING


                                                 incision to right arm


     Estimated Nutritional Goals


      BEE in Kcals:                              Using Current wt


      Calories/Kcals/Kg                          25-30


      Kcals Calculated                           8891-0513


      Protein:                                   Using Current wt


      Protein g/k.8-1


      Protein Calculated                         69-86


      Fluid: ml                                  2150-2580ml (1ml/kcal)


     Nutritional Problem


      1. Problem


       Problem                                   altered GI function


       Etiology                                  s/p reversal colostomy


       Signs/Symptoms:                           pt on clear liquid


     Intervention/Recommendation


      Comments                                   1. Continue with clear liquid


                                                 diet as ordered.


                                                 2. Monitor PO intake, wt, labs


                                                 and skin integrity


                                                 3. F/U as high risk in 2-3


                                                 days, -


     Expected Outcomes/Goals


      Expected Outcomes/Goals                    1. PO intake to meet at least


                                                 75% of nutritional needs.


                                                 2. Wt stability, skin to


                                                 remain intact, labs to


                                                 approach WNL.

## 2018-06-26 NOTE — GENERAL PROGRESS NOTE
Subjective





- Review of Systems


Service Date: 18


Subjective: 


pt on NRB mask 


RR 29


tachycardia





Objective





- Results


Result Diagrams: 


 18 04:15





 18 04:15


Recent Labs: 


 Laboratory Last Values











WBC  10.1 Th/cmm (4.8-10.8)   18  04:15    


 


RBC  4.24 Mil/cmm (3.80-5.80)   18  04:15    


 


Hgb  12.4 gm/dL (12-16)   18  04:15    


 


Hct  37.4 % (41.0-60)  L  18  04:15    


 


MCV  88.2 fl (80-99)   18  04:15    


 


MCH  29.2 pg (27.0-31.0)   18  04:15    


 


MCHC Differential  33.1 pg (28.0-36.0)   18  04:15    


 


RDW  14.9 % (11.5-20.0)   18  04:15    


 


Plt Count  256 Th/cmm (150-400)   18  04:15    


 


MPV  8.8 fl  18  04:15    


 


Neutrophils %  75.1 % (40.0-80.0)   18  04:15    


 


Band Neutrophils %  22 % (0-10)  H  18  18:24    


 


Lymphocytes %  9.7 % (20.0-50.0)  L  18  04:15    


 


Monocytes %  14.9 % (2.0-10.0)  H  18  04:15    


 


Eosinophils %  0.1 % (0.0-5.0)   18  04:15    


 


Basophils %  0.2 % (0.0-2.0)   18  04:15    


 


Neutrophils (Manual)  40 % (40-80)   18  18:24    


 


Lymphocytes  28 % (20-50)   18  18:24    


 


Monocytes  10 % (2-10)   18  18:24    


 


Eosinophils  0 % (0-5)   18  18:24    


 


Basophils  0 % (0-3)   18  18:24    


 


Platelet Estimate  ADEQUATE  (NORMAL)   18  18:24    


 


Platelet Morphology  NORMAL  (NORMAL)   18  18:24    


 


RBC Morph Micro Appear  NORMAL  (NORMAL)   18  18:24    


 


PT  11.9 SECONDS (9.5-11.5)  H  18  12:08    


 


INR  1.13  (0.5-1.4)   18  12:08    


 


PTT (Actin FS)  22.3 SECONDS (26.0-38.0)  L  18  12:08    


 


Sodium  138 mEq/L (136-145)   18  04:15    


 


Potassium  3.9 mEq/L (3.5-5.1)   18  04:15    


 


Chloride  107 mEq/L ()   18  04:15    


 


Carbon Dioxide  24.2 mEq/L (21.0-31.0)   18  04:15    


 


Anion Gap  10.7  (7.0-16.0)   18  04:15    


 


BUN  22 mg/dL (7-25)   18  04:15    


 


Creatinine  1.9 mg/dL (0.7-1.3)  H  18  04:15    


 


Est GFR ( Amer)  46.0 ml/min (>90)   18  04:15    


 


Est GFR (Non-Af Amer)  38.0 ml/min  18  04:15    


 


BUN/Creatinine Ratio  11.6   18  04:15    


 


Glucose  135 mg/dL ()  H  18  04:15    


 


POC Glucose  131 MG/DL (70 - 105)  H  18  16:49    


 


Calcium  8.0 mg/dL (8.6-10.3)  L  18  04:15    


 


Phosphorus  2.9 mg/dL (2.5-5.0)   18  04:15    


 


Magnesium  1.6 mg/dL (1.9-2.7)  L  18  04:15    


 


Total Bilirubin  0.5 mg/dL (0.3-1.0)   18  04:15    


 


AST  14 U/L (13-39)   18  04:15    


 


ALT  12 U/L (7-52)   18  04:15    


 


Alkaline Phosphatase  42 U/L ()   18  04:15    


 


Total Protein  6.1 gm/dL (6.0-8.3)   18  04:15    


 


Albumin  2.7 gm/dL (4.2-5.5)  L  18  04:15    


 


Globulin  3.4 gm/dL  18  04:15    


 


Albumin/Globulin Ratio  0.8  (1.0-1.8)  L  18  04:15    


 


Triglycerides  87 mg/dL (<150)   18  04:15    


 


Cholesterol  66 mg/dL (<200)   18  04:15    


 


Urine Source  CLEAN C   18  17:35    


 


Urine Color  YELLOW   18  17:35    


 


Urine Clarity  HAZY  (CLEAR)   18  17:35    


 


Urine pH  5.0  (4.6 - 8.0)   18  17:35    


 


Ur Specific Gravity  1.020  (1.005-1.030)   18  17:35    


 


Urine Protein  30 mg/dL (NEGATIVE)  H  18  17:35    


 


Urine Glucose (UA)  NEGATIVE mg/dL (NEGATIVE)   18  17:35    


 


Urine Ketones  NEGATIVE mg/dL (NEGATIVE)   18  17:35    


 


Urine Blood  TRACE  (NEGATIVE)   18  17:35    


 


Urine Nitrate  NEGATIVE  (NEGATIVE)   18  17:35    


 


Urine Bilirubin  NEGATIVE  (NEGATIVE)   18  17:35    


 


Urine Urobilinogen  0.2 E.U./dL (0.2 - 1.0)   18  17:35    


 


Ur Leukocyte Esterase  TRACE  (NEGATIVE)  H  18  17:35    


 


Urine RBC  0-2 /hpf (0-5)  H  18  17:35    


 


Urine WBC  0-2 /hpf (0-5)   18  17:35    


 


Ur Epithelial Cells  OCCASIONAL /lpf (FEW)   18  17:35    


 


Amorphous Sediment  FEW URATES  (NONE SEEN)   18  17:35    


 


Urine Bacteria  FEW /hpf (NONE SEEN)   18  17:35    


 


Vancomycin Trough  16.8 ug/mL (5-10)  H  18  09:30    


 


Random Vancomycin  19.2 ug/mL (5.0-40.0)   18  04:50    














- Physical Exam


Vitals and I&O: 


 Vital Signs











Temp  97.5 F   18 18:59


 


Pulse  159   18 19:22


 


Resp  35   18 19:22


 


BP  126/79   18 18:59


 


Pulse Ox  75   18 19:22








 Intake & Output











 18





 06:59 18:59 06:59


 


Intake Total 800 350 


 


Output Total 1800 2200 


 


Balance -1000 -1850 


 


Weight (lbs) 93.44 kg 93.157 kg 


 


Intake:   


 


  Intake, IV Amount 800 100 


 


    KCL 20mEq/100mL Premix 20 100  





    meq In 100 ml @ 50 mls/   





    hr IV Q2H [facility] Rx#:   





    [id number]   


 


    Vancomycin HCl 1.5 gm In 500  





    Sodium Chloride 0.9% 500   





    ml @ 250 mls/hr IV ONCE   





    ONE Rx#:[id number]   


 


    metroNIDAZOLE 500mg/ 100 





    100mL 500 mg In 100 ml @   





    100 mls/hr IV Q8H [facility] Rx#   





    :[id number]   


 


  Oral  250 


 


Output:   


 


  Gastric Drainage 1400 1600 


 


  Urine 400 600 


 


Other:   


 


  # Bowel Movements  0 


 


  Weight Source Bedscale Bedscale 











Active Medications: 


Current Medications





Acetaminophen/Hydrocodone Bitart (Norco 10 Mg/325 Mg)  1 tab PO Q6H PRN


   PRN Reason: Pain (Severe)


   Stop: 18 20:04


   Last Admin: 18 03:03 Dose:  1 tab


Albuterol/Ipratropium (Duoneb Neb)  3 ml HHN Q4HRT [facility]; Protocol


   Stop: 18 18:59


   Last Admin: 18 18:14 Dose:  3 ml


Cholestyramine Resin (Questran)  4 gm PO BID [facility]


   Stop: 18 16:59


   Last Admin: 18 16:52 Dose:  Not Given


Enoxaparin Sodium (Lovenox)  30 mg SUBQ Q12HR [facility]


   Stop: 18 20:59


   Last Admin: 18 09:16 Dose:  30 mg


Hydromorphone HCl (Dilaudid)  1 mg IVP Q3HR PRN


   PRN Reason: SEVERE PAIN


   Stop: 18 16:25


   Last Admin: 18 16:46 Dose:  1 mg


Metronidazole (Flagyl)  500 mg in 100 mls @ 100 mls/hr IV Q8H [facility]


   Stop: 18 20:59


   Last Infusion: 18 13:05 Dose:  Infused


Dextrose/Sodium Chloride (D5-0.9%Ns)  1,000 mls @ 60 mls/hr IV .[id number] [facility]


   Stop: 18 14:59


   Last Admin: 18 15:00 Dose:  60 mls/hr


Multivitamins/Minerals 10 ml/Dextrose/ Amino Acids/Electrolytes  1,200 mls @ 50 

mls/hr IV .Q24H [facility]


   Stop: 18 14:59


   Last Admin: 18 15:00 Dose:  50 mls/hr


Cefepime HCl 1 gm/ Dextrose  50 mls @ 100 mls/hr IV Q12HR [facility]


   Stop: 18 20:59


Insulin Aspart (Novolog Insulin Sliding Scale)  0 units SUBQ Q6H [facility]; Protocol


   Stop: 18 11:59


   Last Admin: 18 17:01 Dose:  Not Given


Metoclopramide HCl (Reglan)  10 mg IVP Q6HR [facility]


   Stop: 18 17:59


   Last Admin: 18 17:01 Dose:  10 mg


Miscellaneous (Tpn Per Pharmacy)  1 ea MC PRN [facility]


   Stop: 18 17:59


Ondansetron HCl (Zofran)  4 mg IV Q4H PRN


   PRN Reason: Nausea / Vomiting


   Stop: 18 05:53


   Last Admin: 18 22:50 Dose:  4 mg


Pantoprazole Sodium (Protonix)  40 mg IVP BID [facility]


   Stop: 18 08:59


   Last Admin: 18 16:46 Dose:  40 mg


Zolpidem Tartrate (Ambien)  5 mg PO HS PRN


   PRN Reason: Insomnia


   Stop: 18 20:55


   Last Admin: 18 21:59 Dose:  5 mg








General: Alert, Cooperative, No acute distress


HEENT: Atraumatic


Neck: Supple


Cardiovascular: Regular rate, Normal S1, Normal S2


Lungs: Clear to auscultation


Abdomen: Distended





- Procedures


Procedures: 


 Procedures











Procedure Code Date


 


EXCISION OF CHEST SKIN, EXTERNAL APPROACH 2TS8EHU 18


 


EXCISION OF ILEUM, OPEN APPROACH [id number] 18


 


EXCISION OF RIGHT LOWER ARM SKIN, EXTERNAL APPROACH [id number] 18


 


EXCISION OF RIGHT LOWER LEG SKIN, EXTERNAL APPROACH [id number] 18


 


RELEASE ILEUM, OPEN APPROACH [id number] 18














Assessment/Plan





- Assessment


Assessment: 


CT scan shows bowel obstruction


distended abdomen


arf


resp distress





- Plan


Plan: 


continue IVF


may need intubation





Nutritional Asmnt/Malnutr-PDOC





- Dietary Evaluation


Malnutrition Findings (Please click <Entered> for more info): 








Nutritional Asmnt/Malnutrition                             Start:  18 14:

24


Text:                                                      Status: Active      

  


Freq:                                                                          

  


Protocol:                                                                      

  


 Document     18 14:24  JASEG  (Rec: 18 14:35  LCHENG  [id number])


 Nutritional Asmnt/Malnutrition


     Patient General Information


      Nutritional Screening                      High Risk


                                                 Consult


      Diagnosis                                  exploratory laparotomy with


                                                 recersal colostomy


      Pertinent Medical Hx/Surgical Hx           acute perforation of


                                                 diverticulitis, s/p


                                                 exploratory laparotomy and


                                                 divertin gcolostomy placement


      Subjective Information                     Pt seen lying in bed at time


                                                 of visit, awake and alert. Pt


                                                 stated he tolerated clear


                                                 liquid well, adequte amount


                                                 for him at this time. Pt has


                                                 RAQUEL drain noted. Per EMR, PO


                                                 intake % of clear liquid


                                                 .


      Current Diet Order/ Nutrition Support      clear liquid


      Pertinent Medications                      D5-0.9%ns, piperacillin,


                                                 vancomycin


      Pertinent Labs                              Na 133, K 3.4, Cr 0.7,


                                                 glucose 107, Ca 7.9


     Nutritional Hx/Data


      Height                                     1.88 m


      Height (Calculated Centimeters)            188.0


      Current Weight (lbs)                       86.636 kg


      Weight (Calculated Kilograms)              86.6


      Weight (Calculated Grams)                  27480.1


      Ideal Body Weight                          190


      Body Mass Index (BMI)                      24.5


      Weight Status                              Approriate


     GI Symptoms


      GI Symptoms                                None


      Last BM                                    none


      Difficult in:                              None


      Skin Integrity/Comment:                    RIGHT LOWER LEG HEALING


                                                 incision to right arm


     Estimated Nutritional Goals


      BEE in Kcals:                              Using Current wt


      Calories/Kcals/Kg                          25-30


      Kcals Calculated                           7153-4293


      Protein:                                   Using Current wt


      Protein g/k.8-1


      Protein Calculated                         69-86


      Fluid: ml                                  2150-2580ml (1ml/kcal)


     Nutritional Problem


      1. Problem


       Problem                                   altered GI function


       Etiology                                  s/p reversal colostomy


       Signs/Symptoms:                           pt on clear liquid


     Intervention/Recommendation


      Comments                                   1. Continue with clear liquid


                                                 diet as ordered.


                                                 2. Monitor PO intake, wt, labs


                                                 and skin integrity


                                                 3. F/U as high risk in 2-3


                                                 days, -


     Expected Outcomes/Goals


      Expected Outcomes/Goals                    1. PO intake to meet at least


                                                 75% of nutritional needs.


                                                 2. Wt stability, skin to


                                                 remain intact, labs to


                                                 approach WNL.

## 2018-06-27 LAB
ALBUMIN SERPL-MCNC: 2.5 GM/DL (ref 4.2–5.5)
ALBUMIN/GLOB SERPL: 0.8 {RATIO} (ref 1–1.8)
ALP SERPL-CCNC: 55 U/L (ref 34–104)
ALT SERPL-CCNC: 9 U/L (ref 7–52)
ANION GAP SERPL CALC-SCNC: 10.5 MMOL/L (ref 7–16)
ARTERIAL PATENCY WRIST A: YES
AST SERPL-CCNC: 16 U/L (ref 13–39)
BILIRUB SERPL-MCNC: 0.4 MG/DL (ref 0.3–1)
BUN SERPL-MCNC: 30 MG/DL (ref 7–25)
BURR CELLS BLD QL SMEAR: (no result)
CALCIUM SERPL-MCNC: 7.9 MG/DL (ref 8.6–10.3)
CHLORIDE SERPL-SCNC: 111 MEQ/L (ref 98–107)
CO2 SERPL-SCNC: 21.7 MEQ/L (ref 21–31)
CREAT SERPL-MCNC: 1.9 MG/DL (ref 0.7–1.3)
ERYTHROCYTE [DISTWIDTH] IN BLOOD BY AUTOMATED COUNT: 15.3 % (ref 11.5–20)
GLOBULIN SER-MCNC: 3.3 GM/DL
GLUCOSE SERPL-MCNC: 200 MG/DL (ref 70–105)
HBA1C MFR BLD: 4.8 % (ref 4–6)
HCT VFR BLD CALC: 35.8 % (ref 41–60)
HGB BLD-MCNC: 10 G/DL (ref 4–35)
HGB BLD-MCNC: 11.9 GM/DL (ref 12–16)
LYMPHOCYTES # BLD MANUAL: 5 % (ref 20–50)
MAGNESIUM SERPL-MCNC: 2 MG/DL (ref 1.9–2.7)
MCH RBC QN AUTO: 29.6 PG (ref 27–31)
MCHC RBC AUTO-ENTMCNC: 33.3 PG (ref 28–36)
MCV RBC AUTO: 89.1 FL (ref 80–99)
MONOCYTES # BLD MANUAL: 2 % (ref 2–10)
NEUTROPHILS NFR BLD AUTO: 83 % (ref 40–80)
NEUTS BAND NFR BLD: 10 % (ref 0–10)
PCO2 BLDA: 62 MMHG (ref 35–45)
PHOSPHATE SERPL-MCNC: 3.5 MG/DL (ref 2.5–5)
PLATELET # BLD EST: ADEQUATE 10*3/UL
PLATELET # BLD: 207 TH/CMM (ref 150–400)
PMV BLD AUTO: 8.8 FL
PO2 BLDA: 86 MMHG (ref 80–100)
POLYCHROMASIA BLD QL SMEAR: (no result)
POTASSIUM SERPL-SCNC: 4.2 MEQ/L (ref 3.5–5.1)
RBC # BLD AUTO: 4.02 MIL/CMM (ref 3.8–5.8)
SAO2 % BLDA: 94 % (ref 92–100)
SODIUM SERPL-SCNC: 139 MEQ/L (ref 136–145)
TOTAL CELLS COUNTED BLD: 100
WBC # BLD AUTO: 15.7 TH/CMM (ref 4.8–10.8)

## 2018-06-27 RX ADMIN — IPRATROPIUM BROMIDE AND ALBUTEROL SULFATE SCH ML: .5; 3 SOLUTION RESPIRATORY (INHALATION) at 07:40

## 2018-06-27 RX ADMIN — METHYLPREDNISOLONE SODIUM SUCCINATE SCH MG: 40 INJECTION, POWDER, FOR SOLUTION INTRAMUSCULAR; INTRAVENOUS at 05:19

## 2018-06-27 RX ADMIN — DEXTROSE AND SODIUM CHLORIDE SCH MLS/HR: 5; .9 INJECTION, SOLUTION INTRAVENOUS at 09:04

## 2018-06-27 RX ADMIN — IPRATROPIUM BROMIDE AND ALBUTEROL SULFATE SCH ML: .5; 3 SOLUTION RESPIRATORY (INHALATION) at 10:40

## 2018-06-27 RX ADMIN — METOCLOPRAMIDE SCH MG: 5 INJECTION, SOLUTION INTRAMUSCULAR; INTRAVENOUS at 05:19

## 2018-06-27 RX ADMIN — METRONIDAZOLE SCH MLS/HR: 500 INJECTION, SOLUTION INTRAVENOUS at 12:01

## 2018-06-27 RX ADMIN — IPRATROPIUM BROMIDE AND ALBUTEROL SULFATE SCH ML: .5; 3 SOLUTION RESPIRATORY (INHALATION) at 19:34

## 2018-06-27 RX ADMIN — METOCLOPRAMIDE SCH MG: 5 INJECTION, SOLUTION INTRAMUSCULAR; INTRAVENOUS at 11:17

## 2018-06-27 RX ADMIN — ENOXAPARIN SODIUM SCH MG: 100 INJECTION SUBCUTANEOUS at 09:05

## 2018-06-27 RX ADMIN — IPRATROPIUM BROMIDE AND ALBUTEROL SULFATE SCH ML: .5; 3 SOLUTION RESPIRATORY (INHALATION) at 02:47

## 2018-06-27 RX ADMIN — INSULIN ASPART SCH: 100 INJECTION, SOLUTION INTRAVENOUS; SUBCUTANEOUS at 05:04

## 2018-06-27 RX ADMIN — INSULIN ASPART SCH UNITS: 100 INJECTION, SOLUTION INTRAVENOUS; SUBCUTANEOUS at 11:55

## 2018-06-27 RX ADMIN — CHLORHEXIDINE GLUCONATE SCH ML: 1.2 RINSE ORAL at 20:45

## 2018-06-27 RX ADMIN — IPRATROPIUM BROMIDE AND ALBUTEROL SULFATE SCH ML: .5; 3 SOLUTION RESPIRATORY (INHALATION) at 23:09

## 2018-06-27 RX ADMIN — HYDROMORPHONE HYDROCHLORIDE PRN MG: 1 INJECTION, SOLUTION INTRAMUSCULAR; INTRAVENOUS; SUBCUTANEOUS at 04:01

## 2018-06-27 RX ADMIN — HYDROMORPHONE HYDROCHLORIDE PRN MG: 1 INJECTION, SOLUTION INTRAMUSCULAR; INTRAVENOUS; SUBCUTANEOUS at 09:00

## 2018-06-27 RX ADMIN — BUDESONIDE SCH MG: 0.5 SUSPENSION RESPIRATORY (INHALATION) at 19:34

## 2018-06-27 RX ADMIN — METRONIDAZOLE SCH MLS/HR: 500 INJECTION, SOLUTION INTRAVENOUS at 04:01

## 2018-06-27 RX ADMIN — IPRATROPIUM BROMIDE AND ALBUTEROL SULFATE SCH ML: .5; 3 SOLUTION RESPIRATORY (INHALATION) at 14:24

## 2018-06-27 RX ADMIN — METHYLPREDNISOLONE SODIUM SUCCINATE SCH MG: 40 INJECTION, POWDER, FOR SOLUTION INTRAMUSCULAR; INTRAVENOUS at 10:21

## 2018-06-27 RX ADMIN — CHLORHEXIDINE GLUCONATE SCH ML: 1.2 RINSE ORAL at 08:35

## 2018-06-27 RX ADMIN — ENOXAPARIN SODIUM SCH MG: 100 INJECTION SUBCUTANEOUS at 20:47

## 2018-06-27 RX ADMIN — INSULIN ASPART SCH UNITS: 100 INJECTION, SOLUTION INTRAVENOUS; SUBCUTANEOUS at 17:00

## 2018-06-27 RX ADMIN — METRONIDAZOLE SCH MLS/HR: 500 INJECTION, SOLUTION INTRAVENOUS at 21:21

## 2018-06-27 RX ADMIN — HYDROMORPHONE HYDROCHLORIDE PRN MG: 1 INJECTION, SOLUTION INTRAMUSCULAR; INTRAVENOUS; SUBCUTANEOUS at 00:15

## 2018-06-27 RX ADMIN — METOCLOPRAMIDE SCH MG: 5 INJECTION, SOLUTION INTRAMUSCULAR; INTRAVENOUS at 17:00

## 2018-06-27 RX ADMIN — METHYLPREDNISOLONE SODIUM SUCCINATE SCH MG: 40 INJECTION, POWDER, FOR SOLUTION INTRAMUSCULAR; INTRAVENOUS at 16:37

## 2018-06-27 NOTE — PROGRESS NOTES
DATE:  06/27/2018



PULMONARY AND CRITICAL CARE CONSULTATION NOTE



REASON FOR CONSULTATION:  Respiratory failure.



CONSULT NOTE:  This is a 63-year-old gentleman who basically was admitted with

some laceration of the right lower extremity.  Subsequently, the patient came

back.  Subsequently, the patient had a revision of the diverting colostomy. 

Subsequently, also the patient has some skin laceration of right upper and lower

extremity.  Subsequently, following surgery a couple of days ago the patient was

moved to ICU because of tachypnea and shortness of breath and the patient's

symptoms got worse and subsequently intubated and I was asked to see this

patient for further care and necessary treatment.  The patient is currently

sedated.  Meaningful detailed history from the patient is not available to me at

this particular time and other history is very sketchy as mentioned earlier.



PHYSICAL EXAMINATION:

GENERAL:  This is an elderly looking gentleman, on a ventilator, not in any

acute distress, slightly tachypneic.

VITAL SIGNS:  Temperature is 96.3, blood pressure 116/72, saturation currently

99 or 100%.

HEENT:  Examination of the head is essentially unremarkable.  Pupils appears to

be pinpoint more left than the right side, very poorly reacting.  No nuchal

rigidity.  Oral cavity appears to be intact.

NECK:  No nodes in the neck could be palpated.

CHEST:  Shows some surgical scar on the left upper portion of the chest on

auscultation, scattered rales and rhonchi with generalized diminished air entry.

ABDOMEN:  Shows surgical scar mid abdomen, slightly tenderness.

EXTREMITIES:  Shows poor pulsations.  No cyanosis or clubbing.



LABORATORY DATA:  The patient's pertinent laboratory studies:  Chest x-ray shows

extensive bilateral pneumonia, right upper lobe, left mid lung and the patient's

white count is 15.7 and the patient's ABG initially showed severe hypercarbia

with 100% with BiPAP and electrolytes are okay with creatinine of 1.9, BUN is

30.  The patient's albumin is 2.5.



ASSESSMENT:  The patient's postoperatively acute respiratory failure, exact

reason is not clear, question aspiration, extensive pneumonia, possibly

healthcare-associated pneumonia.



PLANS AND SUGGESTIONS:  We will continue vent support, aggressive inhalation

treatment.  Repeat culture.  ____ IV antibiotic to be monitored by Infectious

Disease, Dr. Ramiro Dillard and I will get serial EKG, etc. and serial chest

x-ray, blood gases and will be glad to follow along with you.





DD: 06/27/2018 14:13

DT: 06/27/2018 23:13

JOB# [id number]  [id number]

## 2018-06-27 NOTE — DIAGNOSTIC IMAGING REPORT
Portable chest x-ray



HISTORY: Shortness of breath



Compared with prior exam performed earlier in the day (0308 hours), an

endotracheal tube tip is approximately 2.0 cm above the bianka. 

Persistent diffuse bilateral pulmonary infiltrates are seen.



IMPRESSION:

1.  Persistent diffuse bilateral pulmonary infiltrates

2.  Endotracheal tube placement as noted above

## 2018-06-27 NOTE — GENERAL PROGRESS NOTE
Subjective





- Review of Systems


Service Date: 18


Events since last encounter: 





Pulmonologist consult - intubated


xray likely aspiration


labs noted


bronchoscopy?





Objective





- Results


Result Diagrams: 


 18 04:40





 18 04:40


Recent Labs: 


 Laboratory Last Values











WBC  15.7 Th/cmm (4.8-10.8)  H  18  04:40    


 


RBC  4.02 Mil/cmm (3.80-5.80)   18  04:40    


 


Hgb  11.9 gm/dL (12-16)  L  18  04:40    


 


Hct  35.8 % (41.0-60)  L  18  04:40    


 


MCV  89.1 fl (80-99)   18  04:40    


 


MCH  29.6 pg (27.0-31.0)   18  04:40    


 


MCHC Differential  33.3 pg (28.0-36.0)   18  04:40    


 


RDW  15.3 % (11.5-20.0)   18  04:40    


 


Plt Count  207 Th/cmm (150-400)   18  04:40    


 


MPV  8.8 fl  18  04:40    


 


Neutrophils %  75.1 % (40.0-80.0)   18  04:15    


 


Band Neutrophils %  10 % (0-10)   18  04:40    


 


Lymphocytes %  9.7 % (20.0-50.0)  L  18  04:15    


 


Monocytes %  14.9 % (2.0-10.0)  H  18  04:15    


 


Eosinophils %  0.1 % (0.0-5.0)   18  04:15    


 


Basophils %  0.2 % (0.0-2.0)   18  04:15    


 


Neutrophils (Manual)  83 % (40-80)  H  18  04:40    


 


Lymphocytes  5 % (20-50)  L  18  04:40    


 


Monocytes  2 % (2-10)   18  04:40    


 


Eosinophils  0 % (0-5)   18  18:24    


 


Basophils  0 % (0-3)   18  18:24    


 


Platelet Estimate  ADEQUATE  (NORMAL)   18  04:40    


 


Platelet Morphology  NORMAL  (NORMAL)   18  18:24    


 


Polychromasia  1+   18  04:40    


 


Wise Cells  1+   18  04:40    


 


RBC Morph Micro Appear  NORMAL  (NORMAL)   18  18:24    


 


PT  11.9 SECONDS (9.5-11.5)  H  18  12:08    


 


INR  1.13  (0.5-1.4)   18  12:08    


 


PTT (Actin FS)  22.3 SECONDS (26.0-38.0)  L  18  12:08    


 


Specimen Source  Arterial   18  09:45    


 


Sample Site  Right Radial   18  09:45    


 


pH  7.22  (7.35-7.45)  L*  18  09:45    


 


pCO2  62.0 mmHg (35.0-45.0)  H*  18  09:45    


 


pO2  86.0 mmHg (80.0-100.0)   18  09:45    


 


HCO3  22.3 mEq/L (20.0-26.0)   18  09:45    


 


Base Excess  -3.3 mEq/L (-3.0-3.0)  L  18  09:45    


 


O2 Saturation  94.0 % (92.0-100.0)   18  09:45    


 


Ceferino Test  YES   18  09:45    


 


Vent Rate  14   18  09:45    


 


Inspired O2  100   18  09:45    


 


Tidal Volume  450   18  09:45    


 


PEEP  5   18  09:45    


 


Pressure (ins/psv/peep)  NA   18  09:45    


 


Critical Value  E.ESPINOZA   18  09:45    


 


Sodium  139 mEq/L (136-145)   18  04:40    


 


Potassium  4.2 mEq/L (3.5-5.1)   18  04:40    


 


Chloride  111 mEq/L ()  H  18  04:40    


 


Carbon Dioxide  21.7 mEq/L (21.0-31.0)   18  04:40    


 


Anion Gap  10.5  (7.0-16.0)   18  04:40    


 


BUN  30 mg/dL (7-25)  H  18  04:40    


 


Creatinine  1.9 mg/dL (0.7-1.3)  H  18  04:40    


 


Est GFR ( Amer)  46.0 ml/min (>90)   18  04:40    


 


Est GFR (Non-Af Amer)  38.0 ml/min  18  04:40    


 


BUN/Creatinine Ratio  15.8   18  04:40    


 


Glucose  200 mg/dL ()  H  18  04:40    


 


POC Glucose  214 MG/DL (70 - 105)  H  18  11:20    


 


Calcium  7.9 mg/dL (8.6-10.3)  L  18  04:40    


 


Phosphorus  3.5 mg/dL (2.5-5.0)   18  04:40    


 


Magnesium  2.0 mg/dL (1.9-2.7)   18  04:40    


 


Total Bilirubin  0.4 mg/dL (0.3-1.0)   18  04:40    


 


AST  16 U/L (13-39)   18  04:40    


 


ALT  9 U/L (7-52)   18  04:40    


 


Alkaline Phosphatase  55 U/L ()   18  04:40    


 


Total Protein  5.8 gm/dL (6.0-8.3)  L  18  04:40    


 


Albumin  2.5 gm/dL (4.2-5.5)  L  18  04:40    


 


Globulin  3.3 gm/dL  18  04:40    


 


Albumin/Globulin Ratio  0.8  (1.0-1.8)  L  18  04:40    


 


Prealbumin  7 mg/dL (10-36)  L  18  04:15    


 


Triglycerides  87 mg/dL (<150)   18  04:15    


 


Cholesterol  66 mg/dL (<200)   18  04:15    


 


Urine Source  CLEAN C   18  17:35    


 


Urine Color  YELLOW   18  17:35    


 


Urine Clarity  HAZY  (CLEAR)   18  17:35    


 


Urine pH  5.0  (4.6 - 8.0)   18  17:35    


 


Ur Specific Gravity  1.020  (1.005-1.030)   18  17:35    


 


Urine Protein  30 mg/dL (NEGATIVE)  H  18  17:35    


 


Urine Glucose (UA)  NEGATIVE mg/dL (NEGATIVE)   18  17:35    


 


Urine Ketones  NEGATIVE mg/dL (NEGATIVE)   18  17:35    


 


Urine Blood  TRACE  (NEGATIVE)   18  17:35    


 


Urine Nitrate  NEGATIVE  (NEGATIVE)   18  17:35    


 


Urine Bilirubin  NEGATIVE  (NEGATIVE)   18  17:35    


 


Urine Urobilinogen  0.2 E.U./dL (0.2 - 1.0)   18  17:35    


 


Ur Leukocyte Esterase  TRACE  (NEGATIVE)  H  18  17:35    


 


Urine RBC  0-2 /hpf (0-5)  H  18  17:35    


 


Urine WBC  0-2 /hpf (0-5)   18  17:35    


 


Ur Epithelial Cells  OCCASIONAL /lpf (FEW)   18  17:35    


 


Amorphous Sediment  FEW URATES  (NONE SEEN)   18  17:35    


 


Urine Bacteria  FEW /hpf (NONE SEEN)   18  17:35    


 


Vancomycin Trough  16.8 ug/mL (5-10)  H  18  09:30    


 


Random Vancomycin  19.2 ug/mL (5.0-40.0)   18  04:50    














- Physical Exam


Vitals and I&O: 


 Vital Signs











Temp  96.3 F   18 14:00


 


Pulse  110   18 14:30


 


Resp  22   18 14:00


 


BP  120/73   18 14:30


 


Pulse Ox  95   18 14:24








 Intake & Output











 18





 18:59 06:59 18:59


 


Intake Total 


 


Output Total 2200 750 


 


Balance -1850 -500 1150


 


Weight (lbs) 93.157 kg 92.986 kg 


 


Intake:   


 


  Intake, IV Amount 


 


    Cefepime 1 gm In Dextrose   50





    5% 50 ml @ 100 mls/hr IV   





    Q12HR [facility] Rx#:[id number]   


 


    Cefepime 1 gm In Dextrose  50 





    5% 50 ml @ 100 mls/hr IV   





    Q24H [facility] Rx#:[id number]   


 


    D5-0.9%Ns 1,000 ml @ 60   1000





    mls/hr IV .[id number] [facility] Rx   





    #:[id number]   


 


    metroNIDAZOLE 500mg/ 200 100





    100mL 500 mg In 100 ml @   





    100 mls/hr IV Q8H [facility] Rx#   





    :[id number]   


 


  Oral 250  


 


Output:   


 


  Gastric Drainage 1600  


 


  Urine 600 750 


 


Other:   


 


  # Bowel Movements 0  


 


  Weight Source Bedscale Bedscale 











Active Medications: 


Current Medications





Acetaminophen/Hydrocodone Bitart (Norco 10 Mg/325 Mg)  1 tab PO Q6H PRN


   PRN Reason: Pain (Severe)


   Stop: 18 20:04


   Last Admin: 18 03:03 Dose:  1 tab


Albuterol/Ipratropium (Duoneb Neb)  3 ml HHN Q4HRT [facility]; Protocol


   Stop: 18 18:59


   Last Admin: 18 14:24 Dose:  3 ml


Albuterol/Ipratropium (Duoneb Neb)  3 ml HHN Q4HRT [facility]


   Stop: 18 14:59


Budesonide (Pulmicort)  0.5 mg HHN BIDRT [facility]


   Stop: 18 18:59


Chlorhexidine Gluconate (Peridex)  15 ml MM 0800,2000 [facility]


   Stop: 18 07:59


   Last Admin: 18 08:35 Dose:  15 ml


Cholestyramine Resin (Questran)  4 gm PO BID [facility]


   Stop: 18 16:59


   Last Admin: 18 09:04 Dose:  Not Given


Enoxaparin Sodium (Lovenox)  30 mg SUBQ Q12HR [facility]


   Stop: 18 20:59


   Last Admin: 18 09:05 Dose:  30 mg


Hydromorphone HCl (Dilaudid)  1 mg IVP Q3HR PRN


   PRN Reason: SEVERE PAIN


   Stop: 18 16:25


   Last Admin: 18 09:00 Dose:  1 mg


Metronidazole (Flagyl)  500 mg in 100 mls @ 100 mls/hr IV Q8H [facility]


   Stop: 18 20:59


   Last Infusion: 18 13:05 Dose:  Infused


Dextrose/Sodium Chloride (D5-0.9%Ns)  1,000 mls @ 60 mls/hr IV .[id number] [facility]


   Stop: 18 14:59


   Last Admin: 18 09:04 Dose:  60 mls/hr


Multivitamins/Minerals 10 ml/Dextrose/ Amino Acids/Electrolytes  1,200 mls @ 50 

mls/hr IV .Q24H [facility]


   Stop: 18 14:59


   Last Admin: 18 15:00 Dose:  50 mls/hr


Dextrose/Sodium Chloride (D5-0.9%Ns)  1,000 mls @ 50 mls/hr IV .Q20H [facility]


   Stop: 18 14:59


Multivitamins/Minerals 10 ml/Dextrose/ Amino Acids/Electrolytes  1,440 mls @ 60 

mls/hr IV .Q24H [facility]


   Stop: 18 14:59


Cefepime HCl 1 gm/ Dextrose  50 mls @ 100 mls/hr IV Q12HR [facility]


   Stop: 18 20:59


   Last Infusion: 18 09:45 Dose:  Infused


Propofol (Diprivan)  1,000 mg in 100 mls @ 0 mls/hr IV TITR [facility]; Protocol


   Stop: 18 09:59


   Last Admin: 18 10:00 Dose:  15 mcg/kg/min, 8.369 mls/hr


Insulin Aspart (Novolog Insulin Sliding Scale)  0 units SUBQ Q6H [facility]; Protocol


   Stop: 18 11:59


   Last Admin: 18 11:55 Dose:  4 units


Lorazepam (Ativan)  1 mg IVP Q4HR [facility]; Protocol


   PRN Reason: Anxiety


   Stop: 18 02:24


   Last Admin: 18 07:30 Dose:  1 mg


Methylprednisolone Sodium Succinate (Solu-Medrol)  60 mg IVP Q6H [facility]


   Stop: 18 04:59


   Last Admin: 18 10:21 Dose:  60 mg


Metoclopramide HCl (Reglan)  10 mg IVP Q6HR [facility]


   Stop: 18 17:59


   Last Admin: 18 11:17 Dose:  10 mg


Miscellaneous (Tpn Per Pharmacy)  1 ea  PRN [facility]


   Stop: 18 17:59


Miscellaneous (Vancomycin Iv Per Pharmacy)  1 ea  PRN PRN


   PRN Reason: PROTOCOL


   Stop: 18 14:14


Ondansetron HCl (Zofran)  4 mg IV Q4H PRN


   PRN Reason: Nausea / Vomiting


   Stop: 18 05:53


   Last Admin: 18 22:50 Dose:  4 mg


Pantoprazole Sodium (Protonix)  40 mg IVP BID [facility]


   Stop: 18 08:59


   Last Admin: 18 09:04 Dose:  40 mg


Zolpidem Tartrate (Ambien)  5 mg PO HS PRN


   PRN Reason: Insomnia


   Stop: 18 20:55


   Last Admin: 18 21:59 Dose:  5 mg








General: Alert, Cooperative, No acute distress


HEENT: Atraumatic


Neck: Supple


Cardiovascular: Regular rate, Normal S1, Normal S2


Lungs: Clear to auscultation


Abdomen: Distended





- Procedures


Procedures: 


 Procedures











Procedure Code Date


 


EXCISION OF CHEST SKIN, EXTERNAL APPROACH 5FT9IED 18


 


EXCISION OF ILEUM, OPEN APPROACH [id number] 18


 


EXCISION OF RIGHT LOWER ARM SKIN, EXTERNAL APPROACH [id number] 18


 


EXCISION OF RIGHT LOWER LEG SKIN, EXTERNAL APPROACH [id number] 18


 


RELEASE ILEUM, OPEN APPROACH [id number] 18


 


RESPIRATORY VENTILATION, LESS THAN 24 CONSECUTIVE HOURS [id number] 18














Nutritional Asmnt/Malnutr-PDOC





- Dietary Evaluation


Malnutrition Findings (Please click <Entered> for more info): 








Nutritional Asmnt/Malnutrition                             Start:  18 14:

24


Text:                                                      Status: Complete    

  


Freq:                                                                          

  


Protocol:                                                                      

  


 Document     18 14:24  LCHENG  (Rec: 18 14:35  LCHENG  [id number])


 Nutritional Asmnt/Malnutrition


     Patient General Information


      Nutritional Screening                      High Risk


                                                 Consult


      Diagnosis                                  exploratory laparotomy with


                                                 recersal colostomy


      Pertinent Medical Hx/Surgical Hx           acute perforation of


                                                 diverticulitis, s/p


                                                 exploratory laparotomy and


                                                 divertin gcolostomy placement


      Subjective Information                     Pt seen lying in bed at time


                                                 of visit, awake and alert. Pt


                                                 stated he tolerated clear


                                                 liquid well, adequte amount


                                                 for him at this time. Pt has


                                                 RAQUEL drain noted. Per EMR, PO


                                                 intake % of clear liquid


                                                 .


      Current Diet Order/ Nutrition Support      clear liquid


      Pertinent Medications                      D5-0.9%ns, piperacillin,


                                                 vancomycin


      Pertinent Labs                              Na 133, K 3.4, Cr 0.7,


                                                 glucose 107, Ca 7.9


     Nutritional Hx/Data


      Height                                     1.88 m


      Height (Calculated Centimeters)            188.0


      Current Weight (lbs)                       86.636 kg


      Weight (Calculated Kilograms)              86.6


      Weight (Calculated Grams)                  62349.1


      Ideal Body Weight                          190


      Body Mass Index (BMI)                      24.5


      Weight Status                              Approriate


     GI Symptoms


      GI Symptoms                                None


      Last BM                                    none


      Difficult in:                              None


      Skin Integrity/Comment:                    RIGHT LOWER LEG HEALING


                                                 incision to right arm


     Estimated Nutritional Goals


      BEE in Kcals:                              Using Current wt


      Calories/Kcals/Kg                          25-30


      Kcals Calculated                           6725-7616


      Protein:                                   Using Current wt


      Protein g/k.8-1


      Protein Calculated                         69-86


      Fluid: ml                                  2150-2580ml (1ml/kcal)


     Nutritional Problem


      1. Problem


       Problem                                   altered GI function


       Etiology                                  s/p reversal colostomy


       Signs/Symptoms:                           pt on clear liquid


     Intervention/Recommendation


      Comments                                   1. Continue with clear liquid


                                                 diet as ordered.


                                                 2. Monitor PO intake, wt, labs


                                                 and skin integrity


                                                 3. F/U as high risk in 2-3


                                                 days, -


     Expected Outcomes/Goals


      Expected Outcomes/Goals                    1. PO intake to meet at least


                                                 75% of nutritional needs.


                                                 2. Wt stability, skin to


                                                 remain intact, labs to


                                                 approach WNL.

## 2018-06-27 NOTE — GENERAL PROGRESS NOTE
Subjective





- Review of Systems


Service Date: 18


Subjective: 


pt intubated





Objective





- Results


Result Diagrams: 


 18 04:40





 18 04:40


Recent Labs: 


 Laboratory Last Values











WBC  15.7 Th/cmm (4.8-10.8)  H  18  04:40    


 


RBC  4.02 Mil/cmm (3.80-5.80)   18  04:40    


 


Hgb  11.9 gm/dL (12-16)  L  18  04:40    


 


Hct  35.8 % (41.0-60)  L  18  04:40    


 


MCV  89.1 fl (80-99)   18  04:40    


 


MCH  29.6 pg (27.0-31.0)   18  04:40    


 


MCHC Differential  33.3 pg (28.0-36.0)   18  04:40    


 


RDW  15.3 % (11.5-20.0)   18  04:40    


 


Plt Count  207 Th/cmm (150-400)   18  04:40    


 


MPV  8.8 fl  18  04:40    


 


Neutrophils %  75.1 % (40.0-80.0)   18  04:15    


 


Band Neutrophils %  10 % (0-10)   18  04:40    


 


Lymphocytes %  9.7 % (20.0-50.0)  L  18  04:15    


 


Monocytes %  14.9 % (2.0-10.0)  H  18  04:15    


 


Eosinophils %  0.1 % (0.0-5.0)   18  04:15    


 


Basophils %  0.2 % (0.0-2.0)   18  04:15    


 


Neutrophils (Manual)  83 % (40-80)  H  18  04:40    


 


Lymphocytes  5 % (20-50)  L  18  04:40    


 


Monocytes  2 % (2-10)   18  04:40    


 


Eosinophils  0 % (0-5)   18  18:24    


 


Basophils  0 % (0-3)   18  18:24    


 


Platelet Estimate  ADEQUATE  (NORMAL)   18  04:40    


 


Platelet Morphology  NORMAL  (NORMAL)   18  18:24    


 


Polychromasia  1+   18  04:40    


 


[place] Cells  1+   18  04:40    


 


RBC Morph Micro Appear  NORMAL  (NORMAL)   18  18:24    


 


PT  11.9 SECONDS (9.5-11.5)  H  18  12:08    


 


INR  1.13  (0.5-1.4)   18  12:08    


 


PTT (Actin FS)  22.3 SECONDS (26.0-38.0)  L  18  12:08    


 


Specimen Source  Arterial   18  09:45    


 


Sample Site  Right Radial   18  09:45    


 


pH  7.22  (7.35-7.45)  L*  18  09:45    


 


pCO2  62.0 mmHg (35.0-45.0)  H*  18  09:45    


 


pO2  86.0 mmHg (80.0-100.0)   18  09:45    


 


HCO3  22.3 mEq/L (20.0-26.0)   18  09:45    


 


Base Excess  -3.3 mEq/L (-3.0-3.0)  L  18  09:45    


 


O2 Saturation  94.0 % (92.0-100.0)   18  09:45    


 


Ceferino Test  YES   18  09:45    


 


Vent Rate  14   18  09:45    


 


Inspired O2  100   18  09:45    


 


Tidal Volume  450   18  09:45    


 


PEEP  5   18  09:45    


 


Pressure (ins/psv/peep)  NA   18  09:45    


 


Critical Value  E.ESPINOZA   18  09:45    


 


Sodium  139 mEq/L (136-145)   18  04:40    


 


Potassium  4.2 mEq/L (3.5-5.1)   18  04:40    


 


Chloride  111 mEq/L ()  H  18  04:40    


 


Carbon Dioxide  21.7 mEq/L (21.0-31.0)   18  04:40    


 


Anion Gap  10.5  (7.0-16.0)   18  04:40    


 


BUN  30 mg/dL (7-25)  H  18  04:40    


 


Creatinine  1.9 mg/dL (0.7-1.3)  H  18  04:40    


 


Est GFR ( Amer)  46.0 ml/min (>90)   18  04:40    


 


Est GFR (Non-Af Amer)  38.0 ml/min  18  04:40    


 


BUN/Creatinine Ratio  15.8   18  04:40    


 


Glucose  200 mg/dL ()  H  18  04:40    


 


POC Glucose  214 MG/DL (70 - 105)  H  18  11:20    


 


Calcium  7.9 mg/dL (8.6-10.3)  L  18  04:40    


 


Phosphorus  3.5 mg/dL (2.5-5.0)   18  04:40    


 


Magnesium  2.0 mg/dL (1.9-2.7)   18  04:40    


 


Total Bilirubin  0.4 mg/dL (0.3-1.0)   18  04:40    


 


AST  16 U/L (13-39)   18  04:40    


 


ALT  9 U/L (7-52)   18  04:40    


 


Alkaline Phosphatase  55 U/L ()   18  04:40    


 


Total Protein  5.8 gm/dL (6.0-8.3)  L  18  04:40    


 


Albumin  2.5 gm/dL (4.2-5.5)  L  18  04:40    


 


Globulin  3.3 gm/dL  18  04:40    


 


Albumin/Globulin Ratio  0.8  (1.0-1.8)  L  18  04:40    


 


Prealbumin  7 mg/dL (10-36)  L  18  04:15    


 


Triglycerides  87 mg/dL (<150)   18  04:15    


 


Cholesterol  66 mg/dL (<200)   18  04:15    


 


Urine Source  CLEAN C   18  17:35    


 


Urine Color  YELLOW   18  17:35    


 


Urine Clarity  HAZY  (CLEAR)   18  17:35    


 


Urine pH  5.0  (4.6 - 8.0)   18  17:35    


 


Ur Specific Gravity  1.020  (1.005-1.030)   18  17:35    


 


Urine Protein  30 mg/dL (NEGATIVE)  H  18  17:35    


 


Urine Glucose (UA)  NEGATIVE mg/dL (NEGATIVE)   18  17:35    


 


Urine Ketones  NEGATIVE mg/dL (NEGATIVE)   18  17:35    


 


Urine Blood  TRACE  (NEGATIVE)   18  17:35    


 


Urine Nitrate  NEGATIVE  (NEGATIVE)   18  17:35    


 


Urine Bilirubin  NEGATIVE  (NEGATIVE)   18  17:35    


 


Urine Urobilinogen  0.2 E.U./dL (0.2 - 1.0)   18  17:35    


 


Ur Leukocyte Esterase  TRACE  (NEGATIVE)  H  18  17:35    


 


Urine RBC  0-2 /hpf (0-5)  H  18  17:35    


 


Urine WBC  0-2 /hpf (0-5)   18  17:35    


 


Ur Epithelial Cells  OCCASIONAL /lpf (FEW)   18  17:35    


 


Amorphous Sediment  FEW URATES  (NONE SEEN)   18  17:35    


 


Urine Bacteria  FEW /hpf (NONE SEEN)   18  17:35    


 


Vancomycin Trough  16.8 ug/mL (5-10)  H  18  09:30    


 


Random Vancomycin  19.2 ug/mL (5.0-40.0)   18  04:50    














- Physical Exam


Vitals and I&O: 


 Vital Signs











Temp  96.2 F   18 12:00


 


Pulse  119   18 12:00


 


Resp  21   18 12:00


 


BP  119/79   18 12:00


 


Pulse Ox  98   18 12:00








 Intake & Output











 18





 18:59 06:59 18:59


 


Intake Total 


 


Output Total 2200 750 


 


Balance -1850 -500 1050


 


Weight (lbs) 93.157 kg 92.986 kg 


 


Intake:   


 


  Intake, IV Amount 


 


    Cefepime 1 gm In Dextrose   50





    5% 50 ml @ 100 mls/hr IV   





    Q12HR [facility] Rx#:[id number]   


 


    Cefepime 1 gm In Dextrose  50 





    5% 50 ml @ 100 mls/hr IV   





    Q24H [facility] Rx#:[id number]   


 


    D5-0.9%Ns 1,000 ml @ 60   1000





    mls/hr IV .[id number] [facility] Rx   





    #:[id number]   


 


    metroNIDAZOLE 500mg/ 200 





    100mL 500 mg In 100 ml @   





    100 mls/hr IV Q8H [facility] Rx#   





    :[id number]   


 


  Oral 250  


 


Output:   


 


  Gastric Drainage 1600  


 


  Urine 600 750 


 


Other:   


 


  # Bowel Movements 0  


 


  Weight Source Bedscale Bedscale 











Active Medications: 


Current Medications





Acetaminophen/Hydrocodone Bitart (Norco 10 Mg/325 Mg)  1 tab PO Q6H PRN


   PRN Reason: Pain (Severe)


   Stop: 18 20:04


   Last Admin: 18 03:03 Dose:  1 tab


Albuterol/Ipratropium (Duoneb Neb)  3 ml HHN Q4HRT [facility]; Protocol


   Stop: 18 18:59


   Last Admin: 18 10:40 Dose:  3 ml


Chlorhexidine Gluconate (Peridex)  15 ml MM 0800,2000 [facility]


   Stop: 18 07:59


   Last Admin: 18 08:35 Dose:  15 ml


Cholestyramine Resin (Questran)  4 gm PO BID [facility]


   Stop: 18 16:59


   Last Admin: 18 09:04 Dose:  Not Given


Enoxaparin Sodium (Lovenox)  30 mg SUBQ Q12HR [facility]


   Stop: 18 20:59


   Last Admin: 18 09:05 Dose:  30 mg


Hydromorphone HCl (Dilaudid)  1 mg IVP Q3HR PRN


   PRN Reason: SEVERE PAIN


   Stop: 18 16:25


   Last Admin: 18 09:00 Dose:  1 mg


Metronidazole (Flagyl)  500 mg in 100 mls @ 100 mls/hr IV Q8H [facility]


   Stop: 18 20:59


   Last Admin: 18 12:01 Dose:  100 mls/hr


Dextrose/Sodium Chloride (D5-0.9%Ns)  1,000 mls @ 60 mls/hr IV .[id number] [facility]


   Stop: 18 14:59


   Last Admin: 18 09:04 Dose:  60 mls/hr


Multivitamins/Minerals 10 ml/Dextrose/ Amino Acids/Electrolytes  1,200 mls @ 50 

mls/hr IV .Q24H [facility]


   Stop: 18 14:59


   Last Admin: 18 15:00 Dose:  50 mls/hr


Dextrose/Sodium Chloride (D5-0.9%Ns)  1,000 mls @ 50 mls/hr IV .Q20H [facility]


   Stop: 18 14:59


Multivitamins/Minerals 10 ml/Dextrose/ Amino Acids/Electrolytes  1,440 mls @ 60 

mls/hr IV .Q24H [facility]


   Stop: 18 14:59


Cefepime HCl 1 gm/ Dextrose  50 mls @ 100 mls/hr IV Q12HR [facility]


   Stop: 18 20:59


   Last Infusion: 18 09:45 Dose:  Infused


Propofol (Diprivan)  1,000 mg in 100 mls @ 0 mls/hr IV TITR [facility]; Protocol


   Stop: 18 09:59


   Last Admin: 18 10:00 Dose:  15 mcg/kg/min, 8.369 mls/hr


Insulin Aspart (Novolog Insulin Sliding Scale)  0 units SUBQ Q6H [facility]; Protocol


   Stop: 18 11:59


   Last Admin: 18 11:55 Dose:  4 units


Lorazepam (Ativan)  1 mg IVP Q4HR PRN; Protocol


   PRN Reason: Anxiety


   Stop: 18 02:24


   Last Admin: 18 07:30 Dose:  1 mg


Methylprednisolone Sodium Succinate (Solu-Medrol)  60 mg IVP Q6H [facility]


   Stop: 18 04:59


   Last Admin: 18 10:21 Dose:  60 mg


Metoclopramide HCl (Reglan)  10 mg IVP Q6HR [facility]


   Stop: 18 17:59


   Last Admin: 18 11:17 Dose:  10 mg


Miscellaneous (Tpn Per Pharmacy)  1 ea MC PRN [facility]


   Stop: 18 17:59


Ondansetron HCl (Zofran)  4 mg IV Q4H PRN


   PRN Reason: Nausea / Vomiting


   Stop: 18 05:53


   Last Admin: 18 22:50 Dose:  4 mg


Pantoprazole Sodium (Protonix)  40 mg IVP BID [facility]


   Stop: 18 08:59


   Last Admin: 18 09:04 Dose:  40 mg


Zolpidem Tartrate (Ambien)  5 mg PO HS PRN


   PRN Reason: Insomnia


   Stop: 18 20:55


   Last Admin: 18 21:59 Dose:  5 mg








General: Alert, Cooperative, No acute distress


HEENT: Atraumatic


Neck: Supple


Cardiovascular: Regular rate, Normal S1, Normal S2


Lungs: Clear to auscultation


Abdomen: Distended





- Procedures


Procedures: 


 Procedures











Procedure Code Date


 


EXCISION OF CHEST SKIN, EXTERNAL APPROACH 5PV3XNO 18


 


EXCISION OF ILEUM, OPEN APPROACH [id number] 18


 


EXCISION OF RIGHT LOWER ARM SKIN, EXTERNAL APPROACH [id number] 18


 


EXCISION OF RIGHT LOWER LEG SKIN, EXTERNAL APPROACH [id number] 18


 


RELEASE ILEUM, OPEN APPROACH [id number] 18














Assessment/Plan





- Assessment


Assessment: 


CT scan shows bowel obstruction


distended abdomen


arf


intubated


on fio2 = 100%





- Plan


Plan: 


continue IVF


 intubation


will monitor renal function





Nutritional Asmnt/Malnutr-PDOC





- Dietary Evaluation


Malnutrition Findings (Please click <Entered> for more info): 








Nutritional Asmnt/Malnutrition                             Start:  18 14:

24


Text:                                                      Status: Active      

  


Freq:                                                                          

  


Protocol:                                                                      

  


 Document     18 14:24  LCHENG  (Rec: 18 14:35  LCKINSEY  [id number])


 Nutritional Asmnt/Malnutrition


     Patient General Information


      Nutritional Screening                      High Risk


                                                 Consult


      Diagnosis                                  exploratory laparotomy with


                                                 recersal colostomy


      Pertinent Medical Hx/Surgical Hx           acute perforation of


                                                 diverticulitis, s/p


                                                 exploratory laparotomy and


                                                 divertin gcolostomy placement


      Subjective Information                     Pt seen lying in bed at time


                                                 of visit, awake and alert. Pt


                                                 stated he tolerated clear


                                                 liquid well, adequte amount


                                                 for him at this time. Pt has


                                                 RAQUEL drain noted. Per EMR, PO


                                                 intake % of clear liquid


                                                 .


      Current Diet Order/ Nutrition Support      clear liquid


      Pertinent Medications                      D5-0.9%ns, piperacillin,


                                                 vancomycin


      Pertinent Labs                              Na 133, K 3.4, Cr 0.7,


                                                 glucose 107, Ca 7.9


     Nutritional Hx/Data


      Height                                     1.88 m


      Height (Calculated Centimeters)            188.0


      Current Weight (lbs)                       86.636 kg


      Weight (Calculated Kilograms)              86.6


      Weight (Calculated Grams)                  74108.1


      Ideal Body Weight                          190


      Body Mass Index (BMI)                      24.5


      Weight Status                              Approriate


     GI Symptoms


      GI Symptoms                                None


      Last BM                                    none


      Difficult in:                              None


      Skin Integrity/Comment:                    RIGHT LOWER LEG HEALING


                                                 incision to right arm


     Estimated Nutritional Goals


      BEE in Kcals:                              Using Current wt


      Calories/Kcals/Kg                          25-30


      Kcals Calculated                           2939-6440


      Protein:                                   Using Current wt


      Protein g/k.8-1


      Protein Calculated                         69-86


      Fluid: ml                                  2150-2580ml (1ml/kcal)


     Nutritional Problem


      1. Problem


       Problem                                   altered GI function


       Etiology                                  s/p reversal colostomy


       Signs/Symptoms:                           pt on clear liquid


     Intervention/Recommendation


      Comments                                   1. Continue with clear liquid


                                                 diet as ordered.


                                                 2. Monitor PO intake, wt, labs


                                                 and skin integrity


                                                 3. F/U as high risk in 2-3


                                                 days, -


     Expected Outcomes/Goals


      Expected Outcomes/Goals                    1. PO intake to meet at least


                                                 75% of nutritional needs.


                                                 2. Wt stability, skin to


                                                 remain intact, labs to


                                                 approach WNL.

## 2018-06-27 NOTE — DIAGNOSTIC IMAGING REPORT
Portable chest x-ray



HISTORY: Increased shortness of breath



Compared to prior exam of 6/26/2016, there is increasing diffuse

bilateral pulmonary infiltrates.  An endotracheal tube has been

inserted.  The tip is approximately 2.5 cm above the bianka.



IMPRESSION:

1.  Increased diffuse bilateral pulmonary infiltrates

2.  Endotracheal tube placement as noted above

## 2018-06-27 NOTE — DIAGNOSTIC IMAGING REPORT
Portable chest x-ray



HISTORY: Pneumonia



Compared with prior exam taken earlier in the day, there has been an

increase in severe diffuse bilateral pulmonary infiltrates.



An endotracheal tube tip is approximate 3.5 cm above the bianka.



IMPRESSION:

1.  Worsening pulmonary status with increasing diffuse bilateral

infiltrates and pulmonary consolidation.

2.  Endotracheal tube placement as noted above

## 2018-06-27 NOTE — INTERNAL MEDICINE PROG NOTE
Internal Medicine Subjective





- Subjective


Service Date: 18


Patient seen and examined:: with staff (THE PATIENT WAS INTUBATED EARLY MORNING 

SECONDARY TO ACUTE RESPIRATORY FAILURE.)


Patient is:: in bed, other (ON VENTILATOR,NOT RESPONDING.)


Patient Complaints of:: SOB


Per staff patient has:: other (AFEBRIL,WAS SEEN BY PULMONOLOGIST.)





Internal Medicine Objective





- Results


Result Diagrams: 


 18 04:40





 18 04:40


Recent Labs: 


 Laboratory Last Values











WBC  15.7 Th/cmm (4.8-10.8)  H  18  04:40    


 


RBC  4.02 Mil/cmm (3.80-5.80)   18  04:40    


 


Hgb  11.9 gm/dL (12-16)  L  18  04:40    


 


Hct  35.8 % (41.0-60)  L  18  04:40    


 


MCV  89.1 fl (80-99)   18  04:40    


 


MCH  29.6 pg (27.0-31.0)   18  04:40    


 


MCHC Differential  33.3 pg (28.0-36.0)   18  04:40    


 


RDW  15.3 % (11.5-20.0)   18  04:40    


 


Plt Count  207 Th/cmm (150-400)   18  04:40    


 


MPV  8.8 fl  18  04:40    


 


Neutrophils %  75.1 % (40.0-80.0)   18  04:15    


 


Band Neutrophils %  10 % (0-10)   18  04:40    


 


Lymphocytes %  9.7 % (20.0-50.0)  L  18  04:15    


 


Monocytes %  14.9 % (2.0-10.0)  H  18  04:15    


 


Eosinophils %  0.1 % (0.0-5.0)   18  04:15    


 


Basophils %  0.2 % (0.0-2.0)   18  04:15    


 


Neutrophils (Manual)  83 % (40-80)  H  18  04:40    


 


Lymphocytes  5 % (20-50)  L  18  04:40    


 


Monocytes  2 % (2-10)   18  04:40    


 


Eosinophils  0 % (0-5)   18  18:24    


 


Basophils  0 % (0-3)   18  18:24    


 


Platelet Estimate  ADEQUATE  (NORMAL)   18  04:40    


 


Platelet Morphology  NORMAL  (NORMAL)   18  18:24    


 


Polychromasia  1+   18  04:40    


 


Anna Cells  1+   18  04:40    


 


RBC Morph Micro Appear  NORMAL  (NORMAL)   18  18:24    


 


PT  11.9 SECONDS (9.5-11.5)  H  18  12:08    


 


INR  1.13  (0.5-1.4)   18  12:08    


 


PTT (Actin FS)  22.3 SECONDS (26.0-38.0)  L  18  12:08    


 


Specimen Source  Arterial   18  09:45    


 


Sample Site  Right Radial   18  09:45    


 


pH  7.22  (7.35-7.45)  L*  18  09:45    


 


pCO2  62.0 mmHg (35.0-45.0)  H*  18  09:45    


 


pO2  86.0 mmHg (80.0-100.0)   18  09:45    


 


HCO3  22.3 mEq/L (20.0-26.0)   18  09:45    


 


Base Excess  -3.3 mEq/L (-3.0-3.0)  L  18  09:45    


 


O2 Saturation  94.0 % (92.0-100.0)   18  09:45    


 


Ceferino Test  YES   18  09:45    


 


Vent Rate  14   18  09:45    


 


Inspired O2  100   18  09:45    


 


Tidal Volume  450   18  09:45    


 


PEEP  5   18  09:45    


 


Pressure (ins/psv/peep)  NA   18  09:45    


 


Critical Value  E.ESPINOZA   18  09:45    


 


Sodium  139 mEq/L (136-145)   18  04:40    


 


Potassium  4.2 mEq/L (3.5-5.1)   18  04:40    


 


Chloride  111 mEq/L ()  H  18  04:40    


 


Carbon Dioxide  21.7 mEq/L (21.0-31.0)   18  04:40    


 


Anion Gap  10.5  (7.0-16.0)   18  04:40    


 


BUN  30 mg/dL (7-25)  H  18  04:40    


 


Creatinine  1.9 mg/dL (0.7-1.3)  H  18  04:40    


 


Est GFR ( Amer)  46.0 ml/min (>90)   18  04:40    


 


Est GFR (Non-Af Amer)  38.0 ml/min  18  04:40    


 


BUN/Creatinine Ratio  15.8   18  04:40    


 


Glucose  200 mg/dL ()  H  18  04:40    


 


POC Glucose  214 MG/DL (70 - 105)  H  18  11:20    


 


Calcium  7.9 mg/dL (8.6-10.3)  L  18  04:40    


 


Phosphorus  3.5 mg/dL (2.5-5.0)   18  04:40    


 


Magnesium  2.0 mg/dL (1.9-2.7)   18  04:40    


 


Total Bilirubin  0.4 mg/dL (0.3-1.0)   18  04:40    


 


AST  16 U/L (13-39)   18  04:40    


 


ALT  9 U/L (7-52)   18  04:40    


 


Alkaline Phosphatase  55 U/L ()   18  04:40    


 


Total Protein  5.8 gm/dL (6.0-8.3)  L  18  04:40    


 


Albumin  2.5 gm/dL (4.2-5.5)  L  18  04:40    


 


Globulin  3.3 gm/dL  18  04:40    


 


Albumin/Globulin Ratio  0.8  (1.0-1.8)  L  18  04:40    


 


Prealbumin  7 mg/dL (10-36)  L  18  04:15    


 


Triglycerides  87 mg/dL (<150)   18  04:15    


 


Cholesterol  66 mg/dL (<200)   18  04:15    


 


Urine Source  CLEAN C   18  17:35    


 


Urine Color  YELLOW   18  17:35    


 


Urine Clarity  HAZY  (CLEAR)   18  17:35    


 


Urine pH  5.0  (4.6 - 8.0)   18  17:35    


 


Ur Specific Gravity  1.020  (1.005-1.030)   18  17:35    


 


Urine Protein  30 mg/dL (NEGATIVE)  H  18  17:35    


 


Urine Glucose (UA)  NEGATIVE mg/dL (NEGATIVE)   18  17:35    


 


Urine Ketones  NEGATIVE mg/dL (NEGATIVE)   18  17:35    


 


Urine Blood  TRACE  (NEGATIVE)   18  17:35    


 


Urine Nitrate  NEGATIVE  (NEGATIVE)   18  17:35    


 


Urine Bilirubin  NEGATIVE  (NEGATIVE)   18  17:35    


 


Urine Urobilinogen  0.2 E.U./dL (0.2 - 1.0)   18  17:35    


 


Ur Leukocyte Esterase  TRACE  (NEGATIVE)  H  18  17:35    


 


Urine RBC  0-2 /hpf (0-5)  H  18  17:35    


 


Urine WBC  0-2 /hpf (0-5)   18  17:35    


 


Ur Epithelial Cells  OCCASIONAL /lpf (FEW)   18  17:35    


 


Amorphous Sediment  FEW URATES  (NONE SEEN)   18  17:35    


 


Urine Bacteria  FEW /hpf (NONE SEEN)   18  17:35    


 


Vancomycin Trough  16.8 ug/mL (5-10)  H  18  09:30    


 


Random Vancomycin  19.2 ug/mL (5.0-40.0)   18  04:50    














- Physical Exam


Vitals and I&O: 


 Vital Signs











Temp  97.2 F   18 18:00


 


Pulse  105   18 18:15


 


Resp  20   18 18:00


 


BP  105/72   18 18:15


 


Pulse Ox  98   18 18:00








 Intake & Output











 18





 18:59 06:59 18:59


 


Intake Total 


 


Output Total 2200 750 880


 


Balance -1850 -500 270


 


Weight (lbs) 93.157 kg 92.986 kg 94.432 kg


 


Intake:   


 


  Intake, IV Amount 


 


    Cefepime 1 gm In Dextrose   50





    5% 50 ml @ 100 mls/hr IV   





    Q12HR [facility] Rx#:[id number]   


 


    Cefepime 1 gm In Dextrose  50 





    5% 50 ml @ 100 mls/hr IV   





    Q24H [facility] Rx#:[id number]   


 


    D5-0.9%Ns 1,000 ml @ 60   1000





    mls/hr IV .[id number] [facility] Rx   





    #:[id number]   


 


    metroNIDAZOLE 500mg/ 200 100





    100mL 500 mg In 100 ml @   





    100 mls/hr IV Q8H [facility] Rx#   





    :[id number]   


 


  Oral 250  0


 


Output:   


 


  Gastric Drainage 1600  


 


  Urine 600 750 880


 


Other:   


 


  # Bowel Movements 0  0


 


  Weight Source Bedscale Bedscale Bedscale











Active Medications: 


Current Medications





Acetaminophen/Hydrocodone Bitart (Norco 10 Mg/325 Mg)  1 tab PO Q6H PRN


   PRN Reason: Pain (Severe)


   Stop: 18 20:04


   Last Admin: 18 03:03 Dose:  1 tab


Albuterol/Ipratropium (Duoneb Neb)  3 ml HHN Q4HRT [facility]


   Stop: 18 14:59


Budesonide (Pulmicort)  0.5 mg HHN BIDRT [facility]


   Stop: 18 18:59


Chlorhexidine Gluconate (Peridex)  15 ml MM 0800,2000 [facility]


   Stop: 18 07:59


   Last Admin: 18 08:35 Dose:  15 ml


Cholestyramine Resin (Questran)  4 gm PO BID [facility]


   Stop: 18 16:59


   Last Admin: 18 16:37 Dose:  Not Given


Enoxaparin Sodium (Lovenox)  30 mg SUBQ Q12HR [facility]


   Stop: 18 20:59


   Last Admin: 18 09:05 Dose:  30 mg


Hydromorphone HCl (Dilaudid)  1 mg IVP Q3HR PRN


   PRN Reason: SEVERE PAIN


   Stop: 18 16:25


   Last Admin: 18 09:00 Dose:  1 mg


Metronidazole (Flagyl)  500 mg in 100 mls @ 100 mls/hr IV Q8H [facility]


   Stop: 18 20:59


   Last Infusion: 18 13:05 Dose:  Infused


Dextrose/Sodium Chloride (D5-0.9%Ns)  1,000 mls @ 50 mls/hr IV .Q20H [facility]


   Stop: 18 14:59


   Last Admin: 18 15:09 Dose:  50 mls/hr


Multivitamins/Minerals 10 ml/Dextrose/ Amino Acids/Electrolytes  1,440 mls @ 60 

mls/hr IV .Q24H [facility]


   Stop: 18 14:59


   Last Admin: 18 14:56 Dose:  60 mls/hr


Cefepime HCl 1 gm/ Dextrose  50 mls @ 100 mls/hr IV Q12HR [facility]


   Stop: 18 20:59


   Last Infusion: 18 09:45 Dose:  Infused


Propofol (Diprivan)  1,000 mg in 100 mls @ 0 mls/hr IV TITR [facility]; Protocol


   Stop: 18 09:59


   Last Admin: 18 10:00 Dose:  15 mcg/kg/min, 8.369 mls/hr


Insulin Aspart (Novolog Insulin Sliding Scale)  0 units SUBQ Q6H [facility]; Protocol


   Stop: 18 11:59


   Last Admin: 18 17:00 Dose:  2 units


Lorazepam (Ativan)  1 mg IVP Q4HR PRN; Protocol


   PRN Reason: Anxiety


   Stop: 18 02:24


   Last Admin: 18 07:30 Dose:  1 mg


Methylprednisolone Sodium Succinate (Solu-Medrol)  60 mg IVP Q6H [facility]


   Stop: 18 04:59


   Last Admin: 18 16:37 Dose:  60 mg


Metoclopramide HCl (Reglan)  10 mg IVP Q6HR [facility]


   Stop: 18 17:59


   Last Admin: 18 17:00 Dose:  10 mg


Miscellaneous (Tpn Per Pharmacy)  1 ea  PRN KATHY


   Stop: 18 17:59


Miscellaneous (Vancomycin Iv Per Pharmacy)  1 ea  PRN PRN


   PRN Reason: PROTOCOL


   Stop: 18 14:14


Ondansetron HCl (Zofran)  4 mg IV Q4H PRN


   PRN Reason: Nausea / Vomiting


   Stop: 18 05:53


   Last Admin: 18 22:50 Dose:  4 mg


Pantoprazole Sodium (Protonix)  40 mg IVP BID KATHY


   Stop: 18 08:59


   Last Admin: 18 16:37 Dose:  40 mg


Zolpidem Tartrate (Ambien)  5 mg PO HS PRN


   PRN Reason: Insomnia


   Stop: 18 20:55


   Last Admin: 18 21:59 Dose:  5 mg








General: other (NOT FRESPONDING TO VERBAL ORDER.)


HEENT: NC/AT, PERRLA, EOMI, anicteric sclerae, throat clear


Neck: Supple, No JVD, No thyromegaly, +2 carotid pulse wo bruit, No LAD


Lungs: ronchi


Cardiovascular: RRR, Normal S1, Normal S2, without murmur, tachy


Abdomen: tender, distended


Extremities: clear


Neurological: unable to follow command





- Procedures


Procedures: 


 Procedures











Procedure Code Date


 


EXCISION OF CHEST SKIN, EXTERNAL APPROACH [id number] 18


 


EXCISION OF ILEUM, OPEN APPROACH [id number] 18


 


EXCISION OF RIGHT LOWER ARM SKIN, EXTERNAL APPROACH [id number] 18


 


EXCISION OF RIGHT LOWER LEG SKIN, EXTERNAL APPROACH [id number] 18


 


RELEASE ILEUM, OPEN APPROACH [id number] 18


 


RESPIRATORY VENTILATION, LESS THAN 24 CONSECUTIVE HOURS [id number] 18














Internal Medicine Assmt/Plan





- Assessment


Assessment: 





1.SP COLOSTOMY REVISION.


2.SP EXCISION BIOPSY FOR CHEST WALL LESION.


3.ACUTE BOWEL OBSTRUCTION


4.ACUTE RESPIRATORY FAILURE.


5.BILATERAL PNEUMONIA.





- Plan


Plan: 


CONTINUE ON CURRENT MEDICATION AND TPN.





Nutritional Asmnt/Malnutr-PDOC





- Dietary Evaluation


Malnutrition Findings (Please click <Entered> for more info): 








Nutritional Asmnt/Malnutrition                             Start:  18 14:

24


Text:                                                      Status: Complete    

  


Freq:                                                                          

  


Protocol:                                                                      

  


 Document     18 14:24  KAYE  (Rec: 18 14:35  KAYE [id number])


 Nutritional Asmnt/Malnutrition


     Patient General Information


      Nutritional Screening                      High Risk


                                                 Consult


      Diagnosis                                  exploratory laparotomy with


                                                 recersal colostomy


      Pertinent Medical Hx/Surgical Hx           acute perforation of


                                                 diverticulitis, s/p


                                                 exploratory laparotomy and


                                                 divertin gcolostomy placement


      Subjective Information                     Pt seen lying in bed at time


                                                 of visit, awake and alert. Pt


                                                 stated he tolerated clear


                                                 liquid well, adequte amount


                                                 for him at this time. Pt has


                                                 RAQUEL drain noted. Per EMR, PO


                                                 intake % of clear liquid


                                                 .


      Current Diet Order/ Nutrition Support      clear liquid


      Pertinent Medications                      D5-0.9%ns, piperacillin,


                                                 vancomycin


      Pertinent Labs                              Na 133, K 3.4, Cr 0.7,


                                                 glucose 107, Ca 7.9


     Nutritional Hx/Data


      Height                                     1.88 m


      Height (Calculated Centimeters)            188.0


      Current Weight (lbs)                       86.636 kg


      Weight (Calculated Kilograms)              86.6


      Weight (Calculated Grams)                  85224.1


      Ideal Body Weight                          190


      Body Mass Index (BMI)                      24.5


      Weight Status                              Approriate


     GI Symptoms


      GI Symptoms                                None


      Last BM                                    none


      Difficult in:                              None


      Skin Integrity/Comment:                    RIGHT LOWER LEG HEALING


                                                 incision to right arm


     Estimated Nutritional Goals


      BEE in Kcals:                              Using Current wt


      Calories/Kcals/Kg                          25-30


      Kcals Calculated                           2782-4909


      Protein:                                   Using Current wt


      Protein g/k.8-1


      Protein Calculated                         69-86


      Fluid: ml                                  2150-2580ml (1ml/kcal)


     Nutritional Problem


      1. Problem


       Problem                                   altered GI function


       Etiology                                  s/p reversal colostomy


       Signs/Symptoms:                           pt on clear liquid


     Intervention/Recommendation


      Comments                                   1. Continue with clear liquid


                                                 diet as ordered.


                                                 2. Monitor PO intake, wt, labs


                                                 and skin integrity


                                                 3. F/U as high risk in 2-3


                                                 days, -


     Expected Outcomes/Goals


      Expected Outcomes/Goals                    1. PO intake to meet at least


                                                 75% of nutritional needs.


                                                 2. Wt stability, skin to


                                                 remain intact, labs to


                                                 approach WNL.

## 2018-06-28 LAB
ALBUMIN SERPL-MCNC: 2.3 GM/DL (ref 4.2–5.5)
ALBUMIN/GLOB SERPL: 0.7 {RATIO} (ref 1–1.8)
ALP SERPL-CCNC: 55 U/L (ref 34–104)
ALT SERPL-CCNC: 8 U/L (ref 7–52)
ANION GAP SERPL CALC-SCNC: 9.9 MMOL/L (ref 7–16)
ARTERIAL PATENCY WRIST A: YES
AST SERPL-CCNC: 14 U/L (ref 13–39)
BASOPHILS # BLD AUTO: 0 TH/CUMM (ref 0–0.2)
BILIRUB DIRECT SERPL-MCNC: 0.05 MG/DL (ref 0–0.2)
BILIRUB SERPL-MCNC: 0.3 MG/DL (ref 0.3–1)
BUN SERPL-MCNC: 35 MG/DL (ref 7–25)
CALCIUM SERPL-MCNC: 8 MG/DL (ref 8.6–10.3)
CHLORIDE SERPL-SCNC: 114 MEQ/L (ref 98–107)
CO2 SERPL-SCNC: 20.8 MEQ/L (ref 21–31)
CREAT SERPL-MCNC: 1.8 MG/DL (ref 0.7–1.3)
EOSINOPHIL # BLD AUTO: 0 TH/CMM (ref 0.1–0.4)
ERYTHROCYTE [DISTWIDTH] IN BLOOD BY AUTOMATED COUNT: 15.6 % (ref 11.5–20)
GLOBULIN SER-MCNC: 3.2 GM/DL
GLUCOSE SERPL-MCNC: 191 MG/DL (ref 70–105)
HCT VFR BLD CALC: 33 % (ref 41–60)
HGB BLD-MCNC: 10.8 GM/DL (ref 12–16)
LYMPHOCYTE AB SER FC-ACNC: 0.4 TH/CMM (ref 1.5–3)
LYMPHOCYTES # BLD MANUAL: 4 % (ref 20–50)
MAGNESIUM SERPL-MCNC: 2.1 MG/DL (ref 1.9–2.7)
MCH RBC QN AUTO: 29.3 PG (ref 27–31)
MCHC RBC AUTO-ENTMCNC: 32.9 PG (ref 28–36)
MCV RBC AUTO: 89 FL (ref 80–99)
MONOCYTES # BLD AUTO: 0.5 TH/CMM (ref 0.3–1)
MONOCYTES # BLD MANUAL: 3 % (ref 2–10)
NEUTROPHILS # BLD: 9.7 TH/CMM (ref 1.8–8)
NEUTROPHILS NFR BLD AUTO: 92 % (ref 40–80)
NEUTS BAND NFR BLD: 1 % (ref 0–10)
PCO2 BLDA: 34 MMHG (ref 35–45)
PHOSPHATE SERPL-MCNC: 2.2 MG/DL (ref 2.5–5)
PLATELET # BLD EST: ADEQUATE 10*3/UL
PLATELET # BLD: 167 TH/CMM (ref 150–400)
PMV BLD AUTO: 8.7 FL
PO2 BLDA: 188 MMHG (ref 80–100)
POTASSIUM SERPL-SCNC: 3.7 MEQ/L (ref 3.5–5.1)
RBC # BLD AUTO: 3.7 MIL/CMM (ref 3.8–5.8)
SAO2 % BLDA: 100 % (ref 92–100)
SODIUM SERPL-SCNC: 141 MEQ/L (ref 136–145)
TOTAL CELLS COUNTED BLD: 100
WBC # BLD AUTO: 10.6 TH/CMM (ref 4.8–10.8)

## 2018-06-28 RX ADMIN — IPRATROPIUM BROMIDE AND ALBUTEROL SULFATE SCH ML: .5; 3 SOLUTION RESPIRATORY (INHALATION) at 03:07

## 2018-06-28 RX ADMIN — INSULIN ASPART SCH UNITS: 100 INJECTION, SOLUTION INTRAVENOUS; SUBCUTANEOUS at 12:22

## 2018-06-28 RX ADMIN — ENOXAPARIN SODIUM SCH MG: 100 INJECTION SUBCUTANEOUS at 12:44

## 2018-06-28 RX ADMIN — INSULIN ASPART SCH UNITS: 100 INJECTION, SOLUTION INTRAVENOUS; SUBCUTANEOUS at 05:27

## 2018-06-28 RX ADMIN — METRONIDAZOLE SCH MLS/HR: 500 INJECTION, SOLUTION INTRAVENOUS at 05:26

## 2018-06-28 RX ADMIN — INSULIN ASPART SCH UNITS: 100 INJECTION, SOLUTION INTRAVENOUS; SUBCUTANEOUS at 00:02

## 2018-06-28 RX ADMIN — CHLORHEXIDINE GLUCONATE SCH ML: 1.2 RINSE ORAL at 20:22

## 2018-06-28 RX ADMIN — BUDESONIDE SCH MG: 0.5 SUSPENSION RESPIRATORY (INHALATION) at 18:55

## 2018-06-28 RX ADMIN — METOCLOPRAMIDE SCH MG: 5 INJECTION, SOLUTION INTRAMUSCULAR; INTRAVENOUS at 17:36

## 2018-06-28 RX ADMIN — METHYLPREDNISOLONE SODIUM SUCCINATE SCH MG: 40 INJECTION, POWDER, FOR SOLUTION INTRAMUSCULAR; INTRAVENOUS at 00:01

## 2018-06-28 RX ADMIN — IPRATROPIUM BROMIDE AND ALBUTEROL SULFATE SCH ML: .5; 3 SOLUTION RESPIRATORY (INHALATION) at 22:51

## 2018-06-28 RX ADMIN — METHYLPREDNISOLONE SODIUM SUCCINATE SCH MG: 40 INJECTION, POWDER, FOR SOLUTION INTRAMUSCULAR; INTRAVENOUS at 11:00

## 2018-06-28 RX ADMIN — METOCLOPRAMIDE SCH MG: 5 INJECTION, SOLUTION INTRAMUSCULAR; INTRAVENOUS at 00:01

## 2018-06-28 RX ADMIN — IPRATROPIUM BROMIDE AND ALBUTEROL SULFATE SCH ML: .5; 3 SOLUTION RESPIRATORY (INHALATION) at 10:24

## 2018-06-28 RX ADMIN — BUDESONIDE SCH MG: 0.5 SUSPENSION RESPIRATORY (INHALATION) at 06:53

## 2018-06-28 RX ADMIN — IPRATROPIUM BROMIDE AND ALBUTEROL SULFATE SCH ML: .5; 3 SOLUTION RESPIRATORY (INHALATION) at 18:55

## 2018-06-28 RX ADMIN — INSULIN ASPART SCH UNITS: 100 INJECTION, SOLUTION INTRAVENOUS; SUBCUTANEOUS at 18:18

## 2018-06-28 RX ADMIN — IPRATROPIUM BROMIDE AND ALBUTEROL SULFATE SCH ML: .5; 3 SOLUTION RESPIRATORY (INHALATION) at 15:22

## 2018-06-28 RX ADMIN — ASCORBIC ACID, VITAMIN A PALMITATE, CHOLECALCIFEROL, THIAMINE HYDROCHLORIDE, RIBOFLAVIN-5 PHOSPHATE SODIUM, PYRIDOXINE HYDROCHLORIDE, NIACINAMIDE, DEXPANTHENOL, ALPHA-TOCOPHEROL ACETATE, VITAMIN K1, FOLIC ACID, BIOTIN, CYANOCOBALAMIN SCH MLS/HR: 200; 3300; 200; 6; 3.6; 6; 40; 15; 10; 150; 600; 60; 5 INJECTION, SOLUTION INTRAVENOUS at 18:17

## 2018-06-28 RX ADMIN — METOCLOPRAMIDE SCH MG: 5 INJECTION, SOLUTION INTRAMUSCULAR; INTRAVENOUS at 12:00

## 2018-06-28 RX ADMIN — METRONIDAZOLE SCH MLS/HR: 500 INJECTION, SOLUTION INTRAVENOUS at 16:00

## 2018-06-28 RX ADMIN — METHYLPREDNISOLONE SODIUM SUCCINATE SCH MG: 40 INJECTION, POWDER, FOR SOLUTION INTRAMUSCULAR; INTRAVENOUS at 05:25

## 2018-06-28 RX ADMIN — METOCLOPRAMIDE SCH MG: 5 INJECTION, SOLUTION INTRAMUSCULAR; INTRAVENOUS at 05:27

## 2018-06-28 RX ADMIN — METRONIDAZOLE SCH MLS/HR: 500 INJECTION, SOLUTION INTRAVENOUS at 21:28

## 2018-06-28 RX ADMIN — IPRATROPIUM BROMIDE AND ALBUTEROL SULFATE SCH ML: .5; 3 SOLUTION RESPIRATORY (INHALATION) at 06:37

## 2018-06-28 RX ADMIN — CHLORHEXIDINE GLUCONATE SCH ML: 1.2 RINSE ORAL at 10:41

## 2018-06-28 RX ADMIN — ENOXAPARIN SODIUM SCH MG: 100 INJECTION SUBCUTANEOUS at 20:22

## 2018-06-28 NOTE — GENERAL PROGRESS NOTE
Subjective





- Review of Systems


Service Date: 18


Subjective: 


pt intubated tachycardic





Objective





- Results


Result Diagrams: 


 18 05:50





 18 05:50


Recent Labs: 


 Laboratory Last Values











WBC  10.6 Th/cmm (4.8-10.8)   18  05:50    


 


RBC  3.70 Mil/cmm (3.80-5.80)  L  18  05:50    


 


Hgb  10.8 gm/dL (12-16)  L  18  05:50    


 


Hct  33.0 % (41.0-60)  L  18  05:50    


 


MCV  89.0 fl (80-99)   18  05:50    


 


MCH  29.3 pg (27.0-31.0)   18  05:50    


 


MCHC Differential  32.9 pg (28.0-36.0)   18  05:50    


 


RDW  15.6 % (11.5-20.0)   18  05:50    


 


Plt Count  167 Th/cmm (150-400)   18  05:50    


 


MPV  8.7 fl  18  05:50    


 


Neutrophils %  75.1 % (40.0-80.0)   18  04:15    


 


Band Neutrophils %  1 % (0-10)   18  05:50    


 


Lymphocytes %  9.7 % (20.0-50.0)  L  18  04:15    


 


Monocytes %  14.9 % (2.0-10.0)  H  18  04:15    


 


Eosinophils %  0.1 % (0.0-5.0)   18  04:15    


 


Basophils %  0.2 % (0.0-2.0)   18  04:15    


 


Neutrophils (Manual)  92 % (40-80)  H  18  05:50    


 


Lymphocytes  4 % (20-50)  L  18  05:50    


 


Monocytes  3 % (2-10)   18  05:50    


 


Eosinophils  0 % (0-5)   18  18:24    


 


Basophils  0 % (0-3)   18  18:24    


 


Platelet Estimate  ADEQUATE  (NORMAL)   18  05:50    


 


Platelet Morphology  NORMAL  (NORMAL)   18  18:24    


 


Polychromasia  1+   18  04:40    


 


Dobbins Cells  1+   18  04:40    


 


RBC Morph Micro Appear  NORMAL  (NORMAL)   18  18:24    


 


PT  11.9 SECONDS (9.5-11.5)  H  18  12:08    


 


INR  1.13  (0.5-1.4)   18  12:08    


 


PTT (Actin FS)  22.3 SECONDS (26.0-38.0)  L  18  12:08    


 


Specimen Source  Arterial   18  08:40    


 


Sample Site  Right Radial   18  08:40    


 


pH  7.43  (7.35-7.45)   18  08:40    


 


pCO2  34.0 mmHg (35.0-45.0)  L  18  08:40    


 


pO2  188.0 mmHg (80.0-100.0)  H  18  08:40    


 


HCO3  24.1 mEq/L (20.0-26.0)   18  08:40    


 


Base Excess  -1.2 mEq/L (-3.0-3.0)   18  08:40    


 


O2 Saturation  100.0 % (92.0-100.0)   18  08:40    


 


Ceferino Test  YES   18  08:40    


 


Vent Rate  14   18  08:40    


 


Inspired O2  100   18  08:40    


 


Tidal Volume  500   18  08:40    


 


PEEP  5   18  08:40    


 


Pressure (ins/psv/peep)  NA   18  08:40    


 


Critical Value  E.ESPINOZA   18  08:40    


 


Sodium  141 mEq/L (136-145)   18  05:50    


 


Potassium  3.7 mEq/L (3.5-5.1)   18  05:50    


 


Chloride  114 mEq/L ()  H  18  05:50    


 


Carbon Dioxide  20.8 mEq/L (21.0-31.0)  L  18  05:50    


 


Anion Gap  9.9  (7.0-16.0)   18  05:50    


 


BUN  35 mg/dL (7-25)  H  18  05:50    


 


Creatinine  1.8 mg/dL (0.7-1.3)  H  18  05:50    


 


Est GFR ( Amer)  48.9 ml/min (>90)   18  05:50    


 


Est GFR (Non-Af Amer)  40.4 ml/min  18  05:50    


 


BUN/Creatinine Ratio  19.4   18  05:50    


 


Glucose  191 mg/dL ()  H  18  05:50    


 


POC Glucose  180 MG/DL (70 - 105)  H  18  05:22    


 


Hemoglobin A1c %  4.8 % (4.0-6.0)   18  04:40    


 


Whole Bld Lactic Acid  1.36 mmol/L (0.60-1.99)   18  05:50    


 


Calcium  8.0 mg/dL (8.6-10.3)  L  18  05:50    


 


Phosphorus  2.2 mg/dL (2.5-5.0)  L  18  05:50    


 


Magnesium  2.1 mg/dL (1.9-2.7)   18  05:50    


 


Total Bilirubin  0.3 mg/dL (0.3-1.0)   18  05:50    


 


Direct Bilirubin  0.05 mg/dL (0.0-0.2)   18  05:50    


 


AST  14 U/L (13-39)   18  05:50    


 


ALT  8 U/L (7-52)   18  05:50    


 


Alkaline Phosphatase  55 U/L ()   18  05:50    


 


Ammonia  54 umol/L (16-53)  H  18  05:50    


 


Total Protein  5.5 gm/dL (6.0-8.3)  L  18  05:50    


 


Albumin  2.3 gm/dL (4.2-5.5)  L  18  05:50    


 


Globulin  3.2 gm/dL  18  05:50    


 


Albumin/Globulin Ratio  0.7  (1.0-1.8)  L  18  05:50    


 


Prealbumin  7 mg/dL (10-36)  L  18  04:15    


 


Triglycerides  87 mg/dL (<150)   18  04:15    


 


Cholesterol  66 mg/dL (<200)   18  04:15    


 


Urine Source  CLEAN C   18  17:35    


 


Urine Color  YELLOW   18  17:35    


 


Urine Clarity  HAZY  (CLEAR)   18  17:35    


 


Urine pH  5.0  (4.6 - 8.0)   18  17:35    


 


Ur Specific Gravity  1.020  (1.005-1.030)   18  17:35    


 


Urine Protein  30 mg/dL (NEGATIVE)  H  18  17:35    


 


Urine Glucose (UA)  NEGATIVE mg/dL (NEGATIVE)   18  17:35    


 


Urine Ketones  NEGATIVE mg/dL (NEGATIVE)   18  17:35    


 


Urine Blood  TRACE  (NEGATIVE)   18  17:35    


 


Urine Nitrate  NEGATIVE  (NEGATIVE)   18  17:35    


 


Urine Bilirubin  NEGATIVE  (NEGATIVE)   18  17:35    


 


Urine Urobilinogen  0.2 E.U./dL (0.2 - 1.0)   18  17:35    


 


Ur Leukocyte Esterase  TRACE  (NEGATIVE)  H  18  17:35    


 


Urine RBC  0-2 /hpf (0-5)  H  18  17:35    


 


Urine WBC  0-2 /hpf (0-5)   18  17:35    


 


Ur Epithelial Cells  OCCASIONAL /lpf (FEW)   18  17:35    


 


Amorphous Sediment  FEW URATES  (NONE SEEN)   18  17:35    


 


Urine Bacteria  FEW /hpf (NONE SEEN)   18  17:35    


 


Vancomycin Trough  16.8 ug/mL (5-10)  H  18  09:30    


 


Random Vancomycin  20.6 ug/mL (5.0-40.0)   18  05:50    














- Physical Exam


Vitals and I&O: 


 Vital Signs











Temp  96.7 F   18 06:00


 


Pulse  117   18 10:24


 


Resp  20   18 07:00


 


BP  100/58   18 07:00


 


Pulse Ox  99   18 10:24








 Intake & Output











 18





 18:59 06:59 18:59


 


Intake Total 1650 416.952 


 


Output Total 880 1000 


 


Balance 770 -583.048 


 


Weight (lbs) 94.432 kg 94.347 kg 


 


Intake:   


 


  Intake, IV Amount 1650 416.952 


 


    Cefepime 1 gm In Dextrose 50 50 





    5% 50 ml @ 100 mls/hr IV   





    Q12HR [facility] Rx#:[id number]   


 


    D5-0.9%Ns 1,000 ml @ 60 1000  





    mls/hr IV .[id number] [facility] Rx   





    #:[id number]   


 


    Propofol 1,000 mg In 100  166.952 





    ml @ Per Protocol IV TITR   





    [facility] Rx#:[id number]   


 


    Vancomycin HCl 1.5 gm In 500  





    Sodium Chloride 0.9% 500   





    ml @ 250 mls/hr IV ONCE   





    ONE Rx#:[id number]   


 


    metroNIDAZOLE 500mg/ 200 





    100mL 500 mg In 100 ml @   





    100 mls/hr IV Q8H [facility] Rx#   





    :[id number]   


 


  Oral 0  


 


Output:   


 


  Gastric Drainage  0 


 


  Urine 880 1000 


 


Other:   


 


  # Bowel Movements 0 1 


 


  Weight Source Bedscale Bedscale 











Active Medications: 


Current Medications





Acetaminophen/Hydrocodone Bitart (Norco 10 Mg/325 Mg)  1 tab PO Q6H PRN


   PRN Reason: Pain (Severe)


   Stop: 18 20:04


   Last Admin: 18 03:03 Dose:  1 tab


Albuterol/Ipratropium (Duoneb Neb)  3 ml HHN Q4HRT [facility]


   Stop: 18 14:59


   Last Admin: 18 10:24 Dose:  3 ml


Budesonide (Pulmicort)  0.5 mg HHN BIDRT [facility]


   Stop: 18 18:59


   Last Admin: 18 19:34 Dose:  0.5 mg


Chlorhexidine Gluconate (Peridex)  15 ml MM 08, [facility]


   Stop: 18 07:59


   Last Admin: 18 10:41 Dose:  15 ml


Cholestyramine Resin (Questran)  4 gm PO BID [facility]


   Stop: 18 16:59


   Last Admin: 18 16:37 Dose:  Not Given


Enoxaparin Sodium (Lovenox)  30 mg SUBQ Q12HR [facility]


   Stop: 18 20:59


   Last Admin: 18 20:47 Dose:  30 mg


Hydromorphone HCl (Dilaudid)  1 mg IVP Q3HR PRN


   PRN Reason: SEVERE PAIN


   Stop: 18 16:25


   Last Admin: 18 09:00 Dose:  1 mg


Metronidazole (Flagyl)  500 mg in 100 mls @ 100 mls/hr IV Q8H [facility]


   Stop: 18 20:59


   Last Infusion: 18 06:30 Dose:  Infused


Dextrose/Sodium Chloride (D5-0.9%Ns)  1,000 mls @ 50 mls/hr IV .Q20H [facility]


   Stop: 18 14:59


   Last Admin: 18 15:09 Dose:  50 mls/hr


Multivitamins/Minerals 10 ml/Dextrose/ Amino Acids/Electrolytes  1,440 mls @ 60 

mls/hr IV .Q24H [facility]


   Stop: 18 14:59


   Last Admin: 18 14:56 Dose:  60 mls/hr


Cefepime HCl 1 gm/ Dextrose  50 mls @ 100 mls/hr IV Q12HR [facility]


   Stop: 18 20:59


   Last Admin: 18 10:25 Dose:  100 mls/hr


Propofol (Diprivan)  1,000 mg in 100 mls @ 0 mls/hr IV TITR [facility]; Protocol


   Stop: 18 09:59


   Last Titration: 18 06:05 Dose:  17 mcg/kg/min, 9.485 mls/hr


Vancomycin HCl 1.5 gm/ Sodium (Chloride)  500 mls @ 250 mls/hr IV ONCE ONE


   Stop: 18 11:59


Potassium Phosphate 20 mmole/ (Sodium Chloride)  256.6667 mls @ 42 mls/hr IV 

ONCE ONE


   Stop: 18 15:36


   Last Admin: 18 10:27 Dose:  42 mls/hr


Dextrose/Sodium Chloride (D5-0.9%Ns)  1,000 mls @ 30 mls/hr IV .Q24H [facility]


   Stop: 18 14:59


Insulin Aspart (Novolog Insulin Sliding Scale)  0 units SUBQ Q6H [facility]; Protocol


   Stop: 18 11:59


   Last Admin: 18 05:27 Dose:  2 units


Lorazepam (Ativan)  1 mg IVP Q4HR PRN; Protocol


   PRN Reason: Anxiety


   Stop: 18 02:24


   Last Admin: 18 00:07 Dose:  1 mg


Methylprednisolone Sodium Succinate (Solu-Medrol)  60 mg IVP Q6H [facility]


   Stop: 18 04:59


   Last Admin: 18 05:25 Dose:  60 mg


Metoclopramide HCl (Reglan)  10 mg IVP Q6HR [facility]


   Stop: 18 17:59


   Last Admin: 18 05:27 Dose:  10 mg


Miscellaneous (Tpn Per Pharmacy)  1 [facility] PRN [facility]


   Stop: 18 17:59


Miscellaneous (Vancomycin Iv Per Pharmacy)  1 [facility] PRN PRN


   PRN Reason: PROTOCOL


   Stop: 18 14:14


Ondansetron HCl (Zofran)  4 mg IV Q4H PRN


   PRN Reason: Nausea / Vomiting


   Stop: 18 05:53


   Last Admin: 18 22:50 Dose:  4 mg


Pantoprazole Sodium (Protonix)  40 mg IVP BID [facility]


   Stop: 18 08:59


   Last Admin: 18 16:37 Dose:  40 mg


Zolpidem Tartrate (Ambien)  5 mg PO HS PRN


   PRN Reason: Insomnia


   Stop: 18 20:55


   Last Admin: 18 21:59 Dose:  5 mg








General: Alert, Cooperative, No acute distress


HEENT: Atraumatic


Neck: Supple


Cardiovascular: Regular rate, Normal S1, Normal S2


Lungs: Clear to auscultation


Abdomen: Distended





- Procedures


Procedures: 


 Procedures











Procedure Code Date


 


EXCISION OF CHEST SKIN, EXTERNAL APPROACH [id number] 18


 


EXCISION OF ILEUM, OPEN APPROACH [id number] 18


 


EXCISION OF RIGHT LOWER ARM SKIN, EXTERNAL APPROACH [id number] 18


 


EXCISION OF RIGHT LOWER LEG SKIN, EXTERNAL APPROACH [id number] 18


 


RELEASE ILEUM, OPEN APPROACH [id number] 18


 


RESPIRATORY VENTILATION, LESS THAN 24 CONSECUTIVE HOURS [id number] 18














Assessment/Plan





- Assessment


Assessment: 


CT scan shows bowel obstruction


distended abdomen


arf


intubated








- Plan


Plan: 


continue IVF


 intubation


will monitor renal function





Nutritional Asmnt/Malnutr-PDOC





- Dietary Evaluation


Malnutrition Findings (Please click <Entered> for more info): 








Nutritional Asmnt/Malnutrition                             Start:  18 14:

24


Text:                                                      Status: Complete    

  


Freq:                                                                          

  


Protocol:                                                                      

  


 Document     18 14:24  DOMINIKKINSEY  (Rec: 18 14:35  DOMINIKKINSEY [id number])


 Nutritional Asmnt/Malnutrition


     Patient General Information


      Nutritional Screening                      High Risk


                                                 Consult


      Diagnosis                                  exploratory laparotomy with


                                                 recersal colostomy


      Pertinent Medical Hx/Surgical Hx           acute perforation of


                                                 diverticulitis, s/p


                                                 exploratory laparotomy and


                                                 divertin gcolostomy placement


      Subjective Information                     Pt seen lying in bed at time


                                                 of visit, awake and alert. Pt


                                                 stated he tolerated clear


                                                 liquid well, adequte amount


                                                 for him at this time. Pt has


                                                 RAQUEL drain noted. Per EMR, PO


                                                 intake % of clear liquid


                                                 .


      Current Diet Order/ Nutrition Support      clear liquid


      Pertinent Medications                      D5-0.9%ns, piperacillin,


                                                 vancomycin


      Pertinent Labs                              Na 133, K 3.4, Cr 0.7,


                                                 glucose 107, Ca 7.9


     Nutritional Hx/Data


      Height                                     1.88 m


      Height (Calculated Centimeters)            188.0


      Current Weight (lbs)                       86.636 kg


      Weight (Calculated Kilograms)              86.6


      Weight (Calculated Grams)                  43470.1


      Ideal Body Weight                          190


      Body Mass Index (BMI)                      24.5


      Weight Status                              Approriate


     GI Symptoms


      GI Symptoms                                None


      Last BM                                    none


      Difficult in:                              None


      Skin Integrity/Comment:                    RIGHT LOWER LEG HEALING


                                                 incision to right arm


     Estimated Nutritional Goals


      BEE in Kcals:                              Using Current wt


      Calories/Kcals/Kg                          25-30


      Kcals Calculated                           7240-5672


      Protein:                                   Using Current wt


      Protein g/k.8-1


      Protein Calculated                         69-86


      Fluid: ml                                  2150-2580ml (1ml/kcal)


     Nutritional Problem


      1. Problem


       Problem                                   altered GI function


       Etiology                                  s/p reversal colostomy


       Signs/Symptoms:                           pt on clear liquid


     Intervention/Recommendation


      Comments                                   1. Continue with clear liquid


                                                 diet as ordered.


                                                 2. Monitor PO intake, wt, labs


                                                 and skin integrity


                                                 3. F/U as high risk in 2-3


                                                 days, -


     Expected Outcomes/Goals


      Expected Outcomes/Goals                    1. PO intake to meet at least


                                                 75% of nutritional needs.


                                                 2. Wt stability, skin to


                                                 remain intact, labs to


                                                 approach WNL.

## 2018-06-28 NOTE — DIAGNOSTIC IMAGING REPORT
CHEST X-RAY: AP view



INDICATION: Pneumonia



COMPARISON: Chest x-ray 6/27/2018



FINDINGS: Support devices are stable.  Extensive bilateral infiltrates

are seen with improvement in right upper lobe aeration and decreasing

right upper lobe infiltrates.  Mild cardiomegaly is noted.  No

significant effusion.



IMPRESSION:



Extensive bilateral infiltrates with overall improvement in right upper

lobe infiltrates and right upper lobe consolidative changes.  There is

also improved aeration of the right upper lobe.

## 2018-06-28 NOTE — GENERAL PROGRESS NOTE
Subjective





- Review of Systems


Service Date: 18


Events since last encounter: 





labs ok


chest xray improving, FI 02 stioll 100%


minimal NGT drainage





Objective





- Results


Result Diagrams: 


 18 05:50





 18 05:50


Recent Labs: 


 Laboratory Last Values











WBC  10.6 Th/cmm (4.8-10.8)   18  05:50    


 


RBC  3.70 Mil/cmm (3.80-5.80)  L  18  05:50    


 


Hgb  10.8 gm/dL (12-16)  L  18  05:50    


 


Hct  33.0 % (41.0-60)  L  18  05:50    


 


MCV  89.0 fl (80-99)   18  05:50    


 


MCH  29.3 pg (27.0-31.0)   18  05:50    


 


MCHC Differential  32.9 pg (28.0-36.0)   18  05:50    


 


RDW  15.6 % (11.5-20.0)   18  05:50    


 


Plt Count  167 Th/cmm (150-400)   18  05:50    


 


MPV  8.7 fl  18  05:50    


 


Neutrophils %  75.1 % (40.0-80.0)   18  04:15    


 


Band Neutrophils %  1 % (0-10)   18  05:50    


 


Lymphocytes %  9.7 % (20.0-50.0)  L  18  04:15    


 


Monocytes %  14.9 % (2.0-10.0)  H  18  04:15    


 


Eosinophils %  0.1 % (0.0-5.0)   18  04:15    


 


Basophils %  0.2 % (0.0-2.0)   18  04:15    


 


Neutrophils (Manual)  92 % (40-80)  H  18  05:50    


 


Lymphocytes  4 % (20-50)  L  18  05:50    


 


Monocytes  3 % (2-10)   18  05:50    


 


Eosinophils  0 % (0-5)   18  18:24    


 


Basophils  0 % (0-3)   18  18:24    


 


Platelet Estimate  ADEQUATE  (NORMAL)   18  05:50    


 


Platelet Morphology  NORMAL  (NORMAL)   18  18:24    


 


Polychromasia  1+   18  04:40    


 


[place] Cells  1+   18  04:40    


 


RBC Morph Micro Appear  NORMAL  (NORMAL)   18  18:24    


 


PT  11.9 SECONDS (9.5-11.5)  H  18  12:08    


 


INR  1.13  (0.5-1.4)   18  12:08    


 


PTT (Actin FS)  22.3 SECONDS (26.0-38.0)  L  18  12:08    


 


Specimen Source  Arterial   18  09:45    


 


Sample Site  Right Radial   18  09:45    


 


pH  7.22  (7.35-7.45)  L*  18  09:45    


 


pCO2  62.0 mmHg (35.0-45.0)  H*  18  09:45    


 


pO2  86.0 mmHg (80.0-100.0)   18  09:45    


 


HCO3  22.3 mEq/L (20.0-26.0)   18  09:45    


 


Base Excess  -3.3 mEq/L (-3.0-3.0)  L  18  09:45    


 


O2 Saturation  94.0 % (92.0-100.0)   18  09:45    


 


Ceferino Test  YES   18  09:45    


 


Vent Rate  14   18  09:45    


 


Inspired O2  100   18  09:45    


 


Tidal Volume  450   18  09:45    


 


PEEP  5   18  09:45    


 


Pressure (ins/psv/peep)  NA   18  09:45    


 


Critical Value  E.ESPINOZA   18  09:45    


 


Sodium  141 mEq/L (136-145)   18  05:50    


 


Potassium  3.7 mEq/L (3.5-5.1)   18  05:50    


 


Chloride  114 mEq/L ()  H  18  05:50    


 


Carbon Dioxide  20.8 mEq/L (21.0-31.0)  L  18  05:50    


 


Anion Gap  9.9  (7.0-16.0)   18  05:50    


 


BUN  35 mg/dL (7-25)  H  18  05:50    


 


Creatinine  1.8 mg/dL (0.7-1.3)  H  18  05:50    


 


Est GFR ( Amer)  48.9 ml/min (>90)   18  05:50    


 


Est GFR (Non-Af Amer)  40.4 ml/min  18  05:50    


 


BUN/Creatinine Ratio  19.4   18  05:50    


 


Glucose  191 mg/dL ()  H  18  05:50    


 


POC Glucose  180 MG/DL (70 - 105)  H  18  05:22    


 


Hemoglobin A1c %  4.8 % (4.0-6.0)   18  04:40    


 


Whole Bld Lactic Acid  1.36 mmol/L (0.60-1.99)   18  05:50    


 


Calcium  8.0 mg/dL (8.6-10.3)  L  18  05:50    


 


Phosphorus  2.2 mg/dL (2.5-5.0)  L  18  05:50    


 


Magnesium  2.1 mg/dL (1.9-2.7)   18  05:50    


 


Total Bilirubin  0.3 mg/dL (0.3-1.0)   18  05:50    


 


Direct Bilirubin  0.05 mg/dL (0.0-0.2)   18  05:50    


 


AST  14 U/L (13-39)   18  05:50    


 


ALT  8 U/L (7-52)   18  05:50    


 


Alkaline Phosphatase  55 U/L ()   18  05:50    


 


Ammonia  54 umol/L (16-53)  H  18  05:50    


 


Total Protein  5.5 gm/dL (6.0-8.3)  L  18  05:50    


 


Albumin  2.3 gm/dL (4.2-5.5)  L  18  05:50    


 


Globulin  3.2 gm/dL  18  05:50    


 


Albumin/Globulin Ratio  0.7  (1.0-1.8)  L  18  05:50    


 


Prealbumin  7 mg/dL (10-36)  L  18  04:15    


 


Triglycerides  87 mg/dL (<150)   18  04:15    


 


Cholesterol  66 mg/dL (<200)   18  04:15    


 


Urine Source  CLEAN C   18  17:35    


 


Urine Color  YELLOW   18  17:35    


 


Urine Clarity  HAZY  (CLEAR)   18  17:35    


 


Urine pH  5.0  (4.6 - 8.0)   18  17:35    


 


Ur Specific Gravity  1.020  (1.005-1.030)   18  17:35    


 


Urine Protein  30 mg/dL (NEGATIVE)  H  18  17:35    


 


Urine Glucose (UA)  NEGATIVE mg/dL (NEGATIVE)   18  17:35    


 


Urine Ketones  NEGATIVE mg/dL (NEGATIVE)   18  17:35    


 


Urine Blood  TRACE  (NEGATIVE)   18  17:35    


 


Urine Nitrate  NEGATIVE  (NEGATIVE)   18  17:35    


 


Urine Bilirubin  NEGATIVE  (NEGATIVE)   18  17:35    


 


Urine Urobilinogen  0.2 E.U./dL (0.2 - 1.0)   18  17:35    


 


Ur Leukocyte Esterase  TRACE  (NEGATIVE)  H  18  17:35    


 


Urine RBC  0-2 /hpf (0-5)  H  18  17:35    


 


Urine WBC  0-2 /hpf (0-5)   18  17:35    


 


Ur Epithelial Cells  OCCASIONAL /lpf (FEW)   18  17:35    


 


Amorphous Sediment  FEW URATES  (NONE SEEN)   18  17:35    


 


Urine Bacteria  FEW /hpf (NONE SEEN)   18  17:35    


 


Vancomycin Trough  16.8 ug/mL (5-10)  H  18  09:30    


 


Random Vancomycin  20.6 ug/mL (5.0-40.0)   18  05:50    














- Physical Exam


Vitals and I&O: 


 Vital Signs











Temp  96.7 F   18 06:00


 


Pulse  110   18 07:00


 


Resp  20   18 07:00


 


BP  100/58   18 07:00


 


Pulse Ox  100   18 07:00








 Intake & Output











 18





 18:59 06:59 18:59


 


Intake Total 1650 416.952 


 


Output Total 880 1000 


 


Balance 770 -583.048 


 


Weight (lbs) 94.432 kg 94.347 kg 


 


Intake:   


 


  Intake, IV Amount 1650 416.952 


 


    Cefepime 1 gm In Dextrose 50 50 





    5% 50 ml @ 100 mls/hr IV   





    Q12HR [facility] Rx#:[id number]   


 


    D5-0.9%Ns 1,000 ml @ 60 1000  





    mls/hr IV .[id number] [facility] Rx   





    #:[id number]   


 


    Propofol 1,000 mg In 100  166.952 





    ml @ Per Protocol IV TITR   





    [facility] Rx#:[id number]   


 


    Vancomycin HCl 1.5 gm In 500  





    Sodium Chloride 0.9% 500   





    ml @ 250 mls/hr IV ONCE   





    [facility] Rx#:[id number]   


 


    metroNIDAZOLE 500mg/ 200 





    100mL 500 mg In 100 ml @   





    100 mls/hr IV Q8H [facility] Rx#   





    :[id number]   


 


  Oral 0  


 


Output:   


 


  Gastric Drainage  0 


 


  Urine 880 1000 


 


Other:   


 


  # Bowel Movements 0 1 


 


  Weight Source Bedscale Bedscale 











Active Medications: 


Current Medications





Acetaminophen/Hydrocodone Bitart (Norco 10 Mg/325 Mg)  1 tab PO Q6H PRN


   PRN Reason: Pain (Severe)


   Stop: 18 20:04


   Last Admin: 18 03:03 Dose:  1 tab


Albuterol/Ipratropium (Duoneb Neb)  3 ml HHN Q4HRT [facility]


   Stop: 18 14:59


   Last Admin: 18 06:37 Dose:  3 ml


Budesonide (Pulmicort)  0.5 mg HHN BIDRT [facility]


   Stop: 18 18:59


   Last Admin: 18 19:34 Dose:  0.5 mg


Chlorhexidine Gluconate (Peridex)  15 ml MM 0800,2000 [facility]


   Stop: 18 07:59


   Last Admin: 18 20:45 Dose:  15 ml


Cholestyramine Resin (Questran)  4 gm PO BID [facility]


   Stop: 18 16:59


   Last Admin: 18 16:37 Dose:  Not Given


Enoxaparin Sodium (Lovenox)  30 mg SUBQ Q12HR [facility]


   Stop: 18 20:59


   Last Admin: 18 20:47 Dose:  30 mg


Hydromorphone HCl (Dilaudid)  1 mg IVP Q3HR PRN


   PRN Reason: SEVERE PAIN


   Stop: 18 16:25


   Last Admin: 18 09:00 Dose:  1 mg


Metronidazole (Flagyl)  500 mg in 100 mls @ 100 mls/hr IV Q8H [facility]


   Stop: 18 20:59


   Last Infusion: 18 06:30 Dose:  Infused


Dextrose/Sodium Chloride (D5-0.9%Ns)  1,000 mls @ 50 mls/hr IV .Q20H [facility]


   Stop: 18 14:59


   Last Admin: 18 15:09 Dose:  50 mls/hr


Multivitamins/Minerals 10 ml/Dextrose/ Amino Acids/Electrolytes  1,440 mls @ 60 

mls/hr IV .Q24H [facility]


   Stop: 18 14:59


   Last Admin: 18 14:56 Dose:  60 mls/hr


Cefepime HCl 1 gm/ Dextrose  50 mls @ 100 mls/hr IV Q12HR [facility]


   Stop: 18 20:59


   Last Infusion: 18 21:20 Dose:  Infused


Propofol (Diprivan)  1,000 mg in 100 mls @ 0 mls/hr IV TITR [facility]; Protocol


   Stop: 18 09:59


   Last Titration: 18 06:05 Dose:  17 mcg/kg/min, 9.485 mls/hr


Vancomycin HCl 1.5 gm/ Sodium (Chloride)  500 mls @ 250 mls/hr IV ONCE ONE


   Stop: 18 11:59


Potassium Phosphate 20 mmole/ (Sodium Chloride)  256.6667 mls @ 42 mls/hr IV 

ONCE ONE


   Stop: 18 15:36


Insulin Aspart (Novolog Insulin Sliding Scale)  0 units SUBQ Q6H [facility]; Protocol


   Stop: 18 11:59


   Last Admin: 18 05:27 Dose:  2 units


Lorazepam (Ativan)  1 mg IVP Q4HR PRN; Protocol


   PRN Reason: Anxiety


   Stop: 18 02:24


   Last Admin: 18 00:07 Dose:  1 mg


Methylprednisolone Sodium Succinate (Solu-Medrol)  60 mg IVP Q6H [facility]


   Stop: 18 04:59


   Last Admin: 18 05:25 Dose:  60 mg


Metoclopramide HCl (Reglan)  10 mg IVP Q6HR [facility]


   Stop: 18 17:59


   Last Admin: 18 05:27 Dose:  10 mg


Miscellaneous (Tpn Per Pharmacy)  1 ea  PRN [facility]


   Stop: 18 17:59


Miscellaneous (Vancomycin Iv Per Pharmacy)  1 ea  PRN PRN


   PRN Reason: PROTOCOL


   Stop: 18 14:14


Ondansetron HCl (Zofran)  4 mg IV Q4H PRN


   PRN Reason: Nausea / Vomiting


   Stop: 18 05:53


   Last Admin: 18 22:50 Dose:  4 mg


Pantoprazole Sodium (Protonix)  40 mg IVP BID [facility]


   Stop: 18 08:59


   Last Admin: 18 16:37 Dose:  40 mg


Zolpidem Tartrate (Ambien)  5 mg PO HS PRN


   PRN Reason: Insomnia


   Stop: 18 20:55


   Last Admin: 18 21:59 Dose:  5 mg








General: Alert, Cooperative, No acute distress


HEENT: Atraumatic


Neck: Supple


Cardiovascular: Regular rate, Normal S1, Normal S2


Lungs: Clear to auscultation


Abdomen: Distended





- Procedures


Procedures: 


 Procedures











Procedure Code Date


 


EXCISION OF CHEST SKIN, EXTERNAL APPROACH 8VS4JRU 18


 


EXCISION OF ILEUM, OPEN APPROACH [id number] 18


 


EXCISION OF RIGHT LOWER ARM SKIN, EXTERNAL APPROACH [id number] 18


 


EXCISION OF RIGHT LOWER LEG SKIN, EXTERNAL APPROACH [id number] 18


 


RELEASE ILEUM, OPEN APPROACH [id number] 18


 


RESPIRATORY VENTILATION, LESS THAN 24 CONSECUTIVE HOURS [id number] 18














Nutritional Asmnt/Malnutr-PDOC





- Dietary Evaluation


Malnutrition Findings (Please click <Entered> for more info): 








Nutritional Asmnt/Malnutrition                             Start:  18 14:

24


Text:                                                      Status: Complete    

  


Freq:                                                                          

  


Protocol:                                                                      

  


 Document     18 14:24  KAYE  (Rec: 18 14:35  KAYE  [id number])


 Nutritional Asmnt/Malnutrition


     Patient General Information


      Nutritional Screening                      High Risk


                                                 Consult


      Diagnosis                                  exploratory laparotomy with


                                                 recersal colostomy


      Pertinent Medical Hx/Surgical Hx           acute perforation of


                                                 diverticulitis, s/p


                                                 exploratory laparotomy and


                                                 divertin gcolostomy placement


      Subjective Information                     Pt seen lying in bed at time


                                                 of visit, awake and alert. Pt


                                                 stated he tolerated clear


                                                 liquid well, adequte amount


                                                 for him at this time. Pt has


                                                 RAQUEL drain noted. Per EMR, PO


                                                 intake % of clear liquid


                                                 .


      Current Diet Order/ Nutrition Support      clear liquid


      Pertinent Medications                      D5-0.9%ns, piperacillin,


                                                 vancomycin


      Pertinent Labs                              Na 133, K 3.4, Cr 0.7,


                                                 glucose 107, Ca 7.9


     Nutritional Hx/Data


      Height                                     1.88 m


      Height (Calculated Centimeters)            188.0


      Current Weight (lbs)                       86.636 kg


      Weight (Calculated Kilograms)              86.6


      Weight (Calculated Grams)                  93279.1


      Ideal Body Weight                          190


      Body Mass Index (BMI)                      24.5


      Weight Status                              Approriate


     GI Symptoms


      GI Symptoms                                None


      Last BM                                    none


      Difficult in:                              None


      Skin Integrity/Comment:                    RIGHT LOWER LEG HEALING


                                                 incision to right arm


     Estimated Nutritional Goals


      BEE in Kcals:                              Using Current wt


      Calories/Kcals/Kg                          25-30


      Kcals Calculated                           6937-2441


      Protein:                                   Using Current wt


      Protein g/k.8-1


      Protein Calculated                         69-86


      Fluid: ml                                  2150-2580ml (1ml/kcal)


     Nutritional Problem


      1. Problem


       Problem                                   altered GI function


       Etiology                                  s/p reversal colostomy


       Signs/Symptoms:                           pt on clear liquid


     Intervention/Recommendation


      Comments                                   1. Continue with clear liquid


                                                 diet as ordered.


                                                 2. Monitor PO intake, wt, labs


                                                 and skin integrity


                                                 3. F/U as high risk in 2-3


                                                 days, -


     Expected Outcomes/Goals


      Expected Outcomes/Goals                    1. PO intake to meet at least


                                                 75% of nutritional needs.


                                                 2. Wt stability, skin to


                                                 remain intact, labs to


                                                 approach WNL.

## 2018-06-28 NOTE — INTERNAL MEDICINE PROG NOTE
Internal Medicine Subjective





- Subjective


Service Date: 18


Patient seen and examined:: with staff


Patient is:: in bed, other (ON VENTILATOR,opening his eyes.)


Patient Complaints of:: SOB


Per staff patient has:: no adverse event, other (AFEBRIL,WAS SEEN BY 

PULMONOLOGIST.)





Internal Medicine Objective





- Results


Result Diagrams: 


 18 05:50





 18 05:50


Recent Labs: 


 Laboratory Last Values











WBC  10.6 Th/cmm (4.8-10.8)   18  05:50    


 


RBC  3.70 Mil/cmm (3.80-5.80)  L  18  05:50    


 


Hgb  10.8 gm/dL (12-16)  L  18  05:50    


 


Hct  33.0 % (41.0-60)  L  18  05:50    


 


MCV  89.0 fl (80-99)   18  05:50    


 


MCH  29.3 pg (27.0-31.0)   18  05:50    


 


MCHC Differential  32.9 pg (28.0-36.0)   18  05:50    


 


RDW  15.6 % (11.5-20.0)   18  05:50    


 


Plt Count  167 Th/cmm (150-400)   18  05:50    


 


MPV  8.7 fl  18  05:50    


 


Neutrophils %  75.1 % (40.0-80.0)   18  04:15    


 


Band Neutrophils %  1 % (0-10)   18  05:50    


 


Lymphocytes %  9.7 % (20.0-50.0)  L  18  04:15    


 


Monocytes %  14.9 % (2.0-10.0)  H  18  04:15    


 


Eosinophils %  0.1 % (0.0-5.0)   18  04:15    


 


Basophils %  0.2 % (0.0-2.0)   18  04:15    


 


Neutrophils (Manual)  92 % (40-80)  H  18  05:50    


 


Lymphocytes  4 % (20-50)  L  18  05:50    


 


Monocytes  3 % (2-10)   18  05:50    


 


Eosinophils  0 % (0-5)   18  18:24    


 


Basophils  0 % (0-3)   18  18:24    


 


Platelet Estimate  ADEQUATE  (NORMAL)   18  05:50    


 


Platelet Morphology  NORMAL  (NORMAL)   18  18:24    


 


Polychromasia  1+   18  04:40    


 


Ashley Cells  1+   18  04:40    


 


RBC Morph Micro Appear  NORMAL  (NORMAL)   18  18:24    


 


PT  11.9 SECONDS (9.5-11.5)  H  18  12:08    


 


INR  1.13  (0.5-1.4)   18  12:08    


 


PTT (Actin FS)  22.3 SECONDS (26.0-38.0)  L  18  12:08    


 


Specimen Source  Arterial   18  08:40    


 


Sample Site  Right Radial   18  08:40    


 


pH  7.43  (7.35-7.45)   18  08:40    


 


pCO2  34.0 mmHg (35.0-45.0)  L  18  08:40    


 


pO2  188.0 mmHg (80.0-100.0)  H  18  08:40    


 


HCO3  24.1 mEq/L (20.0-26.0)   18  08:40    


 


Base Excess  -1.2 mEq/L (-3.0-3.0)   18  08:40    


 


O2 Saturation  100.0 % (92.0-100.0)   18  08:40    


 


Ceferino Test  YES   18  08:40    


 


Vent Rate  14   18  08:40    


 


Inspired O2  100   18  08:40    


 


Tidal Volume  500   18  08:40    


 


PEEP  5   18  08:40    


 


Pressure (ins/psv/peep)  NA   18  08:40    


 


Critical Value  E.ESPINOZA   18  08:40    


 


Sodium  141 mEq/L (136-145)   18  05:50    


 


Potassium  3.7 mEq/L (3.5-5.1)   18  05:50    


 


Chloride  114 mEq/L ()  H  18  05:50    


 


Carbon Dioxide  20.8 mEq/L (21.0-31.0)  L  18  05:50    


 


Anion Gap  9.9  (7.0-16.0)   18  05:50    


 


BUN  35 mg/dL (7-25)  H  18  05:50    


 


Creatinine  1.8 mg/dL (0.7-1.3)  H  18  05:50    


 


Est GFR ( Amer)  48.9 ml/min (>90)   18  05:50    


 


Est GFR (Non-Af Amer)  40.4 ml/min  18  05:50    


 


BUN/Creatinine Ratio  19.4   18  05:50    


 


Glucose  191 mg/dL ()  H  18  05:50    


 


POC Glucose  181 MG/DL (70 - 105)  H  18  12:18    


 


Hemoglobin A1c %  4.8 % (4.0-6.0)   18  04:40    


 


Whole Bld Lactic Acid  1.36 mmol/L (0.60-1.99)   18  05:50    


 


Calcium  8.0 mg/dL (8.6-10.3)  L  18  05:50    


 


Phosphorus  2.2 mg/dL (2.5-5.0)  L  18  05:50    


 


Magnesium  2.1 mg/dL (1.9-2.7)   18  05:50    


 


Total Bilirubin  0.3 mg/dL (0.3-1.0)   18  05:50    


 


Direct Bilirubin  0.05 mg/dL (0.0-0.2)   18  05:50    


 


AST  14 U/L (13-39)   18  05:50    


 


ALT  8 U/L (7-52)   18  05:50    


 


Alkaline Phosphatase  55 U/L ()   18  05:50    


 


Ammonia  54 umol/L (16-53)  H  18  05:50    


 


Total Protein  5.5 gm/dL (6.0-8.3)  L  18  05:50    


 


Albumin  2.3 gm/dL (4.2-5.5)  L  18  05:50    


 


Globulin  3.2 gm/dL  18  05:50    


 


Albumin/Globulin Ratio  0.7  (1.0-1.8)  L  18  05:50    


 


Prealbumin  7 mg/dL (10-36)  L  18  04:15    


 


Triglycerides  87 mg/dL (<150)   18  04:15    


 


Cholesterol  66 mg/dL (<200)   18  04:15    


 


Urine Source  CLEAN C   18  17:35    


 


Urine Color  YELLOW   18  17:35    


 


Urine Clarity  HAZY  (CLEAR)   18  17:35    


 


Urine pH  5.0  (4.6 - 8.0)   18  17:35    


 


Ur Specific Gravity  1.020  (1.005-1.030)   18  17:35    


 


Urine Protein  30 mg/dL (NEGATIVE)  H  18  17:35    


 


Urine Glucose (UA)  NEGATIVE mg/dL (NEGATIVE)   18  17:35    


 


Urine Ketones  NEGATIVE mg/dL (NEGATIVE)   18  17:35    


 


Urine Blood  TRACE  (NEGATIVE)   18  17:35    


 


Urine Nitrate  NEGATIVE  (NEGATIVE)   18  17:35    


 


Urine Bilirubin  NEGATIVE  (NEGATIVE)   18  17:35    


 


Urine Urobilinogen  0.2 E.U./dL (0.2 - 1.0)   18  17:35    


 


Ur Leukocyte Esterase  TRACE  (NEGATIVE)  H  18  17:35    


 


Urine RBC  0-2 /hpf (0-5)  H  18  17:35    


 


Urine WBC  0-2 /hpf (0-5)   18  17:35    


 


Ur Epithelial Cells  OCCASIONAL /lpf (FEW)   18  17:35    


 


Amorphous Sediment  FEW URATES  (NONE SEEN)   18  17:35    


 


Urine Bacteria  FEW /hpf (NONE SEEN)   18  17:35    


 


Vancomycin Trough  16.8 ug/mL (5-10)  H  18  09:30    


 


Random Vancomycin  20.6 ug/mL (5.0-40.0)   18  05:50    














- Physical Exam


Vitals and I&O: 


 Vital Signs











Temp  98.8 F   18 14:00


 


Pulse  119   18 15:22


 


Resp  28   18 15:00


 


BP  117/71   18 15:15


 


Pulse Ox  97   18 15:22








 Intake & Output











 18





 18:59 06:59 18:59


 


Intake Total 1650 416.952 70.979


 


Output Total 880 1000 


 


Balance 770 -583.048 70.979


 


Weight (lbs) 94.432 kg 94.347 kg 


 


Intake:   


 


  Intake, IV Amount 1650 416.952 70.979


 


    Cefepime 1 gm In Dextrose 50 50 





    5% 50 ml @ 100 mls/hr IV   





    Q12HR [facility] Rx#:[id number]   


 


    D5-0.9%Ns 1,000 ml @ 60 1000  





    mls/hr IV .[id number] [facility] Rx   





    #:[id number]   


 


    Propofol 1,000 mg In 100  166.952 70.979





    ml @ Per Protocol IV TITR   





    [facility] Rx#:[id number]   


 


    Vancomycin HCl 1.5 gm In 500  





    Sodium Chloride 0.9% 500   





    ml @ 250 mls/hr IV ONCE   





    ONE Rx#:[id number]   


 


    metroNIDAZOLE 500mg/ 200 





    100mL 500 mg In 100 ml @   





    100 mls/hr IV Q8H [facility] Rx#   





    :[id number]   


 


  Oral 0  


 


Output:   


 


  Gastric Drainage  0 


 


  Urine 880 1000 


 


Other:   


 


  # Bowel Movements 0 1 


 


  Weight Source Bedscale Bedscale 











Active Medications: 


Current Medications





Acetaminophen/Hydrocodone Bitart (Norco 10 Mg/325 Mg)  1 tab PO Q6H PRN


   PRN Reason: Pain (Severe)


   Stop: 18 20:04


   Last Admin: 18 03:03 Dose:  1 tab


Albuterol/Ipratropium (Duoneb Neb)  3 ml HHN Q4HRT [facility]


   Stop: 18 14:59


   Last Admin: 18 15:22 Dose:  3 ml


Budesonide (Pulmicort)  0.5 mg HHN BIDRT [facility]


   Stop: 18 18:59


   Last Admin: 18 19:34 Dose:  0.5 mg


Chlorhexidine Gluconate (Peridex)  15 ml MM 08, [facility]


   Stop: 18 07:59


   Last Admin: 18 10:41 Dose:  15 ml


Cholestyramine Resin (Questran)  4 gm PO BID [facility]


   Stop: 18 16:59


   Last Admin: 18 09:00 Dose:  Not Given


Enoxaparin Sodium (Lovenox)  30 mg SUBQ Q12HR [facility]


   Stop: 18 20:59


   Last Admin: 18 12:44 Dose:  30 mg


Hydromorphone HCl (Dilaudid)  1 mg IVP Q3HR PRN


   PRN Reason: SEVERE PAIN


   Stop: 18 16:25


   Last Admin: 18 09:00 Dose:  1 mg


Metronidazole (Flagyl)  500 mg in 100 mls @ 100 mls/hr IV Q8H [facility]


   Stop: 18 20:59


   Last Infusion: 18 06:30 Dose:  Infused


Cefepime HCl 1 gm/ Dextrose  50 mls @ 100 mls/hr IV Q12HR [facility]


   Stop: 18 20:59


   Last Admin: 18 10:25 Dose:  100 mls/hr


Propofol (Diprivan)  1,000 mg in 100 mls @ 0 mls/hr IV TITR [facility]; Protocol


   Stop: 18 09:59


   Last Admin: 18 13:34 Dose:  17 mcg/kg/min, 9.485 mls/hr


Dextrose/Sodium Chloride (D5-0.9%Ns)  1,000 mls @ 30 mls/hr IV .Q24H [facility]


   Stop: 18 14:59


   Last Admin: 18 14:29 Dose:  30 mls/hr


Multivitamins/Minerals 10 ml/Dextrose/ Amino Acids/Electrolytes  1,680 mls @ 70 

mls/hr IV .Q24H [facility]


   Stop: 18 14:59


Insulin Aspart (Novolog Insulin Sliding Scale)  0 units SUBQ Q6H [facility]; Protocol


   Stop: 18 11:59


   Last Admin: 18 12:22 Dose:  2 units


Lorazepam (Ativan)  1 mg IVP Q4HR PRN; Protocol


   PRN Reason: Anxiety


   Stop: 18 02:24


   Last Admin: 18 00:07 Dose:  1 mg


Methylprednisolone Sodium Succinate (Solu-Medrol)  60 mg IVP Q6H [facility]


   Stop: 18 16:29


Metoclopramide HCl (Reglan)  10 mg IVP Q6HR [facility]


   Stop: 18 17:59


   Last Admin: 18 05:27 Dose:  10 mg


Miscellaneous (Tpn Per Pharmacy)  1 ea MC PRN [facility]


   Stop: 18 17:59


Miscellaneous (Vancomycin Iv Per Pharmacy)  1 ea  PRN PRN


   PRN Reason: PROTOCOL


   Stop: 18 14:14


Ondansetron HCl (Zofran)  4 mg IV Q4H PRN


   PRN Reason: Nausea / Vomiting


   Stop: 18 05:53


   Last Admin: 18 22:50 Dose:  4 mg


Pantoprazole Sodium (Protonix)  40 mg IVP BID [facility]


   Stop: 18 08:59


   Last Admin: 18 09:00 Dose:  40 mg


Zolpidem Tartrate (Ambien)  5 mg PO HS PRN


   PRN Reason: Insomnia


   Stop: 18 20:55


   Last Admin: 18 21:59 Dose:  5 mg








General: other (on 50 percent fio2,less distress.)


HEENT: NC/AT, PERRLA, EOMI, anicteric sclerae, throat clear


Neck: Supple, No JVD, No thyromegaly, +2 carotid pulse wo bruit, No LAD


Lungs: ronchi


Cardiovascular: RRR, Normal S1, Normal S2, without murmur, tachy


Abdomen: tender, distended


Extremities: clear


Neurological: unable to follow command





- Procedures


Procedures: 


 Procedures











Procedure Code Date


 


EXCISION OF CHEST SKIN, EXTERNAL APPROACH [id number] 18


 


EXCISION OF ILEUM, OPEN APPROACH [id number] 18


 


EXCISION OF RIGHT LOWER ARM SKIN, EXTERNAL APPROACH [id number] 18


 


EXCISION OF RIGHT LOWER LEG SKIN, EXTERNAL APPROACH [id number] 18


 


RELEASE ILEUM, OPEN APPROACH [id number] 18


 


RESPIRATORY VENTILATION, 24-96 CONSECUTIVE HOURS [id number] 18














Internal Medicine Assmt/Plan





- Assessment


Assessment: 





1.SP COLOSTOMY REVISION.


2.SP EXCISION BIOPSY FOR CHEST WALL LESION.


3.ACUTE BOWEL OBSTRUCTION


4.ACUTE RESPIRATORY FAILURE.


5.BILATERAL PNEUMONIA.





- Plan


Plan: 


CONTINUE ON CURRENT MEDICATION AND TPN.





Nutritional Asmnt/Malnutr-PDOC





- Dietary Evaluation


Malnutrition Findings (Please click <Entered> for more info): 








Nutritional Asmnt/Malnutrition                             Start:  18 14:

24


Text:                                                      Status: Complete    

  


Freq:                                                                          

  


Protocol:                                                                      

  


 Document     18 14:24  KAYE  (Rec: 18 14:35  KAYE  [id number])


 Nutritional Asmnt/Malnutrition


     Patient General Information


      Nutritional Screening                      High Risk


                                                 Consult


      Diagnosis                                  exploratory laparotomy with


                                                 recersal colostomy


      Pertinent Medical Hx/Surgical Hx           acute perforation of


                                                 diverticulitis, s/p


                                                 exploratory laparotomy and


                                                 divertin gcolostomy placement


      Subjective Information                     Pt seen lying in bed at time


                                                 of visit, awake and alert. Pt


                                                 stated he tolerated clear


                                                 liquid well, adequte amount


                                                 for him at this time. Pt has


                                                 RAQUEL drain noted. Per EMR, PO


                                                 intake % of clear liquid


                                                 .


      Current Diet Order/ Nutrition Support      clear liquid


      Pertinent Medications                      D5-0.9%ns, piperacillin,


                                                 vancomycin


      Pertinent Labs                              Na 133, K 3.4, Cr 0.7,


                                                 glucose 107, Ca 7.9


     Nutritional Hx/Data


      Height                                     1.88 m


      Height (Calculated Centimeters)            188.0


      Current Weight (lbs)                       86.636 kg


      Weight (Calculated Kilograms)              86.6


      Weight (Calculated Grams)                  52151.1


      Ideal Body Weight                          190


      Body Mass Index (BMI)                      24.5


      Weight Status                              Approriate


     GI Symptoms


      GI Symptoms                                None


      Last BM                                    none


      Difficult in:                              None


      Skin Integrity/Comment:                    RIGHT LOWER LEG HEALING


                                                 incision to right arm


     Estimated Nutritional Goals


      BEE in Kcals:                              Using Current wt


      Calories/Kcals/Kg                          25-30


      Kcals Calculated                           8702-1845


      Protein:                                   Using Current wt


      Protein g/k.8-1


      Protein Calculated                         69-86


      Fluid: ml                                  2150-2580ml (1ml/kcal)


     Nutritional Problem


      1. Problem


       Problem                                   altered GI function


       Etiology                                  s/p reversal colostomy


       Signs/Symptoms:                           pt on clear liquid


     Intervention/Recommendation


      Comments                                   1. Continue with clear liquid


                                                 diet as ordered.


                                                 2. Monitor PO intake, wt, labs


                                                 and skin integrity


                                                 3. F/U as high risk in 2-3


                                                 days, -


     Expected Outcomes/Goals


      Expected Outcomes/Goals                    1. PO intake to meet at least


                                                 75% of nutritional needs.


                                                 2. Wt stability, skin to


                                                 remain intact, labs to


                                                 approach WNL.

## 2018-06-28 NOTE — PROGRESS NOTES
DATE:  06/28/2018



PULMONARY/CRITICAL CARE PROGRESS NOTE



PROBLEM LIST:

1.  Bilateral pneumonia, most likely secondary to aspiration.

2.  Underlying recent GI surgery.



SYMPTOMS:  The patient is awake, get more restless as to be sedated.  FiO2 is

decreased to 50% from 100%.



PHYSICAL EXAMINATION:

VITAL SIGNS:  On exam, T-max 99, heart rate 110-120, blood pressure 100/60,

saturation 100% on currently 50% of oxygen.

NECK:  Veins not visualized.

CHEST:  Shows occasional rales with diminished air entry.

HEART:  Regular.

ABDOMEN:  Soft, nontender.

EXTREMITIES:  Shows no new acute changes.



LABORATORY DATA:  White count is 10.6.  ABG; pO2 was 188 on 100%.  Electrolytes

are okay with creatinine 1.8.



ASSESSMENT:  The patient clinically appears to be stable, slightly clinically

better.



PLANS AND SUGGESTIONS:  We will go ahead and continue current treatment.  We

will gradually decrease FIO2.  We will follow through chest x-ray and continue

sedation.  Hopefully, when the infiltrate gets better, we will start attempt to

wean, etc. and go from there.





DD: 06/28/2018 13:41

DT: 06/28/2018 23:00

JOB# [id number]  [id number]

## 2018-06-28 NOTE — CARDIOLOGY
06/27/2018



The patient of Dr. Stephens.



M-MODE ECHOCARDIOGRAM:  Mitral valve, anterior leaflet of mitral valve shows

normal excursion, EF velocity.  Posterior leaflet of mitral valve shows normal

excursion.  Left ventricular posterior wall shows increased thickness, normal

excursion.  Interventricular septum shows increased thickness, normal excursion,

hypertrophy of the left ventricle, ejection fraction 60%.  Left atrium normal. 

Aortic root shows normal dimension, normal excursion of aortic leaflets.



CONCLUSION:  Hypertrophy of the left ventricle, ejection fraction 60%.



2D ECHO:  Long axis view showed normal-sized left ventricle with hypertrophy of

the left ventricle.  Left atrium normal.  Aortic root shows normal dimension,

normal excursion of aortic leaflets.  Short axis view of mitral valve normal. 

Short axis view of aortic valve normal.  Apical four chamber view showed

normal-sized left ventricle with hypertrophy of the left ventricle.  Left atrium

normal.  Right ventricular cavity, right atrium normal, no pericardial effusion.



CONCLUSION:  Hypertrophy of the left ventricle, ejection fraction 60%.



Doppler study shows mild tricuspid regurgitation, right ventricular systolic

pressure 26 mmHg.





DD: 06/28/2018 12:17

DT: 06/28/2018 14:57

[facility]# [id number]  [id number]

## 2018-06-29 LAB
ALBUMIN SERPL-MCNC: 2.2 GM/DL (ref 4.2–5.5)
ALBUMIN/GLOB SERPL: 0.7 {RATIO} (ref 1–1.8)
ALP SERPL-CCNC: 69 U/L (ref 34–104)
ALT SERPL-CCNC: 9 U/L (ref 7–52)
ANION GAP SERPL CALC-SCNC: 10.2 MMOL/L (ref 7–16)
ARTERIAL PATENCY WRIST A: YES
AST SERPL-CCNC: 17 U/L (ref 13–39)
BILIRUB SERPL-MCNC: 0.3 MG/DL (ref 0.3–1)
BUN SERPL-MCNC: 43 MG/DL (ref 7–25)
CALCIUM SERPL-MCNC: 8.2 MG/DL (ref 8.6–10.3)
CHLORIDE SERPL-SCNC: 115 MEQ/L (ref 98–107)
CO2 SERPL-SCNC: 21.5 MEQ/L (ref 21–31)
CREAT SERPL-MCNC: 1.7 MG/DL (ref 0.7–1.3)
ERYTHROCYTE [DISTWIDTH] IN BLOOD BY AUTOMATED COUNT: 15.6 % (ref 11.5–20)
GLOBULIN SER-MCNC: 3.1 GM/DL
GLUCOSE SERPL-MCNC: 177 MG/DL (ref 70–105)
HCT VFR BLD CALC: 31.9 % (ref 41–60)
HGB BLD-MCNC: 10.6 GM/DL (ref 12–16)
LYMPHOCYTES # BLD MANUAL: 12 % (ref 20–50)
MAGNESIUM SERPL-MCNC: 2 MG/DL (ref 1.9–2.7)
MCH RBC QN AUTO: 29.4 PG (ref 27–31)
MCHC RBC AUTO-ENTMCNC: 33.2 PG (ref 28–36)
MCV RBC AUTO: 88.7 FL (ref 80–99)
MONOCYTES # BLD MANUAL: 3 % (ref 2–10)
NEUTROPHILS NFR BLD AUTO: 83 % (ref 40–80)
NEUTS BAND NFR BLD: 2 % (ref 0–10)
PCO2 BLDA: 39 MMHG (ref 35–45)
PHOSPHATE SERPL-MCNC: 2.4 MG/DL (ref 2.5–5)
PLATELET # BLD: 166 TH/CMM (ref 150–400)
PMV BLD AUTO: 8.9 FL
PO2 BLDA: 85 MMHG (ref 80–100)
POTASSIUM SERPL-SCNC: 3.7 MEQ/L (ref 3.5–5.1)
RBC # BLD AUTO: 3.59 MIL/CMM (ref 3.8–5.8)
SAO2 % BLDA: 96 % (ref 92–100)
SODIUM SERPL-SCNC: 143 MEQ/L (ref 136–145)
TOTAL CELLS COUNTED BLD: 100
WBC # BLD AUTO: 11.1 TH/CMM (ref 4.8–10.8)

## 2018-06-29 RX ADMIN — INSULIN ASPART SCH UNITS: 100 INJECTION, SOLUTION INTRAVENOUS; SUBCUTANEOUS at 00:05

## 2018-06-29 RX ADMIN — IPRATROPIUM BROMIDE AND ALBUTEROL SULFATE SCH ML: .5; 3 SOLUTION RESPIRATORY (INHALATION) at 10:40

## 2018-06-29 RX ADMIN — ENOXAPARIN SODIUM SCH MG: 100 INJECTION SUBCUTANEOUS at 09:08

## 2018-06-29 RX ADMIN — METRONIDAZOLE SCH MLS/HR: 500 INJECTION, SOLUTION INTRAVENOUS at 21:19

## 2018-06-29 RX ADMIN — INSULIN ASPART SCH UNITS: 100 INJECTION, SOLUTION INTRAVENOUS; SUBCUTANEOUS at 06:10

## 2018-06-29 RX ADMIN — METRONIDAZOLE SCH MLS/HR: 500 INJECTION, SOLUTION INTRAVENOUS at 16:10

## 2018-06-29 RX ADMIN — IPRATROPIUM BROMIDE AND ALBUTEROL SULFATE SCH ML: .5; 3 SOLUTION RESPIRATORY (INHALATION) at 02:55

## 2018-06-29 RX ADMIN — INSULIN ASPART SCH UNITS: 100 INJECTION, SOLUTION INTRAVENOUS; SUBCUTANEOUS at 18:43

## 2018-06-29 RX ADMIN — IPRATROPIUM BROMIDE AND ALBUTEROL SULFATE SCH ML: .5; 3 SOLUTION RESPIRATORY (INHALATION) at 23:26

## 2018-06-29 RX ADMIN — METOCLOPRAMIDE SCH MG: 5 INJECTION, SOLUTION INTRAMUSCULAR; INTRAVENOUS at 00:05

## 2018-06-29 RX ADMIN — METOCLOPRAMIDE SCH: 5 INJECTION, SOLUTION INTRAMUSCULAR; INTRAVENOUS at 16:09

## 2018-06-29 RX ADMIN — ASCORBIC ACID, VITAMIN A PALMITATE, CHOLECALCIFEROL, THIAMINE HYDROCHLORIDE, RIBOFLAVIN-5 PHOSPHATE SODIUM, PYRIDOXINE HYDROCHLORIDE, NIACINAMIDE, DEXPANTHENOL, ALPHA-TOCOPHEROL ACETATE, VITAMIN K1, FOLIC ACID, BIOTIN, CYANOCOBALAMIN SCH MLS/HR: 200; 3300; 200; 6; 3.6; 6; 40; 15; 10; 150; 600; 60; 5 INJECTION, SOLUTION INTRAVENOUS at 18:00

## 2018-06-29 RX ADMIN — IPRATROPIUM BROMIDE AND ALBUTEROL SULFATE SCH ML: .5; 3 SOLUTION RESPIRATORY (INHALATION) at 14:01

## 2018-06-29 RX ADMIN — IPRATROPIUM BROMIDE AND ALBUTEROL SULFATE SCH ML: .5; 3 SOLUTION RESPIRATORY (INHALATION) at 19:38

## 2018-06-29 RX ADMIN — METOCLOPRAMIDE SCH MG: 5 INJECTION, SOLUTION INTRAMUSCULAR; INTRAVENOUS at 06:11

## 2018-06-29 RX ADMIN — ENOXAPARIN SODIUM SCH MG: 100 INJECTION SUBCUTANEOUS at 20:10

## 2018-06-29 RX ADMIN — CHLORHEXIDINE GLUCONATE SCH ML: 1.2 RINSE ORAL at 09:25

## 2018-06-29 RX ADMIN — BUDESONIDE SCH MG: 0.5 SUSPENSION RESPIRATORY (INHALATION) at 19:38

## 2018-06-29 RX ADMIN — CHLORHEXIDINE GLUCONATE SCH ML: 1.2 RINSE ORAL at 20:09

## 2018-06-29 RX ADMIN — INSULIN ASPART SCH UNITS: 100 INJECTION, SOLUTION INTRAVENOUS; SUBCUTANEOUS at 12:40

## 2018-06-29 RX ADMIN — METRONIDAZOLE SCH MLS/HR: 500 INJECTION, SOLUTION INTRAVENOUS at 04:27

## 2018-06-29 RX ADMIN — IPRATROPIUM BROMIDE AND ALBUTEROL SULFATE SCH ML: .5; 3 SOLUTION RESPIRATORY (INHALATION) at 06:46

## 2018-06-29 RX ADMIN — BUDESONIDE SCH MG: 0.5 SUSPENSION RESPIRATORY (INHALATION) at 06:53

## 2018-06-29 RX ADMIN — METOCLOPRAMIDE SCH MG: 5 INJECTION, SOLUTION INTRAMUSCULAR; INTRAVENOUS at 17:29

## 2018-06-29 NOTE — GENERAL PROGRESS NOTE
Subjective





- Review of Systems


Service Date: 18


Subjective: 


pt intubated 





Objective





- Results


Result Diagrams: 


 18 04:45





 18 04:45


Recent Labs: 


 Laboratory Last Values











WBC  11.1 Th/cmm (4.8-10.8)  H  18  04:45    


 


RBC  3.59 Mil/cmm (3.80-5.80)  L  18  04:45    


 


Hgb  10.6 gm/dL (12-16)  L  18  04:45    


 


Hct  31.9 % (41.0-60)  L  18  04:45    


 


MCV  88.7 fl (80-99)   18  04:45    


 


MCH  29.4 pg (27.0-31.0)   18  04:45    


 


MCHC Differential  33.2 pg (28.0-36.0)   18  04:45    


 


RDW  15.6 % (11.5-20.0)   18  04:45    


 


Plt Count  166 Th/cmm (150-400)   18  04:45    


 


MPV  8.9 fl  18  04:45    


 


Neutrophils %  75.1 % (40.0-80.0)   18  04:15    


 


Band Neutrophils %  2 % (0-10)   18  04:45    


 


Lymphocytes %  9.7 % (20.0-50.0)  L  18  04:15    


 


Monocytes %  14.9 % (2.0-10.0)  H  18  04:15    


 


Eosinophils %  0.1 % (0.0-5.0)   18  04:15    


 


Basophils %  0.2 % (0.0-2.0)   18  04:15    


 


Neutrophils (Manual)  83 % (40-80)  H  18  04:45    


 


Lymphocytes  12 % (20-50)  L  18  04:45    


 


Monocytes  3 % (2-10)   18  04:45    


 


Eosinophils  0 % (0-5)   18  18:24    


 


Basophils  0 % (0-3)   18  18:24    


 


Platelet Estimate  ADEQUATE  (NORMAL)   18  05:50    


 


Platelet Morphology  NORMAL  (NORMAL)   18  18:24    


 


Polychromasia  1+   18  04:40    


 


Anna Cells  1+   18  04:40    


 


RBC Morph Micro Appear  NORMAL  (NORMAL)   18  18:24    


 


PT  11.9 SECONDS (9.5-11.5)  H  18  12:08    


 


INR  1.13  (0.5-1.4)   18  12:08    


 


PTT (Actin FS)  22.3 SECONDS (26.0-38.0)  L  18  12:08    


 


Specimen Source  Arterial   18  08:40    


 


Sample Site  Right Radial   18  08:40    


 


pH  7.43  (7.35-7.45)   18  08:40    


 


pCO2  34.0 mmHg (35.0-45.0)  L  18  08:40    


 


pO2  188.0 mmHg (80.0-100.0)  H  18  08:40    


 


HCO3  24.1 mEq/L (20.0-26.0)   18  08:40    


 


Base Excess  -1.2 mEq/L (-3.0-3.0)   18  08:40    


 


O2 Saturation  100.0 % (92.0-100.0)   18  08:40    


 


Ceferino Test  YES   18  08:40    


 


Vent Rate  14   18  08:40    


 


Inspired O2  100   18  08:40    


 


Tidal Volume  500   18  08:40    


 


PEEP  5   18  08:40    


 


Pressure (ins/psv/peep)  NA   18  08:40    


 


Critical Value  E.ESPINOZA   18  08:40    


 


Sodium  143 mEq/L (136-145)   18  04:45    


 


Potassium  3.7 mEq/L (3.5-5.1)   18  04:45    


 


Chloride  115 mEq/L ()  H  18  04:45    


 


Carbon Dioxide  21.5 mEq/L (21.0-31.0)   18  04:45    


 


Anion Gap  10.2  (7.0-16.0)   18  04:45    


 


BUN  43 mg/dL (7-25)  H  18  04:45    


 


Creatinine  1.7 mg/dL (0.7-1.3)  H  18  04:45    


 


Est GFR ( Amer)  52.3 ml/min (>90)   18  04:45    


 


Est GFR (Non-Af Amer)  43.2 ml/min  18  04:45    


 


BUN/Creatinine Ratio  25.3   18  04:45    


 


Glucose  177 mg/dL ()  H  18  04:45    


 


POC Glucose  167 MG/DL (70 - 105)  H  18  06:08    


 


Hemoglobin A1c %  4.8 % (4.0-6.0)   18  04:40    


 


Whole Bld Lactic Acid  1.36 mmol/L (0.60-1.99)   18  05:50    


 


Calcium  8.2 mg/dL (8.6-10.3)  L  18  04:45    


 


Phosphorus  2.4 mg/dL (2.5-5.0)  L  18  04:45    


 


Magnesium  2.0 mg/dL (1.9-2.7)   18  04:45    


 


Total Bilirubin  0.3 mg/dL (0.3-1.0)   18  04:45    


 


Direct Bilirubin  0.05 mg/dL (0.0-0.2)   18  05:50    


 


AST  17 U/L (13-39)   18  04:45    


 


ALT  9 U/L (7-52)   18  04:45    


 


Alkaline Phosphatase  69 U/L ()   18  04:45    


 


Ammonia  54 umol/L (16-53)  H  18  05:50    


 


Total Protein  5.3 gm/dL (6.0-8.3)  L  18  04:45    


 


Albumin  2.2 gm/dL (4.2-5.5)  L  18  04:45    


 


Globulin  3.1 gm/dL  18  04:45    


 


Albumin/Globulin Ratio  0.7  (1.0-1.8)  L  18  04:45    


 


Prealbumin  7 mg/dL (10-36)  L  18  04:15    


 


Triglycerides  87 mg/dL (<150)   18  04:15    


 


Cholesterol  66 mg/dL (<200)   18  04:15    


 


Urine Source  CLEAN C   18  17:35    


 


Urine Color  YELLOW   18  17:35    


 


Urine Clarity  HAZY  (CLEAR)   18  17:35    


 


Urine pH  5.0  (4.6 - 8.0)   18  17:35    


 


Ur Specific Gravity  1.020  (1.005-1.030)   18  17:35    


 


Urine Protein  30 mg/dL (NEGATIVE)  H  18  17:35    


 


Urine Glucose (UA)  NEGATIVE mg/dL (NEGATIVE)   18  17:35    


 


Urine Ketones  NEGATIVE mg/dL (NEGATIVE)   18  17:35    


 


Urine Blood  TRACE  (NEGATIVE)   18  17:35    


 


Urine Nitrate  NEGATIVE  (NEGATIVE)   18  17:35    


 


Urine Bilirubin  NEGATIVE  (NEGATIVE)   18  17:35    


 


Urine Urobilinogen  0.2 E.U./dL (0.2 - 1.0)   18  17:35    


 


Ur Leukocyte Esterase  TRACE  (NEGATIVE)  H  18  17:35    


 


Urine RBC  0-2 /hpf (0-5)  H  18  17:35    


 


Urine WBC  0-2 /hpf (0-5)   18  17:35    


 


Ur Epithelial Cells  OCCASIONAL /lpf (FEW)   18  17:35    


 


Amorphous Sediment  FEW URATES  (NONE SEEN)   18  17:35    


 


Urine Bacteria  FEW /hpf (NONE SEEN)   18  17:35    


 


Vancomycin Trough  16.8 ug/mL (5-10)  H  18  09:30    


 


Random Vancomycin  22.8 ug/mL (5.0-40.0)   18  04:45    














- Physical Exam


Vitals and I&O: 


 Vital Signs











Temp  98.6 F   18 10:00


 


Pulse  111   18 10:00


 


Resp  19   18 10:00


 


BP  124/77   18 10:00


 


Pulse Ox  96   18 09:59








 Intake & Output











 18





 18:59 06:59 18:59


 


Intake Total 0428.096 6092.016 92.373


 


Output Total 1300 2400 


 


Balance 615.979 -595.984 92.373


 


Weight (lbs) 100.244 kg 100.244 kg 


 


Intake:   


 


  Intake, IV Amount 468.693 6975.016 92.373


 


    Cefepime 1 gm In Dextrose 50 50 50





    5% 50 ml @ 100 mls/hr IV   





    Q12HR [facility] Rx#:[id number]   


 


    D5-0.9%Ns 1,000 ml @ 30  465.5 





    mls/hr IV .Q24H [facility] Rx#:   





    [id number]   


 


    Multivitamin Inj 10 ml In  872.667 





    Dextrose 70% 1,170 ml In   





    Amino Acids 10% 500 ml @   





    70 mls/hr IV .Q24H [facility]   





    Rx#:[id number]   


 


    Propofol 1,000 mg In 100 70.979 215.849 42.373





    ml @ Per Protocol IV TITR   





    [facility] Rx#:[id number]   


 


    metroNIDAZOLE 500mg/ 200 





    100mL 500 mg In 100 ml @   





    100 mls/hr IV Q8H [facility] Rx#   





    :[id number]   


 


  TPN/  


 


  Other 975  


 


Output:   


 


  Gastric Drainage  400 


 


  Urine 1300 2000 


 


Other:   


 


  # Bowel Movements 0 0 


 


  Weight Source Bedscale Bedscale 











Active Medications: 


Current Medications





Acetaminophen/Hydrocodone Bitart (Norco 10 Mg/325 Mg)  1 tab PO Q6H PRN


   PRN Reason: Pain (Severe)


   Stop: 18 20:04


   Last Admin: 18 03:03 Dose:  1 tab


Albuterol/Ipratropium (Duoneb Neb)  3 ml HHN Q4HRT [facility]


   Stop: 18 14:59


   Last Admin: 18 06:46 Dose:  3 ml


Budesonide (Pulmicort)  0.5 mg HHN BIDRT [facility]


   Stop: 18 18:59


   Last Admin: 18 06:53 Dose:  0.5 mg


Chlorhexidine Gluconate (Peridex)  15 ml MM  [facility]


   Stop: 18 07:59


   Last Admin: 18 09:25 Dose:  15 ml


Cholestyramine Resin (Questran)  4 gm PO BID [facility]


   Stop: 18 16:59


   Last Admin: 18 09:46 Dose:  Not Given


Enoxaparin Sodium (Lovenox)  30 mg SUBQ Q12HR [facility]


   Stop: 18 20:59


   Last Admin: 18 09:08 Dose:  30 mg


Hydromorphone HCl (Dilaudid)  1 mg IVP Q3HR PRN


   PRN Reason: SEVERE PAIN


   Stop: 18 16:25


   Last Admin: 18 09:00 Dose:  1 mg


Metronidazole (Flagyl)  500 mg in 100 mls @ 100 mls/hr IV Q8H [facility]


   Stop: 18 20:59


   Last Infusion: 18 05:27 Dose:  Infused


Cefepime HCl 1 gm/ Dextrose  50 mls @ 100 mls/hr IV Q12HR [facility]


   Stop: 18 20:59


   Last Infusion: 18 09:48 Dose:  Infused


Propofol (Diprivan)  1,000 mg in 100 mls @ 0 mls/hr IV TITR [facility]; Protocol


   Stop: 18 09:59


   Last Admin: 18 09:07 Dose:  31 mcg/kg/min, 17.295 mls/hr


Dextrose/Sodium Chloride (D5-0.9%Ns)  1,000 mls @ 30 mls/hr IV .Q24H [facility]


   Stop: 18 14:59


   Last Infusion: 18 06:00 Dose:  30 mls/hr


Multivitamins/Minerals 10 ml/Dextrose/ Amino Acids/Electrolytes  1,680 mls @ 70 

mls/hr IV .Q24H [facility]


   Stop: 18 14:59


   Last Infusion: 18 06:45 Dose:  70 mls/hr


Vancomycin HCl 1.5 gm/ Sodium (Chloride)  500 mls @ 250 mls/hr IV Q36H [facility]


   Stop: 18 17:59


Insulin Aspart (Novolog Insulin Sliding Scale)  0 units SUBQ Q6H [facility]; Protocol


   Stop: 18 11:59


   Last Admin: 18 06:10 Dose:  2 units


Lorazepam (Ativan)  1 mg IVP Q4HR [facility]; Protocol


   PRN Reason: Anxiety


   Stop: 18 02:24


   Last Admin: 18 00:07 Dose:  1 mg


Methylprednisolone Sodium Succinate (Solu-Medrol)  60 mg IVP Q6H [facility]


   Stop: 18 16:29


   Last Admin: 18 04:27 Dose:  60 mg


Metoclopramide HCl (Reglan)  10 mg IVP Q6HR [facility]


   Stop: 18 17:59


   Last Admin: 18 06:11 Dose:  10 mg


Miscellaneous (Tpn Per Pharmacy)  1 ea MC PRN [facility]


   Stop: 18 17:59


Miscellaneous (Vancomycin Iv Per Pharmacy)  1 ea  PRN PRN


   PRN Reason: PROTOCOL


   Stop: 18 14:14


Ondansetron HCl (Zofran)  4 mg IV Q4H PRN


   PRN Reason: Nausea / Vomiting


   Stop: 18 05:53


   Last Admin: 18 22:50 Dose:  4 mg


Pantoprazole Sodium (Protonix)  40 mg IVP BID [facility]


   Stop: 18 08:59


   Last Admin: 18 09:08 Dose:  40 mg


Zolpidem Tartrate (Ambien)  5 mg PO HS PRN


   PRN Reason: Insomnia


   Stop: 18 20:55


   Last Admin: 18 21:59 Dose:  5 mg








General: Alert, Cooperative, No acute distress


HEENT: Atraumatic


Neck: Supple


Cardiovascular: Regular rate, Normal S1, Normal S2


Lungs: Clear to auscultation


Abdomen: Distended





- Procedures


Procedures: 


 Procedures











Procedure Code Date


 


EXCISION OF CHEST SKIN, EXTERNAL APPROACH 8OC8JTY 18


 


EXCISION OF ILEUM, OPEN APPROACH [id number] 18


 


EXCISION OF RIGHT LOWER ARM SKIN, EXTERNAL APPROACH [id number] 18


 


EXCISION OF RIGHT LOWER LEG SKIN, EXTERNAL APPROACH [id number] 18


 


RELEASE ILEUM, OPEN APPROACH [id number] 18


 


RESPIRATORY VENTILATION, 24-96 CONSECUTIVE HOURS [id number] 18














Assessment/Plan





- Assessment


Assessment: 


CT scan shows bowel obstruction


distended abdomen


arf


intubated








- Plan


Plan: 


continue IVF


 intubation


will monitor renal function





Nutritional Asmnt/Malnutr-PDOC





- Dietary Evaluation


Malnutrition Findings (Please click <Entered> for more info): 








Nutritional Asmnt/Malnutrition                             Start:  18 14:

24


Text:                                                      Status: Complete    

  


Freq:                                                                          

  


Protocol:                                                                      

  


 Document     18 14:24  LCHENG  (Rec: 18 14:35  LCHENG  [id number])


 Nutritional Asmnt/Malnutrition


     Patient General Information


      Nutritional Screening                      High Risk


                                                 Consult


      Diagnosis                                  exploratory laparotomy with


                                                 recersal colostomy


      Pertinent Medical Hx/Surgical Hx           acute perforation of


                                                 diverticulitis, s/p


                                                 exploratory laparotomy and


                                                 divertin gcolostomy placement


      Subjective Information                     Pt seen lying in bed at time


                                                 of visit, awake and alert. Pt


                                                 stated he tolerated clear


                                                 liquid well, adequte amount


                                                 for him at this time. Pt has


                                                 RAQUEL drain noted. Per EMR, PO


                                                 intake % of clear liquid


                                                 .


      Current Diet Order/ Nutrition Support      clear liquid


      Pertinent Medications                      D5-0.9%ns, piperacillin,


                                                 vancomycin


      Pertinent Labs                              Na 133, K 3.4, Cr 0.7,


                                                 glucose 107, Ca 7.9


     Nutritional Hx/Data


      Height                                     1.88 m


      Height (Calculated Centimeters)            188.0


      Current Weight (lbs)                       86.636 kg


      Weight (Calculated Kilograms)              86.6


      Weight (Calculated Grams)                  23461.1


      Ideal Body Weight                          190


      Body Mass Index (BMI)                      24.5


      Weight Status                              Approriate


     GI Symptoms


      GI Symptoms                                None


      Last BM                                    none


      Difficult in:                              None


      Skin Integrity/Comment:                    RIGHT LOWER LEG HEALING


                                                 incision to right arm


     Estimated Nutritional Goals


      BEE in Kcals:                              Using Current wt


      Calories/Kcals/Kg                          25-30


      Kcals Calculated                           5375-1158


      Protein:                                   Using Current wt


      Protein g/k.8-1


      Protein Calculated                         69-86


      Fluid: ml                                  2150-2580ml (1ml/kcal)


     Nutritional Problem


      1. Problem


       Problem                                   altered GI function


       Etiology                                  s/p reversal colostomy


       Signs/Symptoms:                           pt on clear liquid


     Intervention/Recommendation


      Comments                                   1. Continue with clear liquid


                                                 diet as ordered.


                                                 2. Monitor PO intake, wt, labs


                                                 and skin integrity


                                                 3. F/U as high risk in 2-3


                                                 days, -


     Expected Outcomes/Goals


      Expected Outcomes/Goals                    1. PO intake to meet at least


                                                 75% of nutritional needs.


                                                 2. Wt stability, skin to


                                                 remain intact, labs to


                                                 approach WNL.

## 2018-06-29 NOTE — DIAGNOSTIC IMAGING REPORT
CHEST X-RAY: AP view



INDICATION: Pneumonia



COMPARISON: Chest x-ray 6/28/2018



FINDINGS: ET tube is seen with tip 1.8 cm above the Jayna.  Right PICC

line is stable.  Diffuse pulmonary infiltrates are noted with overall

slight improvement.  Small bilateral effusions are noted.  Heart size is

normal.



IMPRESSION:



Diffuse pulmonary infiltrates overall slight improvement since prior

exam.



ET tube with tip 1.8 cm above the Jayna.

## 2018-06-29 NOTE — INTERNAL MEDICINE PROG NOTE
Internal Medicine Subjective





- Subjective


Service Date: 18


Patient seen and examined:: with staff (HE IS DOING BETTER,STILL ON VENT.THERE 

IS NO BM.)


Patient is:: in bed, other (ON VENTILATOR,opening his eyes.)


Patient Complaints of:: SOB


Per staff patient has:: no adverse event, other (AFEBRIL,WAS SEEN BY 

PULMONOLOGIST.)





Internal Medicine Objective





- Results


Result Diagrams: 


 18 04:45





 18 04:45


Recent Labs: 


 Laboratory Last Values











WBC  11.1 Th/cmm (4.8-10.8)  H  18  04:45    


 


RBC  3.59 Mil/cmm (3.80-5.80)  L  18  04:45    


 


Hgb  10.6 gm/dL (12-16)  L  18  04:45    


 


Hct  31.9 % (41.0-60)  L  18  04:45    


 


MCV  88.7 fl (80-99)   18  04:45    


 


MCH  29.4 pg (27.0-31.0)   18  04:45    


 


MCHC Differential  33.2 pg (28.0-36.0)   18  04:45    


 


RDW  15.6 % (11.5-20.0)   18  04:45    


 


Plt Count  166 Th/cmm (150-400)   18  04:45    


 


MPV  8.9 fl  18  04:45    


 


Neutrophils %  75.1 % (40.0-80.0)   18  04:15    


 


Band Neutrophils %  2 % (0-10)   18  04:45    


 


Lymphocytes %  9.7 % (20.0-50.0)  L  18  04:15    


 


Monocytes %  14.9 % (2.0-10.0)  H  18  04:15    


 


Eosinophils %  0.1 % (0.0-5.0)   18  04:15    


 


Basophils %  0.2 % (0.0-2.0)   18  04:15    


 


Neutrophils (Manual)  83 % (40-80)  H  18  04:45    


 


Lymphocytes  12 % (20-50)  L  18  04:45    


 


Monocytes  3 % (2-10)   18  04:45    


 


Eosinophils  0 % (0-5)   18  18:24    


 


Basophils  0 % (0-3)   18  18:24    


 


Platelet Estimate  ADEQUATE  (NORMAL)   18  05:50    


 


Platelet Morphology  NORMAL  (NORMAL)   18  18:24    


 


Polychromasia  1+   18  04:40    


 


Anna Cells  1+   18  04:40    


 


RBC Morph Micro Appear  NORMAL  (NORMAL)   18  18:24    


 


PT  11.9 SECONDS (9.5-11.5)  H  18  12:08    


 


INR  1.13  (0.5-1.4)   18  12:08    


 


PTT (Actin FS)  22.3 SECONDS (26.0-38.0)  L  18  12:08    


 


Specimen Source  Arterial   18  09:42    


 


Sample Site  Left Radial   18  09:42    


 


pH  7.39  (7.35-7.45)   18  09:42    


 


pCO2  39.0 mmHg (35.0-45.0)   18  09:42    


 


pO2  85.0 mmHg (80.0-100.0)   18  09:42    


 


HCO3  24.0 mEq/L (20.0-26.0)   18  09:42    


 


Base Excess  -1.2 mEq/L (-3.0-3.0)   18  09:42    


 


O2 Saturation  96.0 % (92.0-100.0)   18  09:42    


 


Ceferino Test  YES   18  09:42    


 


Vent Rate  14   18  09:42    


 


Inspired O2  30   18  09:42    


 


Tidal Volume  500   18  09:42    


 


PEEP  5   18  09:42    


 


Pressure (ins/psv/peep)  NA   18  09:42    


 


Critical Value  E.ESPINOZA   18  09:42    


 


Sodium  143 mEq/L (136-145)   18  04:45    


 


Potassium  3.7 mEq/L (3.5-5.1)   18  04:45    


 


Chloride  115 mEq/L ()  H  18  04:45    


 


Carbon Dioxide  21.5 mEq/L (21.0-31.0)   18  04:45    


 


Anion Gap  10.2  (7.0-16.0)   18  04:45    


 


BUN  43 mg/dL (7-25)  H  18  04:45    


 


Creatinine  1.7 mg/dL (0.7-1.3)  H  18  04:45    


 


Est GFR ( Amer)  52.3 ml/min (>90)   18  04:45    


 


Est GFR (Non-Af Amer)  43.2 ml/min  18  04:45    


 


BUN/Creatinine Ratio  25.3   18  04:45    


 


Glucose  177 mg/dL ()  H  18  04:45    


 


POC Glucose  175 MG/DL (70 - 105)  H  18  12:36    


 


Hemoglobin A1c %  4.8 % (4.0-6.0)   18  04:40    


 


Whole Bld Lactic Acid  1.36 mmol/L (0.60-1.99)   18  05:50    


 


Calcium  8.2 mg/dL (8.6-10.3)  L  18  04:45    


 


Phosphorus  2.4 mg/dL (2.5-5.0)  L  18  04:45    


 


Magnesium  2.0 mg/dL (1.9-2.7)   18  04:45    


 


Total Bilirubin  0.3 mg/dL (0.3-1.0)   18  04:45    


 


Direct Bilirubin  0.05 mg/dL (0.0-0.2)   18  05:50    


 


AST  17 U/L (13-39)   18  04:45    


 


ALT  9 U/L (7-52)   18  04:45    


 


Alkaline Phosphatase  69 U/L ()   18  04:45    


 


Ammonia  54 umol/L (16-53)  H  18  05:50    


 


Total Protein  5.3 gm/dL (6.0-8.3)  L  18  04:45    


 


Albumin  2.2 gm/dL (4.2-5.5)  L  18  04:45    


 


Globulin  3.1 gm/dL  18  04:45    


 


Albumin/Globulin Ratio  0.7  (1.0-1.8)  L  18  04:45    


 


Prealbumin  7 mg/dL (10-36)  L  18  04:15    


 


Triglycerides  87 mg/dL (<150)   18  04:15    


 


Cholesterol  66 mg/dL (<200)   18  04:15    


 


Urine Source  CLEAN C   18  17:35    


 


Urine Color  YELLOW   18  17:35    


 


Urine Clarity  HAZY  (CLEAR)   18  17:35    


 


Urine pH  5.0  (4.6 - 8.0)   18  17:35    


 


Ur Specific Gravity  1.020  (1.005-1.030)   18  17:35    


 


Urine Protein  30 mg/dL (NEGATIVE)  H  18  17:35    


 


Urine Glucose (UA)  NEGATIVE mg/dL (NEGATIVE)   18  17:35    


 


Urine Ketones  NEGATIVE mg/dL (NEGATIVE)   18  17:35    


 


Urine Blood  TRACE  (NEGATIVE)   18  17:35    


 


Urine Nitrate  NEGATIVE  (NEGATIVE)   18  17:35    


 


Urine Bilirubin  NEGATIVE  (NEGATIVE)   18  17:35    


 


Urine Urobilinogen  0.2 E.U./dL (0.2 - 1.0)   18  17:35    


 


Ur Leukocyte Esterase  TRACE  (NEGATIVE)  H  18  17:35    


 


Urine RBC  0-2 /hpf (0-5)  H  18  17:35    


 


Urine WBC  0-2 /hpf (0-5)   18  17:35    


 


Ur Epithelial Cells  OCCASIONAL /lpf (FEW)   18  17:35    


 


Amorphous Sediment  FEW URATES  (NONE SEEN)   18  17:35    


 


Urine Bacteria  FEW /hpf (NONE SEEN)   18  17:35    


 


Vancomycin Trough  16.8 ug/mL (5-10)  H  18  09:30    


 


Random Vancomycin  22.8 ug/mL (5.0-40.0)   18  04:45    














- Physical Exam


Vitals and I&O: 


 Vital Signs











Temp  99 F   18 14:00


 


Pulse  95   18 15:00


 


Resp  25   18 15:00


 


BP  124/80   18 15:00


 


Pulse Ox  97   18 15:00








 Intake & Output











 18





 18:59 06:59 18:59


 


Intake Total 2948.985 7105.016 102.373


 


Output Total 1300 2400 


 


Balance 615.979 -595.984 102.373


 


Weight (lbs) 100.244 kg 100.244 kg 


 


Intake:   


 


  Intake, IV Amount 834.670 4305.016 102.373


 


    Cefepime 1 gm In Dextrose 50 50 50





    5% 50 ml @ 100 mls/hr IV   





    Q12HR [facility] Rx#:[id number]   


 


    D5-0.9%Ns 1,000 ml @ 30  465.5 





    mls/hr IV .Q24H [facility] Rx#:   





    [id number]   


 


    Multivitamin Inj 10 ml In  872.667 





    Dextrose 70% 1,170 ml In   





    Amino Acids 10% 500 ml @   





    70 mls/hr IV .Q24H [facility]   





    Rx#:[id number]   


 


    Propofol 1,000 mg In 100 70.979 215.849 52.373





    ml @ Per Protocol IV TITR   





    [facility] Rx#:[id number]   


 


    metroNIDAZOLE 500mg/ 200 





    100mL 500 mg In 100 ml @   





    100 mls/hr IV Q8H [facility] Rx#   





    :[id number]   


 


  TPN/  


 


  Other 975  


 


Output:   


 


  Gastric Drainage  400 


 


  Urine 1300 2000 


 


Other:   


 


  # Bowel Movements 0 0 


 


  Weight Source Bedscale Bedscale 











Active Medications: 


Current Medications





Acetaminophen/Hydrocodone Bitart (Norco 10 Mg/325 Mg)  1 tab PO Q6H PRN


   PRN Reason: Pain (Severe)


   Stop: 18 20:04


   Last Admin: 18 03:03 Dose:  1 tab


Albuterol/Ipratropium (Duoneb Neb)  3 ml HHN Q4HRT [facility]


   Stop: 18 14:59


   Last Admin: 18 14:01 Dose:  3 ml


Budesonide (Pulmicort)  0.5 mg HHN BIDRT [facility]


   Stop: 18 18:59


   Last Admin: 18 06:53 Dose:  0.5 mg


Chlorhexidine Gluconate (Peridex)  15 ml MM 08, [facility]


   Stop: 18 07:59


   Last Admin: 18 09:25 Dose:  15 ml


Cholestyramine Resin (Questran)  4 gm PO BID [facility]


   Stop: 18 16:59


   Last Admin: 18 09:46 Dose:  Not Given


Enoxaparin Sodium (Lovenox)  30 mg SUBQ Q12HR [facility]


   Stop: 18 20:59


   Last Admin: 18 09:08 Dose:  30 mg


Hydromorphone HCl (Dilaudid)  1 mg IVP Q3HR PRN


   PRN Reason: SEVERE PAIN


   Stop: 18 16:25


   Last Admin: 18 09:00 Dose:  1 mg


Metronidazole (Flagyl)  500 mg in 100 mls @ 100 mls/hr IV Q8H [facility]


   Stop: 18 20:59


   Last Infusion: 18 05:27 Dose:  Infused


Cefepime HCl 1 gm/ Dextrose  50 mls @ 100 mls/hr IV Q12HR [facility]


   Stop: 18 20:59


   Last Infusion: 18 09:48 Dose:  Infused


Propofol (Diprivan)  1,000 mg in 100 mls @ 0 mls/hr IV TITR [facility]; Protocol


   Stop: 18 09:59


   Last Titration: 18 11:30 Dose:  13.98 mcg/kg/min, 7.8 mls/hr


Dextrose/Sodium Chloride (D5-0.9%Ns)  1,000 mls @ 30 mls/hr IV .Q24H [facility]


   Stop: 18 14:59


   Last Infusion: 18 06:00 Dose:  30 mls/hr


Multivitamins/Minerals 10 ml/Dextrose/ Amino Acids/Electrolytes  1,680 mls @ 70 

mls/hr IV .Q24H [facility]


   Stop: 18 14:59


   Last Infusion: 18 06:45 Dose:  70 mls/hr


Vancomycin HCl 1.5 gm/ Sodium (Chloride)  500 mls @ 250 mls/hr IV Q36H [facility]


   Stop: 18 17:59


Insulin Aspart (Novolog Insulin Sliding Scale)  0 units SUBQ Q6H [facility]; Protocol


   Stop: 18 11:59


   Last Admin: 18 12:40 Dose:  2 units


Lorazepam (Ativan)  1 mg IVP Q4HR PRN; Protocol


   PRN Reason: Anxiety


   Stop: 18 02:24


   Last Admin: 18 00:07 Dose:  1 mg


Methylprednisolone Sodium Succinate (Solu-Medrol)  60 mg IVP Q6H [facility]


   Stop: 18 16:29


   Last Admin: 18 10:59 Dose:  60 mg


Metoclopramide HCl (Reglan)  10 mg IVP Q6HR [facility]


   Stop: 18 17:59


   Last Admin: 18 06:11 Dose:  10 mg


Miscellaneous (Tpn Per Pharmacy)  1 [facility] PRN [facility]


   Stop: 18 17:59


Miscellaneous (Vancomycin Iv Per Pharmacy)  1 [facility] PRN PRN


   PRN Reason: PROTOCOL


   Stop: 18 14:14


Ondansetron HCl (Zofran)  4 mg IV Q4H PRN


   PRN Reason: Nausea / Vomiting


   Stop: 18 05:53


   Last Admin: 18 22:50 Dose:  4 mg


Pantoprazole Sodium (Protonix)  40 mg IVP BID [facility]


   Stop: 18 08:59


   Last Admin: 18 09:08 Dose:  40 mg


Zolpidem Tartrate (Ambien)  5 mg PO HS PRN


   PRN Reason: Insomnia


   Stop: 18 20:55


   Last Admin: 18 21:59 Dose:  5 mg








General: other (on 50 percent fio2,less distress.)


HEENT: NC/AT, PERRLA, EOMI, anicteric sclerae, throat clear


Neck: Supple, No JVD, No thyromegaly, +2 carotid pulse wo bruit, No LAD


Lungs: ronchi


Cardiovascular: RRR, Normal S1, Normal S2, without murmur, tachy


Abdomen: tender, distended


Extremities: clear


Neurological: unable to follow command





- Procedures


Procedures: 


 Procedures











Procedure Code Date


 


EXCISION OF CHEST SKIN, EXTERNAL APPROACH [id number] 18


 


EXCISION OF ILEUM, OPEN APPROACH [id number] 18


 


EXCISION OF RIGHT LOWER ARM SKIN, EXTERNAL APPROACH [id number] 18


 


EXCISION OF RIGHT LOWER LEG SKIN, EXTERNAL APPROACH [id number] 18


 


RELEASE ILEUM, OPEN APPROACH [id number] 18


 


RESPIRATORY VENTILATION, 24-96 CONSECUTIVE HOURS [id number] 18














Internal Medicine Assmt/Plan





- Assessment


Assessment: 





1.SP COLOSTOMY REVISION.


2.SP EXCISION BIOPSY FOR CHEST WALL LESION.


3.ACUTE BOWEL OBSTRUCTION


4.ACUTE RESPIRATORY FAILURE.


5.BILATERAL PNEUMONIA.





- Plan


Plan: 


CONTINUE ON CURRENT MEDICATION AND TPN.





Nutritional Asmnt/Malnutr-PDOC





- Dietary Evaluation


Malnutrition Findings (Please click <Entered> for more info): 








Nutritional Asmnt/Malnutrition                             Start:  18 14:

24


Text:                                                      Status: Complete    

  


Freq:                                                                          

  


Protocol:                                                                      

  


 Document     18 14:24  [facility]  (Rec: 18 14:35  [facility]  [id number])


 Nutritional Asmnt/Malnutrition


     Patient General Information


      Nutritional Screening                      High Risk


                                                 Consult


      Diagnosis                                  exploratory laparotomy with


                                                 recersal colostomy


      Pertinent Medical Hx/Surgical Hx           acute perforation of


                                                 diverticulitis, s/p


                                                 exploratory laparotomy and


                                                 divertin gcolostomy placement


      Subjective Information                     Pt seen lying in bed at time


                                                 of visit, awake and alert. Pt


                                                 stated he tolerated clear


                                                 liquid well, adequte amount


                                                 for him at this time. Pt has


                                                 RAQUEL drain noted. Per EMR, PO


                                                 intake % of clear liquid


                                                 .


      Current Diet Order/ Nutrition Support      clear liquid


      Pertinent Medications                      D5-0.9%ns, piperacillin,


                                                 vancomycin


      Pertinent Labs                              Na 133, K 3.4, Cr 0.7,


                                                 glucose 107, Ca 7.9


     Nutritional Hx/Data


      Height                                     1.88 m


      Height (Calculated Centimeters)            188.0


      Current Weight (lbs)                       86.636 kg


      Weight (Calculated Kilograms)              86.6


      Weight (Calculated Grams)                  36401.1


      Ideal Body Weight                          190


      Body Mass Index (BMI)                      24.5


      Weight Status                              Approriate


     GI Symptoms


      GI Symptoms                                None


      Last BM                                    none


      Difficult in:                              None


      Skin Integrity/Comment:                    RIGHT LOWER LEG HEALING


                                                 incision to right arm


     Estimated Nutritional Goals


      BEE in Kcals:                              Using Current wt


      Calories/Kcals/Kg                          25-30


      Kcals Calculated                           2238-9828


      Protein:                                   Using Current wt


      Protein g/k.8-1


      Protein Calculated                         69-86


      Fluid: ml                                  2150-2580ml (1ml/kcal)


     Nutritional Problem


      1. Problem


       Problem                                   altered GI function


       Etiology                                  s/p reversal colostomy


       Signs/Symptoms:                           pt on clear liquid


     Intervention/Recommendation


      Comments                                   1. Continue with clear liquid


                                                 diet as ordered.


                                                 2. Monitor PO intake, wt, labs


                                                 and skin integrity


                                                 3. F/U as high risk in 2-3


                                                 days, -


     Expected Outcomes/Goals


      Expected Outcomes/Goals                    1. PO intake to meet at least


                                                 75% of nutritional needs.


                                                 2. Wt stability, skin to


                                                 remain intact, labs to


                                                 approach WNL.

## 2018-06-29 NOTE — GENERAL PROGRESS NOTE
Subjective





- Review of Systems


Service Date: 18


Events since last encounter: 





marked xray improved


NGT drain minimal





Objective





- Results


Result Diagrams: 


 18 04:45





 18 04:45


Recent Labs: 


 Laboratory Last Values











WBC  11.1 Th/cmm (4.8-10.8)  H  18  04:45    


 


RBC  3.59 Mil/cmm (3.80-5.80)  L  18  04:45    


 


Hgb  10.6 gm/dL (12-16)  L  18  04:45    


 


Hct  31.9 % (41.0-60)  L  18  04:45    


 


MCV  88.7 fl (80-99)   18  04:45    


 


MCH  29.4 pg (27.0-31.0)   18  04:45    


 


MCHC Differential  33.2 pg (28.0-36.0)   18  04:45    


 


RDW  15.6 % (11.5-20.0)   18  04:45    


 


Plt Count  166 Th/cmm (150-400)   18  04:45    


 


MPV  8.9 fl  18  04:45    


 


Neutrophils %  75.1 % (40.0-80.0)   18  04:15    


 


Band Neutrophils %  2 % (0-10)   18  04:45    


 


Lymphocytes %  9.7 % (20.0-50.0)  L  18  04:15    


 


Monocytes %  14.9 % (2.0-10.0)  H  18  04:15    


 


Eosinophils %  0.1 % (0.0-5.0)   18  04:15    


 


Basophils %  0.2 % (0.0-2.0)   18  04:15    


 


Neutrophils (Manual)  83 % (40-80)  H  18  04:45    


 


Lymphocytes  12 % (20-50)  L  18  04:45    


 


Monocytes  3 % (2-10)   18  04:45    


 


Eosinophils  0 % (0-5)   18  18:24    


 


Basophils  0 % (0-3)   18  18:24    


 


Platelet Estimate  ADEQUATE  (NORMAL)   18  05:50    


 


Platelet Morphology  NORMAL  (NORMAL)   18  18:24    


 


Polychromasia  1+   18  04:40    


 


[place] Cells  1+   18  04:40    


 


RBC Morph Micro Appear  NORMAL  (NORMAL)   18  18:24    


 


PT  11.9 SECONDS (9.5-11.5)  H  18  12:08    


 


INR  1.13  (0.5-1.4)   18  12:08    


 


PTT (Actin FS)  22.3 SECONDS (26.0-38.0)  L  18  12:08    


 


Specimen Source  Arterial   18  09:42    


 


Sample Site  Left Radial   18  09:42    


 


pH  7.39  (7.35-7.45)   18  09:42    


 


pCO2  39.0 mmHg (35.0-45.0)   18  09:42    


 


pO2  85.0 mmHg (80.0-100.0)   18  09:42    


 


HCO3  24.0 mEq/L (20.0-26.0)   18  09:42    


 


Base Excess  -1.2 mEq/L (-3.0-3.0)   18  09:42    


 


O2 Saturation  96.0 % (92.0-100.0)   18  09:42    


 


Ceferino Test  YES   18  09:42    


 


Vent Rate  14   18  09:42    


 


Inspired O2  30   18  09:42    


 


Tidal Volume  500   18  09:42    


 


PEEP  5   18  09:42    


 


Pressure (ins/psv/peep)  NA   18  09:42    


 


Critical Value  E.ESPINOZA   18  09:42    


 


Sodium  143 mEq/L (136-145)   18  04:45    


 


Potassium  3.7 mEq/L (3.5-5.1)   18  04:45    


 


Chloride  115 mEq/L ()  H  18  04:45    


 


Carbon Dioxide  21.5 mEq/L (21.0-31.0)   18  04:45    


 


Anion Gap  10.2  (7.0-16.0)   18  04:45    


 


BUN  43 mg/dL (7-25)  H  18  04:45    


 


Creatinine  1.7 mg/dL (0.7-1.3)  H  18  04:45    


 


Est GFR ( Amer)  52.3 ml/min (>90)   18  04:45    


 


Est GFR (Non-Af Amer)  43.2 ml/min  18  04:45    


 


BUN/Creatinine Ratio  25.3   18  04:45    


 


Glucose  177 mg/dL ()  H  18  04:45    


 


POC Glucose  175 MG/DL (70 - 105)  H  18  12:36    


 


Hemoglobin A1c %  4.8 % (4.0-6.0)   18  04:40    


 


Whole Bld Lactic Acid  1.36 mmol/L (0.60-1.99)   18  05:50    


 


Calcium  8.2 mg/dL (8.6-10.3)  L  18  04:45    


 


Phosphorus  2.4 mg/dL (2.5-5.0)  L  18  04:45    


 


Magnesium  2.0 mg/dL (1.9-2.7)   18  04:45    


 


Total Bilirubin  0.3 mg/dL (0.3-1.0)   18  04:45    


 


Direct Bilirubin  0.05 mg/dL (0.0-0.2)   18  05:50    


 


AST  17 U/L (13-39)   18  04:45    


 


ALT  9 U/L (7-52)   18  04:45    


 


Alkaline Phosphatase  69 U/L ()   18  04:45    


 


Ammonia  54 umol/L (16-53)  H  18  05:50    


 


Total Protein  5.3 gm/dL (6.0-8.3)  L  18  04:45    


 


Albumin  2.2 gm/dL (4.2-5.5)  L  18  04:45    


 


Globulin  3.1 gm/dL  18  04:45    


 


Albumin/Globulin Ratio  0.7  (1.0-1.8)  L  18  04:45    


 


Prealbumin  7 mg/dL (10-36)  L  18  04:15    


 


Triglycerides  87 mg/dL (<150)   18  04:15    


 


Cholesterol  66 mg/dL (<200)   18  04:15    


 


Urine Source  CLEAN C   18  17:35    


 


Urine Color  YELLOW   18  17:35    


 


Urine Clarity  HAZY  (CLEAR)   18  17:35    


 


Urine pH  5.0  (4.6 - 8.0)   18  17:35    


 


Ur Specific Gravity  1.020  (1.005-1.030)   18  17:35    


 


Urine Protein  30 mg/dL (NEGATIVE)  H  18  17:35    


 


Urine Glucose (UA)  NEGATIVE mg/dL (NEGATIVE)   18  17:35    


 


Urine Ketones  NEGATIVE mg/dL (NEGATIVE)   18  17:35    


 


Urine Blood  TRACE  (NEGATIVE)   18  17:35    


 


Urine Nitrate  NEGATIVE  (NEGATIVE)   18  17:35    


 


Urine Bilirubin  NEGATIVE  (NEGATIVE)   18  17:35    


 


Urine Urobilinogen  0.2 E.U./dL (0.2 - 1.0)   18  17:35    


 


Ur Leukocyte Esterase  TRACE  (NEGATIVE)  H  18  17:35    


 


Urine RBC  0-2 /hpf (0-5)  H  18  17:35    


 


Urine WBC  0-2 /hpf (0-5)   18  17:35    


 


Ur Epithelial Cells  OCCASIONAL /lpf (FEW)   18  17:35    


 


Amorphous Sediment  FEW URATES  (NONE SEEN)   18  17:35    


 


Urine Bacteria  FEW /hpf (NONE SEEN)   18  17:35    


 


Vancomycin Trough  16.8 ug/mL (5-10)  H  18  09:30    


 


Random Vancomycin  22.8 ug/mL (5.0-40.0)   18  04:45    














- Physical Exam


Vitals and I&O: 


 Vital Signs











Temp  98.6 F   18 12:00


 


Pulse  105   18 12:00


 


Resp  20   18 12:00


 


BP  121/79   18 12:00


 


Pulse Ox  98   18 12:00








 Intake & Output











 18





 18:59 06:59 18:59


 


Intake Total 1909.710 9543.016 102.373


 


Output Total 1300 2400 


 


Balance 615.979 -595.984 102.373


 


Weight (lbs) 100.244 kg 100.244 kg 


 


Intake:   


 


  Intake, IV Amount 800.465 9265.016 102.373


 


    Cefepime 1 gm In Dextrose 50 50 50





    5% 50 ml @ 100 mls/hr IV   





    Q12HR [facility] Rx#:[id number]   


 


    D5-0.9%Ns 1,000 ml @ 30  465.5 





    mls/hr IV .Q24H [facility] Rx#:   





    [id number]   


 


    Multivitamin Inj 10 ml In  872.667 





    Dextrose 70% 1,170 ml In   





    Amino Acids 10% 500 ml @   





    70 mls/hr IV .Q24H [facility]   





    Rx#:[id number]   


 


    Propofol 1,000 mg In 100 70.979 215.849 52.373





    ml @ Per Protocol IV TITR   





    [facility] Rx#:[id number]   


 


    metroNIDAZOLE 500mg/ 200 





    100mL 500 mg In 100 ml @   





    100 mls/hr IV Q8H [facility] Rx#   





    :[id number]   


 


  TPN/  


 


  Other 975  


 


Output:   


 


  Gastric Drainage  400 


 


  Urine 1300 2000 


 


Other:   


 


  # Bowel Movements 0 0 


 


  Weight Source Bedscale Bedscale 











Active Medications: 


Current Medications





Acetaminophen/Hydrocodone Bitart (Norco 10 Mg/325 Mg)  1 tab PO Q6H PRN


   PRN Reason: Pain (Severe)


   Stop: 18 20:04


   Last Admin: 18 03:03 Dose:  1 tab


Albuterol/Ipratropium (Duoneb Neb)  3 ml HHN Q4HRT [facility]


   Stop: 18 14:59


   Last Admin: 18 10:40 Dose:  3 ml


Budesonide (Pulmicort)  0.5 mg HHN BIDRT [facility]


   Stop: 18 18:59


   Last Admin: 18 06:53 Dose:  0.5 mg


Chlorhexidine Gluconate (Peridex)  15 ml MM  [facility]


   Stop: 18 07:59


   Last Admin: 18 09:25 Dose:  15 ml


Cholestyramine Resin (Questran)  4 gm PO BID [facility]


   Stop: 18 16:59


   Last Admin: 18 09:46 Dose:  Not Given


Enoxaparin Sodium (Lovenox)  30 mg SUBQ Q12HR [facility]


   Stop: 18 20:59


   Last Admin: 18 09:08 Dose:  30 mg


Hydromorphone HCl (Dilaudid)  1 mg IVP Q3HR PRN


   PRN Reason: SEVERE PAIN


   Stop: 18 16:25


   Last Admin: 18 09:00 Dose:  1 mg


Metronidazole (Flagyl)  500 mg in 100 mls @ 100 mls/hr IV Q8H [facility]


   Stop: 18 20:59


   Last Infusion: 18 05:27 Dose:  Infused


Cefepime HCl 1 gm/ Dextrose  50 mls @ 100 mls/hr IV Q12HR [facility]


   Stop: 18 20:59


   Last Infusion: 18 09:48 Dose:  Infused


Propofol (Diprivan)  1,000 mg in 100 mls @ 0 mls/hr IV TITR [facility]; Protocol


   Stop: 18 09:59


   Last Titration: 18 11:30 Dose:  13.98 mcg/kg/min, 7.8 mls/hr


Dextrose/Sodium Chloride (D5-0.9%Ns)  1,000 mls @ 30 mls/hr IV .Q24H [facility]


   Stop: 18 14:59


   Last Infusion: 18 06:00 Dose:  30 mls/hr


Multivitamins/Minerals 10 ml/Dextrose/ Amino Acids/Electrolytes  1,680 mls @ 70 

mls/hr IV .Q24H [facility]


   Stop: 18 14:59


   Last Infusion: 18 06:45 Dose:  70 mls/hr


Vancomycin HCl 1.5 gm/ Sodium (Chloride)  500 mls @ 250 mls/hr IV Q36H [facility]


   Stop: 18 17:59


Insulin Aspart (Novolog Insulin Sliding Scale)  0 units SUBQ Q6H [facility]; Protocol


   Stop: 18 11:59


   Last Admin: 18 12:40 Dose:  2 units


Lorazepam (Ativan)  1 mg IVP Q4HR [facility]; Protocol


   PRN Reason: Anxiety


   Stop: 18 02:24


   Last Admin: 18 00:07 Dose:  1 mg


Methylprednisolone Sodium Succinate (Solu-Medrol)  60 mg IVP Q6H [facility]


   Stop: 18 16:29


   Last Admin: 18 10:59 Dose:  60 mg


Metoclopramide HCl (Reglan)  10 mg IVP Q6HR [facility]


   Stop: 18 17:59


   Last Admin: 18 06:11 Dose:  10 mg


Miscellaneous (Tpn Per Pharmacy)  1 ea MC PRN [facility]


   Stop: 18 17:59


Miscellaneous (Vancomycin Iv Per Pharmacy)  1 ea  PRN PRN


   PRN Reason: PROTOCOL


   Stop: 18 14:14


Ondansetron HCl (Zofran)  4 mg IV Q4H PRN


   PRN Reason: Nausea / Vomiting


   Stop: 18 05:53


   Last Admin: 18 22:50 Dose:  4 mg


Pantoprazole Sodium (Protonix)  40 mg IVP BID [facility]


   Stop: 18 08:59


   Last Admin: 18 09:08 Dose:  40 mg


Zolpidem Tartrate (Ambien)  5 mg PO HS PRN


   PRN Reason: Insomnia


   Stop: 18 20:55


   Last Admin: 18 21:59 Dose:  5 mg








General: Alert, Cooperative, No acute distress


HEENT: Atraumatic


Neck: Supple


Cardiovascular: Regular rate, Normal S1, Normal S2


Lungs: Clear to auscultation


Abdomen: Distended





- Procedures


Procedures: 


 Procedures











Procedure Code Date


 


EXCISION OF CHEST SKIN, EXTERNAL APPROACH [id number] 18


 


EXCISION OF ILEUM, OPEN APPROACH [id number] 18


 


EXCISION OF RIGHT LOWER ARM SKIN, EXTERNAL APPROACH [id number] 18


 


EXCISION OF RIGHT LOWER LEG SKIN, EXTERNAL APPROACH [id number] 18


 


RELEASE ILEUM, OPEN APPROACH [id number] 18


 


RESPIRATORY VENTILATION, 24-96 CONSECUTIVE HOURS [id number] 18














Nutritional Asmnt/Malnutr-PDOC





- Dietary Evaluation


Malnutrition Findings (Please click <Entered> for more info): 








Nutritional Asmnt/Malnutrition                             Start:  18 14:

24


Text:                                                      Status: Complete    

  


Freq:                                                                          

  


Protocol:                                                                      

  


 Document     18 14:24  KAYE  (Rec: 18 14:35  KAYE  [id number])


 Nutritional Asmnt/Malnutrition


     Patient General Information


      Nutritional Screening                      High Risk


                                                 Consult


      Diagnosis                                  exploratory laparotomy with


                                                 recersal colostomy


      Pertinent Medical Hx/Surgical Hx           acute perforation of


                                                 diverticulitis, s/p


                                                 exploratory laparotomy and


                                                 divertin gcolostomy placement


      Subjective Information                     Pt seen lying in bed at time


                                                 of visit, awake and alert. Pt


                                                 stated he tolerated clear


                                                 liquid well, adequte amount


                                                 for him at this time. Pt has


                                                 RAQUEL drain noted. Per EMR, PO


                                                 intake % of clear liquid


                                                 .


      Current Diet Order/ Nutrition Support      clear liquid


      Pertinent Medications                      D5-0.9%ns, piperacillin,


                                                 vancomycin


      Pertinent Labs                              Na 133, K 3.4, Cr 0.7,


                                                 glucose 107, Ca 7.9


     Nutritional Hx/Data


      Height                                     1.88 m


      Height (Calculated Centimeters)            188.0


      Current Weight (lbs)                       86.636 kg


      Weight (Calculated Kilograms)              86.6


      Weight (Calculated Grams)                  89653.1


      Ideal Body Weight                          190


      Body Mass Index (BMI)                      24.5


      Weight Status                              Approriate


     GI Symptoms


      GI Symptoms                                None


      Last BM                                    none


      Difficult in:                              None


      Skin Integrity/Comment:                    RIGHT LOWER LEG HEALING


                                                 incision to right arm


     Estimated Nutritional Goals


      BEE in Kcals:                              Using Current wt


      Calories/Kcals/Kg                          25-30


      Kcals Calculated                           0452-7926


      Protein:                                   Using Current wt


      Protein g/k.8-1


      Protein Calculated                         69-86


      Fluid: ml                                  2150-2580ml (1ml/kcal)


     Nutritional Problem


      1. Problem


       Problem                                   altered GI function


       Etiology                                  s/p reversal colostomy


       Signs/Symptoms:                           pt on clear liquid


     Intervention/Recommendation


      Comments                                   1. Continue with clear liquid


                                                 diet as ordered.


                                                 2. Monitor PO intake, wt, labs


                                                 and skin integrity


                                                 3. F/U as high risk in 2-3


                                                 days, -


     Expected Outcomes/Goals


      Expected Outcomes/Goals                    1. PO intake to meet at least


                                                 75% of nutritional needs.


                                                 2. Wt stability, skin to


                                                 remain intact, labs to


                                                 approach WNL.

## 2018-06-29 NOTE — INFECTIOUS DISEASE PROG NOTE
Infectious Disease Subjective





- Review of Systems


Service Date: 18


Subjective: 





There is no new change, no fever. Diarrhea better.


Doing better. Remains intubated orally, on the ventilator support.


SIMV mode, on weaning protocol.





Infectious Disease Objective





- Results


Result Diagrams: 


 18 04:45





 18 04:45


Recent Labs: 


 Laboratory Last Values











WBC  11.1 Th/cmm (4.8-10.8)  H  18  04:45    


 


RBC  3.59 Mil/cmm (3.80-5.80)  L  18  04:45    


 


Hgb  10.6 gm/dL (12-16)  L  18  04:45    


 


Hct  31.9 % (41.0-60)  L  18  04:45    


 


MCV  88.7 fl (80-99)   18  04:45    


 


MCH  29.4 pg (27.0-31.0)   18  04:45    


 


MCHC Differential  33.2 pg (28.0-36.0)   18  04:45    


 


RDW  15.6 % (11.5-20.0)   18  04:45    


 


Plt Count  166 Th/cmm (150-400)   18  04:45    


 


MPV  8.9 fl  18  04:45    


 


Neutrophils %  75.1 % (40.0-80.0)   18  04:15    


 


Band Neutrophils %  2 % (0-10)   18  04:45    


 


Lymphocytes %  9.7 % (20.0-50.0)  L  18  04:15    


 


Monocytes %  14.9 % (2.0-10.0)  H  18  04:15    


 


Eosinophils %  0.1 % (0.0-5.0)   18  04:15    


 


Basophils %  0.2 % (0.0-2.0)   18  04:15    


 


Neutrophils (Manual)  83 % (40-80)  H  18  04:45    


 


Lymphocytes  12 % (20-50)  L  18  04:45    


 


Monocytes  3 % (2-10)   18  04:45    


 


Eosinophils  0 % (0-5)   18  18:24    


 


Basophils  0 % (0-3)   18  18:24    


 


Platelet Estimate  ADEQUATE  (NORMAL)   18  05:50    


 


Platelet Morphology  NORMAL  (NORMAL)   18  18:24    


 


Polychromasia  1+   18  04:40    


 


Ledyard Cells  1+   18  04:40    


 


RBC Morph Micro Appear  NORMAL  (NORMAL)   18  18:24    


 


PT  11.9 SECONDS (9.5-11.5)  H  18  12:08    


 


INR  1.13  (0.5-1.4)   18  12:08    


 


PTT (Actin FS)  22.3 SECONDS (26.0-38.0)  L  18  12:08    


 


Specimen Source  Arterial   18  09:42    


 


Sample Site  Left Radial   18  09:42    


 


pH  7.39  (7.35-7.45)   18  09:42    


 


pCO2  39.0 mmHg (35.0-45.0)   18  09:42    


 


pO2  85.0 mmHg (80.0-100.0)   18  09:42    


 


HCO3  24.0 mEq/L (20.0-26.0)   18  09:42    


 


Base Excess  -1.2 mEq/L (-3.0-3.0)   18  09:42    


 


O2 Saturation  96.0 % (92.0-100.0)   18  09:42    


 


Ceferino Test  YES   18  09:42    


 


Vent Rate  14   18  09:42    


 


Inspired O2  30   18  09:42    


 


Tidal Volume  500   18  09:42    


 


PEEP  5   18  09:42    


 


Pressure (ins/psv/peep)  NA   18  09:42    


 


Critical Value  E.ESPINOZA   18  09:42    


 


Sodium  143 mEq/L (136-145)   18  04:45    


 


Potassium  3.7 mEq/L (3.5-5.1)   18  04:45    


 


Chloride  115 mEq/L ()  H  18  04:45    


 


Carbon Dioxide  21.5 mEq/L (21.0-31.0)   18  04:45    


 


Anion Gap  10.2  (7.0-16.0)   18  04:45    


 


BUN  43 mg/dL (7-25)  H  18  04:45    


 


Creatinine  1.7 mg/dL (0.7-1.3)  H  18  04:45    


 


Est GFR ( Amer)  52.3 ml/min (>90)   18  04:45    


 


Est GFR (Non-Af Amer)  43.2 ml/min  18  04:45    


 


BUN/Creatinine Ratio  25.3   18  04:45    


 


Glucose  177 mg/dL ()  H  18  04:45    


 


POC Glucose  175 MG/DL (70 - 105)  H  18  12:36    


 


Hemoglobin A1c %  4.8 % (4.0-6.0)   18  04:40    


 


Whole Bld Lactic Acid  1.36 mmol/L (0.60-1.99)   18  05:50    


 


Calcium  8.2 mg/dL (8.6-10.3)  L  18  04:45    


 


Phosphorus  2.4 mg/dL (2.5-5.0)  L  18  04:45    


 


Magnesium  2.0 mg/dL (1.9-2.7)   18  04:45    


 


Total Bilirubin  0.3 mg/dL (0.3-1.0)   18  04:45    


 


Direct Bilirubin  0.05 mg/dL (0.0-0.2)   18  05:50    


 


AST  17 U/L (13-39)   18  04:45    


 


ALT  9 U/L (7-52)   18  04:45    


 


Alkaline Phosphatase  69 U/L ()   18  04:45    


 


Ammonia  54 umol/L (16-53)  H  18  05:50    


 


Total Protein  5.3 gm/dL (6.0-8.3)  L  18  04:45    


 


Albumin  2.2 gm/dL (4.2-5.5)  L  18  04:45    


 


Globulin  3.1 gm/dL  18  04:45    


 


Albumin/Globulin Ratio  0.7  (1.0-1.8)  L  18  04:45    


 


Prealbumin  7 mg/dL (10-36)  L  18  04:15    


 


Triglycerides  87 mg/dL (<150)   18  04:15    


 


Cholesterol  66 mg/dL (<200)   18  04:15    


 


Urine Source  CLEAN C   18  17:35    


 


Urine Color  YELLOW   18  17:35    


 


Urine Clarity  HAZY  (CLEAR)   18  17:35    


 


Urine pH  5.0  (4.6 - 8.0)   18  17:35    


 


Ur Specific Gravity  1.020  (1.005-1.030)   18  17:35    


 


Urine Protein  30 mg/dL (NEGATIVE)  H  18  17:35    


 


Urine Glucose (UA)  NEGATIVE mg/dL (NEGATIVE)   18  17:35    


 


Urine Ketones  NEGATIVE mg/dL (NEGATIVE)   18  17:35    


 


Urine Blood  TRACE  (NEGATIVE)   18  17:35    


 


Urine Nitrate  NEGATIVE  (NEGATIVE)   18  17:35    


 


Urine Bilirubin  NEGATIVE  (NEGATIVE)   18  17:35    


 


Urine Urobilinogen  0.2 E.U./dL (0.2 - 1.0)   18  17:35    


 


Ur Leukocyte Esterase  TRACE  (NEGATIVE)  H  18  17:35    


 


Urine RBC  0-2 /hpf (0-5)  H  18  17:35    


 


Urine WBC  0-2 /hpf (0-5)   18  17:35    


 


Ur Epithelial Cells  OCCASIONAL /lpf (FEW)   18  17:35    


 


Amorphous Sediment  FEW URATES  (NONE SEEN)   18  17:35    


 


Urine Bacteria  FEW /hpf (NONE SEEN)   18  17:35    


 


Vancomycin Trough  16.8 ug/mL (5-10)  H  18  09:30    


 


Random Vancomycin  22.8 ug/mL (5.0-40.0)   18  04:45    














- Physical Exam


Vitals and I&O: 


 Vital Signs











Temp  98.6 F   18 10:00


 


Pulse  86   18 11:30


 


Resp  14   18 11:00


 


BP  100/66   18 11:30


 


Pulse Ox  100   18 11:00








 Intake & Output











 1818





 18:59 06:59 18:59


 


Intake Total 1018.396 4579.016 102.373


 


Output Total 1300 2400 


 


Balance 615.979 -595.984 102.373


 


Weight (lbs) 100.244 kg 100.244 kg 


 


Intake:   


 


  Intake, IV Amount 557.298 0826.016 102.373


 


    Cefepime 1 gm In Dextrose 50 50 50





    5% 50 ml @ 100 mls/hr IV   





    Q12HR KATHY Rx#:[id number]   


 


    D5-0.9%Ns 1,000 ml @ 30  465.5 





    mls/hr IV .Q24H [facility]ATHY Rx#:   





    [id number]   


 


    Multivitamin Inj 10 ml In  872.667 





    Dextrose 70% 1,170 ml In   





    Amino Acids 10% 500 ml @   





    70 mls/hr IV .Q24H KATHY   





    Rx#:[id number]   


 


    Propofol 1,000 mg In 100 70.979 215.849 52.373





    ml @ Per Protocol IV TITR   





    KATHY Rx#:[id number]   


 


    metroNIDAZOLE 500mg/ 200 





    100mL 500 mg In 100 ml @   





    100 mls/hr IV Q8H [facility] Rx#   





    :[id number]   


 


  TPN/  


 


  Other 975  


 


Output:   


 


  Gastric Drainage  400 


 


  Urine 1300 2000 


 


Other:   


 


  # Bowel Movements 0 0 


 


  Weight Source Bedscale Bedscale 











Active Medications: 


Current Medications





Acetaminophen/Hydrocodone Bitart (Norco 10 Mg/325 Mg)  1 tab PO Q6H PRN


   PRN Reason: Pain (Severe)


   Stop: 18 20:04


   Last Admin: 18 03:03 Dose:  1 tab


Albuterol/Ipratropium (Duoneb Neb)  3 ml HHN Q4HRT [facility]


   Stop: 18 14:59


   Last Admin: 18 10:40 Dose:  3 ml


Budesonide (Pulmicort)  0.5 mg HHN BIDRT [facility]


   Stop: 18 18:59


   Last Admin: 18 06:53 Dose:  0.5 mg


Chlorhexidine Gluconate (Peridex)  15 ml MM  [facility]


   Stop: 18 07:59


   Last Admin: 18 09:25 Dose:  15 ml


Cholestyramine Resin (Questran)  4 gm PO BID [facility]


   Stop: 18 16:59


   Last Admin: 18 09:46 Dose:  Not Given


Enoxaparin Sodium (Lovenox)  30 mg SUBQ Q12HR [facility]


   Stop: 18 20:59


   Last Admin: 18 09:08 Dose:  30 mg


Hydromorphone HCl (Dilaudid)  1 mg IVP Q3HR PRN


   PRN Reason: SEVERE PAIN


   Stop: 18 16:25


   Last Admin: 18 09:00 Dose:  1 mg


Metronidazole (Flagyl)  500 mg in 100 mls @ 100 mls/hr IV Q8H [facility]


   Stop: 18 20:59


   Last Infusion: 18 05:27 Dose:  Infused


Cefepime HCl 1 gm/ Dextrose  50 mls @ 100 mls/hr IV Q12HR [facility]


   Stop: 18 20:59


   Last Infusion: 18 09:48 Dose:  Infused


Propofol (Diprivan)  1,000 mg in 100 mls @ 0 mls/hr IV TITR [facility]; Protocol


   Stop: 18 09:59


   Last Titration: 18 11:30 Dose:  13.98 mcg/kg/min, 7.8 mls/hr


Dextrose/Sodium Chloride (D5-0.9%Ns)  1,000 mls @ 30 mls/hr IV .Q24H [facility]


   Stop: 18 14:59


   Last Infusion: 18 06:00 Dose:  30 mls/hr


Multivitamins/Minerals 10 ml/Dextrose/ Amino Acids/Electrolytes  1,680 mls @ 70 

mls/hr IV .Q24H [facility]


   Stop: 18 14:59


   Last Infusion: 18 06:45 Dose:  70 mls/hr


Vancomycin HCl 1.5 gm/ Sodium (Chloride)  500 mls @ 250 mls/hr IV Q36H [facility]


   Stop: 18 17:59


Insulin Aspart (Novolog Insulin Sliding Scale)  0 units SUBQ Q6H [facility]; Protocol


   Stop: 18 11:59


   Last Admin: 18 12:40 Dose:  2 units


Lorazepam (Ativan)  1 mg IVP Q4HR [facility]; Protocol


   PRN Reason: Anxiety


   Stop: 18 02:24


   Last Admin: 18 00:07 Dose:  1 mg


Methylprednisolone Sodium Succinate (Solu-Medrol)  60 mg IVP Q6H [facility]


   Stop: 18 16:29


   Last Admin: 18 10:59 Dose:  60 mg


Metoclopramide HCl (Reglan)  10 mg IVP Q6HR [facility]


   Stop: 18 17:59


   Last Admin: 18 06:11 Dose:  10 mg


Miscellaneous (Tpn Per Pharmacy)  [address] [facility] PRN [facility]


   Stop: 18 17:59


Miscellaneous (Vancomycin Iv Per Pharmacy)  [address] [facility] PRN PRN


   PRN Reason: PROTOCOL


   Stop: 18 14:14


Ondansetron HCl (Zofran)  4 mg IV Q4H PRN


   PRN Reason: Nausea / Vomiting


   Stop: 18 05:53


   Last Admin: 18 22:50 Dose:  4 mg


Pantoprazole Sodium (Protonix)  40 mg IVP BID [facility]


   Stop: 18 08:59


   Last Admin: 18 09:08 Dose:  40 mg


Zolpidem Tartrate (Ambien)  5 mg PO HS PRN


   PRN Reason: Insomnia


   Stop: 18 20:55


   Last Admin: 18 21:59 Dose:  5 mg








General: no acute distress, well developed, well nourished


HEENT: atraumatic, normocephalic, PERRLA, EOMI, moist mucous membrane


Neck: supple, no thyromegaly


Cardiovascular: S1S2, regular


Lungs: clear to percussion, crackles


Abdomen: soft, no tender, no distended, no mass


Extremities: no cyanosis, no clubbing, no edema


Neurological: awake, alert, oriented


Skin: intact





- Procedures


Procedures: 


 Procedures











Procedure Code Date


 


EXCISION OF CHEST SKIN, EXTERNAL APPROACH 0LB4NUI 18


 


EXCISION OF ILEUM, OPEN APPROACH 9SRC5QS 18


 


EXCISION OF RIGHT LOWER ARM SKIN, EXTERNAL APPROACH [id number] 18


 


EXCISION OF RIGHT LOWER LEG SKIN, EXTERNAL APPROACH 0HBKXZZ 18


 


RELEASE ILEUM, OPEN APPROACH [id number] 18


 


RESPIRATORY VENTILATION, 24-96 CONSECUTIVE HOURS 4S7996R 18














Infectious Disease Assmt/Plan





- Assessment


Assessment: 





1. Diarrhea improving.  Stool for C. difficile negative.


2.  Fever, will do fever w/u.


3.  COPD.


4.  Status post closure of diverting ileostomy.


5.  Status post debridement of laceration wound of the right leg and right 

forearm.


6.  Acute renal failure, acute kidney injury.


7.  Distended abdomen, likely ileus.


8. VDRF.


9. Aspiration and HAP.





- Plan


Plan: 





Sepsis workup.     continue vanco, cefepime and Flagyl.





Nutritional Asmnt/Malnutr-PDOC





- Dietary Evaluation


Malnutrition Findings (Please click <Entered> for more info): 








Nutritional Asmnt/Malnutrition                             Start:  18 14:

24


Text:                                                      Status: Complete    

  


Freq:                                                                          

  


Protocol:                                                                      

  


 Document     18 14:24  LCHENG  (Rec: 18 14:35  HEN  [id number])


 Nutritional Asmnt/Malnutrition


     Patient General Information


      Nutritional Screening                      High Risk


                                                 Consult


      Diagnosis                                  exploratory laparotomy with


                                                 recersal colostomy


      Pertinent Medical Hx/Surgical Hx           acute perforation of


                                                 diverticulitis, s/p


                                                 exploratory laparotomy and


                                                 divertin gcolostomy placement


      Subjective Information                     Pt seen lying in bed at time


                                                 of visit, awake and alert. Pt


                                                 stated he tolerated clear


                                                 liquid well, adequte amount


                                                 for him at this time. Pt has


                                                 RAQUEL drain noted. Per EMR, PO


                                                 intake % of clear liquid


                                                 .


      Current Diet Order/ Nutrition Support      clear liquid


      Pertinent Medications                      D5-0.9%ns, piperacillin,


                                                 vancomycin


      Pertinent Labs                              Na 133, K 3.4, Cr 0.7,


                                                 glucose 107, Ca 7.9


     Nutritional Hx/Data


      Height                                     1.88 m


      Height (Calculated Centimeters)            188.0


      Current Weight (lbs)                       86.636 kg


      Weight (Calculated Kilograms)              86.6


      Weight (Calculated Grams)                  62050.1


      Ideal Body Weight                          190


      Body Mass Index (BMI)                      24.5


      Weight Status                              Approriate


     GI Symptoms


      GI Symptoms                                None


      Last BM                                    none


      Difficult in:                              None


      Skin Integrity/Comment:                    RIGHT LOWER LEG HEALING


                                                 incision to right arm


     Estimated Nutritional Goals


      BEE in Kcals:                              Using Current wt


      Calories/Kcals/Kg                          25-30


      Kcals Calculated                           7953-9126


      Protein:                                   Using Current wt


      Protein g/k.8-1


      Protein Calculated                         69-86


      Fluid: ml                                  2150-2580ml (1ml/kcal)


     Nutritional Problem


      1. Problem


       Problem                                   altered GI function


       Etiology                                  s/p reversal colostomy


       Signs/Symptoms:                           pt on clear liquid


     Intervention/Recommendation


      Comments                                   1. Continue with clear liquid


                                                 diet as ordered.


                                                 2. Monitor PO intake, wt, labs


                                                 and skin integrity


                                                 3. F/U as high risk in 2-3


                                                 days, -


     Expected Outcomes/Goals


      Expected Outcomes/Goals                    1. PO intake to meet at least


                                                 75% of nutritional needs.


                                                 2. Wt stability, skin to


                                                 remain intact, labs to


                                                 approach WNL.

## 2018-06-30 LAB
ALBUMIN SERPL-MCNC: 2.1 GM/DL (ref 4.2–5.5)
ALBUMIN/GLOB SERPL: 0.7 {RATIO} (ref 1–1.8)
ALP SERPL-CCNC: 65 U/L (ref 34–104)
ALT SERPL-CCNC: 13 U/L (ref 7–52)
ANION GAP SERPL CALC-SCNC: 9.4 MMOL/L (ref 7–16)
AST SERPL-CCNC: 22 U/L (ref 13–39)
BILIRUB SERPL-MCNC: 0.4 MG/DL (ref 0.3–1)
BUN SERPL-MCNC: 52 MG/DL (ref 7–25)
CALCIUM SERPL-MCNC: 8.1 MG/DL (ref 8.6–10.3)
CHLORIDE SERPL-SCNC: 118 MEQ/L (ref 98–107)
CO2 SERPL-SCNC: 23 MEQ/L (ref 21–31)
CREAT SERPL-MCNC: 1.6 MG/DL (ref 0.7–1.3)
ERYTHROCYTE [DISTWIDTH] IN BLOOD BY AUTOMATED COUNT: 15 % (ref 11.5–20)
GLOBULIN SER-MCNC: 2.9 GM/DL
GLUCOSE SERPL-MCNC: 170 MG/DL (ref 70–105)
HCT VFR BLD CALC: 31.8 % (ref 41–60)
HGB BLD-MCNC: 10.6 GM/DL (ref 12–16)
LYMPHOCYTES # BLD MANUAL: 10 % (ref 20–50)
MAGNESIUM SERPL-MCNC: 1.8 MG/DL (ref 1.9–2.7)
MCH RBC QN AUTO: 29 PG (ref 27–31)
MCHC RBC AUTO-ENTMCNC: 33.2 PG (ref 28–36)
MCV RBC AUTO: 87.4 FL (ref 80–99)
MONOCYTES # BLD MANUAL: 5 % (ref 2–10)
NEUTROPHILS NFR BLD AUTO: 85 % (ref 40–80)
PCO2 BLDA: 38 MMHG (ref 35–45)
PHOSPHATE SERPL-MCNC: 2.6 MG/DL (ref 2.5–5)
PLATELET # BLD: 149 TH/CMM (ref 150–400)
PMV BLD AUTO: 8.6 FL
PO2 BLDA: 84 MMHG (ref 80–100)
POTASSIUM SERPL-SCNC: 3.4 MEQ/L (ref 3.5–5.1)
RBC # BLD AUTO: 3.64 MIL/CMM (ref 3.8–5.8)
SAO2 % BLDA: 97 % (ref 92–100)
SODIUM SERPL-SCNC: 147 MEQ/L (ref 136–145)
TOTAL CELLS COUNTED BLD: 100
TRIGL SERPL-MCNC: 83 MG/DL (ref ?–150)
WBC # BLD AUTO: 8.8 TH/CMM (ref 4.8–10.8)

## 2018-06-30 RX ADMIN — CHLORHEXIDINE GLUCONATE SCH ML: 1.2 RINSE ORAL at 09:00

## 2018-06-30 RX ADMIN — IPRATROPIUM BROMIDE AND ALBUTEROL SULFATE SCH ML: .5; 3 SOLUTION RESPIRATORY (INHALATION) at 15:34

## 2018-06-30 RX ADMIN — METRONIDAZOLE SCH MLS/HR: 500 INJECTION, SOLUTION INTRAVENOUS at 21:20

## 2018-06-30 RX ADMIN — BUDESONIDE SCH MG: 0.5 SUSPENSION RESPIRATORY (INHALATION) at 19:47

## 2018-06-30 RX ADMIN — ASCORBIC ACID, VITAMIN A PALMITATE, CHOLECALCIFEROL, THIAMINE HYDROCHLORIDE, RIBOFLAVIN-5 PHOSPHATE SODIUM, PYRIDOXINE HYDROCHLORIDE, NIACINAMIDE, DEXPANTHENOL, ALPHA-TOCOPHEROL ACETATE, VITAMIN K1, FOLIC ACID, BIOTIN, CYANOCOBALAMIN SCH MLS/HR: 200; 3300; 200; 6; 3.6; 6; 40; 15; 10; 150; 600; 60; 5 INJECTION, SOLUTION INTRAVENOUS at 15:42

## 2018-06-30 RX ADMIN — INSULIN ASPART SCH: 100 INJECTION, SOLUTION INTRAVENOUS; SUBCUTANEOUS at 18:33

## 2018-06-30 RX ADMIN — BUDESONIDE SCH MG: 0.5 SUSPENSION RESPIRATORY (INHALATION) at 07:21

## 2018-06-30 RX ADMIN — IPRATROPIUM BROMIDE AND ALBUTEROL SULFATE SCH ML: .5; 3 SOLUTION RESPIRATORY (INHALATION) at 19:47

## 2018-06-30 RX ADMIN — ENOXAPARIN SODIUM SCH MG: 100 INJECTION SUBCUTANEOUS at 21:20

## 2018-06-30 RX ADMIN — IPRATROPIUM BROMIDE AND ALBUTEROL SULFATE SCH ML: .5; 3 SOLUTION RESPIRATORY (INHALATION) at 11:19

## 2018-06-30 RX ADMIN — METOCLOPRAMIDE SCH MG: 5 INJECTION, SOLUTION INTRAMUSCULAR; INTRAVENOUS at 05:12

## 2018-06-30 RX ADMIN — METRONIDAZOLE SCH MLS/HR: 500 INJECTION, SOLUTION INTRAVENOUS at 05:12

## 2018-06-30 RX ADMIN — METRONIDAZOLE SCH MLS/HR: 500 INJECTION, SOLUTION INTRAVENOUS at 12:23

## 2018-06-30 RX ADMIN — IPRATROPIUM BROMIDE AND ALBUTEROL SULFATE SCH ML: .5; 3 SOLUTION RESPIRATORY (INHALATION) at 07:21

## 2018-06-30 RX ADMIN — INSULIN ASPART SCH UNITS: 100 INJECTION, SOLUTION INTRAVENOUS; SUBCUTANEOUS at 00:56

## 2018-06-30 RX ADMIN — IPRATROPIUM BROMIDE AND ALBUTEROL SULFATE SCH ML: .5; 3 SOLUTION RESPIRATORY (INHALATION) at 02:29

## 2018-06-30 RX ADMIN — ENOXAPARIN SODIUM SCH MG: 100 INJECTION SUBCUTANEOUS at 09:11

## 2018-06-30 RX ADMIN — HYDROMORPHONE HYDROCHLORIDE PRN MG: 1 INJECTION, SOLUTION INTRAMUSCULAR; INTRAVENOUS; SUBCUTANEOUS at 12:16

## 2018-06-30 RX ADMIN — INSULIN ASPART SCH UNITS: 100 INJECTION, SOLUTION INTRAVENOUS; SUBCUTANEOUS at 13:09

## 2018-06-30 RX ADMIN — HYDROMORPHONE HYDROCHLORIDE PRN MG: 1 INJECTION, SOLUTION INTRAMUSCULAR; INTRAVENOUS; SUBCUTANEOUS at 20:15

## 2018-06-30 RX ADMIN — INSULIN ASPART SCH: 100 INJECTION, SOLUTION INTRAVENOUS; SUBCUTANEOUS at 05:12

## 2018-06-30 RX ADMIN — METOCLOPRAMIDE SCH MG: 5 INJECTION, SOLUTION INTRAMUSCULAR; INTRAVENOUS at 17:10

## 2018-06-30 RX ADMIN — METOCLOPRAMIDE SCH MG: 5 INJECTION, SOLUTION INTRAMUSCULAR; INTRAVENOUS at 12:23

## 2018-06-30 RX ADMIN — IPRATROPIUM BROMIDE AND ALBUTEROL SULFATE SCH ML: .5; 3 SOLUTION RESPIRATORY (INHALATION) at 23:09

## 2018-06-30 RX ADMIN — METOCLOPRAMIDE SCH MG: 5 INJECTION, SOLUTION INTRAMUSCULAR; INTRAVENOUS at 00:49

## 2018-06-30 RX ADMIN — CHLORHEXIDINE GLUCONATE SCH ML: 1.2 RINSE ORAL at 20:25

## 2018-06-30 NOTE — DIAGNOSTIC IMAGING REPORT
Exam: Portable summation of chest.



HISTORY: Shortness of breath.



COMPARISON: 06/29/2018



Findings:



Portable upright examination of the chest at 0811 hours reviewed

compared to prior study 06/29/2018 demonstrates unchanged appearance of

the left lung infiltrate.  The endotracheal tube and right-sided PICC

line unchanged appearance.  The costophrenic angles are clear bony

thorax intact.  Mediastinal structures midline.



 Impression:



Left upper lobe pneumonia, follow-up initially recommended.

## 2018-06-30 NOTE — PROGRESS NOTES
DATE:  06/29/2018



PROBLEM LIST:

1.  Acute respiratory failure.

2.  Bilateral pneumonia.

3.  Underlying previous abdominal surgery.



SYMPTOMS:  The patient is stable, breathing better, periodically awake, still

getting propofol 30 mcg per kg.  Hemodynamically pretty stable.



PHYSICAL EXAMINATION:

VITAL SIGNS:  Temperature is 97.6, blood pressure 121/76, saturation is 98 on

30%.

ENT:  Shows no new changes.

CHEST:  Shows clear with diminished air entry.

HEART:  Regular.

ABDOMEN:  Soft, nontender.

EXTREMITIES:  Show no peripheral edema.



LABORATORY DATA:  White count is 11,000.  ABG looks okay on 30% of oxygen.



ASSESSMENT:  The patient clinically appears to be improving.



PLANS AND SUGGESTIONS:  We will go ahead and continue current treatment.  We

will attempt to start weaning down the propofol and go from there.





DD: 06/29/2018 13:55

DT: 06/30/2018 01:13

JOB# [id number]  [id number]

## 2018-06-30 NOTE — GENERAL PROGRESS NOTE
Subjective





- Review of Systems


Service Date: 18


Events since last encounter: 





Pt remains intubated.  Has postop ileus.  NGT to intermittent suction.


Subjective: 





Pt is alert and gestures he wants water.  Explained to pt again he still has 

ileus.





Objective





- Results


Result Diagrams: 


 18 04:45





 18 04:55


Recent Labs: 


 Laboratory Last Values











WBC  8.8 Th/cmm (4.8-10.8)   18  04:45    


 


RBC  3.64 Mil/cmm (3.80-5.80)  L  18  04:45    


 


Hgb  10.6 gm/dL (12-16)  L  18  04:45    


 


Hct  31.8 % (41.0-60)  L  18  04:45    


 


MCV  87.4 fl (80-99)   18  04:45    


 


MCH  29.0 pg (27.0-31.0)   18  04:45    


 


MCHC Differential  33.2 pg (28.0-36.0)   18  04:45    


 


RDW  15.0 % (11.5-20.0)   18  04:45    


 


Plt Count  149 Th/cmm (150-400)  L  18  04:45    


 


MPV  8.6 fl  18  04:45    


 


Neutrophils %  75.1 % (40.0-80.0)   18  04:15    


 


Band Neutrophils %  2 % (0-10)   18  04:45    


 


Lymphocytes %  9.7 % (20.0-50.0)  L  18  04:15    


 


Monocytes %  14.9 % (2.0-10.0)  H  18  04:15    


 


Eosinophils %  0.1 % (0.0-5.0)   18  04:15    


 


Basophils %  0.2 % (0.0-2.0)   18  04:15    


 


Neutrophils (Manual)  85 % (40-80)  H  18  04:45    


 


Lymphocytes  10 % (20-50)  L  18  04:45    


 


Monocytes  5 % (2-10)   18  04:45    


 


Eosinophils  0 % (0-5)   18  18:24    


 


Basophils  0 % (0-3)   18  18:24    


 


Platelet Estimate  ADEQUATE  (NORMAL)   18  05:50    


 


Platelet Morphology  NORMAL  (NORMAL)   18  18:24    


 


Polychromasia  1+   18  04:40    


 


Anna Cells  1+   18  04:40    


 


RBC Morph Micro Appear  NORMAL  (NORMAL)   18  18:24    


 


PT  11.9 SECONDS (9.5-11.5)  H  18  12:08    


 


INR  1.13  (0.5-1.4)   18  12:08    


 


PTT (Actin FS)  22.3 SECONDS (26.0-38.0)  L  18  12:08    


 


Specimen Source  Arterial   18  08:13    


 


Sample Site  Right Radial   18  08:13    


 


pH  7.43  (7.35-7.45)   18  08:13    


 


pCO2  38.0 mmHg (35.0-45.0)   18  08:13    


 


pO2  84.0 mmHg (80.0-100.0)   18  08:13    


 


HCO3  25.7 mEq/L (20.0-26.0)   18  08:13    


 


Base Excess  1.0 mEq/L (-3.0-3.0)   18  08:13    


 


O2 Saturation  97.0 % (92.0-100.0)   18  08:13    


 


Ceferino Test  Positive   18  08:13    


 


Vent Rate  10   18  08:13    


 


Inspired O2  30   18  08:13    


 


Tidal Volume  500   18  08:13    


 


PEEP  5   18  08:13    


 


Pressure (ins/psv/peep)  15   18  08:13    


 


Critical Value  LZHANG   18  08:13    


 


Sodium  147 mEq/L (136-145)  H  18  04:55    


 


Potassium  3.4 mEq/L (3.5-5.1)  L  18  04:55    


 


Chloride  118 mEq/L ()  H  18  04:55    


 


Carbon Dioxide  23.0 mEq/L (21.0-31.0)   18  04:55    


 


Anion Gap  9.4  (7.0-16.0)   18  04:55    


 


BUN  52 mg/dL (7-25)  H  18  04:55    


 


Creatinine  1.6 mg/dL (0.7-1.3)  H  18  04:55    


 


Est GFR ( Amer)  56.1 ml/min (>90)   18  04:55    


 


Est GFR (Non-Af Amer)  46.3 ml/min  18  04:55    


 


BUN/Creatinine Ratio  32.5   18  04:55    


 


Glucose  170 mg/dL ()  H  18  04:55    


 


POC Glucose  144 MG/DL (70 - 105)  H  18  05:09    


 


Hemoglobin A1c %  4.8 % (4.0-6.0)   18  04:40    


 


Whole Bld Lactic Acid  1.36 mmol/L (0.60-1.99)   18  05:50    


 


Calcium  8.1 mg/dL (8.6-10.3)  L  18  04:55    


 


Phosphorus  2.6 mg/dL (2.5-5.0)   18  04:55    


 


Magnesium  1.8 mg/dL (1.9-2.7)  L  18  04:55    


 


Total Bilirubin  0.4 mg/dL (0.3-1.0)   18  04:55    


 


Direct Bilirubin  0.05 mg/dL (0.0-0.2)   18  05:50    


 


AST  22 U/L (13-39)   18  04:55    


 


ALT  13 U/L (7-52)   18  04:55    


 


Alkaline Phosphatase  65 U/L ()   18  04:55    


 


Ammonia  54 umol/L (16-53)  H  18  05:50    


 


Total Protein  5.0 gm/dL (6.0-8.3)  L  18  04:55    


 


Albumin  2.1 gm/dL (4.2-5.5)  L  18  04:55    


 


Globulin  2.9 gm/dL  18  04:55    


 


Albumin/Globulin Ratio  0.7  (1.0-1.8)  L  18  04:55    


 


Prealbumin  7 mg/dL (10-36)  L  18  04:15    


 


Triglycerides  83 mg/dL (<150)   18  04:55    


 


Cholesterol  66 mg/dL (<200)   18  04:15    


 


Urine Source  CLEAN C   18  17:35    


 


Urine Color  YELLOW   18  17:35    


 


Urine Clarity  HAZY  (CLEAR)   18  17:35    


 


Urine pH  5.0  (4.6 - 8.0)   18  17:35    


 


Ur Specific Gravity  1.020  (1.005-1.030)   18  17:35    


 


Urine Protein  30 mg/dL (NEGATIVE)  H  18  17:35    


 


Urine Glucose (UA)  NEGATIVE mg/dL (NEGATIVE)   18  17:35    


 


Urine Ketones  NEGATIVE mg/dL (NEGATIVE)   18  17:35    


 


Urine Blood  TRACE  (NEGATIVE)   18  17:35    


 


Urine Nitrate  NEGATIVE  (NEGATIVE)   18  17:35    


 


Urine Bilirubin  NEGATIVE  (NEGATIVE)   18  17:35    


 


Urine Urobilinogen  0.2 E.U./dL (0.2 - 1.0)   18  17:35    


 


Ur Leukocyte Esterase  TRACE  (NEGATIVE)  H  18  17:35    


 


Urine RBC  0-2 /hpf (0-5)  H  18  17:35    


 


Urine WBC  0-2 /hpf (0-5)   18  17:35    


 


Ur Epithelial Cells  OCCASIONAL /lpf (FEW)   18  17:35    


 


Amorphous Sediment  FEW URATES  (NONE SEEN)   18  17:35    


 


Urine Bacteria  FEW /hpf (NONE SEEN)   18  17:35    


 


Vancomycin Trough  16.8 ug/mL (5-10)  H  18  09:30    


 


Random Vancomycin  27.5 ug/mL (5.0-40.0)   18  04:45    














- Physical Exam


Vitals and I&O: 


 Vital Signs











Temp  97.8 F   18 12:00


 


Pulse  109   18 12:00


 


Resp  25   18 12:00


 


BP  132/83   18 12:00


 


Pulse Ox  98   18 12:00








 Intake & Output











 1818 18





 18:59 06:59 18:59


 


Intake Total 1981.503 843.34 48.64


 


Output Total 1175 1250 


 


Balance 806.503 -406.66 48.64


 


Weight (lbs) 100.698 kg 100.244 kg 


 


Intake:   


 


  Intake, IV Amount 1035.503 843.34 48.64


 


    Cefepime 1 gm In Dextrose 50 50 





    5% 50 ml @ 100 mls/hr IV   





    Q12HR [facility] Rx#:[id number]   


 


    Multivitamin Inj 10 ml In 787.5  





    Dextrose 70% 1,170 ml In   





    Amino Acids 10% 500 ml @   





    70 mls/hr IV .Q24H KATHY   





    Rx#:[id number]   


 


    Propofol 1,000 mg In 100 98.003 93.34 48.64





    ml @ Per Protocol IV TITR   





    [facility] Rx#:[id number]   


 


    Vancomycin HCl 1.5 gm In  500 





    Sodium Chloride 0.9% 500   





    ml @ 250 mls/hr IV Q36H   





    [facility] Rx#:[id number]   


 


    metroNIDAZOLE 500mg/ 200 





    100mL 500 mg In 100 ml @   





    100 mls/hr IV Q8H [facility] Rx#   





    :[id number]   


 


  TPN/  


 


  Other 106  


 


Output:   


 


  Gastric Drainage 150 150 


 


  Urine 1025 1100 


 


Other:   


 


  # Bowel Movements 0 0 


 


  Stool Characteristics   Soft





   Brown


 


  Weight Source Bedscale Bedscale 











Active Medications: 


Current Medications





Acetaminophen/Hydrocodone Bitart (Norco 10 Mg/325 Mg)  1 tab PO Q6H PRN


   PRN Reason: Pain (Severe)


   Stop: 18 20:04


   Last Admin: 18 03:03 Dose:  1 tab


Albuterol/Ipratropium (Duoneb Neb)  3 ml HHN Q4HRT [facility]


   Stop: 18 14:59


   Last Admin: 18 11:19 Dose:  3 ml


Budesonide (Pulmicort)  0.5 mg HHN BIDRT [facility]


   Stop: 18 18:59


   Last Admin: 18 07:21 Dose:  0.5 mg


Chlorhexidine Gluconate (Peridex)  15 ml MM 08, [facility]


   Stop: 18 07:59


   Last Admin: 18 09:00 Dose:  15 ml


Cholestyramine Resin (Questran)  4 gm PO BID [facility]


   Stop: 18 16:59


   Last Admin: 18 09:50 Dose:  Not Given


Enoxaparin Sodium (Lovenox)  30 mg SUBQ Q12HR [facility]


   Stop: 18 20:59


   Last Admin: 18 09:11 Dose:  30 mg


Hydromorphone HCl (Dilaudid)  1 mg IVP Q3HR PRN


   PRN Reason: SEVERE PAIN


   Stop: 18 16:25


   Last Admin: 18 12:16 Dose:  1 mg


Metronidazole (Flagyl)  500 mg in 100 mls @ 100 mls/hr IV Q8H KATHY


   Stop: 18 20:59


   Last Admin: 18 12:23 Dose:  100 mls/hr


Cefepime HCl 1 gm/ Dextrose  50 mls @ 100 mls/hr IV Q12HR [facility]


   Stop: 18 20:59


   Last Admin: 18 09:11 Dose:  100 mls/hr


Propofol (Diprivan)  1,000 mg in 100 mls @ 0 mls/hr IV TITR KATHY; Protocol


   Stop: 18 09:59


   Last Titration: 18 12:19 Dose:  0 mcg/kg/min, 0 mls/hr


Dextrose/Sodium Chloride (D5-0.9%Ns)  1,000 mls @ 30 mls/hr IV .Q24H [facility]


   Stop: 18 14:59


   Last Infusion: 18 06:00 Dose:  30 mls/hr


Multivitamins/Minerals 10 ml/Dextrose/ Amino Acids/Electrolytes  1,680 mls @ 70 

mls/hr IV .Q24H [facility]


   Stop: 18 14:59


   Last Admin: 18 18:00 Dose:  70 mls/hr


Insulin Aspart (Novolog Insulin Sliding Scale)  0 units SUBQ Q6H KATHY; Protocol


   Stop: 18 11:59


   Last Admin: 18 05:12 Dose:  Not Given


Lorazepam (Ativan)  1 mg IVP Q4HR PRN; Protocol


   PRN Reason: Anxiety


   Stop: 18 02:24


   Last Admin: 18 21:19 Dose:  1 mg


Methylprednisolone Sodium Succinate (Solu-Medrol)  60 mg IVP Q6H KATHY


   Stop: 18 16:29


   Last Admin: 18 10:50 Dose:  60 mg


Metoclopramide HCl (Reglan)  10 mg IVP Q6HR KATHY


   Stop: 18 17:59


   Last Admin: 18 12:23 Dose:  10 mg


Miscellaneous (Tpn Per Pharmacy)  1 ea  PRN KATHY


   Stop: 18 17:59


Miscellaneous (Vancomycin Iv Per Pharmacy)  1 [facility] PRN PRN


   PRN Reason: PROTOCOL


   Stop: 18 14:14


Ondansetron HCl (Zofran)  4 mg IV Q4H PRN


   PRN Reason: Nausea / Vomiting


   Stop: 18 05:53


   Last Admin: 18 22:50 Dose:  4 mg


Pantoprazole Sodium (Protonix)  40 mg IVP BID [facility]


   Stop: 18 08:59


   Last Admin: 18 09:10 Dose:  40 mg


Zolpidem Tartrate (Ambien)  5 mg PO HS PRN


   PRN Reason: Insomnia


   Stop: 18 20:55


   Last Admin: 18 21:59 Dose:  5 mg








General: Alert, Cooperative, No acute distress


HEENT: Atraumatic, PERRLA (+dressing), EOMI


Neck: Supple


Cardiovascular: Regular rate, Normal S1, Normal S2


Lungs: Clear to auscultation


Abdomen: Distended





- Procedures


Procedures: 


 Procedures











Procedure Code Date


 


EXCISION OF CHEST SKIN, EXTERNAL APPROACH [id number] 18


 


EXCISION OF ILEUM, OPEN APPROACH [id number] 18


 


EXCISION OF RIGHT LOWER ARM SKIN, EXTERNAL APPROACH [id number] 18


 


EXCISION OF RIGHT LOWER LEG SKIN, EXTERNAL APPROACH [id number] 18


 


RELEASE ILEUM, OPEN APPROACH [id number] 18


 


RESPIRATORY VENTILATION, 24-96 CONSECUTIVE HOURS [id number] 18














Assessment/Plan





- Assessment


Assessment: 





1.  SBO.  S/p surgery.  Now postop ileus.


2.  ANDREW.  Good UO.  Monitor


3.  Hypernatremia.  Still NPO at this time.  Start D5W


4.  Still intubated.  On 30%FiO2.





Nutritional Asmnt/Malnutr-PDOC





- Dietary Evaluation


Malnutrition Findings (Please click <Entered> for more info): 








Nutritional Asmnt/Malnutrition                             Start:  18 14:

24


Text:                                                      Status: Complete    

  


Freq:                                                                          

  


Protocol:                                                                      

  


 Document     18 14:24  DOMINIKKINSEY  (Rec: 18 14:35  DOMINIKKINSEY [id number])


 Nutritional Asmnt/Malnutrition


     Patient General Information


      Nutritional Screening                      High Risk


                                                 Consult


      Diagnosis                                  exploratory laparotomy with


                                                 recersal colostomy


      Pertinent Medical Hx/Surgical Hx           acute perforation of


                                                 diverticulitis, s/p


                                                 exploratory laparotomy and


                                                 divertin gcolostomy placement


      Subjective Information                     Pt seen lying in bed at time


                                                 of visit, awake and alert. Pt


                                                 stated he tolerated clear


                                                 liquid well, adequte amount


                                                 for him at this time. Pt has


                                                 RAQUEL drain noted. Per EMR, PO


                                                 intake % of clear liquid


                                                 .


      Current Diet Order/ Nutrition Support      clear liquid


      Pertinent Medications                      D5-0.9%ns, piperacillin,


                                                 vancomycin


      Pertinent Labs                              Na 133, K 3.4, Cr 0.7,


                                                 glucose 107, Ca 7.9


     Nutritional Hx/Data


      Height                                     1.88 m


      Height (Calculated Centimeters)            188.0


      Current Weight (lbs)                       86.636 kg


      Weight (Calculated Kilograms)              86.6


      Weight (Calculated Grams)                  18103.1


      Ideal Body Weight                          190


      Body Mass Index (BMI)                      24.5


      Weight Status                              Approriate


     GI Symptoms


      GI Symptoms                                None


      Last BM                                    none


      Difficult in:                              None


      Skin Integrity/Comment:                    RIGHT LOWER LEG HEALING


                                                 incision to right arm


     Estimated Nutritional Goals


      BEE in Kcals:                              Using Current wt


      Calories/Kcals/Kg                          25-30


      Kcals Calculated                           1031-6623


      Protein:                                   Using Current wt


      Protein g/k.8-1


      Protein Calculated                         69-86


      Fluid: ml                                  2150-2580ml (1ml/kcal)


     Nutritional Problem


      1. Problem


       Problem                                   altered GI function


       Etiology                                  s/p reversal colostomy


       Signs/Symptoms:                           pt on clear liquid


     Intervention/Recommendation


      Comments                                   1. Continue with clear liquid


                                                 diet as ordered.


                                                 2. Monitor PO intake, wt, labs


                                                 and skin integrity


                                                 3. F/U as high risk in 2-3


                                                 days, -


     Expected Outcomes/Goals


      Expected Outcomes/Goals                    1. PO intake to meet at least


                                                 75% of nutritional needs.


                                                 2. Wt stability, skin to


                                                 remain intact, labs to


                                                 approach WNL.

## 2018-06-30 NOTE — INTERNAL MEDICINE PROG NOTE
Internal Medicine Subjective





- Subjective


Service Date: 18


Patient seen and examined:: with staff


Patient is:: in bed, other (ON VENTILATOR,opening his eyes.)


Patient Complaints of:: SOB


Per staff patient has:: no adverse event, other (AFEBRIL,WAS SEEN BY 

PULMONOLOGIST.)





Internal Medicine Objective





- Results


Result Diagrams: 


 18 04:45





 18 04:55


Recent Labs: 


 Laboratory Last Values











WBC  8.8 Th/cmm (4.8-10.8)   18  04:45    


 


RBC  3.64 Mil/cmm (3.80-5.80)  L  18  04:45    


 


Hgb  10.6 gm/dL (12-16)  L  18  04:45    


 


Hct  31.8 % (41.0-60)  L  18  04:45    


 


MCV  87.4 fl (80-99)   18  04:45    


 


MCH  29.0 pg (27.0-31.0)   18  04:45    


 


MCHC Differential  33.2 pg (28.0-36.0)   18  04:45    


 


RDW  15.0 % (11.5-20.0)   18  04:45    


 


Plt Count  149 Th/cmm (150-400)  L  18  04:45    


 


MPV  8.6 fl  18  04:45    


 


Neutrophils %  75.1 % (40.0-80.0)   18  04:15    


 


Band Neutrophils %  2 % (0-10)   18  04:45    


 


Lymphocytes %  9.7 % (20.0-50.0)  L  18  04:15    


 


Monocytes %  14.9 % (2.0-10.0)  H  18  04:15    


 


Eosinophils %  0.1 % (0.0-5.0)   18  04:15    


 


Basophils %  0.2 % (0.0-2.0)   18  04:15    


 


Neutrophils (Manual)  85 % (40-80)  H  18  04:45    


 


Lymphocytes  10 % (20-50)  L  18  04:45    


 


Monocytes  5 % (2-10)   18  04:45    


 


Eosinophils  0 % (0-5)   18  18:24    


 


Basophils  0 % (0-3)   18  18:24    


 


Platelet Estimate  ADEQUATE  (NORMAL)   18  05:50    


 


Platelet Morphology  NORMAL  (NORMAL)   18  18:24    


 


Polychromasia  1+   18  04:40    


 


Anna Cells  1+   18  04:40    


 


RBC Morph Micro Appear  NORMAL  (NORMAL)   18  18:24    


 


PT  11.9 SECONDS (9.5-11.5)  H  18  12:08    


 


INR  1.13  (0.5-1.4)   18  12:08    


 


PTT (Actin FS)  22.3 SECONDS (26.0-38.0)  L  18  12:08    


 


Specimen Source  Arterial   18  08:13    


 


Sample Site  Right Radial   18  08:13    


 


pH  7.43  (7.35-7.45)   18  08:13    


 


pCO2  38.0 mmHg (35.0-45.0)   18  08:13    


 


pO2  84.0 mmHg (80.0-100.0)   18  08:13    


 


HCO3  25.7 mEq/L (20.0-26.0)   18  08:13    


 


Base Excess  1.0 mEq/L (-3.0-3.0)   18  08:13    


 


O2 Saturation  97.0 % (92.0-100.0)   18  08:13    


 


Ceferino Test  Positive   18  08:13    


 


Vent Rate  10   18  08:13    


 


Inspired O2  30   18  08:13    


 


Tidal Volume  500   18  08:13    


 


PEEP  5   18  08:13    


 


Pressure (ins/psv/peep)  15   18  08:13    


 


Critical Value  LZHANG   18  08:13    


 


Sodium  147 mEq/L (136-145)  H  18  04:55    


 


Potassium  3.4 mEq/L (3.5-5.1)  L  18  04:55    


 


Chloride  118 mEq/L ()  H  18  04:55    


 


Carbon Dioxide  23.0 mEq/L (21.0-31.0)   18  04:55    


 


Anion Gap  9.4  (7.0-16.0)   18  04:55    


 


BUN  52 mg/dL (7-25)  H  18  04:55    


 


Creatinine  1.6 mg/dL (0.7-1.3)  H  18  04:55    


 


Est GFR ( Amer)  56.1 ml/min (>90)   18  04:55    


 


Est GFR (Non-Af Amer)  46.3 ml/min  18  04:55    


 


BUN/Creatinine Ratio  32.5   18  04:55    


 


Glucose  170 mg/dL ()  H  18  04:55    


 


POC Glucose  144 MG/DL (70 - 105)  H  18  05:09    


 


Hemoglobin A1c %  4.8 % (4.0-6.0)   18  04:40    


 


Whole Bld Lactic Acid  1.36 mmol/L (0.60-1.99)   18  05:50    


 


Calcium  8.1 mg/dL (8.6-10.3)  L  18  04:55    


 


Phosphorus  2.6 mg/dL (2.5-5.0)   18  04:55    


 


Magnesium  1.8 mg/dL (1.9-2.7)  L  18  04:55    


 


Total Bilirubin  0.4 mg/dL (0.3-1.0)   18  04:55    


 


Direct Bilirubin  0.05 mg/dL (0.0-0.2)   18  05:50    


 


AST  22 U/L (13-39)   18  04:55    


 


ALT  13 U/L (7-52)   18  04:55    


 


Alkaline Phosphatase  65 U/L ()   18  04:55    


 


Ammonia  54 umol/L (16-53)  H  18  05:50    


 


Total Protein  5.0 gm/dL (6.0-8.3)  L  18  04:55    


 


Albumin  2.1 gm/dL (4.2-5.5)  L  18  04:55    


 


Globulin  2.9 gm/dL  18  04:55    


 


Albumin/Globulin Ratio  0.7  (1.0-1.8)  L  18  04:55    


 


Prealbumin  7 mg/dL (10-36)  L  18  04:15    


 


Triglycerides  83 mg/dL (<150)   18  04:55    


 


Cholesterol  66 mg/dL (<200)   18  04:15    


 


Urine Source  CLEAN C   18  17:35    


 


Urine Color  YELLOW   18  17:35    


 


Urine Clarity  HAZY  (CLEAR)   18  17:35    


 


Urine pH  5.0  (4.6 - 8.0)   18  17:35    


 


Ur Specific Gravity  1.020  (1.005-1.030)   18  17:35    


 


Urine Protein  30 mg/dL (NEGATIVE)  H  18  17:35    


 


Urine Glucose (UA)  NEGATIVE mg/dL (NEGATIVE)   18  17:35    


 


Urine Ketones  NEGATIVE mg/dL (NEGATIVE)   18  17:35    


 


Urine Blood  TRACE  (NEGATIVE)   18  17:35    


 


Urine Nitrate  NEGATIVE  (NEGATIVE)   18  17:35    


 


Urine Bilirubin  NEGATIVE  (NEGATIVE)   18  17:35    


 


Urine Urobilinogen  0.2 E.U./dL (0.2 - 1.0)   18  17:35    


 


Ur Leukocyte Esterase  TRACE  (NEGATIVE)  H  18  17:35    


 


Urine RBC  0-2 /hpf (0-5)  H  18  17:35    


 


Urine WBC  0-2 /hpf (0-5)   18  17:35    


 


Ur Epithelial Cells  OCCASIONAL /lpf (FEW)   18  17:35    


 


Amorphous Sediment  FEW URATES  (NONE SEEN)   18  17:35    


 


Urine Bacteria  FEW /hpf (NONE SEEN)   18  17:35    


 


Vancomycin Trough  16.8 ug/mL (5-10)  H  18  09:30    


 


Random Vancomycin  27.5 ug/mL (5.0-40.0)   18  04:45    














- Physical Exam


Vitals and I&O: 


 Vital Signs











Temp  98.0 F   18 08:00


 


Pulse  101   18 08:00


 


Resp  32   18 08:00


 


BP  131/80   18 08:00


 


Pulse Ox  98   18 08:00








 Intake & Output











 18





 18:59 06:59 18:59


 


Intake Total 1981.503 843.34 33.67


 


Output Total 1175 1250 


 


Balance 806.503 -406.66 33.67


 


Weight (lbs) 100.698 kg 100.244 kg 


 


Intake:   


 


  Intake, IV Amount 1035.503 843.34 33.67


 


    Cefepime 1 gm In Dextrose 50 50 





    5% 50 ml @ 100 mls/hr IV   





    Q12HR [facility] Rx#:[id number]   


 


    Multivitamin Inj 10 ml In 787.5  





    Dextrose 70% 1,170 ml In   





    Amino Acids 10% 500 ml @   





    70 mls/hr IV .Q24H [facility]   





    Rx#:[id number]   


 


    Propofol 1,000 mg In 100 98.003 93.34 33.67





    ml @ Per Protocol IV TITR   





    [facility] Rx#:[id number]   


 


    Vancomycin HCl 1.5 gm In  500 





    Sodium Chloride 0.9% 500   





    ml @ 250 mls/hr IV Q36H   





    [facility] Rx#:[id number]   


 


    metroNIDAZOLE 500mg/ 200 





    100mL 500 mg In 100 ml @   





    100 mls/hr IV Q8H [facility] Rx#   





    :[id number]   


 


  TPN/  


 


  Other 106  


 


Output:   


 


  Gastric Drainage 150 150 


 


  Urine 1025 1100 


 


Other:   


 


  # Bowel Movements 0 0 


 


  Weight Source Bedscale Bedscale 











Active Medications: 


Current Medications





Acetaminophen/Hydrocodone Bitart (Norco 10 Mg/325 Mg)  1 tab PO Q6H PRN


   PRN Reason: Pain (Severe)


   Stop: 18 20:04


   Last Admin: 18 03:03 Dose:  1 tab


Albuterol/Ipratropium (Duoneb Neb)  3 ml HHN Q4HRT [facility]


   Stop: 18 14:59


   Last Admin: 18 07:21 Dose:  3 ml


Budesonide (Pulmicort)  0.5 mg HHN BIDRT [facility]


   Stop: 18 18:59


   Last Admin: 18 07:21 Dose:  0.5 mg


Chlorhexidine Gluconate (Peridex)  15 ml MM 08, [facility]


   Stop: 18 07:59


   Last Admin: 18 20:09 Dose:  15 ml


Cholestyramine Resin (Questran)  4 gm PO BID [facility]


   Stop: 18 16:59


   Last Admin: 18 09:50 Dose:  Not Given


Enoxaparin Sodium (Lovenox)  30 mg SUBQ Q12HR [facility]


   Stop: 18 20:59


   Last Admin: 18 09:11 Dose:  30 mg


Hydromorphone HCl (Dilaudid)  1 mg IVP Q3HR PRN


   PRN Reason: SEVERE PAIN


   Stop: 18 16:25


   Last Admin: 18 09:00 Dose:  1 mg


Metronidazole (Flagyl)  500 mg in 100 mls @ 100 mls/hr IV Q8H [facility]


   Stop: 18 20:59


   Last Infusion: 18 06:15 Dose:  Infused


Cefepime HCl 1 gm/ Dextrose  50 mls @ 100 mls/hr IV Q12HR [facility]


   Stop: 18 20:59


   Last Admin: 18 09:11 Dose:  100 mls/hr


Propofol (Diprivan)  1,000 mg in 100 mls @ 0 mls/hr IV TITR [facility]; Protocol


   Stop: 18 09:59


   Last Titration: 18 09:38 Dose:  10 mcg/kg/min, 5.579 mls/hr


Dextrose/Sodium Chloride (D5-0.9%Ns)  1,000 mls @ 30 mls/hr IV .Q24H [facility]


   Stop: 18 14:59


   Last Infusion: 18 06:00 Dose:  30 mls/hr


Multivitamins/Minerals 10 ml/Dextrose/ Amino Acids/Electrolytes  1,680 mls @ 70 

mls/hr IV .Q24H [facility]


   Stop: 18 14:59


   Last Admin: 18 18:00 Dose:  70 mls/hr


Insulin Aspart (Novolog Insulin Sliding Scale)  0 units SUBQ Q6H [facility]; Protocol


   Stop: 18 11:59


   Last Admin: 18 05:12 Dose:  Not Given


Lorazepam (Ativan)  1 mg IVP Q4HR PRN; Protocol


   PRN Reason: Anxiety


   Stop: 18 02:24


   Last Admin: 18 21:19 Dose:  1 mg


Methylprednisolone Sodium Succinate (Solu-Medrol)  60 mg IVP Q6H [facility]


   Stop: 18 16:29


   Last Admin: 18 05:11 Dose:  60 mg


Metoclopramide HCl (Reglan)  10 mg IVP Q6HR [facility]


   Stop: 18 17:59


   Last Admin: 18 05:12 Dose:  10 mg


Miscellaneous (Tpn Per Pharmacy)  1 ea  PRN [facility]


   Stop: 18 17:59


Miscellaneous (Vancomycin Iv Per Pharmacy)  1 ea  PRN PRN


   PRN Reason: PROTOCOL


   Stop: 18 14:14


Ondansetron HCl (Zofran)  4 mg IV Q4H PRN


   PRN Reason: Nausea / Vomiting


   Stop: 18 05:53


   Last Admin: 18 22:50 Dose:  4 mg


Pantoprazole Sodium (Protonix)  40 mg IVP BID [facility]


   Stop: 18 08:59


   Last Admin: 18 09:10 Dose:  40 mg


Zolpidem Tartrate (Ambien)  5 mg PO HS PRN


   PRN Reason: Insomnia


   Stop: 18 20:55


   Last Admin: 18 21:59 Dose:  5 mg








General: other (on 50 percent fio2,less distress.)


HEENT: NC/AT, PERRLA, EOMI, anicteric sclerae, throat clear


Neck: Supple, No JVD, No thyromegaly, +2 carotid pulse wo bruit, No LAD


Lungs: ronchi


Cardiovascular: RRR, Normal S1, Normal S2, without murmur, tachy


Abdomen: tender, distended


Extremities: clear


Neurological: unable to follow command





- Procedures


Procedures: 


 Procedures











Procedure Code Date


 


EXCISION OF CHEST SKIN, EXTERNAL APPROACH [id number] 18


 


EXCISION OF ILEUM, OPEN APPROACH [id number] 18


 


EXCISION OF RIGHT LOWER ARM SKIN, EXTERNAL APPROACH [id number] 18


 


EXCISION OF RIGHT LOWER LEG SKIN, EXTERNAL APPROACH [id number] 18


 


RELEASE ILEUM, OPEN APPROACH [id number] 18


 


RESPIRATORY VENTILATION, 24-96 CONSECUTIVE HOURS [id number] 18














Internal Medicine Assmt/Plan





- Assessment


Assessment: 





1.SP COLOSTOMY REVISION.


2.SP EXCISION BIOPSY FOR CHEST WALL LESION.


3.ACUTE BOWEL OBSTRUCTION


4.ACUTE RESPIRATORY FAILURE.


5.BILATERAL PNEUMONIA.





- Plan


Plan: 


CONTINUE ON CURRENT MEDICATION AND TPN.





Nutritional Asmnt/Malnutr-PDOC





- Dietary Evaluation


Malnutrition Findings (Please click <Entered> for more info): 








Nutritional Asmnt/Malnutrition                             Start:  18 14:

24


Text:                                                      Status: Complete    

  


Freq:                                                                          

  


Protocol:                                                                      

  


 Document     18 14:24  JASEG  (Rec: 18 14:35  HEN  [id number])


 Nutritional Asmnt/Malnutrition


     Patient General Information


      Nutritional Screening                      High Risk


                                                 Consult


      Diagnosis                                  exploratory laparotomy with


                                                 recersal colostomy


      Pertinent Medical Hx/Surgical Hx           acute perforation of


                                                 diverticulitis, s/p


                                                 exploratory laparotomy and


                                                 divertin gcolostomy placement


      Subjective Information                     Pt seen lying in bed at time


                                                 of visit, awake and alert. Pt


                                                 stated he tolerated clear


                                                 liquid well, adequte amount


                                                 for him at this time. Pt has


                                                 RAQUEL drain noted. Per EMR, PO


                                                 intake % of clear liquid


                                                 .


      Current Diet Order/ Nutrition Support      clear liquid


      Pertinent Medications                      D5-0.9%ns, piperacillin,


                                                 vancomycin


      Pertinent Labs                              Na 133, K 3.4, Cr 0.7,


                                                 glucose 107, Ca 7.9


     Nutritional Hx/Data


      Height                                     1.88 m


      Height (Calculated Centimeters)            188.0


      Current Weight (lbs)                       86.636 kg


      Weight (Calculated Kilograms)              86.6


      Weight (Calculated Grams)                  23641.1


      Ideal Body Weight                          190


      Body Mass Index (BMI)                      24.5


      Weight Status                              Approriate


     GI Symptoms


      GI Symptoms                                None


      Last BM                                    none


      Difficult in:                              None


      Skin Integrity/Comment:                    RIGHT LOWER LEG HEALING


                                                 incision to right arm


     Estimated Nutritional Goals


      BEE in Kcals:                              Using Current wt


      Calories/Kcals/Kg                          25-30


      Kcals Calculated                           9142-9916


      Protein:                                   Using Current wt


      Protein g/k.8-1


      Protein Calculated                         69-86


      Fluid: ml                                  2150-2580ml (1ml/kcal)


     Nutritional Problem


      1. Problem


       Problem                                   altered GI function


       Etiology                                  s/p reversal colostomy


       Signs/Symptoms:                           pt on clear liquid


     Intervention/Recommendation


      Comments                                   1. Continue with clear liquid


                                                 diet as ordered.


                                                 2. Monitor PO intake, wt, labs


                                                 and skin integrity


                                                 3. F/U as high risk in 2-3


                                                 days, -


     Expected Outcomes/Goals


      Expected Outcomes/Goals                    1. PO intake to meet at least


                                                 75% of nutritional needs.


                                                 2. Wt stability, skin to


                                                 remain intact, labs to


                                                 approach WNL.

## 2018-06-30 NOTE — PROGRESS NOTES
DATE:  06/30/2018



PULMONARY PROGRESS NOTE



PROBLEMS:

1.  Acute respiratory failure.

2.  Aspiration pneumonia, improved.

3.  Status post previous abdominal surgery.



SYMPTOMS:  Nil, feeling better.  He notes no respiratory distress.  Currently,

SIMV of 10 with a 30% FiO2, tolerating pretty good.



VITAL SIGNS:  The patient's recorded vitals, temperature is 97.6, blood pressure

129/89, saturation 99 on 30% on SIMV of 10 with pressure support.



PHYSICAL EXAMINATION:

HEENT:  On exam shows no changes.

CHEST:  Show clear.

HEART:  Regular.

ABDOMEN:  Slightly tender.



LABORATORY DATA:  The patient's white count is 8.8, hemoglobin 10.6.  ABG; pO2

is 84 on 30% of oxygen with SIMV of 10, potassium is 3.4 and magnesium is 1.8

and the patient's albumin is 2.1.



ASSESSMENT/IMPRESSION:  The patient is doing reasonably well.  Current setting

x-ray shows almost complete resolution of infiltrates, slight elevation of

diaphragm, right side.



PLANS AND SUGGESTIONS:  We will go ahead and extubate.  Care and plan discussed

with RT nursing staff and we will see how he does and go from there.





DD: 06/30/2018 17:56

DT: 06/30/2018 23:54

JOB# [id number]  [id number]

## 2018-06-30 NOTE — INFECTIOUS DISEASE PROG NOTE
Infectious Disease Subjective





- Review of Systems


Service Date: 18


Subjective: 





There is no new change, no fever. Diarrhea better.


Doing better. Remains intubated orally, on the ventilator support.


SIMV mode, on weaning protocol.





Infectious Disease Objective





- Results


Result Diagrams: 


 18 04:45





 18 04:55


Recent Labs: 


 Laboratory Last Values











WBC  8.8 Th/cmm (4.8-10.8)   18  04:45    


 


RBC  3.64 Mil/cmm (3.80-5.80)  L  18  04:45    


 


Hgb  10.6 gm/dL (12-16)  L  18  04:45    


 


Hct  31.8 % (41.0-60)  L  18  04:45    


 


MCV  87.4 fl (80-99)   18  04:45    


 


MCH  29.0 pg (27.0-31.0)   18  04:45    


 


MCHC Differential  33.2 pg (28.0-36.0)   18  04:45    


 


RDW  15.0 % (11.5-20.0)   18  04:45    


 


Plt Count  149 Th/cmm (150-400)  L  18  04:45    


 


MPV  8.6 fl  18  04:45    


 


Neutrophils %  75.1 % (40.0-80.0)   18  04:15    


 


Band Neutrophils %  2 % (0-10)   18  04:45    


 


Lymphocytes %  9.7 % (20.0-50.0)  L  18  04:15    


 


Monocytes %  14.9 % (2.0-10.0)  H  18  04:15    


 


Eosinophils %  0.1 % (0.0-5.0)   18  04:15    


 


Basophils %  0.2 % (0.0-2.0)   18  04:15    


 


Neutrophils (Manual)  85 % (40-80)  H  18  04:45    


 


Lymphocytes  10 % (20-50)  L  18  04:45    


 


Monocytes  5 % (2-10)   18  04:45    


 


Eosinophils  0 % (0-5)   18  18:24    


 


Basophils  0 % (0-3)   18  18:24    


 


Platelet Estimate  ADEQUATE  (NORMAL)   18  05:50    


 


Platelet Morphology  NORMAL  (NORMAL)   18  18:24    


 


Polychromasia  1+   18  04:40    


 


[place] Cells  1+   18  04:40    


 


RBC Morph Micro Appear  NORMAL  (NORMAL)   18  18:24    


 


PT  11.9 SECONDS (9.5-11.5)  H  18  12:08    


 


INR  1.13  (0.5-1.4)   18  12:08    


 


PTT (Actin FS)  22.3 SECONDS (26.0-38.0)  L  18  12:08    


 


Specimen Source  Arterial   18  08:13    


 


Sample Site  Right Radial   18  08:13    


 


pH  7.43  (7.35-7.45)   18  08:13    


 


pCO2  38.0 mmHg (35.0-45.0)   18  08:13    


 


pO2  84.0 mmHg (80.0-100.0)   18  08:13    


 


HCO3  25.7 mEq/L (20.0-26.0)   18  08:13    


 


Base Excess  1.0 mEq/L (-3.0-3.0)   18  08:13    


 


O2 Saturation  97.0 % (92.0-100.0)   18  08:13    


 


Ceferino Test  Positive   18  08:13    


 


Vent Rate  10   18  08:13    


 


Inspired O2  30   18  08:13    


 


Tidal Volume  500   18  08:13    


 


PEEP  5   18  08:13    


 


Pressure (ins/psv/peep)  15   18  08:13    


 


Critical Value  LZHANG   18  08:13    


 


Sodium  147 mEq/L (136-145)  H  18  04:55    


 


Potassium  3.4 mEq/L (3.5-5.1)  L  18  04:55    


 


Chloride  118 mEq/L ()  H  18  04:55    


 


Carbon Dioxide  23.0 mEq/L (21.0-31.0)   18  04:55    


 


Anion Gap  9.4  (7.0-16.0)   18  04:55    


 


BUN  52 mg/dL (7-25)  H  18  04:55    


 


Creatinine  1.6 mg/dL (0.7-1.3)  H  18  04:55    


 


Est GFR ( Amer)  56.1 ml/min (>90)   18  04:55    


 


Est GFR (Non-Af Amer)  46.3 ml/min  18  04:55    


 


BUN/Creatinine Ratio  32.5   18  04:55    


 


Glucose  170 mg/dL ()  H  18  04:55    


 


POC Glucose  156 MG/DL (70 - 105)  H  18  13:06    


 


Hemoglobin A1c %  4.8 % (4.0-6.0)   18  04:40    


 


Whole Bld Lactic Acid  1.36 mmol/L (0.60-1.99)   18  05:50    


 


Calcium  8.1 mg/dL (8.6-10.3)  L  18  04:55    


 


Phosphorus  2.6 mg/dL (2.5-5.0)   18  04:55    


 


Magnesium  1.8 mg/dL (1.9-2.7)  L  18  04:55    


 


Total Bilirubin  0.4 mg/dL (0.3-1.0)   18  04:55    


 


Direct Bilirubin  0.05 mg/dL (0.0-0.2)   18  05:50    


 


AST  22 U/L (13-39)   18  04:55    


 


ALT  13 U/L (7-52)   18  04:55    


 


Alkaline Phosphatase  65 U/L ()   18  04:55    


 


Ammonia  54 umol/L (16-53)  H  18  05:50    


 


Total Protein  5.0 gm/dL (6.0-8.3)  L  18  04:55    


 


Albumin  2.1 gm/dL (4.2-5.5)  L  18  04:55    


 


Globulin  2.9 gm/dL  18  04:55    


 


Albumin/Globulin Ratio  0.7  (1.0-1.8)  L  18  04:55    


 


Prealbumin  7 mg/dL (10-36)  L  18  04:15    


 


Triglycerides  83 mg/dL (<150)   18  04:55    


 


Cholesterol  66 mg/dL (<200)   18  04:15    


 


Urine Source  CLEAN C   18  17:35    


 


Urine Color  YELLOW   18  17:35    


 


Urine Clarity  HAZY  (CLEAR)   18  17:35    


 


Urine pH  5.0  (4.6 - 8.0)   18  17:35    


 


Ur Specific Gravity  1.020  (1.005-1.030)   18  17:35    


 


Urine Protein  30 mg/dL (NEGATIVE)  H  18  17:35    


 


Urine Glucose (UA)  NEGATIVE mg/dL (NEGATIVE)   18  17:35    


 


Urine Ketones  NEGATIVE mg/dL (NEGATIVE)   18  17:35    


 


Urine Blood  TRACE  (NEGATIVE)   18  17:35    


 


Urine Nitrate  NEGATIVE  (NEGATIVE)   18  17:35    


 


Urine Bilirubin  NEGATIVE  (NEGATIVE)   18  17:35    


 


Urine Urobilinogen  0.2 E.U./dL (0.2 - 1.0)   18  17:35    


 


Ur Leukocyte Esterase  TRACE  (NEGATIVE)  H  18  17:35    


 


Urine RBC  0-2 /hpf (0-5)  H  18  17:35    


 


Urine WBC  0-2 /hpf (0-5)   18  17:35    


 


Ur Epithelial Cells  OCCASIONAL /lpf (FEW)   18  17:35    


 


Amorphous Sediment  FEW URATES  (NONE SEEN)   18  17:35    


 


Urine Bacteria  FEW /hpf (NONE SEEN)   18  17:35    


 


Vancomycin Trough  16.8 ug/mL (5-10)  H  18  09:30    


 


Random Vancomycin  27.5 ug/mL (5.0-40.0)   18  04:45    














- Physical Exam


Vitals and I&O: 


 Vital Signs











Temp  97.8 F   18 12:00


 


Pulse  88   18 13:00


 


Resp  17   18 13:00


 


BP  138/66   18 13:00


 


Pulse Ox  97   18 13:00








 Intake & Output











 1818





 18:59 06:59 18:59


 


Intake Total 1981.503 843.34 98.64


 


Output Total 1175 1250 


 


Balance 806.503 -406.66 98.64


 


Weight (lbs) 100.698 kg 100.244 kg 


 


Intake:   


 


  Intake, IV Amount 1035.503 843.34 98.64


 


    Cefepime 1 gm In Dextrose 50 50 50





    5% 50 ml @ 100 mls/hr IV   





    Q12HR [facility] Rx#:[id number]   


 


    Multivitamin Inj 10 ml In 787.5  





    Dextrose 70% 1,170 ml In   





    Amino Acids 10% 500 ml @   





    70 mls/hr IV .Q24H [facility]   





    Rx#:[id number]   


 


    Propofol 1,000 mg In 100 98.003 93.34 48.64





    ml @ Per Protocol IV TITR   





    [facility] Rx#:[id number]   


 


    Vancomycin HCl 1.5 gm In  500 





    Sodium Chloride 0.9% 500   





    ml @ 250 mls/hr IV Q36H   





    [facility] Rx#:[id number]   


 


    metroNIDAZOLE 500mg/ 200 





    100mL 500 mg In 100 ml @   





    100 mls/hr IV Q8H [facility] Rx#   





    :[id number]   


 


  TPN/  


 


  Other 106  


 


Output:   


 


  Gastric Drainage 150 150 


 


  Urine 1025 1100 


 


Other:   


 


  # Bowel Movements 0 0 


 


  Stool Characteristics   Soft





   Brown


 


  Weight Source Bedscale Bedscale 











Active Medications: 


Current Medications





Acetaminophen/Hydrocodone Bitart (Norco 10 Mg/325 Mg)  1 tab PO Q6H PRN


   PRN Reason: Pain (Severe)


   Stop: 18 20:04


   Last Admin: 18 03:03 Dose:  1 tab


Albuterol/Ipratropium (Duoneb Neb)  3 ml HHN Q4HRT [facility]


   Stop: 18 14:59


   Last Admin: 18 11:19 Dose:  3 ml


Budesonide (Pulmicort)  0.5 mg HHN BIDRT [facility]


   Stop: 18 18:59


   Last Admin: 18 07:21 Dose:  0.5 mg


Chlorhexidine Gluconate (Peridex)  15 ml , [facility]


   Stop: 18 07:59


   Last Admin: 18 09:00 Dose:  15 ml


Cholestyramine Resin (Questran)  4 gm PO BID [facility]


   Stop: 18 16:59


   Last Admin: 18 09:50 Dose:  Not Given


Enoxaparin Sodium (Lovenox)  30 mg SUBQ Q12HR [facility]


   Stop: 18 20:59


   Last Admin: 18 09:11 Dose:  30 mg


Hydromorphone HCl (Dilaudid)  1 mg IVP Q3HR PRN


   PRN Reason: SEVERE PAIN


   Stop: 18 16:25


   Last Admin: 18 12:16 Dose:  1 mg


Metronidazole (Flagyl)  500 mg in 100 mls @ 100 mls/hr IV Q8H [facility]


   Stop: 18 20:59


   Last Admin: 18 12:23 Dose:  100 mls/hr


Cefepime HCl 1 gm/ Dextrose  50 mls @ 100 mls/hr IV Q12HR [facility]


   Stop: 18 20:59


   Last Infusion: 18 09:45 Dose:  Infused


Propofol (Diprivan)  1,000 mg in 100 mls @ 0 mls/hr IV TITR [facility]; Protocol


   Stop: 18 09:59


   Last Titration: 18 12:19 Dose:  0 mcg/kg/min, 0 mls/hr


Multivitamins/Minerals 10 ml/Dextrose/ Amino Acids/Electrolytes  1,680 mls @ 70 

mls/hr IV .Q24H [facility]


   Stop: 18 14:59


   Last Admin: 18 18:00 Dose:  70 mls/hr


Dextrose (D5w)  1,000 mls @ 75 mls/hr IV .[id number] [facility]


   Stop: 18 12:49


Insulin Aspart (Novolog Insulin Sliding Scale)  0 units SUBQ Q6H [facility]; Protocol


   Stop: 18 11:59


   Last Admin: 18 13:09 Dose:  2 units


Lorazepam (Ativan)  1 mg IVP Q4HR PR[facility]; Protocol


   PRN Reason: Anxiety


   Stop: 18 02:24


   Last Admin: 18 21:19 Dose:  1 mg


Methylprednisolone Sodium Succinate (Solu-Medrol)  60 mg IVP Q6H [facility]


   Stop: 18 16:29


   Last Admin: 18 10:50 Dose:  60 mg


Metoclopramide HCl (Reglan)  10 mg IVP Q6HR [facility]


   Stop: 18 17:59


   Last Admin: 18 12:23 Dose:  10 mg


Miscellaneous (Tpn Per Pharmacy)  1 ea [facility] PRN [facility]


   Stop: 18 17:59


Miscellaneous (Vancomycin Iv Per Pharmacy)  1 ea  PRN PRN


   PRN Reason: PROTOCOL


   Stop: 18 14:14


Ondansetron HCl (Zofran)  4 mg IV Q4H PRN


   PRN Reason: Nausea / Vomiting


   Stop: 18 05:53


   Last Admin: 18 22:50 Dose:  4 mg


Pantoprazole Sodium (Protonix)  40 mg IVP BID [facility]


   Stop: 18 08:59


   Last Admin: 18 09:10 Dose:  40 mg


Zolpidem Tartrate (Ambien)  5 mg PO HS PRN


   PRN Reason: Insomnia


   Stop: 18 20:55


   Last Admin: 18 21:59 Dose:  5 mg








General: no acute distress, well developed, well nourished


HEENT: atraumatic, normocephalic, PERRLA, EOMI, moist mucous membrane


Neck: supple, no thyromegaly


Cardiovascular: S1S2, regular


Lungs: clear to percussion, crackles


Abdomen: soft, other (incision intact.), no tender, no distended


Extremities: no cyanosis, no clubbing, no edema


Neurological: awake, alert


Skin: intact





- Procedures


Procedures: 


 Procedures











Procedure Code Date


 


EXCISION OF CHEST SKIN, EXTERNAL APPROACH 3NY4EGY 18


 


EXCISION OF ILEUM, OPEN APPROACH [id number] 18


 


EXCISION OF RIGHT LOWER ARM SKIN, EXTERNAL APPROACH [id number] 18


 


EXCISION OF RIGHT LOWER LEG SKIN, EXTERNAL APPROACH [id number] 18


 


RELEASE ILEUM, OPEN APPROACH [id number] 18


 


RESPIRATORY VENTILATION, 24-96 CONSECUTIVE HOURS [id number] 18














Infectious Disease Assmt/Plan





- Assessment


Assessment: 





1. Diarrhea improving.  Stool for C. difficile negative.


2.  Fever, will do fever w/u.


3.  COPD.


4.  Status post closure of diverting ileostomy.


5.  Status post debridement of laceration wound of the right leg and right 

forearm.


6.  Acute renal failure, acute kidney injury.


7.  Distended abdomen, likely ileus.


8. VDRF.


9. Aspiration and HAP.





- Plan


Plan: 





Sepsis workup.     continue vanco, cefepime and Flagyl.





Nutritional Asmnt/Malnutr-PDOC





- Dietary Evaluation


Malnutrition Findings (Please click <Entered> for more info): 








Nutritional Asmnt/Malnutrition                             Start:  18 14:

24


Text:                                                      Status: Complete    

  


Freq:                                                                          

  


Protocol:                                                                      

  


 Document     18 14:24  HEN  (Rec: 18 14:35  HEN  [id number])


 Nutritional Asmnt/Malnutrition


     Patient General Information


      Nutritional Screening                      High Risk


                                                 Consult


      Diagnosis                                  exploratory laparotomy with


                                                 recersal colostomy


      Pertinent Medical Hx/Surgical Hx           acute perforation of


                                                 diverticulitis, s/p


                                                 exploratory laparotomy and


                                                 divertin gcolostomy placement


      Subjective Information                     Pt seen lying in bed at time


                                                 of visit, awake and alert. Pt


                                                 stated he tolerated clear


                                                 liquid well, adequte amount


                                                 for him at this time. Pt has


                                                 RAQUEL drain noted. Per EMR, PO


                                                 intake % of clear liquid


                                                 .


      Current Diet Order/ Nutrition Support      clear liquid


      Pertinent Medications                      D5-0.9%ns, piperacillin,


                                                 vancomycin


      Pertinent Labs                              Na 133, K 3.4, Cr 0.7,


                                                 glucose 107, Ca 7.9


     Nutritional Hx/Data


      Height                                     1.88 m


      Height (Calculated Centimeters)            188.0


      Current Weight (lbs)                       86.636 kg


      Weight (Calculated Kilograms)              86.6


      Weight (Calculated Grams)                  74024.1


      Ideal Body Weight                          190


      Body Mass Index (BMI)                      24.5


      Weight Status                              Approriate


     GI Symptoms


      GI Symptoms                                None


      Last BM                                    none


      Difficult in:                              None


      Skin Integrity/Comment:                    RIGHT LOWER LEG HEALING


                                                 incision to right arm


     Estimated Nutritional Goals


      BEE in Kcals:                              Using Current wt


      Calories/Kcals/Kg                          25-30


      Kcals Calculated                           0410-1390


      Protein:                                   Using Current wt


      Protein g/k.8-1


      Protein Calculated                         69-86


      Fluid: ml                                  2150-2580ml (1ml/kcal)


     Nutritional Problem


      1. Problem


       Problem                                   altered GI function


       Etiology                                  s/p reversal colostomy


       Signs/Symptoms:                           pt on clear liquid


     Intervention/Recommendation


      Comments                                   1. Continue with clear liquid


                                                 diet as ordered.


                                                 2. Monitor PO intake, wt, labs


                                                 and skin integrity


                                                 3. F/U as high risk in 2-3


                                                 days, -


     Expected Outcomes/Goals


      Expected Outcomes/Goals                    1. PO intake to meet at least


                                                 75% of nutritional needs.


                                                 2. Wt stability, skin to


                                                 remain intact, labs to


                                                 approach WNL.

## 2018-06-30 NOTE — GENERAL PROGRESS NOTE
Subjective





- Review of Systems


Service Date: 18


Events since last encounter: 





labs ok


on SIMV


minimal NGT drainage


abdomen distended with ascites





Objective





- Results


Result Diagrams: 


 18 04:45





 18 04:55


Recent Labs: 


 Laboratory Last Values











WBC  8.8 Th/cmm (4.8-10.8)   18  04:45    


 


RBC  3.64 Mil/cmm (3.80-5.80)  L  18  04:45    


 


Hgb  10.6 gm/dL (12-16)  L  18  04:45    


 


Hct  31.8 % (41.0-60)  L  18  04:45    


 


MCV  87.4 fl (80-99)   18  04:45    


 


MCH  29.0 pg (27.0-31.0)   18  04:45    


 


MCHC Differential  33.2 pg (28.0-36.0)   18  04:45    


 


RDW  15.0 % (11.5-20.0)   18  04:45    


 


Plt Count  149 Th/cmm (150-400)  L  18  04:45    


 


MPV  8.6 fl  18  04:45    


 


Neutrophils %  75.1 % (40.0-80.0)   18  04:15    


 


Band Neutrophils %  2 % (0-10)   18  04:45    


 


Lymphocytes %  9.7 % (20.0-50.0)  L  18  04:15    


 


Monocytes %  14.9 % (2.0-10.0)  H  18  04:15    


 


Eosinophils %  0.1 % (0.0-5.0)   18  04:15    


 


Basophils %  0.2 % (0.0-2.0)   18  04:15    


 


Neutrophils (Manual)  85 % (40-80)  H  18  04:45    


 


Lymphocytes  10 % (20-50)  L  18  04:45    


 


Monocytes  5 % (2-10)   18  04:45    


 


Eosinophils  0 % (0-5)   18  18:24    


 


Basophils  0 % (0-3)   18  18:24    


 


Platelet Estimate  ADEQUATE  (NORMAL)   18  05:50    


 


Platelet Morphology  NORMAL  (NORMAL)   18  18:24    


 


Polychromasia  1+   18  04:40    


 


Anna Cells  1+   18  04:40    


 


RBC Morph Micro Appear  NORMAL  (NORMAL)   18  18:24    


 


PT  11.9 SECONDS (9.5-11.5)  H  18  12:08    


 


INR  1.13  (0.5-1.4)   18  12:08    


 


PTT (Actin FS)  22.3 SECONDS (26.0-38.0)  L  18  12:08    


 


Specimen Source  Arterial   18  08:13    


 


Sample Site  Right Radial   18  08:13    


 


pH  7.43  (7.35-7.45)   18  08:13    


 


pCO2  38.0 mmHg (35.0-45.0)   18  08:13    


 


pO2  84.0 mmHg (80.0-100.0)   18  08:13    


 


HCO3  25.7 mEq/L (20.0-26.0)   18  08:13    


 


Base Excess  1.0 mEq/L (-3.0-3.0)   18  08:13    


 


O2 Saturation  97.0 % (92.0-100.0)   18  08:13    


 


Ceferino Test  Positive   18  08:13    


 


Vent Rate  10   18  08:13    


 


Inspired O2  30   18  08:13    


 


Tidal Volume  500   18  08:13    


 


PEEP  5   18  08:13    


 


Pressure (ins/psv/peep)  15   18  08:13    


 


Critical Value  LZHANG   18  08:13    


 


Sodium  147 mEq/L (136-145)  H  18  04:55    


 


Potassium  3.4 mEq/L (3.5-5.1)  L  18  04:55    


 


Chloride  118 mEq/L ()  H  18  04:55    


 


Carbon Dioxide  23.0 mEq/L (21.0-31.0)   18  04:55    


 


Anion Gap  9.4  (7.0-16.0)   18  04:55    


 


BUN  52 mg/dL (7-25)  H  18  04:55    


 


Creatinine  1.6 mg/dL (0.7-1.3)  H  18  04:55    


 


Est GFR ( Amer)  56.1 ml/min (>90)   18  04:55    


 


Est GFR (Non-Af Amer)  46.3 ml/min  18  04:55    


 


BUN/Creatinine Ratio  32.5   18  04:55    


 


Glucose  170 mg/dL ()  H  18  04:55    


 


POC Glucose  144 MG/DL (70 - 105)  H  18  05:09    


 


Hemoglobin A1c %  4.8 % (4.0-6.0)   18  04:40    


 


Whole Bld Lactic Acid  1.36 mmol/L (0.60-1.99)   18  05:50    


 


Calcium  8.1 mg/dL (8.6-10.3)  L  18  04:55    


 


Phosphorus  2.6 mg/dL (2.5-5.0)   18  04:55    


 


Magnesium  1.8 mg/dL (1.9-2.7)  L  18  04:55    


 


Total Bilirubin  0.4 mg/dL (0.3-1.0)   18  04:55    


 


Direct Bilirubin  0.05 mg/dL (0.0-0.2)   18  05:50    


 


AST  22 U/L (13-39)   18  04:55    


 


ALT  13 U/L (7-52)   18  04:55    


 


Alkaline Phosphatase  65 U/L ()   18  04:55    


 


Ammonia  54 umol/L (16-53)  H  18  05:50    


 


Total Protein  5.0 gm/dL (6.0-8.3)  L  18  04:55    


 


Albumin  2.1 gm/dL (4.2-5.5)  L  18  04:55    


 


Globulin  2.9 gm/dL  18  04:55    


 


Albumin/Globulin Ratio  0.7  (1.0-1.8)  L  18  04:55    


 


Prealbumin  7 mg/dL (10-36)  L  18  04:15    


 


Triglycerides  83 mg/dL (<150)   18  04:55    


 


Cholesterol  66 mg/dL (<200)   18  04:15    


 


Urine Source  CLEAN C   18  17:35    


 


Urine Color  YELLOW   18  17:35    


 


Urine Clarity  HAZY  (CLEAR)   18  17:35    


 


Urine pH  5.0  (4.6 - 8.0)   18  17:35    


 


Ur Specific Gravity  1.020  (1.005-1.030)   18  17:35    


 


Urine Protein  30 mg/dL (NEGATIVE)  H  18  17:35    


 


Urine Glucose (UA)  NEGATIVE mg/dL (NEGATIVE)   18  17:35    


 


Urine Ketones  NEGATIVE mg/dL (NEGATIVE)   18  17:35    


 


Urine Blood  TRACE  (NEGATIVE)   18  17:35    


 


Urine Nitrate  NEGATIVE  (NEGATIVE)   18  17:35    


 


Urine Bilirubin  NEGATIVE  (NEGATIVE)   18  17:35    


 


Urine Urobilinogen  0.2 E.U./dL (0.2 - 1.0)   18  17:35    


 


Ur Leukocyte Esterase  TRACE  (NEGATIVE)  H  18  17:35    


 


Urine RBC  0-2 /hpf (0-5)  H  18  17:35    


 


Urine WBC  0-2 /hpf (0-5)   18  17:35    


 


Ur Epithelial Cells  OCCASIONAL /lpf (FEW)   18  17:35    


 


Amorphous Sediment  FEW URATES  (NONE SEEN)   18  17:35    


 


Urine Bacteria  FEW /hpf (NONE SEEN)   18  17:35    


 


Vancomycin Trough  16.8 ug/mL (5-10)  H  18  09:30    


 


Random Vancomycin  27.5 ug/mL (5.0-40.0)   18  04:45    














- Physical Exam


Vitals and I&O: 


 Vital Signs











Temp  98.0 F   18 08:00


 


Pulse  101   18 08:00


 


Resp  32   18 08:00


 


BP  131/80   18 08:00


 


Pulse Ox  98   18 08:00








 Intake & Output











 18





 18:59 06:59 18:59


 


Intake Total 1981.503 843.34 33.67


 


Output Total 1175 1250 


 


Balance 806.503 -406.66 33.67


 


Weight (lbs) 100.698 kg 100.244 kg 


 


Intake:   


 


  Intake, IV Amount 1035.503 843.34 33.67


 


    Cefepime 1 gm In Dextrose 50 50 





    5% 50 ml @ 100 mls/hr IV   





    Q12HR On license of [facility] Rx#:[id number]   


 


    Multivitamin Inj 10 ml In 787.5  





    Dextrose 70% 1,170 ml In   





    Amino Acids 10% 500 ml @   





    70 mls/hr IV .Q24H On license of [facility]   





    Rx#:[id number]   


 


    Propofol 1,000 mg In 100 98.003 93.34 33.67





    ml @ Per Protocol IV TITR   





    KATHY Rx#:[id number]   


 


    Vancomycin HCl 1.5 gm In  500 





    Sodium Chloride 0.9% 500   





    ml @ 250 mls/hr IV Q36H   





    On license of [facility] Rx#:[id number]   


 


    metroNIDAZOLE 500mg/ 200 





    100mL 500 mg In 100 ml @   





    100 mls/hr IV Q8H On license of [facility] Rx#   





    :[id number]   


 


  TPN/  


 


  Other 106  


 


Output:   


 


  Gastric Drainage 150 150 


 


  Urine 1025 1100 


 


Other:   


 


  # Bowel Movements 0 0 


 


  Weight Source Bedscale Bedscale 











Active Medications: 


Current Medications





Acetaminophen/Hydrocodone Bitart (Norco 10 Mg/325 Mg)  1 tab PO Q6H PRN


   PRN Reason: Pain (Severe)


   Stop: 18 20:04


   Last Admin: 18 03:03 Dose:  1 tab


Albuterol/Ipratropium (Duoneb Neb)  3 ml HHN Q4HRT On license of [facility]


   Stop: 18 14:59


   Last Admin: 18 07:21 Dose:  3 ml


Budesonide (Pulmicort)  0.5 mg HHN BIDRT On license of [facility]


   Stop: 18 18:59


   Last Admin: 18 07:21 Dose:  0.5 mg


Chlorhexidine Gluconate (Peridex)  15 ml MM 08, On license of [facility]


   Stop: 18 07:59


   Last Admin: 18 20:09 Dose:  15 ml


Cholestyramine Resin (Questran)  4 gm PO BID On license of [facility]


   Stop: 18 16:59


   Last Admin: 18 09:50 Dose:  Not Given


Enoxaparin Sodium (Lovenox)  30 mg SUBQ Q12HR KATHY


   Stop: 18 20:59


   Last Admin: 18 09:11 Dose:  30 mg


Hydromorphone HCl (Dilaudid)  1 mg IVP Q3HR PRN


   PRN Reason: SEVERE PAIN


   Stop: 18 16:25


   Last Admin: 18 09:00 Dose:  1 mg


Metronidazole (Flagyl)  500 mg in 100 mls @ 100 mls/hr IV Q8H KATHY


   Stop: 18 20:59


   Last Infusion: 18 06:15 Dose:  Infused


Cefepime HCl 1 gm/ Dextrose  50 mls @ 100 mls/hr IV Q12HR KATHY


   Stop: 18 20:59


   Last Admin: 18 09:11 Dose:  100 mls/hr


Propofol (Diprivan)  1,000 mg in 100 mls @ 0 mls/hr IV TITR On license of [facility]; Protocol


   Stop: 18 09:59


   Last Titration: 18 09:38 Dose:  10 mcg/kg/min, 5.579 mls/hr


Dextrose/Sodium Chloride (D5-0.9%Ns)  1,000 mls @ 30 mls/hr IV .Q24H On license of [facility]


   Stop: 18 14:59


   Last Infusion: 18 06:00 Dose:  30 mls/hr


Multivitamins/Minerals 10 ml/Dextrose/ Amino Acids/Electrolytes  1,680 mls @ 70 

mls/hr IV .Q24H KATHY


   Stop: 18 14:59


   Last Admin: 18 18:00 Dose:  70 mls/hr


Insulin Aspart (Novolog Insulin Sliding Scale)  0 units SUBQ Q6H On license of [facility]; Protocol


   Stop: 18 11:59


   Last Admin: 18 05:12 Dose:  Not Given


Lorazepam (Ativan)  1 mg IVP Q4HR PRN; Protocol


   PRN Reason: Anxiety


   Stop: 18 02:24


   Last Admin: 18 21:19 Dose:  1 mg


Methylprednisolone Sodium Succinate (Solu-Medrol)  60 mg IVP Q6H On license of [facility]


   Stop: 18 16:29


   Last Admin: 18 05:11 Dose:  60 mg


Metoclopramide HCl (Reglan)  10 mg IVP Q6HR KATHY


   Stop: 18 17:59


   Last Admin: 18 05:12 Dose:  10 mg


Miscellaneous (Tpn Per Pharmacy)  1 ea MC PRN KATHY


   Stop: 18 17:59


Miscellaneous (Vancomycin Iv Per Pharmacy)  1 ea  PRN PRN


   PRN Reason: PROTOCOL


   Stop: 18 14:14


Ondansetron HCl (Zofran)  4 mg IV Q4H PRN


   PRN Reason: Nausea / Vomiting


   Stop: 18 05:53


   Last Admin: 18 22:50 Dose:  4 mg


Pantoprazole Sodium (Protonix)  40 mg IVP BID KATHY


   Stop: 18 08:59


   Last Admin: 18 09:10 Dose:  40 mg


Zolpidem Tartrate (Ambien)  5 mg PO HS PRN


   PRN Reason: Insomnia


   Stop: 18 20:55


   Last Admin: 18 21:59 Dose:  5 mg








General: Alert, Cooperative, No acute distress


HEENT: Atraumatic


Neck: Supple


Cardiovascular: Regular rate, Normal S1, Normal S2


Lungs: Clear to auscultation


Abdomen: Distended





- Procedures


Procedures: 


 Procedures











Procedure Code Date


 


EXCISION OF CHEST SKIN, EXTERNAL APPROACH 3MN6NJS 18


 


EXCISION OF ILEUM, OPEN APPROACH [id number] 18


 


EXCISION OF RIGHT LOWER ARM SKIN, EXTERNAL APPROACH [id number] 18


 


EXCISION OF RIGHT LOWER LEG SKIN, EXTERNAL APPROACH [id number] 18


 


RELEASE ILEUM, OPEN APPROACH [id number] 18


 


RESPIRATORY VENTILATION, 24-96 CONSECUTIVE HOURS 8U0351I 18














Nutritional Asmnt/Malnutr-PDOC





- Dietary Evaluation


Malnutrition Findings (Please click <Entered> for more info): 








Nutritional Asmnt/Malnutrition                             Start:  18 14:

24


Text:                                                      Status: Complete    

  


Freq:                                                                          

  


Protocol:                                                                      

  


 Document     18 14:24  KAYE  (Rec: 18 14:35  KAYE  [id number])


 Nutritional Asmnt/Malnutrition


     Patient General Information


      Nutritional Screening                      High Risk


                                                 Consult


      Diagnosis                                  exploratory laparotomy with


                                                 recersal colostomy


      Pertinent Medical Hx/Surgical Hx           acute perforation of


                                                 diverticulitis, s/p


                                                 exploratory laparotomy and


                                                 divertin gcolostomy placement


      Subjective Information                     Pt seen lying in bed at time


                                                 of visit, awake and alert. Pt


                                                 stated he tolerated clear


                                                 liquid well, adequte amount


                                                 for him at this time. Pt has


                                                 RAQUEL drain noted. Per EMR, PO


                                                 intake % of clear liquid


                                                 .


      Current Diet Order/ Nutrition Support      clear liquid


      Pertinent Medications                      D5-0.9%ns, piperacillin,


                                                 vancomycin


      Pertinent Labs                              Na 133, K 3.4, Cr 0.7,


                                                 glucose 107, Ca 7.9


     Nutritional Hx/Data


      Height                                     1.88 m


      Height (Calculated Centimeters)            188.0


      Current Weight (lbs)                       86.636 kg


      Weight (Calculated Kilograms)              86.6


      Weight (Calculated Grams)                  48288.1


      Ideal Body Weight                          190


      Body Mass Index (BMI)                      24.5


      Weight Status                              Approriate


     GI Symptoms


      GI Symptoms                                None


      Last BM                                    none


      Difficult in:                              None


      Skin Integrity/Comment:                    RIGHT LOWER LEG HEALING


                                                 incision to right arm


     Estimated Nutritional Goals


      BEE in Kcals:                              Using Current wt


      Calories/Kcals/Kg                          25-30


      Kcals Calculated                           8098-8270


      Protein:                                   Using Current wt


      Protein g/k.8-1


      Protein Calculated                         69-86


      Fluid: ml                                  2150-2580ml (1ml/kcal)


     Nutritional Problem


      1. Problem


       Problem                                   altered GI function


       Etiology                                  s/p reversal colostomy


       Signs/Symptoms:                           pt on clear liquid


     Intervention/Recommendation


      Comments                                   1. Continue with clear liquid


                                                 diet as ordered.


                                                 2. Monitor PO intake, wt, labs


                                                 and skin integrity


                                                 3. F/U as high risk in 2-3


                                                 days, -


     Expected Outcomes/Goals


      Expected Outcomes/Goals                    1. PO intake to meet at least


                                                 75% of nutritional needs.


                                                 2. Wt stability, skin to


                                                 remain intact, labs to


                                                 approach WNL.

## 2018-07-01 LAB
ALBUMIN SERPL-MCNC: 2.2 GM/DL (ref 4.2–5.5)
ALBUMIN/GLOB SERPL: 0.7 {RATIO} (ref 1–1.8)
ALP SERPL-CCNC: 70 U/L (ref 34–104)
ALT SERPL-CCNC: 19 U/L (ref 7–52)
ANION GAP SERPL CALC-SCNC: 9.6 MMOL/L (ref 7–16)
AST SERPL-CCNC: 27 U/L (ref 13–39)
BILIRUB SERPL-MCNC: 0.5 MG/DL (ref 0.3–1)
BUN SERPL-MCNC: 60 MG/DL (ref 7–25)
CALCIUM SERPL-MCNC: 8.3 MG/DL (ref 8.6–10.3)
CHLORIDE SERPL-SCNC: 116 MEQ/L (ref 98–107)
CO2 SERPL-SCNC: 25.5 MEQ/L (ref 21–31)
CREAT SERPL-MCNC: 1.5 MG/DL (ref 0.7–1.3)
ERYTHROCYTE [DISTWIDTH] IN BLOOD BY AUTOMATED COUNT: 15.3 % (ref 11.5–20)
GLOBULIN SER-MCNC: 3.3 GM/DL
GLUCOSE SERPL-MCNC: 158 MG/DL (ref 70–105)
HCT VFR BLD CALC: 34.3 % (ref 41–60)
HGB BLD-MCNC: 11.5 GM/DL (ref 12–16)
LYMPHOCYTES # BLD MANUAL: 6 % (ref 20–50)
MAGNESIUM SERPL-MCNC: 2.1 MG/DL (ref 1.9–2.7)
MCH RBC QN AUTO: 29.4 PG (ref 27–31)
MCHC RBC AUTO-ENTMCNC: 33.4 PG (ref 28–36)
MCV RBC AUTO: 88.1 FL (ref 80–99)
MONOCYTES # BLD MANUAL: 2 % (ref 2–10)
NEUTROPHILS NFR BLD AUTO: 83 % (ref 40–80)
NEUTS BAND NFR BLD: 9 % (ref 0–10)
PCO2 BLDA: 43 MMHG (ref 35–45)
PHOSPHATE SERPL-MCNC: 4 MG/DL (ref 2.5–5)
PLATELET # BLD: 153 TH/CMM (ref 150–400)
PMV BLD AUTO: 8.7 FL
PO2 BLDA: 67 MMHG (ref 80–100)
POTASSIUM SERPL-SCNC: 4.1 MEQ/L (ref 3.5–5.1)
RBC # BLD AUTO: 3.89 MIL/CMM (ref 3.8–5.8)
SAO2 % BLDA: 93 % (ref 92–100)
SODIUM SERPL-SCNC: 147 MEQ/L (ref 136–145)
TOTAL CELLS COUNTED BLD: 100
WBC # BLD AUTO: 15.5 TH/CMM (ref 4.8–10.8)

## 2018-07-01 RX ADMIN — IPRATROPIUM BROMIDE AND ALBUTEROL SULFATE SCH ML: .5; 3 SOLUTION RESPIRATORY (INHALATION) at 04:39

## 2018-07-01 RX ADMIN — METOCLOPRAMIDE SCH MG: 5 INJECTION, SOLUTION INTRAMUSCULAR; INTRAVENOUS at 12:48

## 2018-07-01 RX ADMIN — ENOXAPARIN SODIUM SCH MG: 100 INJECTION SUBCUTANEOUS at 09:00

## 2018-07-01 RX ADMIN — BUDESONIDE SCH MG: 0.5 SUSPENSION RESPIRATORY (INHALATION) at 19:11

## 2018-07-01 RX ADMIN — METOCLOPRAMIDE SCH MG: 5 INJECTION, SOLUTION INTRAMUSCULAR; INTRAVENOUS at 00:12

## 2018-07-01 RX ADMIN — IPRATROPIUM BROMIDE AND ALBUTEROL SULFATE SCH ML: .5; 3 SOLUTION RESPIRATORY (INHALATION) at 19:08

## 2018-07-01 RX ADMIN — METOCLOPRAMIDE SCH MG: 5 INJECTION, SOLUTION INTRAMUSCULAR; INTRAVENOUS at 18:24

## 2018-07-01 RX ADMIN — HYDROMORPHONE HYDROCHLORIDE PRN MG: 1 INJECTION, SOLUTION INTRAMUSCULAR; INTRAVENOUS; SUBCUTANEOUS at 01:20

## 2018-07-01 RX ADMIN — IPRATROPIUM BROMIDE AND ALBUTEROL SULFATE SCH ML: .5; 3 SOLUTION RESPIRATORY (INHALATION) at 07:23

## 2018-07-01 RX ADMIN — METRONIDAZOLE SCH MLS/HR: 500 INJECTION, SOLUTION INTRAVENOUS at 04:20

## 2018-07-01 RX ADMIN — BUDESONIDE SCH MG: 0.5 SUSPENSION RESPIRATORY (INHALATION) at 07:23

## 2018-07-01 RX ADMIN — HYDROMORPHONE HYDROCHLORIDE PRN MG: 1 INJECTION, SOLUTION INTRAMUSCULAR; INTRAVENOUS; SUBCUTANEOUS at 23:36

## 2018-07-01 RX ADMIN — HYDROMORPHONE HYDROCHLORIDE PRN MG: 1 INJECTION, SOLUTION INTRAMUSCULAR; INTRAVENOUS; SUBCUTANEOUS at 16:51

## 2018-07-01 RX ADMIN — INSULIN ASPART SCH: 100 INJECTION, SOLUTION INTRAVENOUS; SUBCUTANEOUS at 12:43

## 2018-07-01 RX ADMIN — ASCORBIC ACID, VITAMIN A PALMITATE, CHOLECALCIFEROL, THIAMINE HYDROCHLORIDE, RIBOFLAVIN-5 PHOSPHATE SODIUM, PYRIDOXINE HYDROCHLORIDE, NIACINAMIDE, DEXPANTHENOL, ALPHA-TOCOPHEROL ACETATE, VITAMIN K1, FOLIC ACID, BIOTIN, CYANOCOBALAMIN SCH MLS/HR: 200; 3300; 200; 6; 3.6; 6; 40; 15; 10; 150; 600; 60; 5 INJECTION, SOLUTION INTRAVENOUS at 16:21

## 2018-07-01 RX ADMIN — ENOXAPARIN SODIUM SCH MG: 100 INJECTION SUBCUTANEOUS at 21:21

## 2018-07-01 RX ADMIN — IPRATROPIUM BROMIDE AND ALBUTEROL SULFATE SCH ML: .5; 3 SOLUTION RESPIRATORY (INHALATION) at 16:01

## 2018-07-01 RX ADMIN — INSULIN ASPART SCH: 100 INJECTION, SOLUTION INTRAVENOUS; SUBCUTANEOUS at 06:15

## 2018-07-01 RX ADMIN — METOCLOPRAMIDE SCH MG: 5 INJECTION, SOLUTION INTRAMUSCULAR; INTRAVENOUS at 05:47

## 2018-07-01 RX ADMIN — IPRATROPIUM BROMIDE AND ALBUTEROL SULFATE SCH ML: .5; 3 SOLUTION RESPIRATORY (INHALATION) at 23:00

## 2018-07-01 RX ADMIN — INSULIN ASPART SCH: 100 INJECTION, SOLUTION INTRAVENOUS; SUBCUTANEOUS at 00:12

## 2018-07-01 RX ADMIN — HYDROMORPHONE HYDROCHLORIDE PRN MG: 1 INJECTION, SOLUTION INTRAMUSCULAR; INTRAVENOUS; SUBCUTANEOUS at 04:20

## 2018-07-01 RX ADMIN — INSULIN ASPART SCH: 100 INJECTION, SOLUTION INTRAVENOUS; SUBCUTANEOUS at 18:29

## 2018-07-01 RX ADMIN — IPRATROPIUM BROMIDE AND ALBUTEROL SULFATE SCH ML: .5; 3 SOLUTION RESPIRATORY (INHALATION) at 11:06

## 2018-07-01 NOTE — INTERNAL MEDICINE PROG NOTE
Internal Medicine Subjective





- Subjective


Service Date: 18


Patient seen and examined:: with staff (OFF VENT. HE IS ON BIPAP)


Patient is:: in bed, other (ON VENTILATOR,opening his eyes.)


Patient Complaints of:: SOB


Per staff patient has:: no adverse event, other (AFEBRIL,WAS SEEN BY 

PULMONOLOGIST.)





Internal Medicine Objective





- Results


Result Diagrams: 


 18 05:10





 18 05:10


Recent Labs: 


 Laboratory Last Values











WBC  15.5 Th/cmm (4.8-10.8)  H  18  05:10    


 


RBC  3.89 Mil/cmm (3.80-5.80)   18  05:10    


 


Hgb  11.5 gm/dL (12-16)  L  18  05:10    


 


Hct  34.3 % (41.0-60)  L  18  05:10    


 


MCV  88.1 fl (80-99)   18  05:10    


 


MCH  29.4 pg (27.0-31.0)   18  05:10    


 


MCHC Differential  33.4 pg (28.0-36.0)   18  05:10    


 


RDW  15.3 % (11.5-20.0)   18  05:10    


 


Plt Count  153 Th/cmm (150-400)   18  05:10    


 


MPV  8.7 fl  18  05:10    


 


Neutrophils %  75.1 % (40.0-80.0)   18  04:15    


 


Band Neutrophils %  9 % (0-10)   18  05:10    


 


Lymphocytes %  9.7 % (20.0-50.0)  L  18  04:15    


 


Monocytes %  14.9 % (2.0-10.0)  H  18  04:15    


 


Eosinophils %  0.1 % (0.0-5.0)   18  04:15    


 


Basophils %  0.2 % (0.0-2.0)   18  04:15    


 


Neutrophils (Manual)  83 % (40-80)  H  18  05:10    


 


Lymphocytes  6 % (20-50)  L  18  05:10    


 


Monocytes  2 % (2-10)   18  05:10    


 


Eosinophils  0 % (0-5)   18  18:24    


 


Basophils  0 % (0-3)   18  18:24    


 


Platelet Estimate  ADEQUATE  (NORMAL)   18  05:50    


 


Platelet Morphology  NORMAL  (NORMAL)   18  18:24    


 


Polychromasia  1+   18  04:40    


 


Anna Cells  1+   18  04:40    


 


RBC Morph Micro Appear  NORMAL  (NORMAL)   18  18:24    


 


PT  11.9 SECONDS (9.5-11.5)  H  18  12:08    


 


INR  1.13  (0.5-1.4)   18  12:08    


 


PTT (Actin FS)  22.3 SECONDS (26.0-38.0)  L  18  12:08    


 


Specimen Source  Arterial   18  08:41    


 


Sample Site  RB   18  08:41    


 


pH  7.41  (7.35-7.45)   18  08:41    


 


pCO2  43.0 mmHg (35.0-45.0)   18  08:41    


 


pO2  67.0 mmHg (80.0-100.0)  L  18  08:41    


 


HCO3  26.6 mEq/L (20.0-26.0)  H  18  08:41    


 


Base Excess  2.3 mEq/L (-3.0-3.0)   18  08:41    


 


O2 Saturation  93.0 % (92.0-100.0)   18  08:41    


 


Ceferino Test  NA   18  08:41    


 


Vent Rate  NA   18  08:41    


 


Inspired O2  35   18  08:41    


 


Tidal Volume  NA   18  08:41    


 


PEEP  NA   18  08:41    


 


Pressure (ins/psv/peep)  NA   18  08:41    


 


Critical Value  LZHANG   18  08:41    


 


Sodium  147 mEq/L (136-145)  H  18  05:10    


 


Potassium  4.1 mEq/L (3.5-5.1)   18  05:10    


 


Chloride  116 mEq/L ()  H  18  05:10    


 


Carbon Dioxide  25.5 mEq/L (21.0-31.0)   18  05:10    


 


Anion Gap  9.6  (7.0-16.0)   18  05:10    


 


BUN  60 mg/dL (7-25)  H  18  05:10    


 


Creatinine  1.5 mg/dL (0.7-1.3)  H  18  05:10    


 


Est GFR ( Amer)  > 60.0 ml/min (>90)   18  05:10    


 


Est GFR (Non-Af Amer)  49.9 ml/min  18  05:10    


 


BUN/Creatinine Ratio  40.0   18  05:10    


 


Glucose  158 mg/dL ()  H  18  05:10    


 


POC Glucose  137 MG/DL (70 - 105)  H  18  12:17    


 


Hemoglobin A1c %  4.8 % (4.0-6.0)   18  04:40    


 


Whole Bld Lactic Acid  1.36 mmol/L (0.60-1.99)   18  05:50    


 


Calcium  8.3 mg/dL (8.6-10.3)  L  18  05:10    


 


Phosphorus  4.0 mg/dL (2.5-5.0)   18  05:10    


 


Magnesium  2.1 mg/dL (1.9-2.7)   18  05:10    


 


Total Bilirubin  0.5 mg/dL (0.3-1.0)   18  05:10    


 


Direct Bilirubin  0.05 mg/dL (0.0-0.2)   18  05:50    


 


AST  27 U/L (13-39)   18  05:10    


 


ALT  19 U/L (7-52)   18  05:10    


 


Alkaline Phosphatase  70 U/L ()   18  05:10    


 


Ammonia  54 umol/L (16-53)  H  18  05:50    


 


Total Protein  5.5 gm/dL (6.0-8.3)  L  18  05:10    


 


Albumin  2.2 gm/dL (4.2-5.5)  L  18  05:10    


 


Globulin  3.3 gm/dL  18  05:10    


 


Albumin/Globulin Ratio  0.7  (1.0-1.8)  L  18  05:10    


 


Prealbumin  7 mg/dL (10-36)  L  18  04:15    


 


Triglycerides  83 mg/dL (<150)   18  04:55    


 


Cholesterol  66 mg/dL (<200)   18  04:15    


 


Urine Source  CLEAN C   18  17:35    


 


Urine Color  YELLOW   18  17:35    


 


Urine Clarity  HAZY  (CLEAR)   18  17:35    


 


Urine pH  5.0  (4.6 - 8.0)   18  17:35    


 


Ur Specific Gravity  1.020  (1.005-1.030)   18  17:35    


 


Urine Protein  30 mg/dL (NEGATIVE)  H  18  17:35    


 


Urine Glucose (UA)  NEGATIVE mg/dL (NEGATIVE)   18  17:35    


 


Urine Ketones  NEGATIVE mg/dL (NEGATIVE)   18  17:35    


 


Urine Blood  TRACE  (NEGATIVE)   18  17:35    


 


Urine Nitrate  NEGATIVE  (NEGATIVE)   18  17:35    


 


Urine Bilirubin  NEGATIVE  (NEGATIVE)   18  17:35    


 


Urine Urobilinogen  0.2 E.U./dL (0.2 - 1.0)   18  17:35    


 


Ur Leukocyte Esterase  TRACE  (NEGATIVE)  H  18  17:35    


 


Urine RBC  0-2 /hpf (0-5)  H  18  17:35    


 


Urine WBC  0-2 /hpf (0-5)   18  17:35    


 


Ur Epithelial Cells  OCCASIONAL /lpf (FEW)   18  17:35    


 


Amorphous Sediment  FEW URATES  (NONE SEEN)   18  17:35    


 


Urine Bacteria  FEW /hpf (NONE SEEN)   18  17:35    


 


Vancomycin Trough  16.8 ug/mL (5-10)  H  18  09:30    


 


Random Vancomycin  18.1 ug/mL (5.0-40.0)   18  05:00    














- Physical Exam


Vitals and I&O: 


 Vital Signs











Temp  97.9 F   18 12:00


 


Pulse  100   18 13:00


 


Resp  26   18 13:10


 


BP  153/97   18 13:00


 


Pulse Ox  96   18 13:10








 Intake & Output











 18





 18:59 06:59 18:59


 


Intake Total 2557.64 150 


 


Output Total 1225 1300 


 


Balance 1332.64 -1150 


 


Weight (lbs) 99.246 kg 98.883 kg 


 


Intake:   


 


  Intake, IV Amount 1717.64 150 


 


    Cefepime 1 gm In Dextrose 50 50 





    5% 50 ml @ 100 mls/hr IV   





    Q12HR [facility] Rx#:[id number]   


 


    Multivitamin Inj 10 ml In 1519  





    Dextrose 70% 1,170 ml In   





    Amino Acids 10% 500 ml @   





    70 mls/hr IV .Q24H [facility]ATH[facility]   





    Rx#:[id number]   


 


    Propofol 1,000 mg In 100 48.64  





    ml @ Per Protocol IV TITR   





    [facility]ATHY Rx#:[id number]   


 


    metroNIDAZOLE 500mg/ 100 





    100mL 500 mg In 100 ml @   





    100 mls/hr IV Q8H [facility] Rx#   





    :[id number]   


 


  TPN/  


 


Output:   


 


  Gastric Drainage  50 


 


  Urine 1225 1250 


 


Other:   


 


  # Bowel Movements 2 1 


 


  Stool Characteristics Soft Soft 





 Brown Brown 


 


  Weight Source Bedscale Bedscale 











Active Medications: 


Current Medications





Acetaminophen/Hydrocodone Bitart (Norco 10 Mg/325 Mg)  1 tab PO Q6H PRN


   PRN Reason: Pain (Severe)


   Stop: 18 20:04


   Last Admin: 18 03:03 Dose:  1 tab


Albuterol/Ipratropium (Duoneb Neb)  3 ml HHN Q4HRT [facility]


   Stop: 18 14:59


   Last Admin: 18 11:06 Dose:  3 ml


Budesonide (Pulmicort)  0.5 mg HHN BIDRT [facility]


   Stop: 18 18:59


   Last Admin: 18 07:23 Dose:  0.5 mg


Cholestyramine Resin (Questran)  4 gm PO BID [facility]


   Stop: 18 16:59


   Last Admin: 18 10:59 Dose:  Not Given


Enoxaparin Sodium (Lovenox)  30 mg SUBQ Q12HR [facility]


   Stop: 18 20:59


   Last Admin: 18 09:00 Dose:  30 mg


Hydromorphone HCl (Dilaudid)  1 mg IVP Q3HR PRN


   PRN Reason: SEVERE PAIN


   Stop: 18 16:25


   Last Admin: 18 04:20 Dose:  1 mg


Cefepime HCl 1 gm/ Dextrose  50 mls @ 100 mls/hr IV Q12HR [facility]


   Stop: 18 20:59


   Last Admin: 18 09:17 Dose:  100 mls/hr


Multivitamins/Minerals 10 ml/Dextrose/ Amino Acids/Electrolytes  1,680 mls @ 70 

mls/hr IV .Q24H [facility]


   Stop: 18 14:59


   Last Admin: 18 15:42 Dose:  70 mls/hr


Dextrose (D5w)  1,000 mls @ 30 mls/hr IV .Q24H [facility]


   Stop: 18 16:29


   Last Admin: 18 16:30 Dose:  30 mls/hr


Vancomycin HCl 0.75 gm/ Sodium (Chloride)  250 mls @ 165 mls/hr IV Q24HR@0900 

[facility]


   Stop: 18 08:59


   Last Admin: 18 09:02 Dose:  165 mls/hr


Insulin Aspart (Novolog Insulin Sliding Scale)  0 units SUBQ Q6H [facility]; Protocol


   Stop: 18 11:59


   Last Admin: 18 12:43 Dose:  Not Given


Lorazepam (Ativan)  1 mg IVP Q4HR PRN; Protocol


   PRN Reason: Anxiety


   Stop: 18 02:24


   Last Admin: 18 22:50 Dose:  1 mg


Methylprednisolone Sodium Succinate (Solu-Medrol)  60 mg IVP Q6H [facility]


   Stop: 18 16:29


   Last Admin: 18 10:30 Dose:  60 mg


Metoclopramide HCl (Reglan)  10 mg IVP Q6HR [facility]


   Stop: 18 17:59


   Last Admin: 18 12:48 Dose:  10 mg


Miscellaneous (Tpn Per Pharmacy)  1 ea [facility] PRN [facility]


   Stop: 18 17:59


Miscellaneous (Vancomycin Iv Per Pharmacy)  1 ea  PRN PRN


   PRN Reason: PROTOCOL


   Stop: 18 14:14


Ondansetron HCl (Zofran)  4 mg IV Q4H PRN


   PRN Reason: Nausea / Vomiting


   Stop: 18 05:53


   Last Admin: 18 22:50 Dose:  4 mg


Pantoprazole Sodium (Protonix)  40 mg IVP BID [facility]


   Stop: 18 08:59


   Last Admin: 18 09:18 Dose:  40 mg








General: other (on 50 percent fio2,less distress.)


HEENT: NC/AT, PERRLA, EOMI, anicteric sclerae, throat clear


Neck: Supple, No JVD, No thyromegaly, +2 carotid pulse wo bruit, No LAD


Lungs: ronchi


Cardiovascular: RRR, Normal S1, Normal S2, without murmur, tachy


Abdomen: tender, distended


Extremities: clear


Neurological: unable to follow command





- Procedures


Procedures: 


 Procedures











Procedure Code Date


 


EXCISION OF CHEST SKIN, EXTERNAL APPROACH 8BX7CAI 18


 


EXCISION OF ILEUM, OPEN APPROACH [id number] 18


 


EXCISION OF RIGHT LOWER ARM SKIN, EXTERNAL APPROACH [id number] 18


 


EXCISION OF RIGHT LOWER LEG SKIN, EXTERNAL APPROACH [id number] 18


 


RELEASE ILEUM, OPEN APPROACH [id number] 18


 


RESPIRATORY VENTILATION, 24-96 CONSECUTIVE HOURS [id number] 18














Internal Medicine Assmt/Plan





- Assessment


Assessment: 





1.SP COLOSTOMY REVISION.


2.SP EXCISION BIOPSY FOR CHEST WALL LESION.


3.ACUTE BOWEL OBSTRUCTION


4.ACUTE RESPIRATORY FAILURE.


5.BILATERAL PNEUMONIA.





- Plan


Plan: 


CONTINUE ON CURRENT MEDICATION AND TPN.





Nutritional Asmnt/Malnutr-PDOC





- Dietary Evaluation


Malnutrition Findings (Please click <Entered> for more info): 








Nutritional Asmnt/Malnutrition                             Start:  18 14:

24


Text:                                                      Status: Complete    

  


Freq:                                                                          

  


Protocol:                                                                      

  


 Document     18 14:24  LCHENG  (Rec: 18 14:35  LCHENG  [id number])


 Nutritional Asmnt/Malnutrition


     Patient General Information


      Nutritional Screening                      High Risk


                                                 Consult


      Diagnosis                                  exploratory laparotomy with


                                                 recersal colostomy


      Pertinent Medical Hx/Surgical Hx           acute perforation of


                                                 diverticulitis, s/p


                                                 exploratory laparotomy and


                                                 divertin gcolostomy placement


      Subjective Information                     Pt seen lying in bed at time


                                                 of visit, awake and alert. Pt


                                                 stated he tolerated clear


                                                 liquid well, adequte amount


                                                 for him at this time. Pt has


                                                 RAQUEL drain noted. Per EMR, PO


                                                 intake % of clear liquid


                                                 .


      Current Diet Order/ Nutrition Support      clear liquid


      Pertinent Medications                      D5-0.9%ns, piperacillin,


                                                 vancomycin


      Pertinent Labs                              Na 133, K 3.4, Cr 0.7,


                                                 glucose 107, Ca 7.9


     Nutritional Hx/Data


      Height                                     1.88 m


      Height (Calculated Centimeters)            188.0


      Current Weight (lbs)                       86.636 kg


      Weight (Calculated Kilograms)              86.6


      Weight (Calculated Grams)                  65451.1


      Ideal Body Weight                          190


      Body Mass Index (BMI)                      24.5


      Weight Status                              Approriate


     GI Symptoms


      GI Symptoms                                None


      Last BM                                    none


      Difficult in:                              None


      Skin Integrity/Comment:                    RIGHT LOWER LEG HEALING


                                                 incision to right arm


     Estimated Nutritional Goals


      BEE in Kcals:                              Using Current wt


      Calories/Kcals/Kg                          25-30


      Kcals Calculated                           6734-3813


      Protein:                                   Using Current wt


      Protein g/k.8-1


      Protein Calculated                         69-86


      Fluid: ml                                  2150-2580ml (1ml/kcal)


     Nutritional Problem


      1. Problem


       Problem                                   altered GI function


       Etiology                                  s/p reversal colostomy


       Signs/Symptoms:                           pt on clear liquid


     Intervention/Recommendation


      Comments                                   1. Continue with clear liquid


                                                 diet as ordered.


                                                 2. Monitor PO intake, wt, labs


                                                 and skin integrity


                                                 3. F/U as high risk in 2-3


                                                 days, -


     Expected Outcomes/Goals


      Expected Outcomes/Goals                    1. PO intake to meet at least


                                                 75% of nutritional needs.


                                                 2. Wt stability, skin to


                                                 remain intact, labs to


                                                 approach WNL.

## 2018-07-01 NOTE — GENERAL PROGRESS NOTE
Subjective





- Review of Systems


Service Date: 18


Events since last encounter: 





On BiPAP.  Wants sleeping medication now.


Subjective: 





Extubated last night.





Objective





- Results


Result Diagrams: 


 18 05:10





 18 05:10


Recent Labs: 


 Laboratory Last Values











WBC  15.5 Th/cmm (4.8-10.8)  H  18  05:10    


 


RBC  3.89 Mil/cmm (3.80-5.80)   18  05:10    


 


Hgb  11.5 gm/dL (12-16)  L  18  05:10    


 


Hct  34.3 % (41.0-60)  L  18  05:10    


 


MCV  88.1 fl (80-99)   18  05:10    


 


MCH  29.4 pg (27.0-31.0)   18  05:10    


 


MCHC Differential  33.4 pg (28.0-36.0)   18  05:10    


 


RDW  15.3 % (11.5-20.0)   18  05:10    


 


Plt Count  153 Th/cmm (150-400)   18  05:10    


 


MPV  8.7 fl  18  05:10    


 


Neutrophils %  75.1 % (40.0-80.0)   18  04:15    


 


Band Neutrophils %  9 % (0-10)   18  05:10    


 


Lymphocytes %  9.7 % (20.0-50.0)  L  18  04:15    


 


Monocytes %  14.9 % (2.0-10.0)  H  18  04:15    


 


Eosinophils %  0.1 % (0.0-5.0)   18  04:15    


 


Basophils %  0.2 % (0.0-2.0)   18  04:15    


 


Neutrophils (Manual)  83 % (40-80)  H  18  05:10    


 


Lymphocytes  6 % (20-50)  L  18  05:10    


 


Monocytes  2 % (2-10)   18  05:10    


 


Eosinophils  0 % (0-5)   18  18:24    


 


Basophils  0 % (0-3)   18  18:24    


 


Platelet Estimate  ADEQUATE  (NORMAL)   18  05:50    


 


Platelet Morphology  NORMAL  (NORMAL)   18  18:24    


 


Polychromasia  1+   18  04:40    


 


[place] Cells  1+   18  04:40    


 


RBC Morph Micro Appear  NORMAL  (NORMAL)   18  18:24    


 


PT  11.9 SECONDS (9.5-11.5)  H  18  12:08    


 


INR  1.13  (0.5-1.4)   18  12:08    


 


PTT (Actin FS)  22.3 SECONDS (26.0-38.0)  L  18  12:08    


 


Specimen Source  Arterial   18  08:41    


 


Sample Site  RB   18  08:41    


 


pH  7.41  (7.35-7.45)   18  08:41    


 


pCO2  43.0 mmHg (35.0-45.0)   18  08:41    


 


pO2  67.0 mmHg (80.0-100.0)  L  18  08:41    


 


HCO3  26.6 mEq/L (20.0-26.0)  H  18  08:41    


 


Base Excess  2.3 mEq/L (-3.0-3.0)   18  08:41    


 


O2 Saturation  93.0 % (92.0-100.0)   18  08:41    


 


Ceferino Test  NA   18  08:41    


 


Vent Rate  NA   18  08:41    


 


Inspired O2  35   18  08:41    


 


Tidal Volume  NA   18  08:41    


 


PEEP  NA   18  08:41    


 


Pressure (ins/psv/peep)  NA   18  08:41    


 


Critical Value  LZHANG   18  08:41    


 


Sodium  147 mEq/L (136-145)  H  18  05:10    


 


Potassium  4.1 mEq/L (3.5-5.1)   18  05:10    


 


Chloride  116 mEq/L ()  H  18  05:10    


 


Carbon Dioxide  25.5 mEq/L (21.0-31.0)   18  05:10    


 


Anion Gap  9.6  (7.0-16.0)   18  05:10    


 


BUN  60 mg/dL (7-25)  H  18  05:10    


 


Creatinine  1.5 mg/dL (0.7-1.3)  H  18  05:10    


 


Est GFR ( Amer)  > 60.0 ml/min (>90)   18  05:10    


 


Est GFR (Non-Af Amer)  49.9 ml/min  18  05:10    


 


BUN/Creatinine Ratio  40.0   18  05:10    


 


Glucose  158 mg/dL ()  H  18  05:10    


 


POC Glucose  137 MG/DL (70 - 105)  H  18  12:17    


 


Hemoglobin A1c %  4.8 % (4.0-6.0)   18  04:40    


 


Whole Bld Lactic Acid  1.36 mmol/L (0.60-1.99)   18  05:50    


 


Calcium  8.3 mg/dL (8.6-10.3)  L  18  05:10    


 


Phosphorus  4.0 mg/dL (2.5-5.0)   18  05:10    


 


Magnesium  2.1 mg/dL (1.9-2.7)   18  05:10    


 


Total Bilirubin  0.5 mg/dL (0.3-1.0)   18  05:10    


 


Direct Bilirubin  0.05 mg/dL (0.0-0.2)   18  05:50    


 


AST  27 U/L (13-39)   18  05:10    


 


ALT  19 U/L (7-52)   18  05:10    


 


Alkaline Phosphatase  70 U/L ()   18  05:10    


 


Ammonia  54 umol/L (16-53)  H  18  05:50    


 


Total Protein  5.5 gm/dL (6.0-8.3)  L  18  05:10    


 


Albumin  2.2 gm/dL (4.2-5.5)  L  18  05:10    


 


Globulin  3.3 gm/dL  18  05:10    


 


Albumin/Globulin Ratio  0.7  (1.0-1.8)  L  18  05:10    


 


Prealbumin  7 mg/dL (10-36)  L  18  04:15    


 


Triglycerides  83 mg/dL (<150)   18  04:55    


 


Cholesterol  66 mg/dL (<200)   18  04:15    


 


Urine Source  CLEAN C   18  17:35    


 


Urine Color  YELLOW   18  17:35    


 


Urine Clarity  HAZY  (CLEAR)   18  17:35    


 


Urine pH  5.0  (4.6 - 8.0)   18  17:35    


 


Ur Specific Gravity  1.020  (1.005-1.030)   18  17:35    


 


Urine Protein  30 mg/dL (NEGATIVE)  H  18  17:35    


 


Urine Glucose (UA)  NEGATIVE mg/dL (NEGATIVE)   18  17:35    


 


Urine Ketones  NEGATIVE mg/dL (NEGATIVE)   18  17:35    


 


Urine Blood  TRACE  (NEGATIVE)   18  17:35    


 


Urine Nitrate  NEGATIVE  (NEGATIVE)   18  17:35    


 


Urine Bilirubin  NEGATIVE  (NEGATIVE)   18  17:35    


 


Urine Urobilinogen  0.2 E.U./dL (0.2 - 1.0)   18  17:35    


 


Ur Leukocyte Esterase  TRACE  (NEGATIVE)  H  18  17:35    


 


Urine RBC  0-2 /hpf (0-5)  H  18  17:35    


 


Urine WBC  0-2 /hpf (0-5)   18  17:35    


 


Ur Epithelial Cells  OCCASIONAL /lpf (FEW)   18  17:35    


 


Amorphous Sediment  FEW URATES  (NONE SEEN)   18  17:35    


 


Urine Bacteria  FEW /hpf (NONE SEEN)   18  17:35    


 


Vancomycin Trough  16.8 ug/mL (5-10)  H  18  09:30    


 


Random Vancomycin  18.1 ug/mL (5.0-40.0)   18  05:00    














- Physical Exam


Vitals and I&O: 


 Vital Signs











Temp  98.7 F   18 08:00


 


Pulse  125   18 10:32


 


Resp  23   18 11:30


 


BP  152/92   18 10:00


 


Pulse Ox  96   18 11:30








 Intake & Output











 18





 18:59 06:59 18:59


 


Intake Total 2557.64 150 


 


Output Total 1225 1300 


 


Balance 1332.64 -1150 


 


Weight (lbs) 99.246 kg 98.883 kg 


 


Intake:   


 


  Intake, IV Amount 1717.64 150 


 


    Cefepime 1 gm In Dextrose 50 50 





    5% 50 ml @ 100 mls/hr IV   





    Q12HR [facility] Rx#:[id number]   


 


    Multivitamin Inj 10 ml In 1519  





    Dextrose 70% 1,170 ml In   





    Amino Acids 10% 500 ml @   





    70 mls/hr IV .Q24H KATHY   





    Rx#:[id number]   


 


    Propofol 1,000 mg In 100 48.64  





    ml @ Per Protocol IV TITR   





    KATHY Rx#:[id number]   


 


    metroNIDAZOLE 500mg/ 100 





    100mL 500 mg In 100 ml @   





    100 mls/hr IV Q8H [facility] Rx#   





    :[id number]   


 


  TPN/  


 


Output:   


 


  Gastric Drainage  50 


 


  Urine 1225 1250 


 


Other:   


 


  # Bowel Movements 2 1 


 


  Stool Characteristics Soft Soft 





 Brown Brown 


 


  Weight Source Bedscale Bedscale 











Active Medications: 


Current Medications





Acetaminophen/Hydrocodone Bitart (Norco 10 Mg/325 Mg)  1 tab PO Q6H PRN


   PRN Reason: Pain (Severe)


   Stop: 18 20:04


   Last Admin: 18 03:03 Dose:  1 tab


Albuterol/Ipratropium (Duoneb Neb)  3 ml HHN Q4HRT [facility]


   Stop: 18 14:59


   Last Admin: 18 11:06 Dose:  3 ml


Budesonide (Pulmicort)  0.5 mg HHN BIDRT [facility]


   Stop: 18 18:59


   Last Admin: 18 07:23 Dose:  0.5 mg


Cholestyramine Resin (Questran)  4 gm PO BID [facility]


   Stop: 18 16:59


   Last Admin: 18 10:59 Dose:  Not Given


Enoxaparin Sodium (Lovenox)  30 mg SUBQ Q12HR [facility]


   Stop: 18 20:59


   Last Admin: 18 09:00 Dose:  30 mg


Hydromorphone HCl (Dilaudid)  1 mg IVP Q3HR PRN


   PRN Reason: SEVERE PAIN


   Stop: 18 16:25


   Last Admin: 18 04:20 Dose:  1 mg


Cefepime HCl 1 gm/ Dextrose  50 mls @ 100 mls/hr IV Q12HR [facility]


   Stop: 18 20:59


   Last Admin: 18 09:17 Dose:  100 mls/hr


Multivitamins/Minerals 10 ml/Dextrose/ Amino Acids/Electrolytes  1,680 mls @ 70 

mls/hr IV .Q24H [facility]


   Stop: 18 14:59


   Last Admin: 18 15:42 Dose:  70 mls/hr


Dextrose (D5w)  1,000 mls @ 30 mls/hr IV .Q24H [facility]


   Stop: 18 16:29


   Last Admin: 18 16:30 Dose:  30 mls/hr


Vancomycin HCl 0.75 gm/ Sodium (Chloride)  250 mls @ 165 mls/hr IV Q24HR@0900 

[facility]


   Stop: 18 08:59


   Last Admin: 18 09:02 Dose:  165 mls/hr


Insulin Aspart (Novolog Insulin Sliding Scale)  0 units SUBQ Q6H [facility]; Protocol


   Stop: 18 11:59


   Last Admin: 18 12:43 Dose:  Not Given


Lorazepam (Ativan)  1 mg IVP Q4HR PRN; Protocol


   PRN Reason: Anxiety


   Stop: 18 02:24


   Last Admin: 18 22:50 Dose:  1 mg


Methylprednisolone Sodium Succinate (Solu-Medrol)  60 mg IVP Q6H [facility]


   Stop: 18 16:29


   Last Admin: 18 10:30 Dose:  60 mg


Metoclopramide HCl (Reglan)  10 mg IVP Q6HR [facility]


   Stop: 18 17:59


   Last Admin: 18 12:48 Dose:  10 mg


Miscellaneous (Tpn Per Pharmacy)  1 ea MC PRN [facility]


   Stop: 18 17:59


Miscellaneous (Vancomycin Iv Per Pharmacy)  1 ea  PRN PRN


   PRN Reason: PROTOCOL


   Stop: 18 14:14


Ondansetron HCl (Zofran)  4 mg IV Q4H PRN


   PRN Reason: Nausea / Vomiting


   Stop: 18 05:53


   Last Admin: 18 22:50 Dose:  4 mg


Pantoprazole Sodium (Protonix)  40 mg IVP BID [facility]


   Stop: 18 08:59


   Last Admin: 18 09:18 Dose:  40 mg








General: Alert, Cooperative, No acute distress


HEENT: Atraumatic, PERRLA (+dressing), EOMI


Neck: Supple


Cardiovascular: Regular rate, Normal S1, Normal S2


Lungs: Clear to auscultation


Abdomen: Soft





- Procedures


Procedures: 


 Procedures











Procedure Code Date


 


EXCISION OF CHEST SKIN, EXTERNAL APPROACH 9BW7LCR 18


 


EXCISION OF ILEUM, OPEN APPROACH [id number] 18


 


EXCISION OF RIGHT LOWER ARM SKIN, EXTERNAL APPROACH [id number] 18


 


EXCISION OF RIGHT LOWER LEG SKIN, EXTERNAL APPROACH [id number] 18


 


RELEASE ILEUM, OPEN APPROACH [id number] 18


 


RESPIRATORY VENTILATION, 24-96 CONSECUTIVE HOURS [id number] 18














Assessment/Plan





- Assessment


Assessment: 





1.  SBO.  S/p surgery.  Now postop ileus.


2.  ANDREW.  Good UO.  Monitor Cr.


3.  Hypernatremia.  TPN adjusted by pharmacy


4.  Still intubated.  Extubated last night.  On BiPAP.





Nutritional Asmnt/Malnutr-PDOC





- Dietary Evaluation


Malnutrition Findings (Please click <Entered> for more info): 








Nutritional Asmnt/Malnutrition                             Start:  18 14:

24


Text:                                                      Status: Complete    

  


Freq:                                                                          

  


Protocol:                                                                      

  


 Document     18 14:24  LCHENG  (Rec: 18 14:35  LCHENG  [id number])


 Nutritional Asmnt/Malnutrition


     Patient General Information


      Nutritional Screening                      High Risk


                                                 Consult


      Diagnosis                                  exploratory laparotomy with


                                                 recersal colostomy


      Pertinent Medical Hx/Surgical Hx           acute perforation of


                                                 diverticulitis, s/p


                                                 exploratory laparotomy and


                                                 divertin gcolostomy placement


      Subjective Information                     Pt seen lying in bed at time


                                                 of visit, awake and alert. Pt


                                                 stated he tolerated clear


                                                 liquid well, adequte amount


                                                 for him at this time. Pt has


                                                 RAQUEL drain noted. Per EMR, PO


                                                 intake % of clear liquid


                                                 .


      Current Diet Order/ Nutrition Support      clear liquid


      Pertinent Medications                      D5-0.9%ns, piperacillin,


                                                 vancomycin


      Pertinent Labs                              Na 133, K 3.4, Cr 0.7,


                                                 glucose 107, Ca 7.9


     Nutritional Hx/Data


      Height                                     1.88 m


      Height (Calculated Centimeters)            188.0


      Current Weight (lbs)                       86.636 kg


      Weight (Calculated Kilograms)              86.6


      Weight (Calculated Grams)                  12012.1


      Ideal Body Weight                          190


      Body Mass Index (BMI)                      24.5


      Weight Status                              Approriate


     GI Symptoms


      GI Symptoms                                None


      Last BM                                    none


      Difficult in:                              None


      Skin Integrity/Comment:                    RIGHT LOWER LEG HEALING


                                                 incision to right arm


     Estimated Nutritional Goals


      BEE in Kcals:                              Using Current wt


      Calories/Kcals/Kg                          25-30


      Kcals Calculated                           2851-1294


      Protein:                                   Using Current wt


      Protein g/k.8-1


      Protein Calculated                         69-86


      Fluid: ml                                  2150-2580ml (1ml/kcal)


     Nutritional Problem


      1. Problem


       Problem                                   altered GI function


       Etiology                                  s/p reversal colostomy


       Signs/Symptoms:                           pt on clear liquid


     Intervention/Recommendation


      Comments                                   1. Continue with clear liquid


                                                 diet as ordered.


                                                 2. Monitor PO intake, wt, labs


                                                 and skin integrity


                                                 3. F/U as high risk in 2-3


                                                 days, -


     Expected Outcomes/Goals


      Expected Outcomes/Goals                    1. PO intake to meet at least


                                                 75% of nutritional needs.


                                                 2. Wt stability, skin to


                                                 remain intact, labs to


                                                 approach WNL.

## 2018-07-01 NOTE — DIAGNOSTIC IMAGING REPORT
Exam: Portable chest x-ray



HISTORY: Pneumonia.



Findings:



Portable upright examination of chest at 0756 hours reviewed compatible

prior study of 6/30/2018 demonstrates unchanged appearance of bilateral

pneumonia.  Mediastinal structures midline the heart is not enlarged the

costophrenic angles are clear.



IMPRESSION:



Unchanged appearance of bilateral pneumonia.

## 2018-07-01 NOTE — GENERAL PROGRESS NOTE
Subjective





- Review of Systems


Service Date: 18


Events since last encounter: 





extubated


labs noted


abdomen less distended, stapes removed, incision healing well





Objective





- Results


Result Diagrams: 


 18 05:10





 18 05:10


Recent Labs: 


 Laboratory Last Values











WBC  15.5 Th/cmm (4.8-10.8)  H  18  05:10    


 


RBC  3.89 Mil/cmm (3.80-5.80)   18  05:10    


 


Hgb  11.5 gm/dL (12-16)  L  18  05:10    


 


Hct  34.3 % (41.0-60)  L  18  05:10    


 


MCV  88.1 fl (80-99)   18  05:10    


 


MCH  29.4 pg (27.0-31.0)   18  05:10    


 


MCHC Differential  33.4 pg (28.0-36.0)   18  05:10    


 


RDW  15.3 % (11.5-20.0)   18  05:10    


 


Plt Count  153 Th/cmm (150-400)   18  05:10    


 


MPV  8.7 fl  18  05:10    


 


Neutrophils %  75.1 % (40.0-80.0)   18  04:15    


 


Band Neutrophils %  9 % (0-10)   18  05:10    


 


Lymphocytes %  9.7 % (20.0-50.0)  L  18  04:15    


 


Monocytes %  14.9 % (2.0-10.0)  H  18  04:15    


 


Eosinophils %  0.1 % (0.0-5.0)   18  04:15    


 


Basophils %  0.2 % (0.0-2.0)   18  04:15    


 


Neutrophils (Manual)  83 % (40-80)  H  18  05:10    


 


Lymphocytes  6 % (20-50)  L  18  05:10    


 


Monocytes  2 % (2-10)   18  05:10    


 


Eosinophils  0 % (0-5)   18  18:24    


 


Basophils  0 % (0-3)   18  18:24    


 


Platelet Estimate  ADEQUATE  (NORMAL)   18  05:50    


 


Platelet Morphology  NORMAL  (NORMAL)   18  18:24    


 


Polychromasia  1+   18  04:40    


 


[place] Cells  1+   18  04:40    


 


RBC Morph Micro Appear  NORMAL  (NORMAL)   18  18:24    


 


PT  11.9 SECONDS (9.5-11.5)  H  18  12:08    


 


INR  1.13  (0.5-1.4)   18  12:08    


 


PTT (Actin FS)  22.3 SECONDS (26.0-38.0)  L  18  12:08    


 


Specimen Source  Arterial   18  08:41    


 


Sample Site  RB   18  08:41    


 


pH  7.41  (7.35-7.45)   18  08:41    


 


pCO2  43.0 mmHg (35.0-45.0)   18  08:41    


 


pO2  67.0 mmHg (80.0-100.0)  L  18  08:41    


 


HCO3  26.6 mEq/L (20.0-26.0)  H  18  08:41    


 


Base Excess  2.3 mEq/L (-3.0-3.0)   18  08:41    


 


O2 Saturation  93.0 % (92.0-100.0)   18  08:41    


 


C[place]er[place] Test  NA   18  08:41    


 


Vent Rate  NA   18  08:41    


 


Inspired O2  35   18  08:41    


 


Tidal Volume  NA   18  08:41    


 


PEEP  NA   18  08:41    


 


Pressure (ins/psv/peep)  NA   18  08:41    


 


Critical Value  LZHANG   18  08:41    


 


Sodium  147 mEq/L (136-145)  H  18  05:10    


 


Potassium  4.1 mEq/L (3.5-5.1)   18  05:10    


 


Chloride  116 mEq/L ()  H  18  05:10    


 


Carbon Dioxide  25.5 mEq/L (21.0-31.0)   18  05:10    


 


Anion Gap  9.6  (7.0-16.0)   18  05:10    


 


BUN  60 mg/dL (7-25)  H  18  05:10    


 


Creatinine  1.5 mg/dL (0.7-1.3)  H  18  05:10    


 


Est GFR ( Amer)  > 60.0 ml/min (>90)   18  05:10    


 


Est GFR (Non-Af Amer)  49.9 ml/min  18  05:10    


 


BUN/Creatinine Ratio  40.0   18  05:10    


 


Glucose  158 mg/dL ()  H  18  05:10    


 


POC Glucose  132 MG/DL (70 - 105)  H  18  05:46    


 


Hemoglobin A1c %  4.8 % (4.0-6.0)   18  04:40    


 


Whole Bld Lactic Acid  1.36 mmol/L (0.60-1.99)   18  05:50    


 


Calcium  8.3 mg/dL (8.6-10.3)  L  18  05:10    


 


Phosphorus  4.0 mg/dL (2.5-5.0)   18  05:10    


 


Magnesium  2.1 mg/dL (1.9-2.7)   18  05:10    


 


Total Bilirubin  0.5 mg/dL (0.3-1.0)   18  05:10    


 


Direct Bilirubin  0.05 mg/dL (0.0-0.2)   18  05:50    


 


AST  27 U/L (13-39)   18  05:10    


 


ALT  19 U/L (7-52)   18  05:10    


 


Alkaline Phosphatase  70 U/L ()   18  05:10    


 


Ammonia  54 umol/L (16-53)  H  18  05:50    


 


Total Protein  5.5 gm/dL (6.0-8.3)  L  18  05:10    


 


Albumin  2.2 gm/dL (4.2-5.5)  L  18  05:10    


 


Globulin  3.3 gm/dL  18  05:10    


 


Albumin/Globulin Ratio  0.7  (1.0-1.8)  L  18  05:10    


 


Prealbumin  7 mg/dL (10-36)  L  18  04:15    


 


Triglycerides  83 mg/dL (<150)   18  04:55    


 


Cholesterol  66 mg/dL (<200)   18  04:15    


 


Urine Source  CLEAN C   18  17:35    


 


Urine Color  YELLOW   18  17:35    


 


Urine Clarity  HAZY  (CLEAR)   18  17:35    


 


Urine pH  5.0  (4.6 - 8.0)   18  17:35    


 


Ur Specific Gravity  1.020  (1.005-1.030)   18  17:35    


 


Urine Protein  30 mg/dL (NEGATIVE)  H  18  17:35    


 


Urine Glucose (UA)  NEGATIVE mg/dL (NEGATIVE)   18  17:35    


 


Urine Ketones  NEGATIVE mg/dL (NEGATIVE)   18  17:35    


 


Urine Blood  TRACE  (NEGATIVE)   18  17:35    


 


Urine Nitrate  NEGATIVE  (NEGATIVE)   18  17:35    


 


Urine Bilirubin  NEGATIVE  (NEGATIVE)   18  17:35    


 


Urine Urobilinogen  0.2 E.U./dL (0.2 - 1.0)   18  17:35    


 


Ur Leukocyte Esterase  TRACE  (NEGATIVE)  H  18  17:35    


 


Urine RBC  0-2 /hpf (0-5)  H  18  17:35    


 


Urine WBC  0-2 /hpf (0-5)   18  17:35    


 


Ur Epithelial Cells  OCCASIONAL /lpf (FEW)   18  17:35    


 


Amorphous Sediment  FEW URATES  (NONE SEEN)   18  17:35    


 


Urine Bacteria  FEW /hpf (NONE SEEN)   18  17:35    


 


Vancomycin Trough  16.8 ug/mL (5-10)  H  18  09:30    


 


Random Vancomycin  18.1 ug/mL (5.0-40.0)   18  05:00    














- Physical Exam


Vitals and I&O: 


 Vital Signs











Temp  98.5 F   18 05:00


 


Pulse  100   18 07:36


 


Resp  20   18 07:36


 


BP  138/96   18 07:00


 


Pulse Ox  94   18 07:36








 Intake & Output











 18





 18:59 06:59 18:59


 


Intake Total 2557.64 150 


 


Output Total 1225 1300 


 


Balance 1332.64 -1150 


 


Weight (lbs) 99.246 kg 98.883 kg 


 


Intake:   


 


  Intake, IV Amount 1717.64 150 


 


    Cefepime 1 gm In Dextrose 50 50 





    5% 50 ml @ 100 mls/hr IV   





    Q12HR [facility] Rx#:[id number]   


 


    Multivitamin Inj 10 ml In 1519  





    Dextrose 70% 1,170 ml In   





    Amino Acids 10% 500 ml @   





    70 mls/hr IV .Q24H [facility]   





    Rx#:[id number]   


 


    Propofol 1,000 mg In 100 48.64  





    ml @ Per Protocol IV TITR   





    [facility] Rx#:[id number]   


 


    metroNIDAZOLE 500mg/ 100 





    100mL 500 mg In 100 ml @   





    100 mls/hr IV Q8H [facility] Rx#   





    :[id number]   


 


  TPN/  


 


Output:   


 


  Gastric Drainage  50 


 


  Urine 1225 1250 


 


Other:   


 


  # Bowel Movements 2 1 


 


  Stool Characteristics Soft Soft 





 Brown Brown 


 


  Weight Source Bedscale Bedscale 











Active Medications: 


Current Medications





Acetaminophen/Hydrocodone Bitart (Norco 10 Mg/325 Mg)  1 tab PO Q6H PRN


   PRN Reason: Pain (Severe)


   Stop: 18 20:04


   Last Admin: 18 03:03 Dose:  1 tab


Albuterol/Ipratropium (Duoneb Neb)  3 ml HHN Q4HRT [facility]


   Stop: 18 14:59


   Last Admin: 18 07:23 Dose:  3 ml


Budesonide (Pulmicort)  0.5 mg HHN BIDRT [facility]


   Stop: 18 18:59


   Last Admin: 18 07:23 Dose:  0.5 mg


Cholestyramine Resin (Questran)  4 gm PO BID [facility]


   Stop: 18 16:59


   Last Admin: 18 18:24 Dose:  Not Given


Enoxaparin Sodium (Lovenox)  30 mg SUBQ Q12HR [facility]


   Stop: 18 20:59


   Last Admin: 18 21:20 Dose:  30 mg


Hydromorphone HCl (Dilaudid)  1 mg IVP Q3HR PRN


   PRN Reason: SEVERE PAIN


   Stop: 18 16:25


   Last Admin: 18 04:20 Dose:  1 mg


Cefepime HCl 1 gm/ Dextrose  50 mls @ 100 mls/hr IV Q12HR [facility]


   Stop: 18 20:59


   Last Admin: 18 09:17 Dose:  100 mls/hr


Multivitamins/Minerals 10 ml/Dextrose/ Amino Acids/Electrolytes  1,680 mls @ 70 

mls/hr IV .Q24H [facility]


   Stop: 18 14:59


   Last Admin: 18 15:42 Dose:  70 mls/hr


Dextrose (D5w)  1,000 mls @ 30 mls/hr IV .Q24H [facility]


   Stop: 18 16:29


   Last Admin: 18 16:30 Dose:  30 mls/hr


Vancomycin HCl 0.75 gm/ Sodium (Chloride)  250 mls @ 165 mls/hr IV Q24HR@0900 

[facility]


   Stop: 18 08:59


   Last Admin: 18 09:02 Dose:  165 mls/hr


Insulin Aspart (Novolog Insulin Sliding Scale)  0 units SUBQ Q6H [facility]; Protocol


   Stop: 18 11:59


   Last Admin: 18 06:15 Dose:  Not Given


Lorazepam (Ativan)  1 mg IVP Q4HR PRN; Protocol


   PRN Reason: Anxiety


   Stop: 18 02:24


   Last Admin: 18 22:50 Dose:  1 mg


Methylprednisolone Sodium Succinate (Solu-Medrol)  60 mg IVP Q6H [facility]


   Stop: 18 16:29


   Last Admin: 18 04:19 Dose:  60 mg


Metoclopramide HCl (Reglan)  10 mg IVP Q6HR [facility]


   Stop: 18 17:59


   Last Admin: 18 05:47 Dose:  10 mg


Miscellaneous (Tpn Per Pharmacy)  1 ea MC PRN [facility]


   Stop: 18 17:59


Miscellaneous (Vancomycin Iv Per Pharmacy)  1 ea  PRN PRN


   PRN Reason: PROTOCOL


   Stop: 18 14:14


Ondansetron HCl (Zofran)  4 mg IV Q4H PRN


   PRN Reason: Nausea / Vomiting


   Stop: 18 05:53


   Last Admin: 18 22:50 Dose:  4 mg


Pantoprazole Sodium (Protonix)  40 mg IVP BID [facility]


   Stop: 18 08:59


   Last Admin: 18 09:18 Dose:  40 mg








General: Alert, Cooperative, No acute distress


HEENT: Atraumatic, PERRLA (+dressing), EOMI


Neck: Supple


Cardiovascular: Regular rate, Normal S1, Normal S2


Lungs: Clear to auscultation


Abdomen: Distended





- Procedures


Procedures: 


 Procedures











Procedure Code Date


 


EXCISION OF CHEST SKIN, EXTERNAL APPROACH 6BI9KHO 18


 


EXCISION OF ILEUM, OPEN APPROACH [id number] 18


 


EXCISION OF RIGHT LOWER ARM SKIN, EXTERNAL APPROACH [id number] 18


 


EXCISION OF RIGHT LOWER LEG SKIN, EXTERNAL APPROACH 0HBKXZZ 18


 


RELEASE ILEUM, OPEN APPROACH [id number] 18


 


RESPIRATORY VENTILATION, 24-96 CONSECUTIVE HOURS 6C0360A 18














Nutritional Asmnt/Malnutr-PDOC





- Dietary Evaluation


Malnutrition Findings (Please click <Entered> for more info): 








Nutritional Asmnt/Malnutrition                             Start:  18 14:

24


Text:                                                      Status: Complete    

  


Freq:                                                                          

  


Protocol:                                                                      

  


 Document     18 14:24  LCHENG  (Rec: 18 14:35  LCHENG  [id number])


 Nutritional Asmnt/Malnutrition


     Patient General Information


      Nutritional Screening                      High Risk


                                                 Consult


      Diagnosis                                  exploratory laparotomy with


                                                 recersal colostomy


      Pertinent Medical Hx/Surgical Hx           acute perforation of


                                                 diverticulitis, s/p


                                                 exploratory laparotomy and


                                                 divertin gcolostomy placement


      Subjective Information                     Pt seen lying in bed at time


                                                 of visit, awake and alert. Pt


                                                 stated he tolerated clear


                                                 liquid well, adequte amount


                                                 for him at this time. Pt has


                                                 RAQUEL drain noted. Per EMR, PO


                                                 intake % of clear liquid


                                                 .


      Current Diet Order/ Nutrition Support      clear liquid


      Pertinent Medications                      D5-0.9%ns, piperacillin,


                                                 vancomycin


      Pertinent Labs                              Na 133, K 3.4, Cr 0.7,


                                                 glucose 107, Ca 7.9


     Nutritional Hx/Data


      Height                                     1.88 m


      Height (Calculated Centimeters)            188.0


      Current Weight (lbs)                       86.636 kg


      Weight (Calculated Kilograms)              86.6


      Weight (Calculated Grams)                  67193.1


      Ideal Body Weight                          190


      Body Mass Index (BMI)                      24.5


      Weight Status                              Approriate


     GI Symptoms


      GI Symptoms                                None


      Last BM                                    none


      Difficult in:                              None


      Skin Integrity/Comment:                    RIGHT LOWER LEG HEALING


                                                 incision to right arm


     Estimated Nutritional Goals


      BEE in Kcals:                              Using Current wt


      Calories/Kcals/Kg                          25-30


      Kcals Calculated                           5589-8343


      Protein:                                   Using Current wt


      Protein g/k.8-1


      Protein Calculated                         69-86


      Fluid: ml                                  2150-2580ml (1ml/kcal)


     Nutritional Problem


      1. Problem


       Problem                                   altered GI function


       Etiology                                  s/p reversal colostomy


       Signs/Symptoms:                           pt on clear liquid


     Intervention/Recommendation


      Comments                                   1. Continue with clear liquid


                                                 diet as ordered.


                                                 2. Monitor PO intake, wt, labs


                                                 and skin integrity


                                                 3. F/U as high risk in 2-3


                                                 days, -


     Expected Outcomes/Goals


      Expected Outcomes/Goals                    1. PO intake to meet at least


                                                 75% of nutritional needs.


                                                 2. Wt stability, skin to


                                                 remain intact, labs to


                                                 approach WNL.

## 2018-07-02 LAB
ALBUMIN SERPL-MCNC: 2.5 GM/DL (ref 4.2–5.5)
ALBUMIN/GLOB SERPL: 0.8 {RATIO} (ref 1–1.8)
ALP SERPL-CCNC: 76 U/L (ref 34–104)
ALT SERPL-CCNC: 23 U/L (ref 7–52)
ANION GAP SERPL CALC-SCNC: 7.9 MMOL/L (ref 7–16)
AST SERPL-CCNC: 34 U/L (ref 13–39)
BASOPHILS # BLD AUTO: 0.2 TH/CUMM (ref 0–0.2)
BASOPHILS NFR BLD AUTO: 0.8 % (ref 0–2)
BILIRUB SERPL-MCNC: 0.5 MG/DL (ref 0.3–1)
BUN SERPL-MCNC: 65 MG/DL (ref 7–25)
CALCIUM SERPL-MCNC: 8.6 MG/DL (ref 8.6–10.3)
CHLORIDE SERPL-SCNC: 113 MEQ/L (ref 98–107)
CO2 SERPL-SCNC: 31.3 MEQ/L (ref 21–31)
CREAT SERPL-MCNC: 1.5 MG/DL (ref 0.7–1.3)
EOSINOPHIL # BLD AUTO: 0 TH/CMM (ref 0.1–0.4)
EOSINOPHIL NFR BLD AUTO: 0.1 % (ref 0–5)
ERYTHROCYTE [DISTWIDTH] IN BLOOD BY AUTOMATED COUNT: 15.7 % (ref 11.5–20)
ERYTHROCYTE [DISTWIDTH] IN BLOOD BY AUTOMATED COUNT: 18.4 % (ref 11.5–20)
GLOBULIN SER-MCNC: 3.3 GM/DL
GLUCOSE SERPL-MCNC: 135 MG/DL (ref 70–105)
HCT VFR BLD CALC: 32 % (ref 41–60)
HCT VFR BLD CALC: 35 % (ref 41–60)
HGB BLD-MCNC: 10.6 GM/DL (ref 12–16)
HGB BLD-MCNC: 10.6 GM/DL (ref 12–16)
LYMPHOCYTE AB SER FC-ACNC: 0.5 TH/CMM (ref 1.5–3)
LYMPHOCYTES # BLD MANUAL: 4 % (ref 20–50)
LYMPHOCYTES NFR BLD AUTO: 2.5 % (ref 20–50)
MAGNESIUM SERPL-MCNC: 2.3 MG/DL (ref 1.9–2.7)
MCH RBC QN AUTO: 29.1 PG (ref 27–31)
MCH RBC QN AUTO: 29.3 PG (ref 27–31)
MCHC RBC AUTO-ENTMCNC: 30.4 PG (ref 28–36)
MCHC RBC AUTO-ENTMCNC: 33.3 PG (ref 28–36)
MCV RBC AUTO: 87.3 FL (ref 80–99)
MCV RBC AUTO: 96.6 FL (ref 80–99)
MONOCYTES # BLD AUTO: 0.3 TH/CMM (ref 0.3–1)
MONOCYTES # BLD MANUAL: 2 % (ref 2–10)
MONOCYTES NFR BLD AUTO: 1.4 % (ref 2–10)
NEUTROPHILS # BLD: 18.3 TH/CMM (ref 1.8–8)
NEUTROPHILS NFR BLD AUTO: 92 % (ref 40–80)
NEUTROPHILS NFR BLD AUTO: 95.2 % (ref 40–80)
NEUTS BAND NFR BLD: 2 % (ref 0–10)
PCO2 BLDA: 61 MMHG (ref 35–45)
PHOSPHATE SERPL-MCNC: 3.8 MG/DL (ref 2.5–5)
PLATELET # BLD: 125 TH/CMM (ref 150–400)
PLATELET # BLD: 139 TH/CMM (ref 150–400)
PMV BLD AUTO: 8.9 FL
PMV BLD AUTO: 9.1 FL
PO2 BLDA: 73 MMHG (ref 80–100)
POTASSIUM SERPL-SCNC: 4.2 MEQ/L (ref 3.5–5.1)
RBC # BLD AUTO: 3.63 MIL/CMM (ref 3.8–5.8)
RBC # BLD AUTO: 3.66 MIL/CMM (ref 3.8–5.8)
SAO2 % BLDA: 93 % (ref 92–100)
SODIUM SERPL-SCNC: 148 MEQ/L (ref 136–145)
TOTAL CELLS COUNTED BLD: 100
WBC # BLD AUTO: 19.3 TH/CMM (ref 4.8–10.8)
WBC # BLD AUTO: 22.1 TH/CMM (ref 4.8–10.8)

## 2018-07-02 RX ADMIN — INSULIN ASPART SCH UNITS: 100 INJECTION, SOLUTION INTRAVENOUS; SUBCUTANEOUS at 16:59

## 2018-07-02 RX ADMIN — HYDROMORPHONE HYDROCHLORIDE PRN MG: 1 INJECTION, SOLUTION INTRAMUSCULAR; INTRAVENOUS; SUBCUTANEOUS at 03:56

## 2018-07-02 RX ADMIN — IPRATROPIUM BROMIDE AND ALBUTEROL SULFATE SCH ML: .5; 3 SOLUTION RESPIRATORY (INHALATION) at 18:39

## 2018-07-02 RX ADMIN — BUDESONIDE SCH MG: 0.5 SUSPENSION RESPIRATORY (INHALATION) at 07:22

## 2018-07-02 RX ADMIN — IPRATROPIUM BROMIDE AND ALBUTEROL SULFATE SCH ML: .5; 3 SOLUTION RESPIRATORY (INHALATION) at 11:14

## 2018-07-02 RX ADMIN — IPRATROPIUM BROMIDE AND ALBUTEROL SULFATE SCH ML: .5; 3 SOLUTION RESPIRATORY (INHALATION) at 22:02

## 2018-07-02 RX ADMIN — INSULIN ASPART SCH UNITS: 100 INJECTION, SOLUTION INTRAVENOUS; SUBCUTANEOUS at 00:52

## 2018-07-02 RX ADMIN — IPRATROPIUM BROMIDE AND ALBUTEROL SULFATE SCH ML: .5; 3 SOLUTION RESPIRATORY (INHALATION) at 02:46

## 2018-07-02 RX ADMIN — ENOXAPARIN SODIUM SCH MG: 100 INJECTION SUBCUTANEOUS at 09:17

## 2018-07-02 RX ADMIN — METOCLOPRAMIDE SCH MG: 5 INJECTION, SOLUTION INTRAMUSCULAR; INTRAVENOUS at 06:43

## 2018-07-02 RX ADMIN — METOCLOPRAMIDE SCH MG: 5 INJECTION, SOLUTION INTRAMUSCULAR; INTRAVENOUS at 23:55

## 2018-07-02 RX ADMIN — METOCLOPRAMIDE SCH MG: 5 INJECTION, SOLUTION INTRAMUSCULAR; INTRAVENOUS at 17:27

## 2018-07-02 RX ADMIN — ENOXAPARIN SODIUM SCH MG: 100 INJECTION SUBCUTANEOUS at 21:13

## 2018-07-02 RX ADMIN — IPRATROPIUM BROMIDE AND ALBUTEROL SULFATE SCH ML: .5; 3 SOLUTION RESPIRATORY (INHALATION) at 07:22

## 2018-07-02 RX ADMIN — ASCORBIC ACID, VITAMIN A PALMITATE, CHOLECALCIFEROL, THIAMINE HYDROCHLORIDE, RIBOFLAVIN-5 PHOSPHATE SODIUM, PYRIDOXINE HYDROCHLORIDE, NIACINAMIDE, DEXPANTHENOL, ALPHA-TOCOPHEROL ACETATE, VITAMIN K1, FOLIC ACID, BIOTIN, CYANOCOBALAMIN SCH MLS/HR: 200; 3300; 200; 6; 3.6; 6; 40; 15; 10; 150; 600; 60; 5 INJECTION, SOLUTION INTRAVENOUS at 17:04

## 2018-07-02 RX ADMIN — METOCLOPRAMIDE SCH MG: 5 INJECTION, SOLUTION INTRAMUSCULAR; INTRAVENOUS at 00:52

## 2018-07-02 RX ADMIN — METOCLOPRAMIDE SCH MG: 5 INJECTION, SOLUTION INTRAMUSCULAR; INTRAVENOUS at 13:02

## 2018-07-02 RX ADMIN — IPRATROPIUM BROMIDE AND ALBUTEROL SULFATE SCH ML: .5; 3 SOLUTION RESPIRATORY (INHALATION) at 15:31

## 2018-07-02 RX ADMIN — INSULIN ASPART SCH UNITS: 100 INJECTION, SOLUTION INTRAVENOUS; SUBCUTANEOUS at 23:55

## 2018-07-02 RX ADMIN — INSULIN ASPART SCH: 100 INJECTION, SOLUTION INTRAVENOUS; SUBCUTANEOUS at 06:40

## 2018-07-02 RX ADMIN — INSULIN ASPART SCH: 100 INJECTION, SOLUTION INTRAVENOUS; SUBCUTANEOUS at 13:02

## 2018-07-02 RX ADMIN — BUDESONIDE SCH MG: 0.5 SUSPENSION RESPIRATORY (INHALATION) at 18:39

## 2018-07-02 NOTE — PROGRESS NOTES
DATE:  07/01/2018



PULMONARY PROGRESS NOTE



PROBLEM LIST:

1.  Status post acute respiratory failure.

2.  Status post ____ vomiting, aspiration pneumonitis.

3.  Status post previous abdominal surgery and status post previous mechanical

ventilation 24 hours ago.



SYMPTOMS:  Nil.  The patient says sometimes he gets hard time to breath. 

Currently on a BiPAP.  No respiratory distress, etc.



PHYSICAL EXAMINATION:

VITAL SIGNS:  T-max 97.9, blood pressure 153/97, respirations 16-20, saturation

96 on supplement oxygen 30%.

NECK:  Essentially unremarkable.  No nodes in the neck could be palpated.

CHEST:  Shows diminished air entry without much of adventitious breath sounds.

HEART:  Regular.

ABDOMEN:  Soft, nontender.

EXTREMITIES:  Shows no peripheral edema.



LABORATORY DATA:  The patient's pertinent laboratory studies:  White count is

15,000 and hemoglobin 11.5.  ABG this morning is pO2 of 67 on 35% of oxygen. 

Electrolytes are okay with creatinine 1.5.



ASSESSMENT:  The patient is clinically stable, tolerating extubation reasonably

well.



PLANS AND SUGGESTIONS:  Continue current treatment.  We will discontinue the

[place] catheter.  Hopefully, we can get some nutritional if it is okay by [email or web address] Syed and should be okay by tomorrow to get in a step-down unit and go from

there.





DD: 07/01/2018 15:27

DT: 07/02/2018 00:46

JOB# [id number]  [id number]

## 2018-07-02 NOTE — INFECTIOUS DISEASE PROG NOTE
Infectious Disease Subjective





- Review of Systems


Service Date: 18


Events since last encounter: 





extubated on Saturday 


Subjective: 





There is no new change, no fever. Diarrhea better.


Doing better.


On bipap.





Infectious Disease Objective





- Results


Result Diagrams: 


 18 05:00





 18 06:00


Recent Labs: 


 Laboratory Last Values











WBC  22.1 Th/cmm (4.8-10.8)  H*  18  05:00    


 


RBC  3.63 Mil/cmm (3.80-5.80)  L  18  05:00    


 


Hgb  10.6 gm/dL (12-16)  L  18  05:00    


 


Hct  35.0 % (41.0-60)  L  18  05:00    


 


MCV  96.6 fl (80-99)   18  05:00    


 


MCH  29.3 pg (27.0-31.0)   18  05:00    


 


MCHC Differential  30.4 pg (28.0-36.0)   18  05:00    


 


RDW  18.4 % (11.5-20.0)   18  05:00    


 


Plt Count  139 Th/cmm (150-400)  L  18  05:00    


 


MPV  9.1 fl  18  05:00    


 


Neutrophils %  75.1 % (40.0-80.0)   18  04:15    


 


Band Neutrophils %  2 % (0-10)   18  05:00    


 


Lymphocytes %  9.7 % (20.0-50.0)  L  18  04:15    


 


Monocytes %  14.9 % (2.0-10.0)  H  18  04:15    


 


Eosinophils %  0.1 % (0.0-5.0)   18  04:15    


 


Basophils %  0.2 % (0.0-2.0)   18  04:15    


 


Neutrophils (Manual)  92 % (40-80)  H  18  05:00    


 


Lymphocytes  4 % (20-50)  L  18  05:00    


 


Monocytes  2 % (2-10)   18  05:00    


 


Eosinophils  0 % (0-5)   18  18:24    


 


Basophils  0 % (0-3)   18  18:24    


 


Platelet Estimate  ADEQUATE  (NORMAL)   18  05:50    


 


Platelet Morphology  NORMAL  (NORMAL)   18  18:24    


 


Polychromasia  1+   18  04:40    


 


Anna Cells  1+   18  04:40    


 


RBC Morph Micro Appear  NORMAL  (NORMAL)   18  18:24    


 


PT  11.9 SECONDS (9.5-11.5)  H  18  12:08    


 


INR  1.13  (0.5-1.4)   18  12:08    


 


PTT (Actin FS)  22.3 SECONDS (26.0-38.0)  L  18  12:08    


 


Specimen Source  Arterial   18  09:00    


 


Sample Site  Right Radial   18  09:00    


 


pH  7.31  (7.35-7.45)  L  18  09:00    


 


pCO2  61.0 mmHg (35.0-45.0)  H*  18  09:00    


 


pO2  73.0 mmHg (80.0-100.0)  L  18  09:00    


 


HCO3  27.2 mEq/L (20.0-26.0)  H  18  09:00    


 


Base Excess  3.0 mEq/L (-3.0-3.0)   18  09:00    


 


O2 Saturation  93.0 % (92.0-100.0)   18  09:00    


 


Ceferino Test  Positive   18  09:00    


 


Vent Rate  NA   18  09:00    


 


Inspired O2  50   18  09:00    


 


Tidal Volume  NA   18  09:00    


 


PEEP  NA   18  09:00    


 


Pressure (ins/psv/peep)  NA   18  09:00    


 


Critical Value  SH   18  09:00    


 


Sodium  148 mEq/L (136-145)  H  18  06:00    


 


Potassium  4.2 mEq/L (3.5-5.1)   18  06:00    


 


Chloride  113 mEq/L ()  H  18  06:00    


 


Carbon Dioxide  31.3 mEq/L (21.0-31.0)  H  18  06:00    


 


Anion Gap  7.9  (7.0-16.0)   18  06:00    


 


BUN  65 mg/dL (7-25)  H  18  06:00    


 


Creatinine  1.5 mg/dL (0.7-1.3)  H  18  06:00    


 


Est GFR ( Amer)  > 60.0 ml/min (>90)   18  06:00    


 


Est GFR (Non-Af Amer)  49.9 ml/min  18  06:00    


 


BUN/Creatinine Ratio  43.3   18  06:00    


 


Glucose  135 mg/dL ()  H  18  06:00    


 


POC Glucose  161 MG/DL (70 - 105)  H  18  00:46    


 


Hemoglobin A1c %  4.8 % (4.0-6.0)   18  04:40    


 


Whole Bld Lactic Acid  1.36 mmol/L (0.60-1.99)   18  05:50    


 


Calcium  8.6 mg/dL (8.6-10.3)   18  06:00    


 


Phosphorus  3.8 mg/dL (2.5-5.0)   18  06:00    


 


Magnesium  2.3 mg/dL (1.9-2.7)   18  06:00    


 


Total Bilirubin  0.5 mg/dL (0.3-1.0)   18  06:00    


 


Direct Bilirubin  0.05 mg/dL (0.0-0.2)   18  05:50    


 


AST  34 U/L (13-39)   18  06:00    


 


ALT  23 U/L (7-52)   18  06:00    


 


Alkaline Phosphatase  76 U/L ()   18  06:00    


 


Ammonia  54 umol/L (16-53)  H  18  05:50    


 


Total Protein  5.8 gm/dL (6.0-8.3)  L  18  06:00    


 


Albumin  2.5 gm/dL (4.2-5.5)  L  18  06:00    


 


Globulin  3.3 gm/dL  18  06:00    


 


Albumin/Globulin Ratio  0.8  (1.0-1.8)  L  18  06:00    


 


Prealbumin  7 mg/dL (10-36)  L  18  04:15    


 


Triglycerides  83 mg/dL (<150)   18  04:55    


 


Cholesterol  64 mg/dL (<200)   18  06:00    


 


Urine Source  CLEAN C   18  17:35    


 


Urine Color  YELLOW   18  17:35    


 


Urine Clarity  HAZY  (CLEAR)   18  17:35    


 


Urine pH  5.0  (4.6 - 8.0)   18  17:35    


 


Ur Specific Gravity  1.020  (1.005-1.030)   18  17:35    


 


Urine Protein  30 mg/dL (NEGATIVE)  H  18  17:35    


 


Urine Glucose (UA)  NEGATIVE mg/dL (NEGATIVE)   18  17:35    


 


Urine Ketones  NEGATIVE mg/dL (NEGATIVE)   18  17:35    


 


Urine Blood  TRACE  (NEGATIVE)   18  17:35    


 


Urine Nitrate  NEGATIVE  (NEGATIVE)   18  17:35    


 


Urine Bilirubin  NEGATIVE  (NEGATIVE)   18  17:35    


 


Urine Urobilinogen  0.2 E.U./dL (0.2 - 1.0)   18  17:35    


 


Ur Leukocyte Esterase  TRACE  (NEGATIVE)  H  18  17:35    


 


Urine RBC  0-2 /hpf (0-5)  H  18  17:35    


 


Urine WBC  0-2 /hpf (0-5)   18  17:35    


 


Ur Epithelial Cells  OCCASIONAL /lpf (FEW)   18  17:35    


 


Amorphous Sediment  FEW URATES  (NONE SEEN)   18  17:35    


 


Urine Bacteria  FEW /hpf (NONE SEEN)   18  17:35    


 


Vancomycin Trough  16.8 ug/mL (5-10)  H  18  09:30    


 


Random Vancomycin  12.9 ug/mL (5.0-40.0)   18  06:00    














- Physical Exam


Vitals and I&O: 


 Vital Signs











Temp  98.4 F   18 04:00


 


Pulse  93   18 07:25


 


Resp  22   18 09:50


 


BP  139/104   18 06:00


 


Pulse Ox  94   18 09:50








 Intake & Output











 18





 18:59 06:59 18:59


 


Intake Total 3281 50 


 


Output Total 1230  1100


 


Balance 2051 50 -1100


 


Weight (lbs) 98.883 kg  99.564 kg


 


Intake:   


 


  Intake, IV Amount 2441 50 


 


    Cefepime 1 gm In Dextrose 50 50 





    5% 50 ml @ 100 mls/hr IV   





    Q12HR [facility] Rx#:[id number]   


 


    Dextrose 5% 1,000 ml @ 30 760  





    mls/hr IV .Q24H [facility] Rx#:   





    [id number]   


 


    Multivitamin Inj 10 ml In 1631  





    Dextrose 70% 1,170 ml In   





    Amino Acids 10% 500 ml @   





    70 mls/hr IV .Q24H [facility]   





    Rx#:[id number]   


 


  TPN/  


 


Output:   


 


  Gastric Drainage 0  


 


  Urine 1230  1100


 


Other:   


 


  # Bowel Movements   0


 


  Weight Source Estimated  Bedscale











Active Medications: 


Current Medications





Acetaminophen/Hydrocodone Bitart (Norco 10 Mg/325 Mg)  1 tab PO Q6H PRN


   PRN Reason: Pain (Severe)


   Stop: 18 20:04


   Last Admin: 18 03:03 Dose:  1 tab


Albuterol/Ipratropium (Duoneb Neb)  3 ml HHN Q4HRT [facility]


   Stop: 18 14:59


   Last Admin: 18 07:22 Dose:  3 ml


Budesonide (Pulmicort)  0.5 mg HHN BIDRT [facility]


   Stop: 18 18:59


   Last Admin: 18 07:22 Dose:  0.5 mg


Cholestyramine Resin (Questran)  4 gm PO BID [facility]


   Stop: 18 16:59


   Last Admin: 18 16:58 Dose:  Not Given


Enoxaparin Sodium (Lovenox)  30 mg SUBQ Q12HR [facility]


   Stop: 18 20:59


   Last Admin: 18 09:17 Dose:  30 mg


Hydromorphone HCl (Dilaudid)  1 mg IVP Q3HR PRN


   PRN Reason: SEVERE PAIN


   Stop: 18 16:25


   Last Admin: 18 03:56 Dose:  1 mg


Cefepime HCl 1 gm/ Dextrose  50 mls @ 100 mls/hr IV Q12HR [facility]


   Stop: 18 20:59


   Last Admin: 18 09:17 Dose:  100 mls/hr


Multivitamins/Minerals 10 ml/Dextrose/ Amino Acids/Electrolytes  1,680 mls @ 70 

mls/hr IV .Q24H [facility]


   Stop: 18 14:59


   Last Admin: 18 16:21 Dose:  70 mls/hr


Dextrose (D5w)  1,000 mls @ 30 mls/hr IV .Q24H [facility]


   Stop: 18 16:29


   Last Admin: 18 18:20 Dose:  30 mls/hr


Vancomycin HCl 1.5 gm/ Sodium (Chloride)  500 mls @ 250 mls/hr IV Q24H [facility]


   Stop: 18 08:59


   Last Admin: 18 10:26 Dose:  250 mls/hr


Insulin Aspart (Novolog Insulin Sliding Scale)  0 units SUBQ Q6H [facility]; Protocol


   Stop: 18 11:59


   Last Admin: 18 06:40 Dose:  Not Given


Lorazepam (Ativan)  1 mg IVP Q4HR PRN; Protocol


   PRN Reason: Anxiety


   Stop: 18 02:24


   Last Admin: 18 09:18 Dose:  1 mg


Methylprednisolone Sodium Succinate (Solu-Medrol)  60 mg IVP Q6H [facility]


   Stop: 18 16:29


   Last Admin: 18 04:25 Dose:  60 mg


Metoclopramide HCl (Reglan)  10 mg IVP Q6HR [facility]


   Stop: 18 17:59


   Last Admin: 18 06:43 Dose:  10 mg


Miscellaneous (Tpn Per Pharmacy)  1 ea MC PRN [facility]


   Stop: 18 17:59


Miscellaneous (Vancomycin Iv Per Pharmacy)  1 ea  PRN PRN


   PRN Reason: PROTOCOL


   Stop: 18 14:14


Ondansetron HCl (Zofran)  4 mg IV Q4H PRN


   PRN Reason: Nausea / Vomiting


   Stop: 18 05:53


   Last Admin: 18 23:02 Dose:  4 mg


Pantoprazole Sodium (Protonix)  40 mg IVP BID [facility]


   Stop: 18 08:59


   Last Admin: 18 09:17 Dose:  40 mg








General: no acute distress, well developed, well nourished


HEENT: atraumatic, normocephalic, PERRLA, EOMI, moist mucous membrane


Neck: supple, no thyromegaly


Cardiovascular: S1S2, regular


Lungs: clear to percussion, rhonchi


Abdomen: soft, no tender, no distended, no mass


Extremities: no cyanosis, no clubbing, no edema


Neurological: awake, alert


Skin: intact





- Procedures


Procedures: 


 Procedures











Procedure Code Date


 


EXCISION OF CHEST SKIN, EXTERNAL APPROACH 5VH4IBF 18


 


EXCISION OF ILEUM, OPEN APPROACH 5FXB2PG 18


 


EXCISION OF RIGHT LOWER ARM SKIN, EXTERNAL APPROACH [id number] 18


 


EXCISION OF RIGHT LOWER LEG SKIN, EXTERNAL APPROACH [id number] 18


 


RELEASE ILEUM, OPEN APPROACH [id number] 18


 


RESPIRATORY VENTILATION, 24-96 CONSECUTIVE HOURS 9I8922C 18














Infectious Disease Assmt/Plan





- Assessment


Assessment: 





1. Diarrhea improving.  Stool for C. difficile negative.


2.  Fever, will do fever w/u.


3.  COPD.


4.  Status post closure of diverting ileostomy.


5.  Status post debridement of laceration wound of the right leg and right 

forearm.


6.  Acute renal failure, acute kidney injury.


7.  Distended abdomen, likely ileus.


8.  Respiratory failure.


9. Aspiration and HCAP.





- Plan


Plan: 





Sepsis workup.     continue vanco, cefepime and Flagyl.


Repeat CBC.





Nutritional Asmnt/Malnutr-PDOC





- Dietary Evaluation


Malnutrition Findings (Please click <Entered> for more info): 








Nutritional Asmnt/Malnutrition                             Start:  18 14:

24


Text:                                                      Status: Complete    

  


Freq:                                                                          

  


Protocol:                                                                      

  


 Document     18 14:24  HEN  (Rec: 18 14:35  [facility]  [id number])


 Nutritional Asmnt/Malnutrition


     Patient General Information


      Nutritional Screening                      High Risk


                                                 Consult


      Diagnosis                                  exploratory laparotomy with


                                                 recersal colostomy


      Pertinent Medical Hx/Surgical Hx           acute perforation of


                                                 diverticulitis, s/p


                                                 exploratory laparotomy and


                                                 divertin gcolostomy placement


      Subjective Information                     Pt seen lying in bed at time


                                                 of visit, awake and alert. Pt


                                                 stated he tolerated clear


                                                 liquid well, adequte amount


                                                 for him at this time. Pt has


                                                 RAQUEL drain noted. Per EMR, PO


                                                 intake % of clear liquid


                                                 .


      Current Diet Order/ Nutrition Support      clear liquid


      Pertinent Medications                      D5-0.9%ns, piperacillin,


                                                 vancomycin


      Pertinent Labs                              Na 133, K 3.4, Cr 0.7,


                                                 glucose 107, Ca 7.9


     Nutritional Hx/Data


      Height                                     1.88 m


      Height (Calculated Centimeters)            188.0


      Current Weight (lbs)                       86.636 kg


      Weight (Calculated Kilograms)              86.6


      Weight (Calculated Grams)                  09124.1


      Ideal Body Weight                          190


      Body Mass Index (BMI)                      24.5


      Weight Status                              Approriate


     GI Symptoms


      GI Symptoms                                None


      Last BM                                    none


      Difficult in:                              None


      Skin Integrity/Comment:                    RIGHT LOWER LEG HEALING


                                                 incision to right arm


     Estimated Nutritional Goals


      BEE in Kcals:                              Using Current wt


      Calories/Kcals/Kg                          25-30


      Kcals Calculated                           8020-1850


      Protein:                                   Using Current wt


      Protein g/k.8-1


      Protein Calculated                         69-86


      Fluid: ml                                  2150-2580ml (1ml/kcal)


     Nutritional Problem


      1. Problem


       Problem                                   altered GI function


       Etiology                                  s/p reversal colostomy


       Signs/Symptoms:                           pt on clear liquid


     Intervention/Recommendation


      Comments                                   1. Continue with clear liquid


                                                 diet as ordered.


                                                 2. Monitor PO intake, wt, labs


                                                 and skin integrity


                                                 3. F/U as high risk in 2-3


                                                 days, -


     Expected Outcomes/Goals


      Expected Outcomes/Goals                    1. PO intake to meet at least


                                                 75% of nutritional needs.


                                                 2. Wt stability, skin to


                                                 remain intact, labs to


                                                 approach WNL.

## 2018-07-02 NOTE — DIAGNOSTIC IMAGING REPORT
CHEST X-RAY: AP view



INDICATION: Shortness of breath



COMPARISON: 7/1/2018



FINDINGS: Support devices are stable.  Diffuse bilateral pulmonary

infiltrates are noted with small effusions.  There is elevation of the

right hemidiaphragm.  Cardiomegaly is noted.



IMPRESSION:



Persistent diffuse bilateral pulmonary infiltrates.

## 2018-07-02 NOTE — INTERNAL MEDICINE PROG NOTE
Internal Medicine Subjective





- Subjective


Service Date: 18


Patient seen and examined:: with staff


Patient is:: awake, verbal, in bed, talking, other (ON VENTILATOR,opening his 

eyes.)


Patient Complaints of:: SOB


Per staff patient has:: no adverse event, other (AFEBRIL,WAS SEEN BY 

PULMONOLOGIST.)





Internal Medicine Objective





- Results


Result Diagrams: 


 18 11:35





 18 06:00


Recent Labs: 


 Laboratory Last Values











WBC  19.3 Th/cmm (4.8-10.8)  H  18  11:35    


 


RBC  3.66 Mil/cmm (3.80-5.80)  L  18  11:35    


 


Hgb  10.6 gm/dL (12-16)  L  18  11:35    


 


Hct  32.0 % (41.0-60)  L  18  11:35    


 


MCV  87.3 fl (80-99)   18  11:35    


 


MCH  29.1 pg (27.0-31.0)   18  11:35    


 


MCHC Differential  33.3 pg (28.0-36.0)   18  11:35    


 


RDW  15.7 % (11.5-20.0)   18  11:35    


 


Plt Count  125 Th/cmm (150-400)  L  18  11:35    


 


MPV  8.9 fl  18  11:35    


 


Neutrophils %  95.2 % (40.0-80.0)  H  18  11:35    


 


Band Neutrophils %  2 % (0-10)   18  05:00    


 


Lymphocytes %  2.5 % (20.0-50.0)  L  18  11:35    


 


Monocytes %  1.4 % (2.0-10.0)  L  18  11:35    


 


Eosinophils %  0.1 % (0.0-5.0)   18  11:35    


 


Basophils %  0.8 % (0.0-2.0)   18  11:35    


 


Neutrophils (Manual)  92 % (40-80)  H  18  05:00    


 


Lymphocytes  4 % (20-50)  L  18  05:00    


 


Monocytes  2 % (2-10)   18  05:00    


 


Eosinophils  0 % (0-5)   18  18:24    


 


Basophils  0 % (0-3)   18  18:24    


 


Platelet Estimate  ADEQUATE  (NORMAL)   18  05:50    


 


Platelet Morphology  NORMAL  (NORMAL)   18  18:24    


 


Polychromasia  1+   18  04:40    


 


Anna Cells  1+   18  04:40    


 


RBC Morph Micro Appear  NORMAL  (NORMAL)   18  18:24    


 


PT  11.9 SECONDS (9.5-11.5)  H  18  12:08    


 


INR  1.13  (0.5-1.4)   18  12:08    


 


PTT (Actin FS)  22.3 SECONDS (26.0-38.0)  L  18  12:08    


 


Specimen Source  Arterial   18  09:00    


 


Sample Site  Right Radial   18  09:00    


 


pH  7.31  (7.35-7.45)  L  18  09:00    


 


pCO2  61.0 mmHg (35.0-45.0)  H*  18  09:00    


 


pO2  73.0 mmHg (80.0-100.0)  L  18  09:00    


 


HCO3  27.2 mEq/L (20.0-26.0)  H  18  09:00    


 


Base Excess  3.0 mEq/L (-3.0-3.0)   18  09:00    


 


O2 Saturation  93.0 % (92.0-100.0)   18  09:00    


 


Ceferino Test  Positive   18  09:00    


 


Vent Rate  NA   18  09:00    


 


Inspired O2  50   18  09:00    


 


Tidal Volume  NA   18  09:00    


 


PEEP  NA   18  09:00    


 


Pressure (ins/psv/peep)  NA   18  09:00    


 


Critical Value  SH   18  09:00    


 


Sodium  148 mEq/L (136-145)  H  18  06:00    


 


Potassium  4.2 mEq/L (3.5-5.1)   18  06:00    


 


Chloride  113 mEq/L ()  H  18  06:00    


 


Carbon Dioxide  31.3 mEq/L (21.0-31.0)  H  18  06:00    


 


Anion Gap  7.9  (7.0-16.0)   18  06:00    


 


BUN  65 mg/dL (7-25)  H  18  06:00    


 


Creatinine  1.5 mg/dL (0.7-1.3)  H  18  06:00    


 


Est GFR ( Amer)  > 60.0 ml/min (>90)   18  06:00    


 


Est GFR (Non-Af Amer)  49.9 ml/min  18  06:00    


 


BUN/Creatinine Ratio  43.3   18  06:00    


 


Glucose  135 mg/dL ()  H  18  06:00    


 


POC Glucose  152 MG/DL (70 - 105)  H  18  16:47    


 


Hemoglobin A1c %  4.8 % (4.0-6.0)   18  04:40    


 


Whole Bld Lactic Acid  1.36 mmol/L (0.60-1.99)   18  05:50    


 


Calcium  8.6 mg/dL (8.6-10.3)   18  06:00    


 


Phosphorus  3.8 mg/dL (2.5-5.0)   18  06:00    


 


Magnesium  2.3 mg/dL (1.9-2.7)   18  06:00    


 


Total Bilirubin  0.5 mg/dL (0.3-1.0)   18  06:00    


 


Direct Bilirubin  0.05 mg/dL (0.0-0.2)   18  05:50    


 


AST  34 U/L (13-39)   18  06:00    


 


ALT  23 U/L (7-52)   18  06:00    


 


Alkaline Phosphatase  76 U/L ()   18  06:00    


 


Ammonia  54 umol/L (16-53)  H  18  05:50    


 


Total Protein  5.8 gm/dL (6.0-8.3)  L  18  06:00    


 


Albumin  2.5 gm/dL (4.2-5.5)  L  18  06:00    


 


Globulin  3.3 gm/dL  18  06:00    


 


Albumin/Globulin Ratio  0.8  (1.0-1.8)  L  18  06:00    


 


Prealbumin  7 mg/dL (10-36)  L  18  04:15    


 


Triglycerides  83 mg/dL (<150)   18  04:55    


 


Cholesterol  64 mg/dL (<200)   18  06:00    


 


Urine Source  CLEAN C   18  17:35    


 


Urine Color  YELLOW   18  17:35    


 


Urine Clarity  HAZY  (CLEAR)   18  17:35    


 


Urine pH  5.0  (4.6 - 8.0)   18  17:35    


 


Ur Specific Gravity  1.020  (1.005-1.030)   18  17:35    


 


Urine Protein  30 mg/dL (NEGATIVE)  H  18  17:35    


 


Urine Glucose (UA)  NEGATIVE mg/dL (NEGATIVE)   18  17:35    


 


Urine Ketones  NEGATIVE mg/dL (NEGATIVE)   18  17:35    


 


Urine Blood  TRACE  (NEGATIVE)   18  17:35    


 


Urine Nitrate  NEGATIVE  (NEGATIVE)   18  17:35    


 


Urine Bilirubin  NEGATIVE  (NEGATIVE)   18  17:35    


 


Urine Urobilinogen  0.2 E.U./dL (0.2 - 1.0)   18  17:35    


 


Ur Leukocyte Esterase  TRACE  (NEGATIVE)  H  18  17:35    


 


Urine RBC  0-2 /hpf (0-5)  H  18  17:35    


 


Urine WBC  0-2 /hpf (0-5)   18  17:35    


 


Ur Epithelial Cells  OCCASIONAL /lpf (FEW)   18  17:35    


 


Amorphous Sediment  FEW URATES  (NONE SEEN)   18  17:35    


 


Urine Bacteria  FEW /hpf (NONE SEEN)   18  17:35    


 


Vancomycin Trough  16.8 ug/mL (5-10)  H  18  09:30    


 


Random Vancomycin  12.9 ug/mL (5.0-40.0)   18  06:00    














- Physical Exam


Vitals and I&O: 


 Vital Signs











Temp  98.2 F   18 16:00


 


Pulse  101   18 18:00


 


Resp  28   18 18:44


 


BP  153/95   18 18:00


 


Pulse Ox  96   18 18:44








 Intake & Output











 18





 18:59 06:59 18:59


 


Intake Total 3281 50 3923.5


 


Output Total 1230  2400


 


Balance 2051 50 1523.5


 


Weight (lbs) 98.883 kg  98.43 kg


 


Intake:   


 


  Intake, IV Amount 2441 50 2912.5


 


    Cefepime 1 gm In Dextrose 50 50 50





    5% 50 ml @ 100 mls/hr IV   





    Q12HR [facility] Rx#:[id number]   


 


    Dextrose 5% 1,000 ml @ 30 760  682.5





    mls/hr IV .Q24H [facility] Rx#:   





    [id number]   


 


    Multivitamin Inj 10 ml In 1631  1680





    Dextrose 70% 1,170 ml In   





    Amino Acids 10% 500 ml @   





    70 mls/hr IV .Q24H [facility]   





    Rx#:[id number]   


 


    Vancomycin HCl 1.5 gm In   500





    Sodium Chloride 0.9% 500   





    ml @ 250 mls/hr IV Q24H   





    [facility] Rx#:[id number]   


 


  Oral   1011


 


  TPN/  


 


Output:   


 


  Gastric Drainage 0  


 


  Urine 1230  2400


 


Other:   


 


  # Bowel Movements   0


 


  Weight Source Estimated  Bedscale











Active Medications: 


Current Medications





Acetaminophen/Hydrocodone Bitart (Norco 10 Mg/325 Mg)  1 tab PO Q6H PRN


   PRN Reason: Pain (Severe)


   Stop: 18 20:04


   Last Admin: 18 03:03 Dose:  1 tab


Albuterol/Ipratropium (Duoneb Neb)  3 ml HHN Q4HRT [facility]


   Stop: 18 14:59


   Last Admin: 18 18:39 Dose:  3 ml


Budesonide (Pulmicort)  0.5 mg HHN BIDRT [facility]


   Stop: 18 18:59


   Last Admin: 18 18:39 Dose:  0.5 mg


Cholestyramine Resin (Questran)  4 gm PO BID [facility]


   Stop: 18 16:59


   Last Admin: 18 16:58 Dose:  4 gm


Enoxaparin Sodium (Lovenox)  30 mg SUBQ Q12HR [facility]


   Stop: 18 20:59


   Last Admin: 18 09:17 Dose:  30 mg


Hydromorphone HCl (Dilaudid)  1 mg IVP Q3HR PRN


   PRN Reason: SEVERE PAIN


   Stop: 18 16:25


   Last Admin: 18 03:56 Dose:  1 mg


Cefepime HCl 1 gm/ Dextrose  50 mls @ 100 mls/hr IV Q12HR [facility]


   Stop: 18 20:59


   Last Infusion: 18 09:50 Dose:  Infused


Multivitamins/Minerals 10 ml/Dextrose/ Amino Acids/Electrolytes  1,680 mls @ 70 

mls/hr IV .Q24H [facility]


   Stop: 18 14:59


   Last Admin: 18 17:04 Dose:  70 mls/hr


Dextrose (D5w)  1,000 mls @ 30 mls/hr IV .Q24H [facility]


   Stop: 18 16:29


   Last Admin: 18 17:05 Dose:  30 mls/hr


Vancomycin HCl 1.5 gm/ Sodium (Chloride)  500 mls @ 250 mls/hr IV Q24H [facility]


   Stop: 18 08:59


   Last Infusion: 18 12:30 Dose:  Infused


Insulin Aspart (Novolog Insulin Sliding Scale)  0 units SUBQ Q6H [facility]; Protocol


   Stop: 18 11:59


   Last Admin: 18 16:59 Dose:  2 units


Lorazepam (Ativan)  1 mg IVP Q4HR PRN; Protocol


   PRN Reason: Anxiety


   Stop: 18 02:24


   Last Admin: 18 09:18 Dose:  1 mg


Methylprednisolone Sodium Succinate (Solu-Medrol)  60 mg IVP Q6H [facility]


   Stop: 18 16:29


   Last Admin: 18 16:57 Dose:  60 mg


Metoclopramide HCl (Reglan)  10 mg IVP Q6HR [facility]


   Stop: 18 17:59


   Last Admin: 18 17:27 Dose:  10 mg


Miscellaneous (Tpn Per Pharmacy)  1 ea  PRN [facility]ATH[facility]


   Stop: 18 17:59


Miscellaneous (Vancomycin Iv Per Pharmacy)  1 [facility] PRN PRN


   PRN Reason: PROTOCOL


   Stop: 18 14:14


Miscellaneous (Probiotic Screen)  1 [facility] PRN PRN


   PRN Reason: PROTOCOL


   Stop: 18 12:41


Ondansetron HCl (Zofran)  4 mg IV Q4H PRN


   PRN Reason: Nausea / Vomiting


   Stop: 18 05:53


   Last Admin: 18 23:02 Dose:  4 mg


Pantoprazole Sodium (Protonix)  40 mg IVP BID [facility]


   Stop: 18 08:59


   Last Admin: 18 16:58 Dose:  40 mg








General: other (on 50 percent fio2,less distress.)


HEENT: NC/AT, PERRLA, EOMI, anicteric sclerae, throat clear


Neck: Supple, No JVD, No thyromegaly, +2 carotid pulse wo bruit, No LAD


Lungs: ronchi


Cardiovascular: RRR, Normal S1, Normal S2, without murmur, tachy


Abdomen: tender, distended


Extremities: clear


Neurological: unable to follow command





- Procedures


Procedures: 


 Procedures











Procedure Code Date


 


EXCISION OF CHEST SKIN, EXTERNAL APPROACH 0TC2LAJ 18


 


EXCISION OF ILEUM, OPEN APPROACH [id number] 18


 


EXCISION OF RIGHT LOWER ARM SKIN, EXTERNAL APPROACH [id number] 18


 


EXCISION OF RIGHT LOWER LEG SKIN, EXTERNAL APPROACH [id number] 18


 


RELEASE ILEUM, OPEN APPROACH [id number] 18


 


RESPIRATORY VENTILATION, 24-96 CONSECUTIVE HOURS [id number] 18














Internal Medicine Assmt/Plan





- Assessment


Assessment: 





1.SP COLOSTOMY REVISION.


2.SP EXCISION BIOPSY FOR CHEST WALL LESION.


3.ACUTE BOWEL OBSTRUCTION


4.ACUTE RESPIRATORY FAILURE.


5.BILATERAL PNEUMONIA.





- Plan


Plan: 


CONTINUE ON CURRENT MEDICATION AND TPN.





Nutritional Asmnt/Malnutr-PDOC





- Dietary Evaluation


Malnutrition Findings (Please click <Entered> for more info): 








Nutritional Asmnt/Malnutrition                             Start:  18 14:

24


Text:                                                      Status: Complete    

  


Freq:                                                                          

  


Protocol:                                                                      

  


 Document     18 14:24  JASEG  (Rec: 18 14:35  KAYE  [id number])


 Nutritional Asmnt/Malnutrition


     Patient General Information


      Nutritional Screening                      High Risk


                                                 Consult


      Diagnosis                                  exploratory laparotomy with


                                                 recersal colostomy


      Pertinent Medical Hx/Surgical Hx           acute perforation of


                                                 diverticulitis, s/p


                                                 exploratory laparotomy and


                                                 divertin gcolostomy placement


      Subjective Information                     Pt seen lying in bed at time


                                                 of visit, awake and alert. Pt


                                                 stated he tolerated clear


                                                 liquid well, adequte amount


                                                 for him at this time. Pt has


                                                 RAQUEL drain noted. Per EMR, PO


                                                 intake % of clear liquid


                                                 .


      Current Diet Order/ Nutrition Support      clear liquid


      Pertinent Medications                      D5-0.9%ns, piperacillin,


                                                 vancomycin


      Pertinent Labs                              Na 133, K 3.4, Cr 0.7,


                                                 glucose 107, Ca 7.9


     Nutritional Hx/Data


      Height                                     1.88 m


      Height (Calculated Centimeters)            188.0


      Current Weight (lbs)                       86.636 kg


      Weight (Calculated Kilograms)              86.6


      Weight (Calculated Grams)                  78558.1


      Ideal Body Weight                          190


      Body Mass Index (BMI)                      24.5


      Weight Status                              Approriate


     GI Symptoms


      GI Symptoms                                None


      Last BM                                    none


      Difficult in:                              None


      Skin Integrity/Comment:                    RIGHT LOWER LEG HEALING


                                                 incision to right arm


     Estimated Nutritional Goals


      BEE in Kcals:                              Using Current wt


      Calories/Kcals/Kg                          25-30


      Kcals Calculated                           7022-3295


      Protein:                                   Using Current wt


      Protein g/k.8-1


      Protein Calculated                         69-86


      Fluid: ml                                  2150-2580ml (1ml/kcal)


     Nutritional Problem


      1. Problem


       Problem                                   altered GI function


       Etiology                                  s/p reversal colostomy


       Signs/Symptoms:                           pt on clear liquid


     Intervention/Recommendation


      Comments                                   1. Continue with clear liquid


                                                 diet as ordered.


                                                 2. Monitor PO intake, wt, labs


                                                 and skin integrity


                                                 3. F/U as high risk in 2-3


                                                 days, -


     Expected Outcomes/Goals


      Expected Outcomes/Goals                    1. PO intake to meet at least


                                                 75% of nutritional needs.


                                                 2. Wt stability, skin to


                                                 remain intact, labs to


                                                 approach WNL.

## 2018-07-02 NOTE — GENERAL PROGRESS NOTE
Subjective





- Review of Systems


Service Date: 18


Events since last encounter: 





had 2 BMS 2 days ago


no NGT dr.


start clear liquids





Objective





- Results


Result Diagrams: 


 18 05:00





 18 06:00


Recent Labs: 


 Laboratory Last Values











WBC  22.1 Th/cmm (4.8-10.8)  H*  18  05:00    


 


RBC  3.63 Mil/cmm (3.80-5.80)  L  18  05:00    


 


Hgb  10.6 gm/dL (12-16)  L  18  05:00    


 


Hct  35.0 % (41.0-60)  L  18  05:00    


 


MCV  96.6 fl (80-99)   18  05:00    


 


MCH  29.3 pg (27.0-31.0)   18  05:00    


 


MCHC Differential  30.4 pg (28.0-36.0)   18  05:00    


 


RDW  18.4 % (11.5-20.0)   18  05:00    


 


Plt Count  139 Th/cmm (150-400)  L  18  05:00    


 


MPV  9.1 fl  18  05:00    


 


Neutrophils %  75.1 % (40.0-80.0)   18  04:15    


 


Band Neutrophils %  2 % (0-10)   18  05:00    


 


Lymphocytes %  9.7 % (20.0-50.0)  L  18  04:15    


 


Monocytes %  14.9 % (2.0-10.0)  H  18  04:15    


 


Eosinophils %  0.1 % (0.0-5.0)   18  04:15    


 


Basophils %  0.2 % (0.0-2.0)   18  04:15    


 


Neutrophils (Manual)  92 % (40-80)  H  18  05:00    


 


Lymphocytes  4 % (20-50)  L  18  05:00    


 


Monocytes  2 % (2-10)   18  05:00    


 


Eosinophils  0 % (0-5)   18  18:24    


 


Basophils  0 % (0-3)   18  18:24    


 


Platelet Estimate  ADEQUATE  (NORMAL)   18  05:50    


 


Platelet Morphology  NORMAL  (NORMAL)   18  18:24    


 


Polychromasia  1+   18  04:40    


 


Anna Cells  1+   18  04:40    


 


RBC Morph Micro Appear  NORMAL  (NORMAL)   18  18:24    


 


PT  11.9 SECONDS (9.5-11.5)  H  18  12:08    


 


INR  1.13  (0.5-1.4)   18  12:08    


 


PTT (Actin FS)  22.3 SECONDS (26.0-38.0)  L  18  12:08    


 


Specimen Source  Arterial   18  08:41    


 


Sample Site  RB   18  08:41    


 


pH  7.41  (7.35-7.45)   18  08:41    


 


pCO2  43.0 mmHg (35.0-45.0)   18  08:41    


 


pO2  67.0 mmHg (80.0-100.0)  L  18  08:41    


 


HCO3  26.6 mEq/L (20.0-26.0)  H  18  08:41    


 


Base Excess  2.3 mEq/L (-3.0-3.0)   18  08:41    


 


O2 Saturation  93.0 % (92.0-100.0)   18  08:41    


 


Ceferino Test  NA   18  08:41    


 


Vent Rate  NA   18  08:41    


 


Inspired O2  35   18  08:41    


 


Tidal Volume  NA   18  08:41    


 


PEEP  NA   18  08:41    


 


Pressure (ins/psv/peep)  NA   18  08:41    


 


Critical Value  LZHANG   18  08:41    


 


Sodium  148 mEq/L (136-145)  H  18  06:00    


 


Potassium  4.2 mEq/L (3.5-5.1)   18  06:00    


 


Chloride  113 mEq/L ()  H  18  06:00    


 


Carbon Dioxide  31.3 mEq/L (21.0-31.0)  H  18  06:00    


 


Anion Gap  7.9  (7.0-16.0)   18  06:00    


 


BUN  65 mg/dL (7-25)  H  18  06:00    


 


Creatinine  1.5 mg/dL (0.7-1.3)  H  18  06:00    


 


Est GFR ( Amer)  > 60.0 ml/min (>90)   18  06:00    


 


Est GFR (Non-Af Amer)  49.9 ml/min  18  06:00    


 


BUN/Creatinine Ratio  43.3   18  06:00    


 


Glucose  135 mg/dL ()  H  18  06:00    


 


POC Glucose  161 MG/DL (70 - 105)  H  18  00:46    


 


Hemoglobin A1c %  4.8 % (4.0-6.0)   18  04:40    


 


Whole Bld Lactic Acid  1.36 mmol/L (0.60-1.99)   18  05:50    


 


Calcium  8.6 mg/dL (8.6-10.3)   18  06:00    


 


Phosphorus  3.8 mg/dL (2.5-5.0)   18  06:00    


 


Magnesium  2.3 mg/dL (1.9-2.7)   18  06:00    


 


Total Bilirubin  0.5 mg/dL (0.3-1.0)   18  06:00    


 


Direct Bilirubin  0.05 mg/dL (0.0-0.2)   18  05:50    


 


AST  34 U/L (13-39)   18  06:00    


 


ALT  23 U/L (7-52)   18  06:00    


 


Alkaline Phosphatase  76 U/L ()   18  06:00    


 


Ammonia  54 umol/L (16-53)  H  18  05:50    


 


Total Protein  5.8 gm/dL (6.0-8.3)  L  18  06:00    


 


Albumin  2.5 gm/dL (4.2-5.5)  L  18  06:00    


 


Globulin  3.3 gm/dL  18  06:00    


 


Albumin/Globulin Ratio  0.8  (1.0-1.8)  L  18  06:00    


 


Prealbumin  7 mg/dL (10-36)  L  18  04:15    


 


Triglycerides  83 mg/dL (<150)   18  04:55    


 


Cholesterol  66 mg/dL (<200)   18  04:15    


 


Urine Source  CLEAN C   18  17:35    


 


Urine Color  YELLOW   18  17:35    


 


Urine Clarity  HAZY  (CLEAR)   18  17:35    


 


Urine pH  5.0  (4.6 - 8.0)   18  17:35    


 


Ur Specific Gravity  1.020  (1.005-1.030)   18  17:35    


 


Urine Protein  30 mg/dL (NEGATIVE)  H  18  17:35    


 


Urine Glucose (UA)  NEGATIVE mg/dL (NEGATIVE)   18  17:35    


 


Urine Ketones  NEGATIVE mg/dL (NEGATIVE)   18  17:35    


 


Urine Blood  TRACE  (NEGATIVE)   18  17:35    


 


Urine Nitrate  NEGATIVE  (NEGATIVE)   18  17:35    


 


Urine Bilirubin  NEGATIVE  (NEGATIVE)   18  17:35    


 


Urine Urobilinogen  0.2 E.U./dL (0.2 - 1.0)   18  17:35    


 


Ur Leukocyte Esterase  TRACE  (NEGATIVE)  H  18  17:35    


 


Urine RBC  0-2 /hpf (0-5)  H  18  17:35    


 


Urine WBC  0-2 /hpf (0-5)   18  17:35    


 


Ur Epithelial Cells  OCCASIONAL /lpf (FEW)   18  17:35    


 


Amorphous Sediment  FEW URATES  (NONE SEEN)   18  17:35    


 


Urine Bacteria  FEW /hpf (NONE SEEN)   18  17:35    


 


Vancomycin Trough  16.8 ug/mL (5-10)  H  18  09:30    


 


Random Vancomycin  18.1 ug/mL (5.0-40.0)   18  05:00    














- Physical Exam


Vitals and I&O: 


 Vital Signs











Temp  98.4 F   18 04:00


 


Pulse  93   18 07:25


 


Resp  26   18 07:25


 


BP  139/104   18 06:00


 


Pulse Ox  91   18 07:25








 Intake & Output











 18





 18:59 06:59 18:59


 


Intake Total 3281 50 


 


Output Total 1230  1100


 


Balance 2051 50 -1100


 


Weight (lbs) 98.883 kg  99.564 kg


 


Intake:   


 


  Intake, IV Amount 2441 50 


 


    Cefepime 1 gm In Dextrose 50 50 





    5% 50 ml @ 100 mls/hr IV   





    Q12HR [facility] Rx#:[id number]   


 


    Dextrose 5% 1,000 ml @ 30 760  





    mls/hr IV .Q24H [facility] Rx#:   





    [id number]   


 


    Multivitamin Inj 10 ml In 1631  





    Dextrose 70% 1,170 ml In   





    Amino Acids 10% 500 ml @   





    70 mls/hr IV .Q24H [facility]   





    Rx#:[id number]   


 


  TPN/  


 


Output:   


 


  Gastric Drainage 0  


 


  Urine 1230  1100


 


Other:   


 


  # Bowel Movements   0


 


  Weight Source Estimated  Bedscale











Active Medications: 


Current Medications





Acetaminophen/Hydrocodone Bitart (Norco 10 Mg/325 Mg)  1 tab PO Q6H PRN


   PRN Reason: Pain (Severe)


   Stop: 18 20:04


   Last Admin: 18 03:03 Dose:  1 tab


Albuterol/Ipratropium (Duoneb Neb)  3 ml HHN Q4HRT [facility]


   Stop: 18 14:59


   Last Admin: 18 07:22 Dose:  3 ml


Budesonide (Pulmicort)  0.5 mg HHN BIDRT [facility]


   Stop: 18 18:59


   Last Admin: 18 07:22 Dose:  0.5 mg


Cholestyramine Resin (Questran)  4 gm PO BID [facility]


   Stop: 18 16:59


   Last Admin: 18 16:58 Dose:  Not Given


Enoxaparin Sodium (Lovenox)  30 mg SUBQ Q12HR [facility]


   Stop: 18 20:59


   Last Admin: 18 21:21 Dose:  30 mg


Hydromorphone HCl (Dilaudid)  1 mg IVP Q3HR PRN


   PRN Reason: SEVERE PAIN


   Stop: 18 16:25


   Last Admin: 18 03:56 Dose:  1 mg


Cefepime HCl 1 gm/ Dextrose  50 mls @ 100 mls/hr IV Q12HR [facility]


   Stop: 18 20:59


   Last Infusion: 18 21:45 Dose:  Infused


Multivitamins/Minerals 10 ml/Dextrose/ Amino Acids/Electrolytes  1,680 mls @ 70 

mls/hr IV .Q24H [facility]


   Stop: 18 14:59


   Last Admin: 18 16:21 Dose:  70 mls/hr


Dextrose (D5w)  1,000 mls @ 30 mls/hr IV .Q24H [facility]


   Stop: 18 16:29


   Last Admin: 18 18:20 Dose:  30 mls/hr


Vancomycin HCl 0.75 gm/ Sodium (Chloride)  250 mls @ 165 mls/hr IV Q24HR@0900 

[facility]


   Stop: 18 08:59


   Last Admin: 18 09:02 Dose:  165 mls/hr


Insulin Aspart (Novolog Insulin Sliding Scale)  0 units SUBQ Q6H [facility]; Protocol


   Stop: 18 11:59


   Last Admin: 18 06:40 Dose:  Not Given


Lorazepam (Ativan)  1 mg IVP Q4HR PRN; Protocol


   PRN Reason: Anxiety


   Stop: 18 02:24


   Last Admin: 18 22:50 Dose:  1 mg


Methylprednisolone Sodium Succinate (Solu-Medrol)  60 mg IVP Q6H [facility]


   Stop: 18 16:29


   Last Admin: 18 04:25 Dose:  60 mg


Metoclopramide HCl (Reglan)  10 mg IVP Q6HR [facility]


   Stop: 18 17:59


   Last Admin: 18 06:43 Dose:  10 mg


Miscellaneous (Tpn Per Pharmacy)  1 [facility] PRN [facility]


   Stop: 18 17:59


Miscellaneous (Vancomycin Iv Per Pharmacy)  1 [facility] PRN PRN


   PRN Reason: PROTOCOL


   Stop: 18 14:14


Ondansetron HCl (Zofran)  4 mg IV Q4H PRN


   PRN Reason: Nausea / Vomiting


   Stop: 18 05:53


   Last Admin: 18 23:02 Dose:  4 mg


Pantoprazole Sodium (Protonix)  40 mg IVP BID [facility]


   Stop: 18 08:59


   Last Admin: 18 16:42 Dose:  40 mg








General: Alert, Cooperative, No acute distress


HEENT: Atraumatic, PERRLA (+dressing), EOMI


Neck: Supple


Cardiovascular: Regular rate, Normal S1, Normal S2


Lungs: Clear to auscultation


Abdomen: Soft





- Procedures


Procedures: 


 Procedures











Procedure Code Date


 


EXCISION OF CHEST SKIN, EXTERNAL APPROACH 4CX2VEM 18


 


EXCISION OF ILEUM, OPEN APPROACH [id number] 18


 


EXCISION OF RIGHT LOWER ARM SKIN, EXTERNAL APPROACH [id number] 18


 


EXCISION OF RIGHT LOWER LEG SKIN, EXTERNAL APPROACH [id number] 18


 


RELEASE ILEUM, OPEN APPROACH [id number] 18


 


RESPIRATORY VENTILATION, 24-96 CONSECUTIVE HOURS 2Z8783A 18














Nutritional Asmnt/Malnutr-PDOC





- Dietary Evaluation


Malnutrition Findings (Please click <Entered> for more info): 








Nutritional Asmnt/Malnutrition                             Start:  18 14:

24


Text:                                                      Status: Complete    

  


Freq:                                                                          

  


Protocol:                                                                      

  


 Document     18 14:24  [facility]  (Rec: 18 14:35  HEN  [id number])


 Nutritional Asmnt/Malnutrition


     Patient General Information


      Nutritional Screening                      High Risk


                                                 Consult


      Diagnosis                                  exploratory laparotomy with


                                                 recersal colostomy


      Pertinent Medical Hx/Surgical Hx           acute perforation of


                                                 diverticulitis, s/p


                                                 exploratory laparotomy and


                                                 divertin gcolostomy placement


      Subjective Information                     Pt seen lying in bed at time


                                                 of visit, awake and alert. Pt


                                                 stated he tolerated clear


                                                 liquid well, adequte amount


                                                 for him at this time. Pt has


                                                 RAQUEL drain noted. Per EMR, PO


                                                 intake % of clear liquid


                                                 .


      Current Diet Order/ Nutrition Support      clear liquid


      Pertinent Medications                      D5-0.9%ns, piperacillin,


                                                 vancomycin


      Pertinent Labs                              Na 133, K 3.4, Cr 0.7,


                                                 glucose 107, Ca 7.9


     Nutritional Hx/Data


      Height                                     1.88 m


      Height (Calculated Centimeters)            188.0


      Current Weight (lbs)                       86.636 kg


      Weight (Calculated Kilograms)              86.6


      Weight (Calculated Grams)                  91567.1


      Ideal Body Weight                          190


      Body Mass Index (BMI)                      24.5


      Weight Status                              Approriate


     GI Symptoms


      GI Symptoms                                None


      Last BM                                    none


      Difficult in:                              None


      Skin Integrity/Comment:                    RIGHT LOWER LEG HEALING


                                                 incision to right arm


     Estimated Nutritional Goals


      BEE in Kcals:                              Using Current wt


      Calories/Kcals/Kg                          25-30


      Kcals Calculated                           6282-5836


      Protein:                                   Using Current wt


      Protein g/k.8-1


      Protein Calculated                         69-86


      Fluid: ml                                  2150-2580ml (1ml/kcal)


     Nutritional Problem


      1. Problem


       Problem                                   altered GI function


       Etiology                                  s/p reversal colostomy


       Signs/Symptoms:                           pt on clear liquid


     Intervention/Recommendation


      Comments                                   1. Continue with clear liquid


                                                 diet as ordered.


                                                 2. Monitor PO intake, wt, labs


                                                 and skin integrity


                                                 3. F/U as high risk in 2-3


                                                 days, -


     Expected Outcomes/Goals


      Expected Outcomes/Goals                    1. PO intake to meet at least


                                                 75% of nutritional needs.


                                                 2. Wt stability, skin to


                                                 remain intact, labs to


                                                 approach WNL.

## 2018-07-02 NOTE — GENERAL PROGRESS NOTE
Subjective





- Review of Systems


Service Date: 18


Subjective: 


pt on [employer]





Objective





- Results


Result Diagrams: 


 18 11:35





 18 06:00


Recent Labs: 


 Laboratory Last Values











WBC  19.3 Th/cmm (4.8-10.8)  H  18  11:35    


 


RBC  3.66 Mil/cmm (3.80-5.80)  L  18  11:35    


 


Hgb  10.6 gm/dL (12-16)  L  18  11:35    


 


Hct  32.0 % (41.0-60)  L  18  11:35    


 


MCV  87.3 fl (80-99)   18  11:35    


 


MCH  29.1 pg (27.0-31.0)   18  11:35    


 


MCHC Differential  33.3 pg (28.0-36.0)   18  11:35    


 


RDW  15.7 % (11.5-20.0)   18  11:35    


 


Plt Count  125 Th/cmm (150-400)  L  18  11:35    


 


MPV  8.9 fl  18  11:35    


 


Neutrophils %  95.2 % (40.0-80.0)  H  18  11:35    


 


Band Neutrophils %  2 % (0-10)   18  05:00    


 


Lymphocytes %  2.5 % (20.0-50.0)  L  18  11:35    


 


Monocytes %  1.4 % (2.0-10.0)  L  18  11:35    


 


Eosinophils %  0.1 % (0.0-5.0)   18  11:35    


 


Basophils %  0.8 % (0.0-2.0)   18  11:35    


 


Neutrophils (Manual)  92 % (40-80)  H  18  05:00    


 


Lymphocytes  4 % (20-50)  L  18  05:00    


 


Monocytes  2 % (2-10)   18  05:00    


 


Eosinophils  0 % (0-5)   18  18:24    


 


Basophils  0 % (0-3)   18  18:24    


 


Platelet Estimate  ADEQUATE  (NORMAL)   18  05:50    


 


Platelet Morphology  NORMAL  (NORMAL)   18  18:24    


 


Polychromasia  1+   18  04:40    


 


Anna Cells  1+   18  04:40    


 


RBC Morph Micro Appear  NORMAL  (NORMAL)   18  18:24    


 


PT  11.9 SECONDS (9.5-11.5)  H  18  12:08    


 


INR  1.13  (0.5-1.4)   18  12:08    


 


PTT (Actin FS)  22.3 SECONDS (26.0-38.0)  L  18  12:08    


 


Specimen Source  Arterial   18  09:00    


 


Sample Site  Right Radial   18  09:00    


 


pH  7.31  (7.35-7.45)  L  18  09:00    


 


pCO2  61.0 mmHg (35.0-45.0)  H*  18  09:00    


 


pO2  73.0 mmHg (80.0-100.0)  L  18  09:00    


 


HCO3  27.2 mEq/L (20.0-26.0)  H  18  09:00    


 


Base Excess  3.0 mEq/L (-3.0-3.0)   18  09:00    


 


O2 Saturation  93.0 % (92.0-100.0)   18  09:00    


 


Ceferino Test  Positive   18  09:00    


 


Vent Rate  NA   18  09:00    


 


Inspired O2  50   18  09:00    


 


Tidal Volume  NA   18  09:00    


 


PEEP  NA   18  09:00    


 


Pressure (ins/psv/peep)  NA   18  09:00    


 


Critical Value  SH   18  09:00    


 


Sodium  148 mEq/L (136-145)  H  18  06:00    


 


Potassium  4.2 mEq/L (3.5-5.1)   18  06:00    


 


Chloride  113 mEq/L ()  H  18  06:00    


 


Carbon Dioxide  31.3 mEq/L (21.0-31.0)  H  18  06:00    


 


Anion Gap  7.9  (7.0-16.0)   18  06:00    


 


BUN  65 mg/dL (7-25)  H  18  06:00    


 


Creatinine  1.5 mg/dL (0.7-1.3)  H  18  06:00    


 


Est GFR ( Amer)  > 60.0 ml/min (>90)   18  06:00    


 


Est GFR (Non-Af Amer)  49.9 ml/min  18  06:00    


 


BUN/Creatinine Ratio  43.3   18  06:00    


 


Glucose  135 mg/dL ()  H  18  06:00    


 


POC Glucose  132 MG/DL (70 - 105)  H  18  12:23    


 


Hemoglobin A1c %  4.8 % (4.0-6.0)   18  04:40    


 


Whole Bld Lactic Acid  1.36 mmol/L (0.60-1.99)   18  05:50    


 


Calcium  8.6 mg/dL (8.6-10.3)   18  06:00    


 


Phosphorus  3.8 mg/dL (2.5-5.0)   18  06:00    


 


Magnesium  2.3 mg/dL (1.9-2.7)   18  06:00    


 


Total Bilirubin  0.5 mg/dL (0.3-1.0)   18  06:00    


 


Direct Bilirubin  0.05 mg/dL (0.0-0.2)   18  05:50    


 


AST  34 U/L (13-39)   18  06:00    


 


ALT  23 U/L (7-52)   18  06:00    


 


Alkaline Phosphatase  76 U/L ()   18  06:00    


 


Ammonia  54 umol/L (16-53)  H  18  05:50    


 


Total Protein  5.8 gm/dL (6.0-8.3)  L  18  06:00    


 


Albumin  2.5 gm/dL (4.2-5.5)  L  18  06:00    


 


Globulin  3.3 gm/dL  18  06:00    


 


Albumin/Globulin Ratio  0.8  (1.0-1.8)  L  18  06:00    


 


Prealbumin  7 mg/dL (10-36)  L  18  04:15    


 


Triglycerides  83 mg/dL (<150)   18  04:55    


 


Cholesterol  64 mg/dL (<200)   18  06:00    


 


Urine Source  CLEAN C   18  17:35    


 


Urine Color  YELLOW   18  17:35    


 


Urine Clarity  HAZY  (CLEAR)   18  17:35    


 


Urine pH  5.0  (4.6 - 8.0)   18  17:35    


 


Ur Specific Gravity  1.020  (1.005-1.030)   18  17:35    


 


Urine Protein  30 mg/dL (NEGATIVE)  H  18  17:35    


 


Urine Glucose (UA)  NEGATIVE mg/dL (NEGATIVE)   18  17:35    


 


Urine Ketones  NEGATIVE mg/dL (NEGATIVE)   18  17:35    


 


Urine Blood  TRACE  (NEGATIVE)   18  17:35    


 


Urine Nitrate  NEGATIVE  (NEGATIVE)   18  17:35    


 


Urine Bilirubin  NEGATIVE  (NEGATIVE)   18  17:35    


 


Urine Urobilinogen  0.2 E.U./dL (0.2 - 1.0)   18  17:35    


 


Ur Leukocyte Esterase  TRACE  (NEGATIVE)  H  18  17:35    


 


Urine RBC  0-2 /hpf (0-5)  H  18  17:35    


 


Urine WBC  0-2 /hpf (0-5)   18  17:35    


 


Ur Epithelial Cells  OCCASIONAL /lpf (FEW)   18  17:35    


 


Amorphous Sediment  FEW URATES  (NONE SEEN)   18  17:35    


 


Urine Bacteria  FEW /hpf (NONE SEEN)   18  17:35    


 


Vancomycin Trough  16.8 ug/mL (5-10)  H  18  09:30    


 


Random Vancomycin  12.9 ug/mL (5.0-40.0)   18  06:00    














- Physical Exam


Vitals and I&O: 


 Vital Signs











Temp  98 F   18 12:00


 


Pulse  104   18 12:00


 


Resp  18   18 13:05


 


BP  137/92   18 12:00


 


Pulse Ox  94   18 13:05








 Intake & Output











 18





 18:59 06:59 18:59


 


Intake Total 3281 50 550


 


Output Total 1230  1100


 


Balance 2051 50 -550


 


Weight (lbs) 98.883 kg  99.564 kg


 


Intake:   


 


  Intake, IV Amount 2441 50 550


 


    Cefepime 1 gm In Dextrose 50 50 50





    5% 50 ml @ 100 mls/hr IV   





    Q12HR [facility] Rx#:[id number]   


 


    Dextrose 5% 1,000 ml @ 30 760  





    mls/hr IV .Q24H [facility] Rx#:   





    [id number]   


 


    Multivitamin Inj 10 ml In 1631  





    Dextrose 70% 1,170 ml In   





    Amino Acids 10% 500 ml @   





    70 mls/hr IV .Q24H [facility]   





    Rx#:[id number]   


 


    Vancomycin HCl 1.5 gm In   500





    Sodium Chloride 0.9% 500   





    ml @ 250 mls/hr IV Q24H   





    [facility] Rx#:[id number]   


 


  TPN/  


 


Output:   


 


  Gastric Drainage 0  


 


  Urine 1230  1100


 


Other:   


 


  # Bowel Movements   0


 


  Weight Source Estimated  Bedscale











Active Medications: 


Current Medications





Acetaminophen/Hydrocodone Bitart (Norco 10 Mg/325 Mg)  1 tab PO Q6H PRN


   PRN Reason: Pain (Severe)


   Stop: 18 20:04


   Last Admin: 18 03:03 Dose:  1 tab


Albuterol/Ipratropium (Duoneb Neb)  3 ml HHN Q4HRT [facility]


   Stop: 18 14:59


   Last Admin: 18 11:14 Dose:  3 ml


Budesonide (Pulmicort)  0.5 mg HHN BIDRT [facility]


   Stop: 18 18:59


   Last Admin: 18 07:22 Dose:  0.5 mg


Cholestyramine Resin (Questran)  4 gm PO BID [facility]


   Stop: 18 16:59


   Last Admin: 18 09:00 Dose:  Not Given


Enoxaparin Sodium (Lovenox)  30 mg SUBQ Q12HR [facility]


   Stop: 18 20:59


   Last Admin: 18 09:17 Dose:  30 mg


Hydromorphone HCl (Dilaudid)  1 mg IVP Q3HR PRN


   PRN Reason: SEVERE PAIN


   Stop: 18 16:25


   Last Admin: 18 03:56 Dose:  1 mg


Cefepime HCl 1 gm/ Dextrose  50 mls @ 100 mls/hr IV Q12HR [facility]


   Stop: 18 20:59


   Last Infusion: 18 09:50 Dose:  Infused


Multivitamins/Minerals 10 ml/Dextrose/ Amino Acids/Electrolytes  1,680 mls @ 70 

mls/hr IV .Q24H [facility]


   Stop: 18 14:59


   Last Admin: 18 16:21 Dose:  70 mls/hr


Dextrose (D5w)  1,000 mls @ 30 mls/hr IV .Q24H [facility]


   Stop: 18 16:29


   Last Admin: 18 18:20 Dose:  30 mls/hr


Vancomycin HCl 1.5 gm/ Sodium (Chloride)  500 mls @ 250 mls/hr IV Q24H [facility]


   Stop: 18 08:59


   Last Infusion: 18 12:30 Dose:  Infused


Insulin Aspart (Novolog Insulin Sliding Scale)  0 units SUBQ Q6H [facility]; Protocol


   Stop: 18 11:59


   Last Admin: 18 13:02 Dose:  Not Given


Lorazepam (Ativan)  1 mg IVP Q4HR PRN; Protocol


   PRN Reason: Anxiety


   Stop: 18 02:24


   Last Admin: 18 09:18 Dose:  1 mg


Methylprednisolone Sodium Succinate (Solu-Medrol)  60 mg IVP Q6H [facility]


   Stop: 18 16:29


   Last Admin: 18 10:30 Dose:  60 mg


Metoclopramide HCl (Reglan)  10 mg IVP Q6HR [facility]


   Stop: 18 17:59


   Last Admin: 18 13:02 Dose:  10 mg


Miscellaneous (Tpn Per Pharmacy)  1 ea  PRN [facility]


   Stop: 18 17:59


Miscellaneous (Vancomycin Iv Per Pharmacy)  1 [facility] PRN PRN


   PRN Reason: PROTOCOL


   Stop: 18 14:14


Miscellaneous (Probiotic Screen)  1 [facility] PRN PRN


   PRN Reason: PROTOCOL


   Stop: 18 12:41


Ondansetron HCl (Zofran)  4 mg IV Q4H PRN


   PRN Reason: Nausea / Vomiting


   Stop: 18 05:53


   Last Admin: 18 23:02 Dose:  4 mg


Pantoprazole Sodium (Protonix)  40 mg IVP BID [facility]


   Stop: 18 08:59


   Last Admin: 18 09:17 Dose:  40 mg








General: Alert, Cooperative, No acute distress


HEENT: Atraumatic, PERRLA (+dressing), EOMI


Neck: Supple


Cardiovascular: Regular rate, Normal S1, Normal S2


Lungs: Clear to auscultation


Abdomen: Soft





- Procedures


Procedures: 


 Procedures











Procedure Code Date


 


EXCISION OF CHEST SKIN, EXTERNAL APPROACH 6FP2OOE 18


 


EXCISION OF ILEUM, OPEN APPROACH [id number] 18


 


EXCISION OF RIGHT LOWER ARM SKIN, EXTERNAL APPROACH [id number] 18


 


EXCISION OF RIGHT LOWER LEG SKIN, EXTERNAL APPROACH [id number] 18


 


RELEASE ILEUM, OPEN APPROACH [id number] 18


 


RESPIRATORY VENTILATION, 24-96 CONSECUTIVE HOURS 9C2613B 18














Assessment/Plan





- Assessment


Assessment: 


CT scan shows bowel obstruction


distended abdomen


ARF








- Plan


Plan: 


continue IVF


will monitor renal function





Nutritional Asmnt/Malnutr-PDOC





- Dietary Evaluation


Malnutrition Findings (Please click <Entered> for more info): 








Nutritional Asmnt/Malnutrition                             Start:  18 14:

24


Text:                                                      Status: Complete    

  


Freq:                                                                          

  


Protocol:                                                                      

  


 Document     18 14:24  LCHENG  (Rec: 18 14:35  LCHENG  [id number])


 Nutritional Asmnt/Malnutrition


     Patient General Information


      Nutritional Screening                      High Risk


                                                 Consult


      Diagnosis                                  exploratory laparotomy with


                                                 recersal colostomy


      Pertinent Medical Hx/Surgical Hx           acute perforation of


                                                 diverticulitis, s/p


                                                 exploratory laparotomy and


                                                 divertin gcolostomy placement


      Subjective Information                     Pt seen lying in bed at time


                                                 of visit, awake and alert. Pt


                                                 stated he tolerated clear


                                                 liquid well, adequte amount


                                                 for him at this time. Pt has


                                                 RAQUEL drain noted. Per EMR, PO


                                                 intake % of clear liquid


                                                 .


      Current Diet Order/ Nutrition Support      clear liquid


      Pertinent Medications                      D5-0.9%ns, piperacillin,


                                                 vancomycin


      Pertinent Labs                              Na 133, K 3.4, Cr 0.7,


                                                 glucose 107, Ca 7.9


     Nutritional Hx/Data


      Height                                     1.88 m


      Height (Calculated Centimeters)            188.0


      Current Weight (lbs)                       86.636 kg


      Weight (Calculated Kilograms)              86.6


      Weight (Calculated Grams)                  09243.1


      Ideal Body Weight                          190


      Body Mass Index (BMI)                      24.5


      Weight Status                              Approriate


     GI Symptoms


      GI Symptoms                                None


      Last BM                                    none


      Difficult in:                              None


      Skin Integrity/Comment:                    RIGHT LOWER LEG HEALING


                                                 incision to right arm


     Estimated Nutritional Goals


      BEE in Kcals:                              Using Current wt


      Calories/Kcals/Kg                          25-30


      Kcals Calculated                           4068-3881


      Protein:                                   Using Current wt


      Protein g/k.8-1


      Protein Calculated                         69-86


      Fluid: ml                                  2150-2580ml (1ml/kcal)


     Nutritional Problem


      1. Problem


       Problem                                   altered GI function


       Etiology                                  s/p reversal colostomy


       Signs/Symptoms:                           pt on clear liquid


     Intervention/Recommendation


      Comments                                   1. Continue with clear liquid


                                                 diet as ordered.


                                                 2. Monitor PO intake, wt, labs


                                                 and skin integrity


                                                 3. F/U as high risk in 2-3


                                                 days, -


     Expected Outcomes/Goals


      Expected Outcomes/Goals                    1. PO intake to meet at least


                                                 75% of nutritional needs.


                                                 2. Wt stability, skin to


                                                 remain intact, labs to


                                                 approach WNL.

## 2018-07-03 LAB
ANION GAP SERPL CALC-SCNC: 7.6 MMOL/L (ref 7–16)
ARTERIAL PATENCY WRIST A: (no result)
BASOPHILS NFR BLD: 0 % (ref 0–3)
BUN SERPL-MCNC: 64 MG/DL (ref 7–25)
CALCIUM SERPL-MCNC: 8.5 MG/DL (ref 8.6–10.3)
CHLORIDE SERPL-SCNC: 112 MEQ/L (ref 98–107)
CO2 SERPL-SCNC: 29.7 MEQ/L (ref 21–31)
CREAT SERPL-MCNC: 1.4 MG/DL (ref 0.7–1.3)
EOSINOPHIL NFR BLD: 0 % (ref 0–5)
ERYTHROCYTE [DISTWIDTH] IN BLOOD BY AUTOMATED COUNT: 15.2 % (ref 11.5–20)
GLUCOSE SERPL-MCNC: 142 MG/DL (ref 70–105)
HCT VFR BLD CALC: 35.3 % (ref 41–60)
HGB BLD-MCNC: 11.7 GM/DL (ref 12–16)
LYMPHOCYTES # BLD MANUAL: 3 % (ref 20–50)
MAGNESIUM SERPL-MCNC: 2.3 MG/DL (ref 1.9–2.7)
MCH RBC QN AUTO: 29.1 PG (ref 27–31)
MCHC RBC AUTO-ENTMCNC: 33 PG (ref 28–36)
MCV RBC AUTO: 88.2 FL (ref 80–99)
MONOCYTES # BLD MANUAL: 3 % (ref 2–10)
NEUTROPHILS NFR BLD AUTO: 92 % (ref 40–80)
NEUTS BAND NFR BLD: 2 % (ref 0–10)
PCO2 BLDA: 43 MMHG (ref 35–45)
PHOSPHATE SERPL-MCNC: 3 MG/DL (ref 2.5–5)
PLATELET # BLD: 131 TH/CMM (ref 150–400)
PMV BLD AUTO: 9.6 FL
PO2 BLDA: 76 MMHG (ref 80–100)
POTASSIUM SERPL-SCNC: 4.3 MEQ/L (ref 3.5–5.1)
RBC # BLD AUTO: 4.01 MIL/CMM (ref 3.8–5.8)
SAO2 % BLDA: 96 % (ref 92–100)
SODIUM SERPL-SCNC: 145 MEQ/L (ref 136–145)
TOTAL CELLS COUNTED BLD: 100
WBC # BLD AUTO: 16.6 TH/CMM (ref 4.8–10.8)

## 2018-07-03 PROCEDURE — 5A09357 ASSISTANCE WITH RESPIRATORY VENTILATION, LESS THAN 24 CONSECUTIVE HOURS, CONTINUOUS POSITIVE AIRWAY PRESSURE: ICD-10-PCS | Performed by: FAMILY MEDICINE

## 2018-07-03 RX ADMIN — IPRATROPIUM BROMIDE AND ALBUTEROL SULFATE SCH ML: .5; 3 SOLUTION RESPIRATORY (INHALATION) at 19:34

## 2018-07-03 RX ADMIN — INSULIN ASPART SCH: 100 INJECTION, SOLUTION INTRAVENOUS; SUBCUTANEOUS at 05:54

## 2018-07-03 RX ADMIN — BUDESONIDE SCH MG: 0.5 SUSPENSION RESPIRATORY (INHALATION) at 19:34

## 2018-07-03 RX ADMIN — ENOXAPARIN SODIUM SCH MG: 100 INJECTION SUBCUTANEOUS at 21:05

## 2018-07-03 RX ADMIN — METOCLOPRAMIDE SCH MG: 5 INJECTION, SOLUTION INTRAMUSCULAR; INTRAVENOUS at 05:54

## 2018-07-03 RX ADMIN — IPRATROPIUM BROMIDE AND ALBUTEROL SULFATE SCH ML: .5; 3 SOLUTION RESPIRATORY (INHALATION) at 10:47

## 2018-07-03 RX ADMIN — INSULIN ASPART SCH: 100 INJECTION, SOLUTION INTRAVENOUS; SUBCUTANEOUS at 17:35

## 2018-07-03 RX ADMIN — INSULIN ASPART SCH: 100 INJECTION, SOLUTION INTRAVENOUS; SUBCUTANEOUS at 12:00

## 2018-07-03 RX ADMIN — IPRATROPIUM BROMIDE AND ALBUTEROL SULFATE SCH ML: .5; 3 SOLUTION RESPIRATORY (INHALATION) at 22:06

## 2018-07-03 RX ADMIN — BUDESONIDE SCH MG: 0.5 SUSPENSION RESPIRATORY (INHALATION) at 07:12

## 2018-07-03 RX ADMIN — METOCLOPRAMIDE SCH MG: 5 INJECTION, SOLUTION INTRAMUSCULAR; INTRAVENOUS at 17:11

## 2018-07-03 RX ADMIN — IPRATROPIUM BROMIDE AND ALBUTEROL SULFATE SCH ML: .5; 3 SOLUTION RESPIRATORY (INHALATION) at 13:59

## 2018-07-03 RX ADMIN — IPRATROPIUM BROMIDE AND ALBUTEROL SULFATE SCH ML: .5; 3 SOLUTION RESPIRATORY (INHALATION) at 07:12

## 2018-07-03 RX ADMIN — METOCLOPRAMIDE SCH MG: 5 INJECTION, SOLUTION INTRAMUSCULAR; INTRAVENOUS at 11:29

## 2018-07-03 RX ADMIN — IPRATROPIUM BROMIDE AND ALBUTEROL SULFATE SCH ML: .5; 3 SOLUTION RESPIRATORY (INHALATION) at 02:00

## 2018-07-03 RX ADMIN — ENOXAPARIN SODIUM SCH MG: 100 INJECTION SUBCUTANEOUS at 08:47

## 2018-07-03 NOTE — PROGRESS NOTES
DATE:  07/03/2018



PULMONARY/CRITICAL CARE PROGRESS NOTE



PROBLEM LIST:

1.  Acute postoperative respiratory failure.

2.  Aspiration pneumonia, improving.

3.  Significant deconditioning and disability, currently is in chronic

respiratory failure.



SUBJECTIVE:  Symptoms nil, feeling okay.  Still complains of some shortness of

breath, but otherwise unremarkable.  Currently on nasal O2, in no respiratory

distress.



OBJECTIVE:

VITAL SIGNS:  T-max is 97.4. Saturation is in the low 90s on six liters per

minute.

NECK:  Veins not visualized.

CHEST:  Shows diminished air entry with occasional rhonchi.

HEART:  Regular.

ABDOMEN:  Soft, nontender.  Slightly distended.

EXTREMITIES:  Show slight trace of peripheral edema.



LABORATORY DATA:  The patient's white count is 16,000, hemoglobin 11.7.  ABG

shows pCO2 of 43, down than yesterday, and pO2 of 76 on 45% of oxygen, and BUN

is 64.



ASSESSMENT:  The patient is clinically overall doing better.



PLAN:  Continue current respiratory care, inhalation treatment.  We will repeat

another chest x-ray tomorrow.  Continue rest of the treatment.  Might require

long-term acute care.





DD: 07/03/2018 14:18

DT: 07/03/2018 23:08

JOB# [id number]  [id number]

## 2018-07-03 NOTE — INTERNAL MEDICINE PROG NOTE
Internal Medicine Subjective





- Subjective


Service Date: 18


Patient seen and examined:: with staff (HE IS STILL ON BIPAP)


Patient is:: awake, verbal, in bed, talking, other (ON VENTILATOR,opening his 

eyes.)


Patient Complaints of:: SOB


Per staff patient has:: no adverse event, other (AFEBRIL,WAS SEEN BY 

PULMONOLOGIST.)





Internal Medicine Objective





- Results


Result Diagrams: 


 18 04:30





 18 04:30


Recent Labs: 


 Laboratory Last Values











WBC  16.6 Th/cmm (4.8-10.8)  H  18  04:30    


 


RBC  4.01 Mil/cmm (3.80-5.80)   18  04:30    


 


Hgb  11.7 gm/dL (12-16)  L  18  04:30    


 


Hct  35.3 % (41.0-60)  L  18  04:30    


 


MCV  88.2 fl (80-99)   18  04:30    


 


MCH  29.1 pg (27.0-31.0)   18  04:30    


 


MCHC Differential  33.0 pg (28.0-36.0)   18  04:30    


 


RDW  15.2 % (11.5-20.0)   18  04:30    


 


Plt Count  131 Th/cmm (150-400)  L  18  04:30    


 


MPV  9.6 fl  18  04:30    


 


Neutrophils %  95.2 % (40.0-80.0)  H  18  11:35    


 


Band Neutrophils %  2 % (0-10)   18  04:30    


 


Lymphocytes %  2.5 % (20.0-50.0)  L  18  11:35    


 


Monocytes %  1.4 % (2.0-10.0)  L  18  11:35    


 


Eosinophils %  0.1 % (0.0-5.0)   18  11:35    


 


Basophils %  0.8 % (0.0-2.0)   18  11:35    


 


Neutrophils (Manual)  92 % (40-80)  H  18  04:30    


 


Lymphocytes  3 % (20-50)  L  18  04:30    


 


Monocytes  3 % (2-10)   18  04:30    


 


Eosinophils  0 % (0-5)   18  04:30    


 


Basophils  0 % (0-3)   18  04:30    


 


Platelet Estimate  ADEQUATE  (NORMAL)   18  05:50    


 


Platelet Morphology  NORMAL  (NORMAL)   18  18:24    


 


Polychromasia  1+   18  04:40    


 


[place] Cells  1+   18  04:40    


 


RBC Morph Micro Appear  NORMAL  (NORMAL)   18  18:24    


 


PT  11.9 SECONDS (9.5-11.5)  H  18  12:08    


 


INR  1.13  (0.5-1.4)   18  12:08    


 


PTT (Actin FS)  22.3 SECONDS (26.0-38.0)  L  18  12:08    


 


Specimen Source  Arterial   18  08:05    


 


Sample Site  Right Radial   18  08:05    


 


pH  7.45  (7.35-7.45)   18  08:05    


 


pCO2  43.0 mmHg (35.0-45.0)   18  08:05    


 


pO2  76.0 mmHg (80.0-100.0)  L  18  08:05    


 


HCO3  29.0 mEq/L (20.0-26.0)  H  18  08:05    


 


Base Excess  5.3 mEq/L (-3.0-3.0)  H  18  08:05    


 


O2 Saturation  96.0 % (92.0-100.0)   18  08:05    


 


Ceferino Test  PASS   18  08:05    


 


Vent Rate  NA   18  09:00    


 


Inspired O2  45   18  08:05    


 


Tidal Volume  NA   18  09:00    


 


PEEP  NA   18  09:00    


 


Pressure (ins/psv/peep)  NA   18  09:00    


 


Critical Value  PW   18  08:05    


 


Sodium  145 mEq/L (136-145)   18  04:30    


 


Potassium  4.3 mEq/L (3.5-5.1)   18  04:30    


 


Chloride  112 mEq/L ()  H  18  04:30    


 


Carbon Dioxide  29.7 mEq/L (21.0-31.0)   18  04:30    


 


Anion Gap  7.6  (7.0-16.0)   18  04:30    


 


BUN  64 mg/dL (7-25)  H  18  04:30    


 


Creatinine  1.4 mg/dL (0.7-1.3)  H  18  04:30    


 


Est GFR ( Amer)  > 60.0 ml/min (>90)   18  04:30    


 


Est GFR (Non-Af Amer)  54.1 ml/min  18  04:30    


 


BUN/Creatinine Ratio  45.7   18  04:30    


 


Glucose  142 mg/dL ()  H  18  04:30    


 


POC Glucose  114 MG/DL (70 - 105)  H  18  17:05    


 


Hemoglobin A1c %  4.8 % (4.0-6.0)   18  04:40    


 


Whole Bld Lactic Acid  1.36 mmol/L (0.60-1.99)   18  05:50    


 


Calcium  8.5 mg/dL (8.6-10.3)  L  18  04:30    


 


Phosphorus  3.0 mg/dL (2.5-5.0)   18  04:30    


 


Magnesium  2.3 mg/dL (1.9-2.7)   18  04:30    


 


Total Bilirubin  0.5 mg/dL (0.3-1.0)   18  06:00    


 


Direct Bilirubin  0.05 mg/dL (0.0-0.2)   18  05:50    


 


AST  34 U/L (13-39)   18  06:00    


 


ALT  23 U/L (7-52)   18  06:00    


 


Alkaline Phosphatase  76 U/L ()   18  06:00    


 


Ammonia  52 umol/L (16-53)   18  04:30    


 


Total Protein  5.8 gm/dL (6.0-8.3)  L  18  06:00    


 


Albumin  2.5 gm/dL (4.2-5.5)  L  18  06:00    


 


Globulin  3.3 gm/dL  18  06:00    


 


Albumin/Globulin Ratio  0.8  (1.0-1.8)  L  18  06:00    


 


Prealbumin  21 mg/dL (10-36)   18  11:35    


 


Triglycerides  83 mg/dL (<150)   18  04:55    


 


Cholesterol  64 mg/dL (<200)   18  06:00    


 


Urine Source  CLEAN C   18  17:35    


 


Urine Color  YELLOW   18  17:35    


 


Urine Clarity  HAZY  (CLEAR)   18  17:35    


 


Urine pH  5.0  (4.6 - 8.0)   18  17:35    


 


Ur Specific Gravity  1.020  (1.005-1.030)   18  17:35    


 


Urine Protein  30 mg/dL (NEGATIVE)  H  18  17:35    


 


Urine Glucose (UA)  NEGATIVE mg/dL (NEGATIVE)   18  17:35    


 


Urine Ketones  NEGATIVE mg/dL (NEGATIVE)   18  17:35    


 


Urine Blood  TRACE  (NEGATIVE)   18  17:35    


 


Urine Nitrate  NEGATIVE  (NEGATIVE)   18  17:35    


 


Urine Bilirubin  NEGATIVE  (NEGATIVE)   18  17:35    


 


Urine Urobilinogen  0.2 E.U./dL (0.2 - 1.0)   18  17:35    


 


Ur Leukocyte Esterase  TRACE  (NEGATIVE)  H  18  17:35    


 


Urine RBC  0-2 /hpf (0-5)  H  18  17:35    


 


Urine WBC  0-2 /hpf (0-5)   18  17:35    


 


Ur Epithelial Cells  OCCASIONAL /lpf (FEW)   18  17:35    


 


Amorphous Sediment  FEW URATES  (NONE SEEN)   18  17:35    


 


Urine Bacteria  FEW /hpf (NONE SEEN)   18  17:35    


 


Vancomycin Trough  16.8 ug/mL (5-10)  H  18  09:30    


 


Random Vancomycin  12.9 ug/mL (5.0-40.0)   18  06:00    














- Physical Exam


Vitals and I&O: 


 Vital Signs











Temp  98.2 F   18 19:00


 


Pulse  108   18 19:00


 


Resp  28   18 19:51


 


BP  143/83   18 19:00


 


Pulse Ox  98   18 19:51








 Intake & Output











 18





 06:59 18:59 06:59


 


Intake Total 3542.652 0532 


 


Output Total 1500 2725 


 


Balance 242.833 -815 


 


Weight (lbs) 98.475 kg 98.883 kg 


 


Intake:   


 


  Intake, IV Amount 1342.833 550 


 


    Cefepime 1 gm In Dextrose 50 50 





    5% 50 ml @ 100 mls/hr IV   





    Q12HR [facility] Rx#:[id number]   


 


    Dextrose 5% 1,000 ml @ 30 387.5  





    mls/hr IV .Q24H [facility] Rx#:   





    [id number]   


 


    Multivitamin Inj 10 ml In 905.333  





    Dextrose 70% 1,170 ml In   





    Amino Acids 10% 500 ml @   





    70 mls/hr IV .Q24H [facility]   





    Rx#:[id number]   


 


    Vancomycin HCl 1.5 gm In  500 





    Sodium Chloride 0.9% 500   





    ml @ 250 mls/hr IV Q24H   





    [facility] Rx#:[id number]   


 


  Oral 400 1360 


 


Output:   


 


  Urine 1500 2725 


 


Other:   


 


  Stool Characteristics  Soft 





  Formed 


 


  Weight Source Bedscale Bedscale 











Active Medications: 


Current Medications





Acetaminophen/Hydrocodone Bitart (Norco 10 Mg/325 Mg)  1 tab PO Q6H PRN


   PRN Reason: Pain (Severe)


   Stop: 18 20:04


   Last Admin: 18 03:03 Dose:  1 tab


Albuterol/Ipratropium (Duoneb Neb)  3 ml HHN Q4HRT [facility]


   Stop: 18 14:59


   Last Admin: 18 19:34 Dose:  3 ml


Budesonide (Pulmicort)  0.5 mg HHN BIDRT [facility]


   Stop: 18 18:59


   Last Admin: 18 19:34 Dose:  0.5 mg


Cholestyramine Resin (Questran)  4 gm PO BID [facility]


   Stop: 18 16:59


   Last Admin: 18 17:11 Dose:  4 gm


Enoxaparin Sodium (Lovenox)  30 mg SUBQ Q12HR [facility]


   Stop: 18 20:59


   Last Admin: 18 21:05 Dose:  30 mg


Hydromorphone HCl (Dilaudid)  1 mg IVP Q3HR PRN


   PRN Reason: SEVERE PAIN


   Stop: 18 16:25


   Last Admin: 18 03:56 Dose:  1 mg


Cefepime HCl 1 gm/ Dextrose  50 mls @ 100 mls/hr IV Q12HR [facility]


   Stop: 18 20:59


   Last Admin: 18 21:05 Dose:  100 mls/hr


Vancomycin HCl 1.5 gm/ Sodium (Chloride)  500 mls @ 250 mls/hr IV Q24H [facility]


   Stop: 18 08:59


   Last Infusion: 18 13:30 Dose:  Infused


Insulin Aspart (Novolog Insulin Sliding Scale)  0 units SUBQ Q6H KATHY; Protocol


   Stop: 18 11:59


   Last Admin: 18 17:35 Dose:  Not Given


Lorazepam (Ativan)  1 mg IVP Q4HR PRN; Protocol


   PRN Reason: Anxiety


   Stop: 18 02:24


   Last Admin: 18 21:19 Dose:  1 mg


Methylprednisolone Sodium Succinate (Solu-Medrol)  60 mg IVP Q6H [facility]


   Stop: 18 16:29


   Last Admin: 18 17:11 Dose:  60 mg


Metoclopramide HCl (Reglan)  10 mg IVP Q6HR [facility]


   Stop: 18 17:59


   Last Admin: 18 17:11 Dose:  10 mg


Miscellaneous (Vancomycin Iv Per Pharmacy)  1 ea  PRN PRN


   PRN Reason: PROTOCOL


   Stop: 18 14:14


Miscellaneous (Probiotic Screen)  1 ea  PRN PRN


   PRN Reason: PROTOCOL


   Stop: 18 12:41


Ondansetron HCl (Zofran)  4 mg IV Q4H PRN


   PRN Reason: Nausea / Vomiting


   Stop: 18 05:53


   Last Admin: 18 23:02 Dose:  4 mg


Pantoprazole Sodium (Protonix)  40 mg IVP BID [facility]


   Stop: 18 08:59


   Last Admin: 18 17:11 Dose:  40 mg








General: other (on 50 percent fio2,less distress.)


HEENT: NC/AT, PERRLA, EOMI, anicteric sclerae, throat clear


Neck: Supple, No JVD, No thyromegaly, +2 carotid pulse wo bruit, No LAD


Lungs: ronchi


Cardiovascular: RRR, Normal S1, Normal S2, without murmur, tachy


Abdomen: tender, distended


Extremities: clear


Neurological: unable to follow command





- Procedures


Procedures: 


 Procedures











Procedure Code Date


 


EXCISION OF CHEST SKIN, EXTERNAL APPROACH [id number] 18


 


EXCISION OF ILEUM, OPEN APPROACH [id number] 18


 


EXCISION OF RIGHT LOWER ARM SKIN, EXTERNAL APPROACH [id number] 18


 


EXCISION OF RIGHT LOWER LEG SKIN, EXTERNAL APPROACH [id number] 18


 


RELEASE ILEUM, OPEN APPROACH [id number] 18


 


RESPIRATORY VENTILATION, 24-96 CONSECUTIVE HOURS [id number] 18














Internal Medicine Assmt/Plan





- Assessment


Assessment: 





1.SP COLOSTOMY REVISION.


2.SP EXCISION BIOPSY FOR CHEST WALL LESION.


3.ACUTE BOWEL OBSTRUCTION


4.ACUTE RESPIRATORY FAILURE.


5.BILATERAL PNEUMONIA.





- Plan


Plan: 


CONTINUE ON CURRENT MEDICATION AND TPN.





Nutritional Asmnt/Malnutr-PDOC





- Dietary Evaluation


Malnutrition Findings (Please click <Entered> for more info): 








Nutritional Asmnt/Malnutrition                             Start:  18 14:

24


Text:                                                      Status: Complete    

  


Freq:                                                                          

  


Protocol:                                                                      

  


 Document     18 14:24  LCHENG  (Rec: 18 14:35  LCHENG  [id number])


 Nutritional Asmnt/Malnutrition


     Patient General Information


      Nutritional Screening                      High Risk


                                                 Consult


      Diagnosis                                  exploratory laparotomy with


                                                 recersal colostomy


      Pertinent Medical Hx/Surgical Hx           acute perforation of


                                                 diverticulitis, s/p


                                                 exploratory laparotomy and


                                                 divertin gcolostomy placement


      Subjective Information                     Pt seen lying in bed at time


                                                 of visit, awake and alert. Pt


                                                 stated he tolerated clear


                                                 liquid well, adequte amount


                                                 for him at this time. Pt has


                                                 RAQUEL drain noted. Per EMR, PO


                                                 intake % of clear liquid


                                                 .


      Current Diet Order/ Nutrition Support      clear liquid


      Pertinent Medications                      D5-0.9%ns, piperacillin,


                                                 vancomycin


      Pertinent Labs                              Na 133, K 3.4, Cr 0.7,


                                                 glucose 107, Ca 7.9


     Nutritional Hx/Data


      Height                                     1.88 m


      Height (Calculated Centimeters)            188.0


      Current Weight (lbs)                       86.636 kg


      Weight (Calculated Kilograms)              86.6


      Weight (Calculated Grams)                  99326.1


      Ideal Body Weight                          190


      Body Mass Index (BMI)                      24.5


      Weight Status                              Approriate


     GI Symptoms


      GI Symptoms                                None


      Last BM                                    none


      Difficult in:                              None


      Skin Integrity/Comment:                    RIGHT LOWER LEG HEALING


                                                 incision to right arm


     Estimated Nutritional Goals


      BEE in Kcals:                              Using Current wt


      Calories/Kcals/Kg                          25-30


      Kcals Calculated                           7542-6454


      Protein:                                   Using Current wt


      Protein g/k.8-1


      Protein Calculated                         69-86


      Fluid: ml                                  2150-2580ml (1ml/kcal)


     Nutritional Problem


      1. Problem


       Problem                                   altered GI function


       Etiology                                  s/p reversal colostomy


       Signs/Symptoms:                           pt on clear liquid


     Intervention/Recommendation


      Comments                                   1. Continue with clear liquid


                                                 diet as ordered.


                                                 2. Monitor PO intake, wt, labs


                                                 and skin integrity


                                                 3. F/U as high risk in 2-3


                                                 days, -


     Expected Outcomes/Goals


      Expected Outcomes/Goals                    1. PO intake to meet at least


                                                 75% of nutritional needs.


                                                 2. Wt stability, skin to


                                                 remain intact, labs to


                                                 approach WNL.

## 2018-07-03 NOTE — GENERAL PROGRESS NOTE
Subjective





- Review of Systems


Service Date: 18


Subjective: 


pt in bed 


starting oral intake





Objective





- Results


Result Diagrams: 


 18 04:30





 18 04:30


Recent Labs: 


 Laboratory Last Values











WBC  16.6 Th/cmm (4.8-10.8)  H  18  04:30    


 


RBC  4.01 Mil/cmm (3.80-5.80)   18  04:30    


 


Hgb  11.7 gm/dL (12-16)  L  18  04:30    


 


Hct  35.3 % (41.0-60)  L  18  04:30    


 


MCV  88.2 fl (80-99)   18  04:30    


 


MCH  29.1 pg (27.0-31.0)   18  04:30    


 


MCHC Differential  33.0 pg (28.0-36.0)   18  04:30    


 


RDW  15.2 % (11.5-20.0)   18  04:30    


 


Plt Count  131 Th/cmm (150-400)  L  18  04:30    


 


MPV  9.6 fl  18  04:30    


 


Neutrophils %  95.2 % (40.0-80.0)  H  18  11:35    


 


Band Neutrophils %  2 % (0-10)   18  04:30    


 


Lymphocytes %  2.5 % (20.0-50.0)  L  18  11:35    


 


Monocytes %  1.4 % (2.0-10.0)  L  18  11:35    


 


Eosinophils %  0.1 % (0.0-5.0)   18  11:35    


 


Basophils %  0.8 % (0.0-2.0)   18  11:35    


 


Neutrophils (Manual)  92 % (40-80)  H  18  04:30    


 


Lymphocytes  3 % (20-50)  L  18  04:30    


 


Monocytes  3 % (2-10)   18  04:30    


 


Eosinophils  0 % (0-5)   18  04:30    


 


Basophils  0 % (0-3)   18  04:30    


 


Platelet Estimate  ADEQUATE  (NORMAL)   18  05:50    


 


Platelet Morphology  NORMAL  (NORMAL)   18  18:24    


 


Polychromasia  1+   18  04:40    


 


Weidman Cells  1+   18  04:40    


 


RBC Morph Micro Appear  NORMAL  (NORMAL)   18  18:24    


 


PT  11.9 SECONDS (9.5-11.5)  H  18  12:08    


 


INR  1.13  (0.5-1.4)   18  12:08    


 


PTT (Actin FS)  22.3 SECONDS (26.0-38.0)  L  18  12:08    


 


Specimen Source  Arterial   18  08:05    


 


Sample Site  Right Radial   18  08:05    


 


pH  7.45  (7.35-7.45)   18  08:05    


 


pCO2  43.0 mmHg (35.0-45.0)   18  08:05    


 


pO2  76.0 mmHg (80.0-100.0)  L  18  08:05    


 


HCO3  29.0 mEq/L (20.0-26.0)  H  18  08:05    


 


Base Excess  5.3 mEq/L (-3.0-3.0)  H  18  08:05    


 


O2 Saturation  96.0 % (92.0-100.0)   18  08:05    


 


Ceferino Test  PASS   18  08:05    


 


Vent Rate  NA   18  09:00    


 


Inspired O2  45   18  08:05    


 


Tidal Volume  NA   18  09:00    


 


PEEP  NA   18  09:00    


 


Pressure (ins/psv/peep)  NA   18  09:00    


 


Critical Value  PW   18  08:05    


 


Sodium  145 mEq/L (136-145)   18  04:30    


 


Potassium  4.3 mEq/L (3.5-5.1)   18  04:30    


 


Chloride  112 mEq/L ()  H  18  04:30    


 


Carbon Dioxide  29.7 mEq/L (21.0-31.0)   18  04:30    


 


Anion Gap  7.6  (7.0-16.0)   18  04:30    


 


BUN  64 mg/dL (7-25)  H  18  04:30    


 


Creatinine  1.4 mg/dL (0.7-1.3)  H  18  04:30    


 


Est GFR ( Amer)  > 60.0 ml/min (>90)   18  04:30    


 


Est GFR (Non-Af Amer)  54.1 ml/min  18  04:30    


 


BUN/Creatinine Ratio  45.7   18  04:30    


 


Glucose  142 mg/dL ()  H  18  04:30    


 


POC Glucose  140 MG/DL (70 - 105)  H  18  05:50    


 


Hemoglobin A1c %  4.8 % (4.0-6.0)   18  04:40    


 


Whole Bld Lactic Acid  1.36 mmol/L (0.60-1.99)   18  05:50    


 


Calcium  8.5 mg/dL (8.6-10.3)  L  18  04:30    


 


Phosphorus  3.0 mg/dL (2.5-5.0)   18  04:30    


 


Magnesium  2.3 mg/dL (1.9-2.7)   18  04:30    


 


Total Bilirubin  0.5 mg/dL (0.3-1.0)   18  06:00    


 


Direct Bilirubin  0.05 mg/dL (0.0-0.2)   18  05:50    


 


AST  34 U/L (13-39)   18  06:00    


 


ALT  23 U/L (7-52)   18  06:00    


 


Alkaline Phosphatase  76 U/L ()   18  06:00    


 


Ammonia  52 umol/L (16-53)   18  04:30    


 


Total Protein  5.8 gm/dL (6.0-8.3)  L  18  06:00    


 


Albumin  2.5 gm/dL (4.2-5.5)  L  18  06:00    


 


Globulin  3.3 gm/dL  18  06:00    


 


Albumin/Globulin Ratio  0.8  (1.0-1.8)  L  18  06:00    


 


Prealbumin  21 mg/dL (10-36)   18  11:35    


 


Triglycerides  83 mg/dL (<150)   18  04:55    


 


Cholesterol  64 mg/dL (<200)   18  06:00    


 


Urine Source  CLEAN C   18  17:35    


 


Urine Color  YELLOW   18  17:35    


 


Urine Clarity  HAZY  (CLEAR)   18  17:35    


 


Urine pH  5.0  (4.6 - 8.0)   18  17:35    


 


Ur Specific Gravity  1.020  (1.005-1.030)   18  17:35    


 


Urine Protein  30 mg/dL (NEGATIVE)  H  18  17:35    


 


Urine Glucose (UA)  NEGATIVE mg/dL (NEGATIVE)   18  17:35    


 


Urine Ketones  NEGATIVE mg/dL (NEGATIVE)   18  17:35    


 


Urine Blood  TRACE  (NEGATIVE)   18  17:35    


 


Urine Nitrate  NEGATIVE  (NEGATIVE)   18  17:35    


 


Urine Bilirubin  NEGATIVE  (NEGATIVE)   18  17:35    


 


Urine Urobilinogen  0.2 E.U./dL (0.2 - 1.0)   18  17:35    


 


Ur Leukocyte Esterase  TRACE  (NEGATIVE)  H  18  17:35    


 


Urine RBC  0-2 /hpf (0-5)  H  18  17:35    


 


Urine WBC  0-2 /hpf (0-5)   18  17:35    


 


Ur Epithelial Cells  OCCASIONAL /lpf (FEW)   18  17:35    


 


Amorphous Sediment  FEW URATES  (NONE SEEN)   18  17:35    


 


Urine Bacteria  FEW /hpf (NONE SEEN)   18  17:35    


 


Vancomycin Trough  16.8 ug/mL (5-10)  H  18  09:30    


 


Random Vancomycin  12.9 ug/mL (5.0-40.0)   18  06:00    














- Physical Exam


Vitals and I&O: 


 Vital Signs











Temp  98.7 F   18 04:00


 


Pulse  88   18 06:00


 


Resp  25   18 07:25


 


BP  156/96   18 06:00


 


Pulse Ox  96   18 07:25








 Intake & Output











 18





 18:59 06:59 18:59


 


Intake Total 3923.5 1742.833 


 


Output Total 2400 1500 


 


Balance 1523.5 242.833 


 


Weight (lbs) 98.43 kg 98.475 kg 


 


Intake:   


 


  Intake, IV Amount 2912.5 1342.833 


 


    Cefepime 1 gm In Dextrose 50 50 





    5% 50 ml @ 100 mls/hr IV   





    Q12HR [facility] Rx#:[id number]   


 


    Dextrose 5% 1,000 ml @ 30 682.5 387.5 





    mls/hr IV .Q24H [facility] Rx#:   





    [id number]   


 


    Multivitamin Inj 10 ml In 1680 905.333 





    Dextrose 70% 1,170 ml In   





    Amino Acids 10% 500 ml @   





    70 mls/hr IV .Q24H [facility]   





    Rx#:[id number]   


 


    Vancomycin HCl 1.5 gm In 500  





    Sodium Chloride 0.9% 500   





    ml @ 250 mls/hr IV Q24H   





    [facility] Rx#:[id number]   


 


  Oral 1011 400 


 


Output:   


 


  Urine 2400 1500 


 


Other:   


 


  # Bowel Movements 0  


 


  Weight Source Bedscale Bedscale 











Active Medications: 


Current Medications





Acetaminophen/Hydrocodone Bitart (Norco 10 Mg/325 Mg)  1 tab PO Q6H PRN


   PRN Reason: Pain (Severe)


   Stop: 18 20:04


   Last Admin: 18 03:03 Dose:  1 tab


Albuterol/Ipratropium (Duoneb Neb)  3 ml HHN Q4HRT [facility]


   Stop: 18 14:59


   Last Admin: 18 07:12 Dose:  3 ml


Budesonide (Pulmicort)  0.5 mg HHN BIDRT [facility]


   Stop: 18 18:59


   Last Admin: 18 07:12 Dose:  0.5 mg


Cholestyramine Resin (Questran)  4 gm PO BID [facility]


   Stop: 18 16:59


   Last Admin: 18 08:48 Dose:  4 gm


Enoxaparin Sodium (Lovenox)  30 mg SUBQ Q12HR [facility]


   Stop: 18 20:59


   Last Admin: 18 08:47 Dose:  30 mg


Hydromorphone HCl (Dilaudid)  1 mg IVP Q3HR PRN


   PRN Reason: SEVERE PAIN


   Stop: 18 16:25


   Last Admin: 18 03:56 Dose:  1 mg


Cefepime HCl 1 gm/ Dextrose  50 mls @ 100 mls/hr IV Q12HR [facility]


   Stop: 18 20:59


   Last Admin: 18 08:48 Dose:  100 mls/hr


Multivitamins/Minerals 10 ml/Dextrose/ Amino Acids/Electrolytes  1,680 mls @ 70 

mls/hr IV .Q24H [facility]


   Stop: 18 14:59


   Last Infusion: 18 06:00 Dose:  70 mls/hr


Dextrose (D5w)  1,000 mls @ 30 mls/hr IV .Q24H [facility]


   Stop: 18 16:29


   Last Infusion: 18 06:00 Dose:  30 mls/hr


Vancomycin HCl 1.5 gm/ Sodium (Chloride)  500 mls @ 250 mls/hr IV Q24H [facility]


   Stop: 18 08:59


   Last Infusion: 18 12:30 Dose:  Infused


Insulin Aspart (Novolog Insulin Sliding Scale)  0 units SUBQ Q6H [facility]; Protocol


   Stop: 18 11:59


   Last Admin: 18 05:54 Dose:  Not Given


Lorazepam (Ativan)  1 mg IVP Q4HR PRN; Protocol


   PRN Reason: Anxiety


   Stop: 18 02:24


   Last Admin: 18 23:55 Dose:  1 mg


Methylprednisolone Sodium Succinate (Solu-Medrol)  60 mg IVP Q6H [facility]


   Stop: 18 16:29


   Last Admin: 18 04:28 Dose:  60 mg


Metoclopramide HCl (Reglan)  10 mg IVP Q6HR [facility]


   Stop: 18 17:59


   Last Admin: 18 05:54 Dose:  10 mg


Miscellaneous (Vancomycin Iv Per Pharmacy)  1 ea  PRN PRN


   PRN Reason: PROTOCOL


   Stop: 18 14:14


Miscellaneous (Probiotic Screen)  1 ea  PRN PRN


   PRN Reason: PROTOCOL


   Stop: 18 12:41


Ondansetron HCl (Zofran)  4 mg IV Q4H PRN


   PRN Reason: Nausea / Vomiting


   Stop: 18 05:53


   Last Admin: 18 23:02 Dose:  4 mg


Pantoprazole Sodium (Protonix)  40 mg IVP BID [facility]


   Stop: 18 08:59


   Last Admin: 18 08:47 Dose:  40 mg








General: Alert, Cooperative, No acute distress


HEENT: Atraumatic, PERRLA (+dressing), EOMI


Neck: Supple


Cardiovascular: Regular rate, Normal S1, Normal S2


Lungs: Clear to auscultation


Abdomen: Soft


Extremities: Edema (+2 edema)





- Procedures


Procedures: 


 Procedures











Procedure Code Date


 


EXCISION OF CHEST SKIN, EXTERNAL APPROACH 5GJ2CFI 18


 


EXCISION OF ILEUM, OPEN APPROACH [id number] 18


 


EXCISION OF RIGHT LOWER ARM SKIN, EXTERNAL APPROACH [id number] 18


 


EXCISION OF RIGHT LOWER LEG SKIN, EXTERNAL APPROACH 0HBKXZZ 18


 


RELEASE ILEUM, OPEN APPROACH [id number] 18


 


RESPIRATORY VENTILATION, 24-96 CONSECUTIVE HOURS [id number] 18














Assessment/Plan





- Assessment


Assessment: 


CT scan shows bowel obstruction


distended abdomen


ARF








- Plan


Plan: 


tko IVF


dc TPN


one dose lasix


will monitor renal function








Nutritional Asmnt/Malnutr-PDOC





- Dietary Evaluation


Malnutrition Findings (Please click <Entered> for more info): 








Nutritional Asmnt/Malnutrition                             Start:  18 14:

24


Text:                                                      Status: Complete    

  


Freq:                                                                          

  


Protocol:                                                                      

  


 Document     18 14:24  LCHENG  (Rec: 18 14:35  HEN  [id number])


 Nutritional Asmnt/Malnutrition


     Patient General Information


      Nutritional Screening                      High Risk


                                                 Consult


      Diagnosis                                  exploratory laparotomy with


                                                 recersal colostomy


      Pertinent Medical Hx/Surgical Hx           acute perforation of


                                                 diverticulitis, s/p


                                                 exploratory laparotomy and


                                                 divertin gcolostomy placement


      Subjective Information                     Pt seen lying in bed at time


                                                 of visit, awake and alert. Pt


                                                 stated he tolerated clear


                                                 liquid well, adequte amount


                                                 for him at this time. Pt has


                                                 RAQUEL drain noted. Per EMR, PO


                                                 intake % of clear liquid


                                                 .


      Current Diet Order/ Nutrition Support      clear liquid


      Pertinent Medications                      D5-0.9%ns, piperacillin,


                                                 vancomycin


      Pertinent Labs                              Na 133, K 3.4, Cr 0.7,


                                                 glucose 107, Ca 7.9


     Nutritional Hx/Data


      Height                                     1.88 m


      Height (Calculated Centimeters)            188.0


      Current Weight (lbs)                       86.636 kg


      Weight (Calculated Kilograms)              86.6


      Weight (Calculated Grams)                  07159.1


      Ideal Body Weight                          190


      Body Mass Index (BMI)                      24.5


      Weight Status                              Approriate


     GI Symptoms


      GI Symptoms                                None


      Last BM                                    none


      Difficult in:                              None


      Skin Integrity/Comment:                    RIGHT LOWER LEG HEALING


                                                 incision to right arm


     Estimated Nutritional Goals


      BEE in Kcals:                              Using Current wt


      Calories/Kcals/Kg                          25-30


      Kcals Calculated                           3246-2328


      Protein:                                   Using Current wt


      Protein g/k.8-1


      Protein Calculated                         69-86


      Fluid: ml                                  2150-2580ml (1ml/kcal)


     Nutritional Problem


      1. Problem


       Problem                                   altered GI function


       Etiology                                  s/p reversal colostomy


       Signs/Symptoms:                           pt on clear liquid


     Intervention/Recommendation


      Comments                                   1. Continue with clear liquid


                                                 diet as ordered.


                                                 2. Monitor PO intake, wt, labs


                                                 and skin integrity


                                                 3. F/U as high risk in 2-3


                                                 days, -


     Expected Outcomes/Goals


      Expected Outcomes/Goals                    1. PO intake to meet at least


                                                 75% of nutritional needs.


                                                 2. Wt stability, skin to


                                                 remain intact, labs to


                                                 approach WNL.

## 2018-07-03 NOTE — PROGRESS NOTES
DATE:  07/02/2018



PULMONARY PROGRESS NOTE



PROBLEM LIST:

1.  Acute respiratory failure, off mechanical ventilation, currently frequently

on a BiPAP.

2.  Status post abdominal surgery.

3.  Significant deconditioning.



SYMPTOMS:  Nil.  The patient complaining of slight shortness of breath, no

coughing, no wheezing, no chest pain, currently on a total face mask BiPAP on

45% of oxygen.  No respiratory distress, etc. except for mildly tachypneic.



PHYSICAL EXAMINATION:

VITAL SIGNS:  The patient is afebrile, heart rate is ____, blood pressure

153/95, saturation is in mid to low 90s on 40%.

NECK:  Veins not visualized.

CHEST:  Shows diminished air entry with occasional secretory noise.

HEART:  Regular.

ABDOMEN:  Soft, nontender.

EXTREMITIES:  Shows slight trace of peripheral edema.



LABORATORY DATA:  White count earlier was 32,000.  Second set is 19,000 and

platelet count is 125.  ABG shows partially compensated respiratory acidemia,

pCO2 of 61, and electrolytes are within normal limits.



ASSESSMENT:  The patient is clinically stable, persistent shortness of breath

because of abdominal issue elevating diaphragm complicated by severe weakness

with slowly resolving pneumonia.



PLANS AND SUGGESTIONS:  I discussed with nursing staff ____ nutrition.  He can

get a break without BiPAP for a while on and off.  In the meantime, continue

rest of the treatment.  We will follow through other studies tomorrow and go

from there.





DD: 07/02/2018 18:14

DT: 07/03/2018 02:11

JOB# [id number]  [id number]

## 2018-07-03 NOTE — GENERAL PROGRESS NOTE
Subjective





- Review of Systems


Service Date: 18


Events since last encounter: 





tolerating liquids, increase diet


start Ensure





Objective





- Results


Result Diagrams: 


 18 04:30





 18 04:30


Recent Labs: 


 Laboratory Last Values











WBC  16.6 Th/cmm (4.8-10.8)  H  18  04:30    


 


RBC  4.01 Mil/cmm (3.80-5.80)   18  04:30    


 


Hgb  11.7 gm/dL (12-16)  L  18  04:30    


 


Hct  35.3 % (41.0-60)  L  18  04:30    


 


MCV  88.2 fl (80-99)   18  04:30    


 


MCH  29.1 pg (27.0-31.0)   18  04:30    


 


MCHC Differential  33.0 pg (28.0-36.0)   18  04:30    


 


RDW  15.2 % (11.5-20.0)   18  04:30    


 


Plt Count  131 Th/cmm (150-400)  L  18  04:30    


 


MPV  9.6 fl  18  04:30    


 


Neutrophils %  95.2 % (40.0-80.0)  H  18  11:35    


 


Band Neutrophils %  2 % (0-10)   18  04:30    


 


Lymphocytes %  2.5 % (20.0-50.0)  L  18  11:35    


 


Monocytes %  1.4 % (2.0-10.0)  L  18  11:35    


 


Eosinophils %  0.1 % (0.0-5.0)   18  11:35    


 


Basophils %  0.8 % (0.0-2.0)   18  11:35    


 


Neutrophils (Manual)  92 % (40-80)  H  18  04:30    


 


Lymphocytes  3 % (20-50)  L  18  04:30    


 


Monocytes  3 % (2-10)   18  04:30    


 


Eosinophils  0 % (0-5)   18  04:30    


 


Basophils  0 % (0-3)   18  04:30    


 


Platelet Estimate  ADEQUATE  (NORMAL)   18  05:50    


 


Platelet Morphology  NORMAL  (NORMAL)   18  18:24    


 


Polychromasia  1+   18  04:40    


 


[place] Cells  1+   18  04:40    


 


RBC Morph Micro Appear  NORMAL  (NORMAL)   18  18:24    


 


PT  11.9 SECONDS (9.5-11.5)  H  18  12:08    


 


INR  1.13  (0.5-1.4)   18  12:08    


 


PTT (Actin FS)  22.3 SECONDS (26.0-38.0)  L  18  12:08    


 


Specimen Source  Arterial   18  08:05    


 


Sample Site  Right Radial   18  08:05    


 


pH  7.45  (7.35-7.45)   18  08:05    


 


pCO2  43.0 mmHg (35.0-45.0)   18  08:05    


 


pO2  76.0 mmHg (80.0-100.0)  L  18  08:05    


 


HCO3  29.0 mEq/L (20.0-26.0)  H  18  08:05    


 


Base Excess  5.3 mEq/L (-3.0-3.0)  H  18  08:05    


 


O2 Saturation  96.0 % (92.0-100.0)   18  08:05    


 


[place] Test  PASS   18  08:05    


 


Vent Rate  NA   18  09:00    


 


Inspired O2  45   18  08:05    


 


Tidal Volume  NA   18  09:00    


 


PEEP  NA   18  09:00    


 


Pressure (ins/psv/peep)  NA   18  09:00    


 


Critical Value  PW   18  08:05    


 


Sodium  145 mEq/L (136-145)   18  04:30    


 


Potassium  4.3 mEq/L (3.5-5.1)   18  04:30    


 


Chloride  112 mEq/L ()  H  18  04:30    


 


Carbon Dioxide  29.7 mEq/L (21.0-31.0)   18  04:30    


 


Anion Gap  7.6  (7.0-16.0)   18  04:30    


 


BUN  64 mg/dL (7-25)  H  18  04:30    


 


Creatinine  1.4 mg/dL (0.7-1.3)  H  18  04:30    


 


Est GFR ( Amer)  > 60.0 ml/min (>90)   18  04:30    


 


Est GFR (Non-Af Amer)  54.1 ml/min  18  04:30    


 


BUN/Creatinine Ratio  45.7   18  04:30    


 


Glucose  142 mg/dL ()  H  18  04:30    


 


POC Glucose  140 MG/DL (70 - 105)  H  18  05:50    


 


Hemoglobin A1c %  4.8 % (4.0-6.0)   18  04:40    


 


Whole Bld Lactic Acid  1.36 mmol/L (0.60-1.99)   18  05:50    


 


Calcium  8.5 mg/dL (8.6-10.3)  L  18  04:30    


 


Phosphorus  3.0 mg/dL (2.5-5.0)   18  04:30    


 


Magnesium  2.3 mg/dL (1.9-2.7)   18  04:30    


 


Total Bilirubin  0.5 mg/dL (0.3-1.0)   18  06:00    


 


Direct Bilirubin  0.05 mg/dL (0.0-0.2)   18  05:50    


 


AST  34 U/L (13-39)   18  06:00    


 


ALT  23 U/L (7-52)   18  06:00    


 


Alkaline Phosphatase  76 U/L ()   18  06:00    


 


Ammonia  52 umol/L (16-53)   18  04:30    


 


Total Protein  5.8 gm/dL (6.0-8.3)  L  18  06:00    


 


Albumin  2.5 gm/dL (4.2-5.5)  L  18  06:00    


 


Globulin  3.3 gm/dL  18  06:00    


 


Albumin/Globulin Ratio  0.8  (1.0-1.8)  L  18  06:00    


 


Prealbumin  21 mg/dL (10-36)   18  11:35    


 


Triglycerides  83 mg/dL (<150)   18  04:55    


 


Cholesterol  64 mg/dL (<200)   18  06:00    


 


Urine Source  CLEAN C   18  17:35    


 


Urine Color  YELLOW   18  17:35    


 


Urine Clarity  HAZY  (CLEAR)   18  17:35    


 


Urine pH  5.0  (4.6 - 8.0)   18  17:35    


 


Ur Specific Gravity  1.020  (1.005-1.030)   18  17:35    


 


Urine Protein  30 mg/dL (NEGATIVE)  H  18  17:35    


 


Urine Glucose (UA)  NEGATIVE mg/dL (NEGATIVE)   18  17:35    


 


Urine Ketones  NEGATIVE mg/dL (NEGATIVE)   18  17:35    


 


Urine Blood  TRACE  (NEGATIVE)   18  17:35    


 


Urine Nitrate  NEGATIVE  (NEGATIVE)   18  17:35    


 


Urine Bilirubin  NEGATIVE  (NEGATIVE)   18  17:35    


 


Urine Urobilinogen  0.2 E.U./dL (0.2 - 1.0)   18  17:35    


 


Ur Leukocyte Esterase  TRACE  (NEGATIVE)  H  18  17:35    


 


Urine RBC  0-2 /hpf (0-5)  H  18  17:35    


 


Urine WBC  0-2 /hpf (0-5)   18  17:35    


 


Ur Epithelial Cells  OCCASIONAL /lpf (FEW)   18  17:35    


 


Amorphous Sediment  FEW URATES  (NONE SEEN)   18  17:35    


 


Urine Bacteria  FEW /hpf (NONE SEEN)   18  17:35    


 


Vancomycin Trough  16.8 ug/mL (5-10)  H  18  09:30    


 


Random Vancomycin  12.9 ug/mL (5.0-40.0)   18  06:00    














- Physical Exam


Vitals and I&O: 


 Vital Signs











Temp  98.7 F   18 04:00


 


Pulse  88   18 06:00


 


Resp  25   18 07:25


 


BP  156/96   18 06:00


 


Pulse Ox  96   18 07:25








 Intake & Output











 18





 18:59 06:59 18:59


 


Intake Total 3923.5 1742.833 


 


Output Total 2400 1500 


 


Balance 1523.5 242.833 


 


Weight (lbs) 98.43 kg 98.475 kg 


 


Intake:   


 


  Intake, IV Amount 2912.5 1342.833 


 


    Cefepime 1 gm In Dextrose 50 50 





    5% 50 ml @ 100 mls/hr IV   





    Q12HR [facility] Rx#:[id number]   


 


    Dextrose 5% 1,000 ml @ 30 682.5 387.5 





    mls/hr IV .Q24H [facility] Rx#:   





    [id number]   


 


    Multivitamin Inj 10 ml In 1680 905.333 





    Dextrose 70% 1,170 ml In   





    Amino Acids 10% 500 ml @   





    70 mls/hr IV .Q24H [facility]   





    Rx#:[id number]   


 


    Vancomycin HCl 1.5 gm In 500  





    Sodium Chloride 0.9% 500   





    ml @ 250 mls/hr IV Q24H   





    [facility] Rx#:[id number]   


 


  Oral 1011 400 


 


Output:   


 


  Urine 2400 1500 


 


Other:   


 


  # Bowel Movements 0  


 


  Weight Source Bedscale Bedscale 











Active Medications: 


Current Medications





Acetaminophen/Hydrocodone Bitart (Norco 10 Mg/325 Mg)  1 tab PO Q6H PRN


   PRN Reason: Pain (Severe)


   Stop: 18 20:04


   Last Admin: 18 03:03 Dose:  1 tab


Albuterol/Ipratropium (Duoneb Neb)  3 ml HHN Q4HRT [facility]


   Stop: 18 14:59


   Last Admin: 18 07:12 Dose:  3 ml


Budesonide (Pulmicort)  0.5 mg HHN BIDRT [facility]


   Stop: 18 18:59


   Last Admin: 18 07:12 Dose:  0.5 mg


Cholestyramine Resin (Questran)  4 gm PO BID [facility]


   Stop: 18 16:59


   Last Admin: 18 08:48 Dose:  4 gm


Enoxaparin Sodium (Lovenox)  30 mg SUBQ Q12HR [facility]


   Stop: 18 20:59


   Last Admin: 18 08:47 Dose:  30 mg


Hydromorphone HCl (Dilaudid)  1 mg IVP Q3HR PRN


   PRN Reason: SEVERE PAIN


   Stop: 18 16:25


   Last Admin: 18 03:56 Dose:  1 mg


Cefepime HCl 1 gm/ Dextrose  50 mls @ 100 mls/hr IV Q12HR [facility]


   Stop: 18 20:59


   Last Admin: 18 08:48 Dose:  100 mls/hr


Multivitamins/Minerals 10 ml/Dextrose/ Amino Acids/Electrolytes  1,680 mls @ 70 

mls/hr IV .Q24H [facility]


   Stop: 18 14:59


   Last Infusion: 18 06:00 Dose:  70 mls/hr


Dextrose (D5w)  1,000 mls @ 30 mls/hr IV .Q24H [facility]


   Stop: 18 16:29


   Last Infusion: 18 06:00 Dose:  30 mls/hr


Vancomycin HCl 1.5 gm/ Sodium (Chloride)  500 mls @ 250 mls/hr IV Q24H [facility]


   Stop: 18 08:59


   Last Infusion: 18 12:30 Dose:  Infused


Insulin Aspart (Novolog Insulin Sliding Scale)  0 units SUBQ Q6H [facility]; Protocol


   Stop: 18 11:59


   Last Admin: 18 05:54 Dose:  Not Given


Lorazepam (Ativan)  1 mg IVP Q4HR PRN; Protocol


   PRN Reason: Anxiety


   Stop: 18 02:24


   Last Admin: 18 23:55 Dose:  1 mg


Methylprednisolone Sodium Succinate (Solu-Medrol)  60 mg IVP Q6H [facility]


   Stop: 18 16:29


   Last Admin: 18 04:28 Dose:  60 mg


Metoclopramide HCl (Reglan)  10 mg IVP Q6HR [facility]


   Stop: 18 17:59


   Last Admin: 18 05:54 Dose:  10 mg


Miscellaneous (Tpn Per Pharmacy)  1 ea MC PRN [facility]


   Stop: 18 17:59


Miscellaneous (Vancomycin Iv Per Pharmacy)  1 ea  PRN PRN


   PRN Reason: PROTOCOL


   Stop: 18 14:14


Miscellaneous (Probiotic Screen)  1 ea  PRN PRN


   PRN Reason: PROTOCOL


   Stop: 18 12:41


Ondansetron HCl (Zofran)  4 mg IV Q4H PRN


   PRN Reason: Nausea / Vomiting


   Stop: 18 05:53


   Last Admin: 18 23:02 Dose:  4 mg


Pantoprazole Sodium (Protonix)  40 mg IVP BID [facility]


   Stop: 18 08:59


   Last Admin: 18 08:47 Dose:  40 mg








General: Alert, Cooperative, No acute distress


HEENT: Atraumatic, PERRLA (+dressing), EOMI


Neck: Supple


Cardiovascular: Regular rate, Normal S1, Normal S2


Lungs: Clear to auscultation


Abdomen: Soft





- Procedures


Procedures: 


 Procedures











Procedure Code Date


 


EXCISION OF CHEST SKIN, EXTERNAL APPROACH [id number] 18


 


EXCISION OF ILEUM, OPEN APPROACH [id number] 18


 


EXCISION OF RIGHT LOWER ARM SKIN, EXTERNAL APPROACH [id number] 18


 


EXCISION OF RIGHT LOWER LEG SKIN, EXTERNAL APPROACH [id number] 18


 


RELEASE ILEUM, OPEN APPROACH [id number] 18


 


RESPIRATORY VENTILATION, 24-96 CONSECUTIVE HOURS [id number] 18














Nutritional Asmnt/Malnutr-PDOC





- Dietary Evaluation


Malnutrition Findings (Please click <Entered> for more info): 








Nutritional Asmnt/Malnutrition                             Start:  18 14:

24


Text:                                                      Status: Complete    

  


Freq:                                                                          

  


Protocol:                                                                      

  


 Document     18 14:24  LCHENG  (Rec: 18 14:35  LCHENG  [id number])


 Nutritional Asmnt/Malnutrition


     Patient General Information


      Nutritional Screening                      High Risk


                                                 Consult


      Diagnosis                                  exploratory laparotomy with


                                                 recersal colostomy


      Pertinent Medical Hx/Surgical Hx           acute perforation of


                                                 diverticulitis, s/p


                                                 exploratory laparotomy and


                                                 divertin gcolostomy placement


      Subjective Information                     Pt seen lying in bed at time


                                                 of visit, awake and alert. Pt


                                                 stated he tolerated clear


                                                 liquid well, adequte amount


                                                 for him at this time. Pt has


                                                 RAQUEL drain noted. Per EMR, PO


                                                 intake % of clear liquid


                                                 .


      Current Diet Order/ Nutrition Support      clear liquid


      Pertinent Medications                      D5-0.9%ns, piperacillin,


                                                 vancomycin


      Pertinent Labs                              Na 133, K 3.4, Cr 0.7,


                                                 glucose 107, Ca 7.9


     Nutritional Hx/Data


      Height                                     1.88 m


      Height (Calculated Centimeters)            188.0


      Current Weight (lbs)                       86.636 kg


      Weight (Calculated Kilograms)              86.6


      Weight (Calculated Grams)                  04163.1


      Ideal Body Weight                          190


      Body Mass Index (BMI)                      24.5


      Weight Status                              Approriate


     GI Symptoms


      GI Symptoms                                None


      Last BM                                    none


      Difficult in:                              None


      Skin Integrity/Comment:                    RIGHT LOWER LEG HEALING


                                                 incision to right arm


     Estimated Nutritional Goals


      BEE in Kcals:                              Using Current wt


      Calories/Kcals/Kg                          25-30


      Kcals Calculated                           6557-5719


      Protein:                                   Using Current wt


      Protein g/k.8-1


      Protein Calculated                         69-86


      Fluid: ml                                  2150-2580ml (1ml/kcal)


     Nutritional Problem


      1. Problem


       Problem                                   altered GI function


       Etiology                                  s/p reversal colostomy


       Signs/Symptoms:                           pt on clear liquid


     Intervention/Recommendation


      Comments                                   1. Continue with clear liquid


                                                 diet as ordered.


                                                 2. Monitor PO intake, wt, labs


                                                 and skin integrity


                                                 3. F/U as high risk in 2-3


                                                 days, -


     Expected Outcomes/Goals


      Expected Outcomes/Goals                    1. PO intake to meet at least


                                                 75% of nutritional needs.


                                                 2. Wt stability, skin to


                                                 remain intact, labs to


                                                 approach WNL.

## 2018-07-04 LAB
ANION GAP SERPL CALC-SCNC: 14.4 MMOL/L (ref 7–16)
BASOPHILS # BLD AUTO: 0 TH/CUMM (ref 0–0.2)
BASOPHILS NFR BLD: 0 % (ref 0–3)
BUN SERPL-MCNC: 64 MG/DL (ref 7–25)
CALCIUM SERPL-MCNC: 8 MG/DL (ref 8.6–10.3)
CHLORIDE SERPL-SCNC: 112 MEQ/L (ref 98–107)
CO2 SERPL-SCNC: 18.6 MEQ/L (ref 21–31)
CREAT SERPL-MCNC: 1.4 MG/DL (ref 0.7–1.3)
EOSINOPHIL # BLD AUTO: 0.1 TH/CMM (ref 0.1–0.4)
EOSINOPHIL NFR BLD: 0 % (ref 0–5)
ERYTHROCYTE [DISTWIDTH] IN BLOOD BY AUTOMATED COUNT: 15.9 % (ref 11.5–20)
GLUCOSE SERPL-MCNC: 131 MG/DL (ref 70–105)
HCT VFR BLD CALC: 38.7 % (ref 41–60)
HGB BLD-MCNC: 12.6 GM/DL (ref 12–16)
LYMPHOCYTE AB SER FC-ACNC: 0.5 TH/CMM (ref 1.5–3)
LYMPHOCYTES # BLD MANUAL: 2 % (ref 20–50)
MCH RBC QN AUTO: 28.5 PG (ref 27–31)
MCHC RBC AUTO-ENTMCNC: 32.6 PG (ref 28–36)
MCV RBC AUTO: 87.6 FL (ref 80–99)
MONOCYTES # BLD AUTO: 0.6 TH/CMM (ref 0.3–1)
MONOCYTES # BLD MANUAL: 2 % (ref 2–10)
NEUTROPHILS # BLD: 17.6 TH/CMM (ref 1.8–8)
NEUTROPHILS NFR BLD AUTO: 93 % (ref 40–80)
NEUTS BAND NFR BLD: 3 % (ref 0–10)
PCO2 BLDA: 43 MMHG (ref 35–45)
PLAT MORPH BLD: NORMAL
PLATELET # BLD EST: (no result) 10*3/UL
PLATELET # BLD: 100 TH/CMM (ref 150–400)
PMV BLD AUTO: 9.6 FL
PO2 BLDA: 64 MMHG (ref 80–100)
POTASSIUM SERPL-SCNC: 5 MEQ/L (ref 3.5–5.1)
RBC # BLD AUTO: 4.42 MIL/CMM (ref 3.8–5.8)
RBC MORPH BLD: NORMAL
SAO2 % BLDA: 92 % (ref 92–100)
SODIUM SERPL-SCNC: 140 MEQ/L (ref 136–145)
TOTAL CELLS COUNTED BLD: 100
WBC # BLD AUTO: 18.8 TH/CMM (ref 4.8–10.8)

## 2018-07-04 PROCEDURE — 5A09357 ASSISTANCE WITH RESPIRATORY VENTILATION, LESS THAN 24 CONSECUTIVE HOURS, CONTINUOUS POSITIVE AIRWAY PRESSURE: ICD-10-PCS | Performed by: FAMILY MEDICINE

## 2018-07-04 RX ADMIN — IPRATROPIUM BROMIDE AND ALBUTEROL SULFATE SCH ML: .5; 3 SOLUTION RESPIRATORY (INHALATION) at 02:00

## 2018-07-04 RX ADMIN — IPRATROPIUM BROMIDE AND ALBUTEROL SULFATE SCH ML: .5; 3 SOLUTION RESPIRATORY (INHALATION) at 19:21

## 2018-07-04 RX ADMIN — METOCLOPRAMIDE SCH MG: 5 INJECTION, SOLUTION INTRAMUSCULAR; INTRAVENOUS at 13:33

## 2018-07-04 RX ADMIN — METOCLOPRAMIDE SCH MG: 5 INJECTION, SOLUTION INTRAMUSCULAR; INTRAVENOUS at 18:46

## 2018-07-04 RX ADMIN — HYDROMORPHONE HYDROCHLORIDE PRN MG: 1 INJECTION, SOLUTION INTRAMUSCULAR; INTRAVENOUS; SUBCUTANEOUS at 22:34

## 2018-07-04 RX ADMIN — IPRATROPIUM BROMIDE AND ALBUTEROL SULFATE SCH ML: .5; 3 SOLUTION RESPIRATORY (INHALATION) at 11:29

## 2018-07-04 RX ADMIN — INSULIN ASPART SCH: 100 INJECTION, SOLUTION INTRAVENOUS; SUBCUTANEOUS at 06:14

## 2018-07-04 RX ADMIN — IPRATROPIUM BROMIDE AND ALBUTEROL SULFATE SCH ML: .5; 3 SOLUTION RESPIRATORY (INHALATION) at 15:41

## 2018-07-04 RX ADMIN — BUDESONIDE SCH MG: 0.5 SUSPENSION RESPIRATORY (INHALATION) at 07:39

## 2018-07-04 RX ADMIN — IPRATROPIUM BROMIDE AND ALBUTEROL SULFATE SCH ML: .5; 3 SOLUTION RESPIRATORY (INHALATION) at 07:39

## 2018-07-04 RX ADMIN — METOCLOPRAMIDE SCH MG: 5 INJECTION, SOLUTION INTRAMUSCULAR; INTRAVENOUS at 06:12

## 2018-07-04 RX ADMIN — BUDESONIDE SCH MG: 0.5 SUSPENSION RESPIRATORY (INHALATION) at 19:39

## 2018-07-04 RX ADMIN — INSULIN ASPART SCH: 100 INJECTION, SOLUTION INTRAVENOUS; SUBCUTANEOUS at 13:32

## 2018-07-04 RX ADMIN — IPRATROPIUM BROMIDE AND ALBUTEROL SULFATE SCH ML: .5; 3 SOLUTION RESPIRATORY (INHALATION) at 22:58

## 2018-07-04 RX ADMIN — INSULIN ASPART SCH: 100 INJECTION, SOLUTION INTRAVENOUS; SUBCUTANEOUS at 00:10

## 2018-07-04 RX ADMIN — HYDROMORPHONE HYDROCHLORIDE PRN MG: 1 INJECTION, SOLUTION INTRAMUSCULAR; INTRAVENOUS; SUBCUTANEOUS at 02:27

## 2018-07-04 RX ADMIN — INSULIN ASPART SCH: 100 INJECTION, SOLUTION INTRAVENOUS; SUBCUTANEOUS at 18:45

## 2018-07-04 RX ADMIN — METOCLOPRAMIDE SCH MG: 5 INJECTION, SOLUTION INTRAMUSCULAR; INTRAVENOUS at 00:11

## 2018-07-04 NOTE — INTERNAL MEDICINE PROG NOTE
Internal Medicine Subjective





- Subjective


Service Date: 18


Patient seen and examined:: with staff (HE IS STILL HAS SOB.)


Patient is:: awake, verbal, in bed, talking, other (ON VENTILATOR,opening his 

eyes.)


Patient Complaints of:: SOB


Per staff patient has:: no adverse event, other (AFEBRIL,WAS SEEN BY 

PULMONOLOGIST.)





Internal Medicine Objective





- Results


Result Diagrams: 


 18 05:00





 18 05:00


Recent Labs: 


 Laboratory Last Values











WBC  18.8 Th/cmm (4.8-10.8)  H  18  05:00    


 


RBC  4.42 Mil/cmm (3.80-5.80)   18  05:00    


 


Hgb  12.6 gm/dL (12-16)   18  05:00    


 


Hct  38.7 % (41.0-60)  L  18  05:00    


 


MCV  87.6 fl (80-99)   18  05:00    


 


MCH  28.5 pg (27.0-31.0)   18  05:00    


 


MCHC Differential  32.6 pg (28.0-36.0)   18  05:00    


 


RDW  15.9 % (11.5-20.0)   18  05:00    


 


Plt Count  100 Th/cmm (150-400)  L  18  05:00    


 


MPV  9.6 fl  18  05:00    


 


Neutrophils %  95.2 % (40.0-80.0)  H  18  11:35    


 


Band Neutrophils %  3 % (0-10)   18  05:00    


 


Lymphocytes %  2.5 % (20.0-50.0)  L  18  11:35    


 


Monocytes %  1.4 % (2.0-10.0)  L  18  11:35    


 


Eosinophils %  0.1 % (0.0-5.0)   18  11:35    


 


Basophils %  0.8 % (0.0-2.0)   18  11:35    


 


Neutrophils (Manual)  93 % (40-80)  H  18  05:00    


 


Lymphocytes  2 % (20-50)  L  18  05:00    


 


Monocytes  2 % (2-10)   18  05:00    


 


Eosinophils  0 % (0-5)   18  05:00    


 


Basophils  0 % (0-3)   18  05:00    


 


Platelet Estimate  DECREASED PLATELETS  (NORMAL)   18  05:00    


 


Platelet Morphology  NORMAL  (NORMAL)   18  05:00    


 


Polychromasia  1+   18  04:40    


 


Anna Cells  1+   18  04:40    


 


RBC Morph Micro Appear  NORMAL  (NORMAL)   18  05:00    


 


PT  11.9 SECONDS (9.5-11.5)  H  18  12:08    


 


INR  1.13  (0.5-1.4)   18  12:08    


 


PTT (Actin FS)  22.3 SECONDS (26.0-38.0)  L  18  12:08    


 


Specimen Source  Arterial   18  09:40    


 


Sample Site  Right Radial   18  09:40    


 


pH  7.42  (7.35-7.45)   18  09:40    


 


pCO2  43.0 mmHg (35.0-45.0)   18  09:40    


 


pO2  64.0 mmHg (80.0-100.0)  L  18  09:40    


 


HCO3  27.1 mEq/L (20.0-26.0)  H  18  09:40    


 


Base Excess  3.0 mEq/L (-3.0-3.0)   18  09:40    


 


O2 Saturation  92.0 % (92.0-100.0)   18  09:40    


 


Ceferino Test  Positive   18  09:40    


 


Vent Rate  NA   18  09:40    


 


Inspired O2  44   18  09:40    


 


Tidal Volume  NA   18  09:40    


 


PEEP  NA   18  09:40    


 


Pressure (ins/psv/peep)  NA   18  09:40    


 


Critical Value  LZHANG   18  09:40    


 


Sodium  140 mEq/L (136-145)   18  05:00    


 


Potassium  5.0 mEq/L (3.5-5.1)   18  05:00    


 


Chloride  112 mEq/L ()  H  18  05:00    


 


Carbon Dioxide  18.6 mEq/L (21.0-31.0)  L  18  05:00    


 


Anion Gap  14.4  (7.0-16.0)   18  05:00    


 


BUN  64 mg/dL (7-25)  H  18  05:00    


 


Creatinine  1.4 mg/dL (0.7-1.3)  H  18  05:00    


 


Est GFR ( Amer)  > 60.0 ml/min (>90)   18  05:00    


 


Est GFR (Non-Af Amer)  54.1 ml/min  18  05:00    


 


BUN/Creatinine Ratio  45.7   18  05:00    


 


Glucose  131 mg/dL ()  H  18  05:00    


 


POC Glucose  119 MG/DL (70 - 105)  H  18  18:08    


 


Hemoglobin A1c %  4.8 % (4.0-6.0)   18  04:40    


 


Whole Bld Lactic Acid  1.36 mmol/L (0.60-1.99)   18  05:50    


 


Calcium  8.0 mg/dL (8.6-10.3)  L  18  05:00    


 


Phosphorus  3.0 mg/dL (2.5-5.0)   18  04:30    


 


Magnesium  2.3 mg/dL (1.9-2.7)   18  04:30    


 


Total Bilirubin  0.5 mg/dL (0.3-1.0)   18  06:00    


 


Direct Bilirubin  0.05 mg/dL (0.0-0.2)   18  05:50    


 


AST  34 U/L (13-39)   18  06:00    


 


ALT  23 U/L (7-52)   18  06:00    


 


Alkaline Phosphatase  76 U/L ()   18  06:00    


 


Ammonia  52 umol/L (16-53)   18  04:30    


 


Total Protein  5.8 gm/dL (6.0-8.3)  L  18  06:00    


 


Albumin  2.5 gm/dL (4.2-5.5)  L  18  06:00    


 


Globulin  3.3 gm/dL  18  06:00    


 


Albumin/Globulin Ratio  0.8  (1.0-1.8)  L  18  06:00    


 


Prealbumin  21 mg/dL (10-36)   18  11:35    


 


Triglycerides  83 mg/dL (<150)   18  04:55    


 


Cholesterol  64 mg/dL (<200)   18  06:00    


 


Urine Source  CLEAN C   18  17:35    


 


Urine Color  YELLOW   18  17:35    


 


Urine Clarity  HAZY  (CLEAR)   18  17:35    


 


Urine pH  5.0  (4.6 - 8.0)   18  17:35    


 


Ur Specific Gravity  1.020  (1.005-1.030)   18  17:35    


 


Urine Protein  30 mg/dL (NEGATIVE)  H  18  17:35    


 


Urine Glucose (UA)  NEGATIVE mg/dL (NEGATIVE)   18  17:35    


 


Urine Ketones  NEGATIVE mg/dL (NEGATIVE)   18  17:35    


 


Urine Blood  TRACE  (NEGATIVE)   18  17:35    


 


Urine Nitrate  NEGATIVE  (NEGATIVE)   18  17:35    


 


Urine Bilirubin  NEGATIVE  (NEGATIVE)   18  17:35    


 


Urine Urobilinogen  0.2 E.U./dL (0.2 - 1.0)   18  17:35    


 


Ur Leukocyte Esterase  TRACE  (NEGATIVE)  H  18  17:35    


 


Urine RBC  0-2 /hpf (0-5)  H  18  17:35    


 


Urine WBC  0-2 /hpf (0-5)   18  17:35    


 


Ur Epithelial Cells  OCCASIONAL /lpf (FEW)   18  17:35    


 


Amorphous Sediment  FEW URATES  (NONE SEEN)   18  17:35    


 


Urine Bacteria  FEW /hpf (NONE SEEN)   18  17:35    


 


Vancomycin Trough  16.8 ug/mL (5-10)  H  18  09:30    


 


Random Vancomycin  12.9 ug/mL (5.0-40.0)   18  06:00    














- Physical Exam


Vitals and I&O: 


 Vital Signs











Temp  97.2 F   18 16:00


 


Pulse  85   18 19:39


 


Resp  19   18 19:39


 


BP  138/78   18 18:58


 


Pulse Ox  96   18 20:00








 Intake & Output











 18





 06:59 18:59 06:59


 


Intake Total 50 550 800


 


Output Total 1350  1300


 


Balance -1300 550 -500


 


Weight (lbs) 96.071 kg  97.976 kg


 


Intake:   


 


  Intake, IV Amount 50 550 


 


    Cefepime 1 gm In Dextrose 50 50 





    5% 50 ml @ 100 mls/hr IV   





    Q12HR [facility] Rx#:[id number]   


 


    Vancomycin HCl 1.5 gm In  500 





    Sodium Chloride 0.9% 500   





    ml @ 250 mls/hr IV Q24H   





    [facility] Rx#:[id number]   


 


  Oral   800


 


Output:   


 


  Urine 1350  1300


 


Other:   


 


  # Bowel Movements 1  2


 


  Stool Characteristics Soft Soft 





 Green Brown 


 


  Weight Source Bedscale  Bedscale











Active Medications: 


Current Medications





Acetaminophen/Hydrocodone Bitart (Norco 10 Mg/325 Mg)  1 tab PO Q6H PRN


   PRN Reason: Pain (Severe)


   Stop: 18 20:04


   Last Admin: 18 03:03 Dose:  1 tab


Albuterol/Ipratropium (Duoneb Neb)  3 ml HHN Q4HRT [facility]


   Stop: 18 14:59


   Last Admin: 18 19:21 Dose:  3 ml


Budesonide (Pulmicort)  0.5 mg HHN BIDRT [facility]


   Stop: 18 18:59


   Last Admin: 18 19:39 Dose:  0.5 mg


Cholestyramine Resin (Questran)  4 gm PO BID [facility]


   Stop: 18 16:59


   Last Admin: 18 16:55 Dose:  4 gm


Hydromorphone HCl (Dilaudid)  1 mg IVP Q3HR PRN


   PRN Reason: SEVERE PAIN


   Stop: 18 16:25


   Last Admin: 18 02:27 Dose:  1 mg


Cefepime HCl 1 gm/ Dextrose  50 mls @ 100 mls/hr IV Q12HR [facility]


   Stop: 18 20:59


   Last Infusion: 18 10:30 Dose:  Infused


Vancomycin HCl 1.5 gm/ Sodium (Chloride)  500 mls @ 250 mls/hr IV Q24H [facility]


   Stop: 18 08:59


   Last Infusion: 18 13:34 Dose:  Infused


Insulin Aspart (Novolog Insulin Sliding Scale)  0 units SUBQ Q6H KATHY; Protocol


   Stop: 18 11:59


   Last Admin: 18 18:45 Dose:  Not Given


Lorazepam (Ativan)  1 mg IVP Q4HR PRN; Protocol


   PRN Reason: Anxiety


   Stop: 18 02:24


   Last Admin: 18 21:19 Dose:  1 mg


Methylprednisolone Sodium Succinate (Solu-Medrol)  60 mg IVP Q6H [facility]


   Stop: 18 16:29


   Last Admin: 18 16:55 Dose:  60 mg


Metoclopramide HCl (Reglan)  10 mg IVP Q6HR [facility]


   Stop: 18 17:59


   Last Admin: 18 18:46 Dose:  10 mg


Miscellaneous (Vancomycin Iv Per Pharmacy)  1 [facility] PRN PRN


   PRN Reason: PROTOCOL


   Stop: 18 14:14


Miscellaneous (Probiotic Screen)  1 [facility] PRN PRN


   PRN Reason: PROTOCOL


   Stop: 18 12:41


Ondansetron HCl (Zofran)  4 mg IV Q4H PRN


   PRN Reason: Nausea / Vomiting


   Stop: 18 05:53


   Last Admin: 18 23:02 Dose:  4 mg


Pantoprazole Sodium (Protonix)  40 mg IVP BID [facility]


   Stop: 18 08:59


   Last Admin: 18 16:52 Dose:  40 mg








General: other (on 50 percent fio2,less distress.)


HEENT: NC/AT, PERRLA, EOMI, anicteric sclerae, throat clear


Neck: Supple, No JVD, No thyromegaly, +2 carotid pulse wo bruit, No LAD


Lungs: ronchi


Cardiovascular: RRR, Normal S1, Normal S2, without murmur, tachy


Abdomen: tender, distended


Extremities: clear


Neurological: unable to follow command





- Procedures


Procedures: 


 Procedures











Procedure Code Date


 


EXCISION OF CHEST SKIN, EXTERNAL APPROACH [id number] 18


 


EXCISION OF ILEUM, OPEN APPROACH [id number] 18


 


EXCISION OF RIGHT LOWER ARM SKIN, EXTERNAL APPROACH [id number] 18


 


EXCISION OF RIGHT LOWER LEG SKIN, EXTERNAL APPROACH [id number] 18


 


RELEASE ILEUM, OPEN APPROACH [id number] 18


 


RESPIRATORY VENTILATION, 24-96 CONSECUTIVE HOURS [id number] 18














Internal Medicine Assmt/Plan





- Assessment


Assessment: 





1.SP COLOSTOMY REVISION.


2.SP EXCISION BIOPSY FOR CHEST WALL LESION.


3.ACUTE BOWEL OBSTRUCTION


4.ACUTE RESPIRATORY FAILURE.


5.BILATERAL PNEUMONIA.





- Plan


Plan: 


CONTINUE ON CURRENT MEDICATION AND DIET.





Nutritional Asmnt/Malnutr-PDOC





- Dietary Evaluation


Malnutrition Findings (Please click <Entered> for more info): 








Nutritional Asmnt/Malnutrition                             Start:  18 14:

24


Text:                                                      Status: Complete    

  


Freq:                                                                          

  


Protocol:                                                                      

  


 Document     18 14:24  JASE  (Rec: 18 14:35  HEN  [id number])


 Nutritional Asmnt/Malnutrition


     Patient General Information


      Nutritional Screening                      High Risk


                                                 Consult


      Diagnosis                                  exploratory laparotomy with


                                                 recersal colostomy


      Pertinent Medical Hx/Surgical Hx           acute perforation of


                                                 diverticulitis, s/p


                                                 exploratory laparotomy and


                                                 divertin gcolostomy placement


      Subjective Information                     Pt seen lying in bed at time


                                                 of visit, awake and alert. Pt


                                                 stated he tolerated clear


                                                 liquid well, adequte amount


                                                 for him at this time. Pt has


                                                 RAQUEL drain noted. Per EMR, PO


                                                 intake % of clear liquid


                                                 .


      Current Diet Order/ Nutrition Support      clear liquid


      Pertinent Medications                      D5-0.9%ns, piperacillin,


                                                 vancomycin


      Pertinent Labs                              Na 133, K 3.4, Cr 0.7,


                                                 glucose 107, Ca 7.9


     Nutritional Hx/Data


      Height                                     1.88 m


      Height (Calculated Centimeters)            188.0


      Current Weight (lbs)                       86.636 kg


      Weight (Calculated Kilograms)              86.6


      Weight (Calculated Grams)                  57924.1


      Ideal Body Weight                          190


      Body Mass Index (BMI)                      24.5


      Weight Status                              Approriate


     GI Symptoms


      GI Symptoms                                None


      Last BM                                    none


      Difficult in:                              None


      Skin Integrity/Comment:                    RIGHT LOWER LEG HEALING


                                                 incision to right arm


     Estimated Nutritional Goals


      BEE in Kcals:                              Using Current wt


      Calories/Kcals/Kg                          25-30


      Kcals Calculated                           4769-5186


      Protein:                                   Using Current wt


      Protein g/k.8-1


      Protein Calculated                         69-86


      Fluid: ml                                  2150-2580ml (1ml/kcal)


     Nutritional Problem


      1. Problem


       Problem                                   altered GI function


       Etiology                                  s/p reversal colostomy


       Signs/Symptoms:                           pt on clear liquid


     Intervention/Recommendation


      Comments                                   1. Continue with clear liquid


                                                 diet as ordered.


                                                 2. Monitor PO intake, wt, labs


                                                 and skin integrity


                                                 3. F/U as high risk in 2-3


                                                 days, -


     Expected Outcomes/Goals


      Expected Outcomes/Goals                    1. PO intake to meet at least


                                                 75% of nutritional needs.


                                                 2. Wt stability, skin to


                                                 remain intact, labs to


                                                 approach WNL.

## 2018-07-04 NOTE — GENERAL PROGRESS NOTE
Subjective





- Review of Systems


Service Date: 18


Subjective: 


pt in bed 


starting oral intake





Objective





- Results


Result Diagrams: 


 18 05:00





 18 05:00


Recent Labs: 


 Laboratory Last Values











WBC  18.8 Th/cmm (4.8-10.8)  H  18  05:00    


 


RBC  4.42 Mil/cmm (3.80-5.80)   18  05:00    


 


Hgb  12.6 gm/dL (12-16)   18  05:00    


 


Hct  38.7 % (41.0-60)  L  18  05:00    


 


MCV  87.6 fl (80-99)   18  05:00    


 


MCH  28.5 pg (27.0-31.0)   18  05:00    


 


MCHC Differential  32.6 pg (28.0-36.0)   18  05:00    


 


RDW  15.9 % (11.5-20.0)   18  05:00    


 


Plt Count  100 Th/cmm (150-400)  L  18  05:00    


 


MPV  9.6 fl  18  05:00    


 


Neutrophils %  95.2 % (40.0-80.0)  H  18  11:35    


 


Band Neutrophils %  3 % (0-10)   18  05:00    


 


Lymphocytes %  2.5 % (20.0-50.0)  L  18  11:35    


 


Monocytes %  1.4 % (2.0-10.0)  L  18  11:35    


 


Eosinophils %  0.1 % (0.0-5.0)   18  11:35    


 


Basophils %  0.8 % (0.0-2.0)   18  11:35    


 


Neutrophils (Manual)  93 % (40-80)  H  18  05:00    


 


Lymphocytes  2 % (20-50)  L  18  05:00    


 


Monocytes  2 % (2-10)   18  05:00    


 


Eosinophils  0 % (0-5)   18  05:00    


 


Basophils  0 % (0-3)   18  05:00    


 


Platelet Estimate  DECREASED PLATELETS  (NORMAL)   18  05:00    


 


Platelet Morphology  NORMAL  (NORMAL)   18  05:00    


 


Polychromasia  1+   18  04:40    


 


Anna Cells  1+   18  04:40    


 


RBC Morph Micro Appear  NORMAL  (NORMAL)   18  05:00    


 


PT  11.9 SECONDS (9.5-11.5)  H  18  12:08    


 


INR  1.13  (0.5-1.4)   18  12:08    


 


PTT (Actin FS)  22.3 SECONDS (26.0-38.0)  L  18  12:08    


 


Specimen Source  Arterial   18  09:40    


 


Sample Site  Right Radial   18  09:40    


 


pH  7.42  (7.35-7.45)   18  09:40    


 


pCO2  43.0 mmHg (35.0-45.0)   18  09:40    


 


pO2  64.0 mmHg (80.0-100.0)  L  18  09:40    


 


HCO3  27.1 mEq/L (20.0-26.0)  H  18  09:40    


 


Base Excess  3.0 mEq/L (-3.0-3.0)   18  09:40    


 


O2 Saturation  92.0 % (92.0-100.0)   18  09:40    


 


Ceferino Test  Positive   18  09:40    


 


Vent Rate  NA   18  09:40    


 


Inspired O2  44   18  09:40    


 


Tidal Volume  NA   18  09:40    


 


PEEP  NA   18  09:40    


 


Pressure (ins/psv/peep)  NA   18  09:40    


 


Critical Value  LZHANG   18  09:40    


 


Sodium  140 mEq/L (136-145)   18  05:00    


 


Potassium  5.0 mEq/L (3.5-5.1)   18  05:00    


 


Chloride  112 mEq/L ()  H  18  05:00    


 


Carbon Dioxide  18.6 mEq/L (21.0-31.0)  L  18  05:00    


 


Anion Gap  14.4  (7.0-16.0)   18  05:00    


 


BUN  64 mg/dL (7-25)  H  18  05:00    


 


Creatinine  1.4 mg/dL (0.7-1.3)  H  18  05:00    


 


Est GFR ( Amer)  > 60.0 ml/min (>90)   18  05:00    


 


Est GFR (Non-Af Amer)  54.1 ml/min  18  05:00    


 


BUN/Creatinine Ratio  45.7   18  05:00    


 


Glucose  131 mg/dL ()  H  18  05:00    


 


POC Glucose  150 MG/DL (70 - 105)  H  18  12:27    


 


Hemoglobin A1c %  4.8 % (4.0-6.0)   18  04:40    


 


Whole Bld Lactic Acid  1.36 mmol/L (0.60-1.99)   18  05:50    


 


Calcium  8.0 mg/dL (8.6-10.3)  L  18  05:00    


 


Phosphorus  3.0 mg/dL (2.5-5.0)   18  04:30    


 


Magnesium  2.3 mg/dL (1.9-2.7)   18  04:30    


 


Total Bilirubin  0.5 mg/dL (0.3-1.0)   18  06:00    


 


Direct Bilirubin  0.05 mg/dL (0.0-0.2)   18  05:50    


 


AST  34 U/L (13-39)   18  06:00    


 


ALT  23 U/L (7-52)   18  06:00    


 


Alkaline Phosphatase  76 U/L ()   18  06:00    


 


Ammonia  52 umol/L (16-53)   18  04:30    


 


Total Protein  5.8 gm/dL (6.0-8.3)  L  18  06:00    


 


Albumin  2.5 gm/dL (4.2-5.5)  L  18  06:00    


 


Globulin  3.3 gm/dL  18  06:00    


 


Albumin/Globulin Ratio  0.8  (1.0-1.8)  L  18  06:00    


 


Prealbumin  21 mg/dL (10-36)   18  11:35    


 


Triglycerides  83 mg/dL (<150)   18  04:55    


 


Cholesterol  64 mg/dL (<200)   18  06:00    


 


Urine Source  CLEAN C   18  17:35    


 


Urine Color  YELLOW   18  17:35    


 


Urine Clarity  HAZY  (CLEAR)   18  17:35    


 


Urine pH  5.0  (4.6 - 8.0)   18  17:35    


 


Ur Specific Gravity  1.020  (1.005-1.030)   18  17:35    


 


Urine Protein  30 mg/dL (NEGATIVE)  H  18  17:35    


 


Urine Glucose (UA)  NEGATIVE mg/dL (NEGATIVE)   18  17:35    


 


Urine Ketones  NEGATIVE mg/dL (NEGATIVE)   18  17:35    


 


Urine Blood  TRACE  (NEGATIVE)   18  17:35    


 


Urine Nitrate  NEGATIVE  (NEGATIVE)   18  17:35    


 


Urine Bilirubin  NEGATIVE  (NEGATIVE)   18  17:35    


 


Urine Urobilinogen  0.2 E.U./dL (0.2 - 1.0)   18  17:35    


 


Ur Leukocyte Esterase  TRACE  (NEGATIVE)  H  18  17:35    


 


Urine RBC  0-2 /hpf (0-5)  H  18  17:35    


 


Urine WBC  0-2 /hpf (0-5)   18  17:35    


 


Ur Epithelial Cells  OCCASIONAL /lpf (FEW)   18  17:35    


 


Amorphous Sediment  FEW URATES  (NONE SEEN)   18  17:35    


 


Urine Bacteria  FEW /hpf (NONE SEEN)   18  17:35    


 


Vancomycin Trough  16.8 ug/mL (5-10)  H  18  09:30    


 


Random Vancomycin  12.9 ug/mL (5.0-40.0)   18  06:00    














- Physical Exam


Vitals and I&O: 


 Vital Signs











Temp  98.6 F   18 08:00


 


Pulse  87   18 08:00


 


Resp  20   18 11:31


 


BP  176/97   18 08:00


 


Pulse Ox  81   18 11:31








 Intake & Output











 18





 18:59 06:59 18:59


 


Intake Total 1910 50 550


 


Output Total 2725 1350 


 


Balance -815 -1300 550


 


Weight (lbs) 98.883 kg 96.071 kg 


 


Intake:   


 


  Intake, IV Amount 550 50 550


 


    Cefepime 1 gm In Dextrose 50 50 50





    5% 50 ml @ 100 mls/hr IV   





    Q12HR [facility] Rx#:[id number]   


 


    Vancomycin HCl 1.5 gm In 500  500





    Sodium Chloride 0.9% 500   





    ml @ 250 mls/hr IV Q24H   





    [facility] Rx#:[id number]   


 


  Oral 1360  


 


Output:   


 


  Urine 2725 1350 


 


Other:   


 


  # Bowel Movements  1 


 


  Stool Characteristics Soft Soft Soft





 Formed [place]


 


  Weight Source Bedscale Bedscale 











Active Medications: 


Current Medications





Acetaminophen/Hydrocodone Bitart (Norco 10 Mg/325 Mg)  1 tab PO Q6H PRN


   PRN Reason: Pain (Severe)


   Stop: 18 20:04


   Last Admin: 18 03:03 Dose:  1 tab


Albuterol/Ipratropium (Duoneb Neb)  3 ml HHN Q4HRT [facility]


   Stop: 18 14:59


   Last Admin: 18 11:29 Dose:  3 ml


Budesonide (Pulmicort)  0.5 mg HHN BIDRT [facility]


   Stop: 18 18:59


   Last Admin: 18 07:39 Dose:  0.5 mg


Cholestyramine Resin (Questran)  4 gm PO BID [facility]


   Stop: 18 16:59


   Last Admin: 18 09:19 Dose:  4 gm


Hydromorphone HCl (Dilaudid)  1 mg IVP Q3HR PRN


   PRN Reason: SEVERE PAIN


   Stop: 18 16:25


   Last Admin: 18 02:27 Dose:  1 mg


Cefepime HCl 1 gm/ Dextrose  50 mls @ 100 mls/hr IV Q12HR [facility]


   Stop: 18 20:59


   Last Infusion: 18 10:30 Dose:  Infused


Vancomycin HCl 1.5 gm/ Sodium (Chloride)  500 mls @ 250 mls/hr IV Q24H [facility]


   Stop: 18 08:59


   Last Infusion: 18 13:34 Dose:  Infused


Insulin Aspart (Novolog Insulin Sliding Scale)  0 units SUBQ Q6H [facility]; Protocol


   Stop: 18 11:59


   Last Admin: 18 13:32 Dose:  Not Given


Lorazepam (Ativan)  1 mg IVP Q4HR PRN; Protocol


   PRN Reason: Anxiety


   Stop: 18 02:24


   Last Admin: 18 21:19 Dose:  1 mg


Methylprednisolone Sodium Succinate (Solu-Medrol)  60 mg IVP Q6H [facility]


   Stop: 18 16:29


   Last Admin: 18 10:30 Dose:  60 mg


Metoclopramide HCl (Reglan)  10 mg IVP Q6HR [facility]


   Stop: 18 17:59


   Last Admin: 18 13:33 Dose:  10 mg


Miscellaneous (Vancomycin Iv Per Pharmacy)  1 [facility] PRN PRN


   PRN Reason: PROTOCOL


   Stop: 18 14:14


Miscellaneous (Probiotic Screen)  1 [facility] PRN PRN


   PRN Reason: PROTOCOL


   Stop: 18 12:41


Ondansetron HCl (Zofran)  4 mg IV Q4H PRN


   PRN Reason: Nausea / Vomiting


   Stop: 18 05:53


   Last Admin: 18 23:02 Dose:  4 mg


Pantoprazole Sodium (Protonix)  40 mg IVP BID [facility]


   Stop: 18 08:59


   Last Admin: 18 09:19 Dose:  40 mg








General: Alert, Cooperative, No acute distress


HEENT: Atraumatic, PERRLA (+dressing), EOMI


Neck: Supple


Cardiovascular: Regular rate, Normal S1, Normal S2


Lungs: Clear to auscultation


Abdomen: Soft


Extremities: Edema (+2 edema)





- Procedures


Procedures: 


 Procedures











Procedure Code Date


 


EXCISION OF CHEST SKIN, EXTERNAL APPROACH 5BN2SVP 18


 


EXCISION OF ILEUM, OPEN APPROACH [id number] 18


 


EXCISION OF RIGHT LOWER ARM SKIN, EXTERNAL APPROACH [id number] 18


 


EXCISION OF RIGHT LOWER LEG SKIN, EXTERNAL APPROACH [id number] 18


 


RELEASE ILEUM, OPEN APPROACH [id number] 18


 


RESPIRATORY VENTILATION, 24-96 CONSECUTIVE HOURS [id number] 18














Assessment/Plan





- Assessment


Assessment: 


CT scan shows bowel obstruction


distended abdomen


ARF








- Plan


Plan: 


renal function stable


will monitor renal function


check labs in am





Nutritional Asmnt/Malnutr-PDOC





- Dietary Evaluation


Malnutrition Findings (Please click <Entered> for more info): 








Nutritional Asmnt/Malnutrition                             Start:  18 14:

24


Text:                                                      Status: Complete    

  


Freq:                                                                          

  


Protocol:                                                                      

  


 Document     18 14:24  DOMINIKKINSEY  (Rec: 18 14:35  DOMINIKKINSEY [id number])


 Nutritional Asmnt/Malnutrition


     Patient General Information


      Nutritional Screening                      High Risk


                                                 Consult


      Diagnosis                                  exploratory laparotomy with


                                                 recersal colostomy


      Pertinent Medical Hx/Surgical Hx           acute perforation of


                                                 diverticulitis, s/p


                                                 exploratory laparotomy and


                                                 divertin gcolostomy placement


      Subjective Information                     Pt seen lying in bed at time


                                                 of visit, awake and alert. Pt


                                                 stated he tolerated clear


                                                 liquid well, adequte amount


                                                 for him at this time. Pt has


                                                 RAQUEL drain noted. Per EMR, PO


                                                 intake % of clear liquid


                                                 .


      Current Diet Order/ Nutrition Support      clear liquid


      Pertinent Medications                      D5-0.9%ns, piperacillin,


                                                 vancomycin


      Pertinent Labs                              Na 133, K 3.4, Cr 0.7,


                                                 glucose 107, Ca 7.9


     Nutritional Hx/Data


      Height                                     1.88 m


      Height (Calculated Centimeters)            188.0


      Current Weight (lbs)                       86.636 kg


      Weight (Calculated Kilograms)              86.6


      Weight (Calculated Grams)                  96261.1


      Ideal Body Weight                          190


      Body Mass Index (BMI)                      24.5


      Weight Status                              Approriate


     GI Symptoms


      GI Symptoms                                None


      Last BM                                    none


      Difficult in:                              None


      Skin Integrity/Comment:                    RIGHT LOWER LEG HEALING


                                                 incision to right arm


     Estimated Nutritional Goals


      BEE in Kcals:                              Using Current wt


      Calories/Kcals/Kg                          25-30


      Kcals Calculated                           1286-7214


      Protein:                                   Using Current wt


      Protein g/k.8-1


      Protein Calculated                         69-86


      Fluid: ml                                  2150-2580ml (1ml/kcal)


     Nutritional Problem


      1. Problem


       Problem                                   altered GI function


       Etiology                                  s/p reversal colostomy


       Signs/Symptoms:                           pt on clear liquid


     Intervention/Recommendation


      Comments                                   1. Continue with clear liquid


                                                 diet as ordered.


                                                 2. Monitor PO intake, wt, labs


                                                 and skin integrity


                                                 3. F/U as high risk in 2-3


                                                 days, -


     Expected Outcomes/Goals


      Expected Outcomes/Goals                    1. PO intake to meet at least


                                                 75% of nutritional needs.


                                                 2. Wt stability, skin to


                                                 remain intact, labs to


                                                 approach WNL.

## 2018-07-04 NOTE — GENERAL PROGRESS NOTE
Subjective





- Review of Systems


Service Date: 18


Events since last encounter: 





diet advanced, having BM





Objective





- Results


Result Diagrams: 


 18 05:00





 18 05:00


Recent Labs: 


 Laboratory Last Values











WBC  18.8 Th/cmm (4.8-10.8)  H  18  05:00    


 


RBC  4.42 Mil/cmm (3.80-5.80)   18  05:00    


 


Hgb  12.6 gm/dL (12-16)   18  05:00    


 


Hct  38.7 % (41.0-60)  L  18  05:00    


 


MCV  87.6 fl (80-99)   18  05:00    


 


MCH  28.5 pg (27.0-31.0)   18  05:00    


 


MCHC Differential  32.6 pg (28.0-36.0)   18  05:00    


 


RDW  15.9 % (11.5-20.0)   18  05:00    


 


Plt Count  100 Th/cmm (150-400)  L  18  05:00    


 


MPV  9.6 fl  18  05:00    


 


Neutrophils %  95.2 % (40.0-80.0)  H  18  11:35    


 


Band Neutrophils %  3 % (0-10)   18  05:00    


 


Lymphocytes %  2.5 % (20.0-50.0)  L  18  11:35    


 


Monocytes %  1.4 % (2.0-10.0)  L  18  11:35    


 


Eosinophils %  0.1 % (0.0-5.0)   18  11:35    


 


Basophils %  0.8 % (0.0-2.0)   18  11:35    


 


Neutrophils (Manual)  93 % (40-80)  H  18  05:00    


 


Lymphocytes  2 % (20-50)  L  18  05:00    


 


Monocytes  2 % (2-10)   18  05:00    


 


Eosinophils  0 % (0-5)   18  05:00    


 


Basophils  0 % (0-3)   18  05:00    


 


Platelet Estimate  DECREASED PLATELETS  (NORMAL)   18  05:00    


 


Platelet Morphology  NORMAL  (NORMAL)   18  05:00    


 


Polychromasia  1+   18  04:40    


 


Anna Cells  1+   18  04:40    


 


RBC Morph Micro Appear  NORMAL  (NORMAL)   18  05:00    


 


PT  11.9 SECONDS (9.5-11.5)  H  18  12:08    


 


INR  1.13  (0.5-1.4)   18  12:08    


 


PTT (Actin FS)  22.3 SECONDS (26.0-38.0)  L  18  12:08    


 


Specimen Source  Arterial   18  09:40    


 


Sample Site  Right Radial   18  09:40    


 


pH  7.42  (7.35-7.45)   18  09:40    


 


pCO2  43.0 mmHg (35.0-45.0)   18  09:40    


 


pO2  64.0 mmHg (80.0-100.0)  L  18  09:40    


 


HCO3  27.1 mEq/L (20.0-26.0)  H  18  09:40    


 


Base Excess  3.0 mEq/L (-3.0-3.0)   18  09:40    


 


O2 Saturation  92.0 % (92.0-100.0)   18  09:40    


 


Ceferino Test  Positive   18  09:40    


 


Vent Rate  NA   18  09:40    


 


Inspired O2  44   18  09:40    


 


Tidal Volume  NA   18  09:40    


 


PEEP  NA   18  09:40    


 


Pressure (ins/psv/peep)  NA   18  09:40    


 


Critical Value  LZHANG   18  09:40    


 


Sodium  140 mEq/L (136-145)   18  05:00    


 


Potassium  5.0 mEq/L (3.5-5.1)   18  05:00    


 


Chloride  112 mEq/L ()  H  18  05:00    


 


Carbon Dioxide  18.6 mEq/L (21.0-31.0)  L  18  05:00    


 


Anion Gap  14.4  (7.0-16.0)   18  05:00    


 


BUN  64 mg/dL (7-25)  H  18  05:00    


 


Creatinine  1.4 mg/dL (0.7-1.3)  H  18  05:00    


 


Est GFR ( Amer)  > 60.0 ml/min (>90)   18  05:00    


 


Est GFR (Non-Af Amer)  54.1 ml/min  18  05:00    


 


BUN/Creatinine Ratio  45.7   18  05:00    


 


Glucose  131 mg/dL ()  H  18  05:00    


 


POC Glucose  150 MG/DL (70 - 105)  H  18  12:27    


 


Hemoglobin A1c %  4.8 % (4.0-6.0)   18  04:40    


 


Whole Bld Lactic Acid  1.36 mmol/L (0.60-1.99)   18  05:50    


 


Calcium  8.0 mg/dL (8.6-10.3)  L  18  05:00    


 


Phosphorus  3.0 mg/dL (2.5-5.0)   18  04:30    


 


Magnesium  2.3 mg/dL (1.9-2.7)   18  04:30    


 


Total Bilirubin  0.5 mg/dL (0.3-1.0)   18  06:00    


 


Direct Bilirubin  0.05 mg/dL (0.0-0.2)   18  05:50    


 


AST  34 U/L (13-39)   18  06:00    


 


ALT  23 U/L (7-52)   18  06:00    


 


Alkaline Phosphatase  76 U/L ()   18  06:00    


 


Ammonia  52 umol/L (16-53)   18  04:30    


 


Total Protein  5.8 gm/dL (6.0-8.3)  L  18  06:00    


 


Albumin  2.5 gm/dL (4.2-5.5)  L  18  06:00    


 


Globulin  3.3 gm/dL  18  06:00    


 


Albumin/Globulin Ratio  0.8  (1.0-1.8)  L  18  06:00    


 


Prealbumin  21 mg/dL (10-36)   18  11:35    


 


Triglycerides  83 mg/dL (<150)   18  04:55    


 


Cholesterol  64 mg/dL (<200)   18  06:00    


 


Urine Source  CLEAN C   18  17:35    


 


Urine Color  YELLOW   18  17:35    


 


Urine Clarity  HAZY  (CLEAR)   18  17:35    


 


Urine pH  5.0  (4.6 - 8.0)   18  17:35    


 


Ur Specific Gravity  1.020  (1.005-1.030)   18  17:35    


 


Urine Protein  30 mg/dL (NEGATIVE)  H  18  17:35    


 


Urine Glucose (UA)  NEGATIVE mg/dL (NEGATIVE)   18  17:35    


 


Urine Ketones  NEGATIVE mg/dL (NEGATIVE)   18  17:35    


 


Urine Blood  TRACE  (NEGATIVE)   18  17:35    


 


Urine Nitrate  NEGATIVE  (NEGATIVE)   18  17:35    


 


Urine Bilirubin  NEGATIVE  (NEGATIVE)   18  17:35    


 


Urine Urobilinogen  0.2 E.U./dL (0.2 - 1.0)   18  17:35    


 


Ur Leukocyte Esterase  TRACE  (NEGATIVE)  H  18  17:35    


 


Urine RBC  0-2 /hpf (0-5)  H  18  17:35    


 


Urine WBC  0-2 /hpf (0-5)   18  17:35    


 


Ur Epithelial Cells  OCCASIONAL /lpf (FEW)   18  17:35    


 


Amorphous Sediment  FEW URATES  (NONE SEEN)   18  17:35    


 


Urine Bacteria  FEW /hpf (NONE SEEN)   18  17:35    


 


Vancomycin Trough  16.8 ug/mL (5-10)  H  18  09:30    


 


Random Vancomycin  12.9 ug/mL (5.0-40.0)   18  06:00    














- Physical Exam


Vitals and I&O: 


 Vital Signs











Temp  98.6 F   18 08:00


 


Pulse  87   18 08:00


 


Resp  20   18 11:31


 


BP  176/97   18 08:00


 


Pulse Ox  81   18 11:31








 Intake & Output











 18





 18:59 06:59 18:59


 


Intake Total 1910 50 550


 


Output Total 2725 1350 


 


Balance -815 -1300 550


 


Weight (lbs) 98.883 kg 96.071 kg 


 


Intake:   


 


  Intake, IV Amount 550 50 550


 


    Cefepime 1 gm In Dextrose 50 50 50





    5% 50 ml @ 100 mls/hr IV   





    Q12HR [facility] Rx#:[id number]   


 


    Vancomycin HCl 1.5 gm In 500  500





    Sodium Chloride 0.9% 500   





    ml @ 250 mls/hr IV Q24H   





    [facility] Rx#:[id number]   


 


  Oral 1360  


 


Output:   


 


  Urine 2725 1350 


 


Other:   


 


  # Bowel Movements  1 


 


  Stool Characteristics Soft Soft Soft





 Formed [place]


 


  Weight Source Bedscale Bedscale 











Active Medications: 


Current Medications





Acetaminophen/Hydrocodone Bitart (Norco 10 Mg/325 Mg)  1 tab PO Q6H PRN


   PRN Reason: Pain (Severe)


   Stop: 18 20:04


   Last Admin: 18 03:03 Dose:  1 tab


Albuterol/Ipratropium (Duoneb Neb)  3 ml HHN Q4HRT [facility]


   Stop: 18 14:59


   Last Admin: 18 11:29 Dose:  3 ml


Budesonide (Pulmicort)  0.5 mg HHN BIDRT [facility]


   Stop: 18 18:59


   Last Admin: 18 07:39 Dose:  0.5 mg


Cholestyramine Resin (Questran)  4 gm PO BID [facility]


   Stop: 18 16:59


   Last Admin: 18 09:19 Dose:  4 gm


Hydromorphone HCl (Dilaudid)  1 mg IVP Q3HR PRN


   PRN Reason: SEVERE PAIN


   Stop: 18 16:25


   Last Admin: 18 02:27 Dose:  1 mg


Cefepime HCl 1 gm/ Dextrose  50 mls @ 100 mls/hr IV Q12HR [facility]


   Stop: 18 20:59


   Last Infusion: 18 10:30 Dose:  Infused


Vancomycin HCl 1.5 gm/ Sodium (Chloride)  500 mls @ 250 mls/hr IV Q24H [facility]


   Stop: 18 08:59


   Last Infusion: 18 13:34 Dose:  Infused


Insulin Aspart (Novolog Insulin Sliding Scale)  0 units SUBQ Q6H [facility]; Protocol


   Stop: 18 11:59


   Last Admin: 18 13:32 Dose:  Not Given


Lorazepam (Ativan)  1 mg IVP Q4HR PRN; Protocol


   PRN Reason: Anxiety


   Stop: 18 02:24


   Last Admin: 18 21:19 Dose:  1 mg


Methylprednisolone Sodium Succinate (Solu-Medrol)  60 mg IVP Q6H [facility]


   Stop: 18 16:29


   Last Admin: 18 10:30 Dose:  60 mg


Metoclopramide HCl (Reglan)  10 mg IVP Q6HR [facility]


   Stop: 18 17:59


   Last Admin: 18 13:33 Dose:  10 mg


Miscellaneous (Vancomycin Iv Per Pharmacy)  1 [facility] PRN PRN


   PRN Reason: PROTOCOL


   Stop: 18 14:14


Miscellaneous (Probiotic Screen)  1 [facility] PRN PRN


   PRN Reason: PROTOCOL


   Stop: 18 12:41


Ondansetron HCl (Zofran)  4 mg IV Q4H PRN


   PRN Reason: Nausea / Vomiting


   Stop: 18 05:53


   Last Admin: 18 23:02 Dose:  4 mg


Pantoprazole Sodium (Protonix)  40 mg IVP BID [facility]


   Stop: 18 08:59


   Last Admin: 18 09:19 Dose:  40 mg








General: Alert, Cooperative, No acute distress


HEENT: Atraumatic, PERRLA (+dressing), EOMI


Neck: Supple


Cardiovascular: Regular rate, Normal S1, Normal S2


Lungs: Clear to auscultation


Abdomen: Soft


Extremities: Edema (+2 edema)





- Procedures


Procedures: 


 Procedures











Procedure Code Date


 


EXCISION OF CHEST SKIN, EXTERNAL APPROACH [id number] 18


 


EXCISION OF ILEUM, OPEN APPROACH [id number] 18


 


EXCISION OF RIGHT LOWER ARM SKIN, EXTERNAL APPROACH [id number] 18


 


EXCISION OF RIGHT LOWER LEG SKIN, EXTERNAL APPROACH [id number] 18


 


RELEASE ILEUM, OPEN APPROACH [id number] 18


 


RESPIRATORY VENTILATION, 24-96 CONSECUTIVE HOURS [id number] 18














Nutritional Asmnt/Malnutr-PDOC





- Dietary Evaluation


Malnutrition Findings (Please click <Entered> for more info): 








Nutritional Asmnt/Malnutrition                             Start:  18 14:

24


Text:                                                      Status: Complete    

  


Freq:                                                                          

  


Protocol:                                                                      

  


 Document     18 14:24  LCHENG  (Rec: 18 14:35  LCHENG  [id number])


 Nutritional Asmnt/Malnutrition


     Patient General Information


      Nutritional Screening                      High Risk


                                                 Consult


      Diagnosis                                  exploratory laparotomy with


                                                 recersal colostomy


      Pertinent Medical Hx/Surgical Hx           acute perforation of


                                                 diverticulitis, s/p


                                                 exploratory laparotomy and


                                                 divertin gcolostomy placement


      Subjective Information                     Pt seen lying in bed at time


                                                 of visit, awake and alert. Pt


                                                 stated he tolerated clear


                                                 liquid well, adequte amount


                                                 for him at this time. Pt has


                                                 RAQUEL drain noted. Per EMR, PO


                                                 intake % of clear liquid


                                                 .


      Current Diet Order/ Nutrition Support      clear liquid


      Pertinent Medications                      D5-0.9%ns, piperacillin,


                                                 vancomycin


      Pertinent Labs                              Na 133, K 3.4, Cr 0.7,


                                                 glucose 107, Ca 7.9


     Nutritional Hx/Data


      Height                                     1.88 m


      Height (Calculated Centimeters)            188.0


      Current Weight (lbs)                       86.636 kg


      Weight (Calculated Kilograms)              86.6


      Weight (Calculated Grams)                  65417.1


      Ideal Body Weight                          190


      Body Mass Index (BMI)                      24.5


      Weight Status                              Approriate


     GI Symptoms


      GI Symptoms                                None


      Last BM                                    none


      Difficult in:                              None


      Skin Integrity/Comment:                    RIGHT LOWER LEG HEALING


                                                 incision to right arm


     Estimated Nutritional Goals


      BEE in Kcals:                              Using Current wt


      Calories/Kcals/Kg                          25-30


      Kcals Calculated                           8516-1578


      Protein:                                   Using Current wt


      Protein g/k.8-1


      Protein Calculated                         69-86


      Fluid: ml                                  2150-2580ml (1ml/kcal)


     Nutritional Problem


      1. Problem


       Problem                                   altered GI function


       Etiology                                  s/p reversal colostomy


       Signs/Symptoms:                           pt on clear liquid


     Intervention/Recommendation


      Comments                                   1. Continue with clear liquid


                                                 diet as ordered.


                                                 2. Monitor PO intake, wt, labs


                                                 and skin integrity


                                                 3. F/U as high risk in 2-3


                                                 days, -


     Expected Outcomes/Goals


      Expected Outcomes/Goals                    1. PO intake to meet at least


                                                 75% of nutritional needs.


                                                 2. Wt stability, skin to


                                                 remain intact, labs to


                                                 approach WNL.

## 2018-07-04 NOTE — DIAGNOSTIC IMAGING REPORT
CHEST X-RAY: AP view



INDICATION: Shortness of breath



COMPARISON: 7/2/2018



FINDINGS: Prior NG tube has been removed.  Right PICC line is stable. 

Congestive changes are seen with interstitial infiltrates left greater

than right and small effusions.  Low lung lungs are noted.  Heart size

is normal.



IMPRESSION:



Congestive changes and interstitial infiltrates,, left greater than

right, and small bilateral effusions.

## 2018-07-05 LAB
ALBUMIN SERPL-MCNC: 2.3 GM/DL (ref 4.2–5.5)
ALBUMIN/GLOB SERPL: 0.7 {RATIO} (ref 1–1.8)
ALP SERPL-CCNC: 100 U/L (ref 34–104)
ALT SERPL-CCNC: 45 U/L (ref 7–52)
ANION GAP SERPL CALC-SCNC: 10.3 MMOL/L (ref 7–16)
AST SERPL-CCNC: 41 U/L (ref 13–39)
BASOPHILS NFR BLD: 0 % (ref 0–3)
BILIRUB SERPL-MCNC: 0.6 MG/DL (ref 0.3–1)
BUN SERPL-MCNC: 63 MG/DL (ref 7–25)
CALCIUM SERPL-MCNC: 8.4 MG/DL (ref 8.6–10.3)
CHLORIDE SERPL-SCNC: 105 MEQ/L (ref 98–107)
CO2 SERPL-SCNC: 29.1 MEQ/L (ref 21–31)
CREAT SERPL-MCNC: 1.4 MG/DL (ref 0.7–1.3)
EOSINOPHIL NFR BLD: 0 % (ref 0–5)
ERYTHROCYTE [DISTWIDTH] IN BLOOD BY AUTOMATED COUNT: 15.2 % (ref 11.5–20)
GLOBULIN SER-MCNC: 3.1 GM/DL
GLUCOSE SERPL-MCNC: 128 MG/DL (ref 70–105)
HCT VFR BLD CALC: 34.2 % (ref 41–60)
HGB BLD-MCNC: 11.6 GM/DL (ref 12–16)
LYMPHOCYTES # BLD MANUAL: 3 % (ref 20–50)
MCH RBC QN AUTO: 29.6 PG (ref 27–31)
MCHC RBC AUTO-ENTMCNC: 33.9 PG (ref 28–36)
MCV RBC AUTO: 87.2 FL (ref 80–99)
MONOCYTES # BLD MANUAL: 3 % (ref 2–10)
NEUTROPHILS NFR BLD AUTO: 90 % (ref 40–80)
NEUTS BAND NFR BLD: 4 % (ref 0–10)
PLAT MORPH BLD: NORMAL
PLATELET # BLD EST: ADEQUATE 10*3/UL
PLATELET # BLD: 124 TH/CMM (ref 150–400)
PMV BLD AUTO: 10.1 FL
POTASSIUM SERPL-SCNC: 4.4 MEQ/L (ref 3.5–5.1)
RBC # BLD AUTO: 3.92 MIL/CMM (ref 3.8–5.8)
RBC MORPH BLD: NORMAL
SODIUM SERPL-SCNC: 140 MEQ/L (ref 136–145)
TOTAL CELLS COUNTED BLD: 100
WBC # BLD AUTO: 20.9 TH/CMM (ref 4.8–10.8)

## 2018-07-05 RX ADMIN — METOCLOPRAMIDE SCH MG: 5 INJECTION, SOLUTION INTRAMUSCULAR; INTRAVENOUS at 12:00

## 2018-07-05 RX ADMIN — INSULIN ASPART SCH: 100 INJECTION, SOLUTION INTRAVENOUS; SUBCUTANEOUS at 18:50

## 2018-07-05 RX ADMIN — IPRATROPIUM BROMIDE AND ALBUTEROL SULFATE SCH ML: .5; 3 SOLUTION RESPIRATORY (INHALATION) at 18:46

## 2018-07-05 RX ADMIN — METOCLOPRAMIDE SCH MG: 5 INJECTION, SOLUTION INTRAMUSCULAR; INTRAVENOUS at 06:01

## 2018-07-05 RX ADMIN — METOCLOPRAMIDE SCH MG: 5 INJECTION, SOLUTION INTRAMUSCULAR; INTRAVENOUS at 18:50

## 2018-07-05 RX ADMIN — INSULIN ASPART SCH: 100 INJECTION, SOLUTION INTRAVENOUS; SUBCUTANEOUS at 12:01

## 2018-07-05 RX ADMIN — IPRATROPIUM BROMIDE AND ALBUTEROL SULFATE SCH ML: .5; 3 SOLUTION RESPIRATORY (INHALATION) at 11:45

## 2018-07-05 RX ADMIN — IPRATROPIUM BROMIDE AND ALBUTEROL SULFATE SCH ML: .5; 3 SOLUTION RESPIRATORY (INHALATION) at 16:28

## 2018-07-05 RX ADMIN — IPRATROPIUM BROMIDE AND ALBUTEROL SULFATE SCH ML: .5; 3 SOLUTION RESPIRATORY (INHALATION) at 03:42

## 2018-07-05 RX ADMIN — METOCLOPRAMIDE SCH MG: 5 INJECTION, SOLUTION INTRAMUSCULAR; INTRAVENOUS at 00:02

## 2018-07-05 RX ADMIN — HYDROCODONE BITATRATE AND ACETAMINOPHEN PRN TAB: 10; 325 TABLET ORAL at 11:31

## 2018-07-05 RX ADMIN — INSULIN ASPART SCH: 100 INJECTION, SOLUTION INTRAVENOUS; SUBCUTANEOUS at 00:10

## 2018-07-05 RX ADMIN — BUDESONIDE SCH MG: 0.5 SUSPENSION RESPIRATORY (INHALATION) at 18:46

## 2018-07-05 RX ADMIN — IPRATROPIUM BROMIDE AND ALBUTEROL SULFATE SCH ML: .5; 3 SOLUTION RESPIRATORY (INHALATION) at 06:31

## 2018-07-05 RX ADMIN — BUDESONIDE SCH MG: 0.5 SUSPENSION RESPIRATORY (INHALATION) at 06:31

## 2018-07-05 RX ADMIN — INSULIN ASPART SCH: 100 INJECTION, SOLUTION INTRAVENOUS; SUBCUTANEOUS at 06:07

## 2018-07-05 NOTE — GENERAL PROGRESS NOTE
Subjective





- Review of Systems


Service Date: 18


Subjective: 


pt in bed 


starting oral intake





Objective





- Results


Result Diagrams: 


 18 04:50





 18 04:50


Recent Labs: 


 Laboratory Last Values











WBC  20.9 Th/cmm (4.8-10.8)  H*  18  04:50    


 


RBC  3.92 Mil/cmm (3.80-5.80)   18  04:50    


 


Hgb  11.6 gm/dL (12-16)  L  18  04:50    


 


Hct  34.2 % (41.0-60)  L  18  04:50    


 


MCV  87.2 fl (80-99)   18  04:50    


 


MCH  29.6 pg (27.0-31.0)   18  04:50    


 


MCHC Differential  33.9 pg (28.0-36.0)   18  04:50    


 


RDW  15.2 % (11.5-20.0)   18  04:50    


 


Plt Count  124 Th/cmm (150-400)  L  18  04:50    


 


MPV  10.1 fl  18  04:50    


 


Neutrophils %  NP   18  04:50    


 


Band Neutrophils %  4 % (0-10)   18  04:50    


 


Lymphocytes %  2.5 % (20.0-50.0)  L  18  11:35    


 


Monocytes %  1.4 % (2.0-10.0)  L  18  11:35    


 


Eosinophils %  0.1 % (0.0-5.0)   18  11:35    


 


Basophils %  0.8 % (0.0-2.0)   18  11:35    


 


Neutrophils (Manual)  90 % (40-80)  H  18  04:50    


 


Lymphocytes  3 % (20-50)  L  18  04:50    


 


Monocytes  3 % (2-10)   18  04:50    


 


Eosinophils  0 % (0-5)   18  04:50    


 


Basophils  0 % (0-3)   18  04:50    


 


Platelet Estimate  ADEQUATE  (NORMAL)   18  04:50    


 


Platelet Morphology  NORMAL  (NORMAL)   18  04:50    


 


Polychromasia  1+   18  04:40    


 


Anna Cells  1+   18  04:40    


 


RBC Morph Micro Appear  NORMAL  (NORMAL)   18  04:50    


 


PT  11.9 SECONDS (9.5-11.5)  H  18  12:08    


 


INR  1.13  (0.5-1.4)   18  12:08    


 


PTT (Actin FS)  22.3 SECONDS (26.0-38.0)  L  18  12:08    


 


Specimen Source  Arterial   18  09:40    


 


Sample Site  Right Radial   18  09:40    


 


pH  7.42  (7.35-7.45)   18  09:40    


 


pCO2  43.0 mmHg (35.0-45.0)   18  09:40    


 


pO2  64.0 mmHg (80.0-100.0)  L  18  09:40    


 


HCO3  27.1 mEq/L (20.0-26.0)  H  18  09:40    


 


Base Excess  3.0 mEq/L (-3.0-3.0)   18  09:40    


 


O2 Saturation  92.0 % (92.0-100.0)   18  09:40    


 


Ceferino Test  Positive   18  09:40    


 


Vent Rate  NA   18  09:40    


 


Inspired O2  44   18  09:40    


 


Tidal Volume  NA   18  09:40    


 


PEEP  NA   18  09:40    


 


Pressure (ins/psv/peep)  NA   18  09:40    


 


Critical Value  LZHANG   18  09:40    


 


Sodium  140 mEq/L (136-145)   18  04:50    


 


Potassium  4.4 mEq/L (3.5-5.1)   18  04:50    


 


Chloride  105 mEq/L ()   18  04:50    


 


Carbon Dioxide  29.1 mEq/L (21.0-31.0)   18  04:50    


 


Anion Gap  10.3  (7.0-16.0)   18  04:50    


 


BUN  63 mg/dL (7-25)  H  18  04:50    


 


Creatinine  1.4 mg/dL (0.7-1.3)  H  18  04:50    


 


Est GFR ( Amer)  > 60.0 ml/min (>90)   18  04:50    


 


Est GFR (Non-Af Amer)  54.1 ml/min  18  04:50    


 


BUN/Creatinine Ratio  45.0   18  04:50    


 


Glucose  128 mg/dL ()  H  18  04:50    


 


POC Glucose  129 MG/DL (70 - 105)  H  18  11:46    


 


Hemoglobin A1c %  4.8 % (4.0-6.0)   18  04:40    


 


Whole Bld Lactic Acid  1.36 mmol/L (0.60-1.99)   18  05:50    


 


Calcium  8.4 mg/dL (8.6-10.3)  L  18  04:50    


 


Phosphorus  3.0 mg/dL (2.5-5.0)   18  04:30    


 


Magnesium  2.3 mg/dL (1.9-2.7)   18  04:30    


 


Total Bilirubin  0.6 mg/dL (0.3-1.0)   18  04:50    


 


Direct Bilirubin  0.05 mg/dL (0.0-0.2)   18  05:50    


 


AST  41 U/L (13-39)  H  18  04:50    


 


ALT  45 U/L (7-52)   18  04:50    


 


Alkaline Phosphatase  100 U/L ()   18  04:50    


 


Ammonia  52 umol/L (16-53)   18  04:30    


 


Total Protein  5.4 gm/dL (6.0-8.3)  L  18  04:50    


 


Albumin  2.3 gm/dL (4.2-5.5)  L  18  04:50    


 


Globulin  3.1 gm/dL  18  04:50    


 


Albumin/Globulin Ratio  0.7  (1.0-1.8)  L  18  04:50    


 


Prealbumin  21 mg/dL (10-36)   18  11:35    


 


Triglycerides  83 mg/dL (<150)   18  04:55    


 


Cholesterol  64 mg/dL (<200)   18  06:00    


 


Urine Source  CLEAN C   18  17:35    


 


Urine Color  YELLOW   18  17:35    


 


Urine Clarity  HAZY  (CLEAR)   18  17:35    


 


Urine pH  5.0  (4.6 - 8.0)   18  17:35    


 


Ur Specific Gravity  1.020  (1.005-1.030)   18  17:35    


 


Urine Protein  30 mg/dL (NEGATIVE)  H  18  17:35    


 


Urine Glucose (UA)  NEGATIVE mg/dL (NEGATIVE)   18  17:35    


 


Urine Ketones  NEGATIVE mg/dL (NEGATIVE)   18  17:35    


 


Urine Blood  TRACE  (NEGATIVE)   18  17:35    


 


Urine Nitrate  NEGATIVE  (NEGATIVE)   18  17:35    


 


Urine Bilirubin  NEGATIVE  (NEGATIVE)   18  17:35    


 


Urine Urobilinogen  0.2 E.U./dL (0.2 - 1.0)   18  17:35    


 


Ur Leukocyte Esterase  TRACE  (NEGATIVE)  H  18  17:35    


 


Urine RBC  0-2 /hpf (0-5)  H  18  17:35    


 


Urine WBC  0-2 /hpf (0-5)   18  17:35    


 


Ur Epithelial Cells  OCCASIONAL /lpf (FEW)   18  17:35    


 


Amorphous Sediment  FEW URATES  (NONE SEEN)   18  17:35    


 


Urine Bacteria  FEW /hpf (NONE SEEN)   18  17:35    


 


Vancomycin Trough  24.3 ug/mL (5-10)  H  18  08:00    


 


Random Vancomycin  12.9 ug/mL (5.0-40.0)   18  06:00    














- Physical Exam


Vitals and I&O: 


 Vital Signs











Temp  96.7 F   18 08:00


 


Pulse  102   18 11:45


 


Resp  24   18 11:45


 


BP  128/79   18 11:00


 


Pulse Ox  95   18 11:45








 Intake & Output











 18





 18:59 06:59 18:59


 


Intake Total 550 950 50


 


Output Total  3400 


 


Balance 550 -2450 50


 


Weight (lbs)  97.432 kg 


 


Intake:   


 


  Intake, IV Amount 550 50 50


 


    Cefepime 1 gm In Dextrose 50 50 50





    5% 50 ml @ 100 mls/hr IV   





    Q12HR [facility] Rx#:[id number]   


 


    Vancomycin HCl 1.5 gm In 500  





    Sodium Chloride 0.9% 500   





    ml @ 250 mls/hr IV Q24H   





    [facility] Rx#:[id number]   


 


  Oral  900 


 


Output:   


 


  Urine  3400 


 


Other:   


 


  # Bowel Movements  2 


 


  Stool Characteristics Soft  





 Brown  


 


  Weight Source  Bedscale 











Active Medications: 


Current Medications





Acetaminophen/Hydrocodone Bitart (Norco 10 Mg/325 Mg)  1 tab PO Q6H PRN


   PRN Reason: Pain (Severe)


   Stop: 18 20:04


   Last Admin: 18 11:31 Dose:  1 tab


Albuterol/Ipratropium (Duoneb Neb)  3 ml HHN Q4HRT [facility]


   Stop: 18 14:59


   Last Admin: 18 11:45 Dose:  3 ml


Budesonide (Pulmicort)  0.5 mg HHN BIDRT [facility]


   Stop: 18 18:59


   Last Admin: 18 06:31 Dose:  0.5 mg


Cholestyramine Resin (Questran)  4 gm PO BID [facility]


   Stop: 18 16:59


   Last Admin: 18 09:00 Dose:  4 gm


Hydromorphone HCl (Dilaudid)  1 mg IVP Q3HR PRN


   PRN Reason: SEVERE PAIN


   Stop: 18 16:25


   Last Admin: 18 22:34 Dose:  1 mg


Cefepime HCl 1 gm/ Dextrose  50 mls @ 100 mls/hr IV Q12HR [facility]


   Stop: 18 20:59


   Last Infusion: 18 11:27 Dose:  Infused


Vancomycin HCl 1.25 gm/ Sodium (Chloride)  250 mls @ 165 mls/hr IV Q24H [facility]


   Stop: 18 09:59


   Last Admin: 18 12:01 Dose:  165 mls/hr


Insulin Aspart (Novolog Insulin Sliding Scale)  0 units SUBQ Q6H [facility]; Protocol


   Stop: 18 11:59


   Last Admin: 18 12:01 Dose:  Not Given


Lorazepam (Ativan)  1 mg IVP Q4HR PRN; Protocol


   PRN Reason: Anxiety


   Stop: 18 02:24


   Last Admin: 18 21:19 Dose:  1 mg


Methylprednisolone Sodium Succinate (Solu-Medrol)  60 mg IVP Q6H [facility]


   Stop: 18 16:29


   Last Admin: 18 10:00 Dose:  60 mg


Metoclopramide HCl (Reglan)  10 mg IVP Q6HR [facility]


   Stop: 18 17:59


   Last Admin: 18 12:00 Dose:  10 mg


Miscellaneous (Vancomycin Iv Per Pharmacy)  1 [facility] PRN PRN


   PRN Reason: PROTOCOL


   Stop: 18 14:14


Miscellaneous (Probiotic Screen)  1 [facility] PRN PRN


   PRN Reason: PROTOCOL


   Stop: 18 12:41


Ondansetron HCl (Zofran)  4 mg IV Q4H PRN


   PRN Reason: Nausea / Vomiting


   Stop: 18 05:53


   Last Admin: 18 23:02 Dose:  4 mg


Pantoprazole Sodium (Protonix)  40 mg IVP BID [facility]


   Stop: 18 08:59


   Last Admin: 18 09:32 Dose:  40 mg








General: Alert, Cooperative, No acute distress


HEENT: Atraumatic, PERRLA (+dressing), EOMI


Neck: Supple


Cardiovascular: Regular rate, Normal S1, Normal S2


Lungs: Clear to auscultation


Abdomen: Soft


Extremities: Edema (+2 edema)





- Procedures


Procedures: 


 Procedures











Procedure Code Date


 


EXCISION OF CHEST SKIN, EXTERNAL APPROACH 4WS0ESX 18


 


EXCISION OF ILEUM, OPEN APPROACH [id number] 18


 


EXCISION OF RIGHT LOWER ARM SKIN, EXTERNAL APPROACH [id number] 18


 


EXCISION OF RIGHT LOWER LEG SKIN, EXTERNAL APPROACH [id number] 18


 


RELEASE ILEUM, OPEN APPROACH [id number] 18


 


RESPIRATORY VENTILATION, 24-96 CONSECUTIVE HOURS [id number] 18














Assessment/Plan





- Assessment


Assessment: 


CT scan shows bowel obstruction


distended abdomen


ARF








- Plan


Plan: 


renal function stable


will monitor renal function


check labs in am





Nutritional Asmnt/Malnutr-PDOC





- Dietary Evaluation


Malnutrition Findings (Please click <Entered> for more info): 








Nutritional Asmnt/Malnutrition                             Start:  18 14:

24


Text:                                                      Status: Complete    

  


Freq:                                                                          

  


Protocol:                                                                      

  


 Document     18 14:24  LCHENG  (Rec: 18 14:35  LCHENG  [id number])


 Nutritional Asmnt/Malnutrition


     Patient General Information


      Nutritional Screening                      High Risk


                                                 Consult


      Diagnosis                                  exploratory laparotomy with


                                                 recersal colostomy


      Pertinent Medical Hx/Surgical Hx           acute perforation of


                                                 diverticulitis, s/p


                                                 exploratory laparotomy and


                                                 divertin gcolostomy placement


      Subjective Information                     Pt seen lying in bed at time


                                                 of visit, awake and alert. Pt


                                                 stated he tolerated clear


                                                 liquid well, adequte amount


                                                 for him at this time. Pt has


                                                 RAQUEL drain noted. Per EMR, PO


                                                 intake % of clear liquid


                                                 .


      Current Diet Order/ Nutrition Support      clear liquid


      Pertinent Medications                      D5-0.9%ns, piperacillin,


                                                 vancomycin


      Pertinent Labs                              Na 133, K 3.4, Cr 0.7,


                                                 glucose 107, Ca 7.9


     Nutritional Hx/Data


      Height                                     1.88 m


      Height (Calculated Centimeters)            188.0


      Current Weight (lbs)                       86.636 kg


      Weight (Calculated Kilograms)              86.6


      Weight (Calculated Grams)                  93389.1


      Ideal Body Weight                          190


      Body Mass Index (BMI)                      24.5


      Weight Status                              Approriate


     GI Symptoms


      GI Symptoms                                None


      Last BM                                    none


      Difficult in:                              None


      Skin Integrity/Comment:                    RIGHT LOWER LEG HEALING


                                                 incision to right arm


     Estimated Nutritional Goals


      BEE in Kcals:                              Using Current wt


      Calories/Kcals/Kg                          25-30


      Kcals Calculated                           2531-7157


      Protein:                                   Using Current wt


      Protein g/k.8-1


      Protein Calculated                         69-86


      Fluid: ml                                  2150-2580ml (1ml/kcal)


     Nutritional Problem


      1. Problem


       Problem                                   altered GI function


       Etiology                                  s/p reversal colostomy


       Signs/Symptoms:                           pt on clear liquid


     Intervention/Recommendation


      Comments                                   1. Continue with clear liquid


                                                 diet as ordered.


                                                 2. Monitor PO intake, wt, labs


                                                 and skin integrity


                                                 3. F/U as high risk in 2-3


                                                 days, -


     Expected Outcomes/Goals


      Expected Outcomes/Goals                    1. PO intake to meet at least


                                                 75% of nutritional needs.


                                                 2. Wt stability, skin to


                                                 remain intact, labs to


                                                 approach WNL.

## 2018-07-05 NOTE — PROGRESS NOTES
DATE:  07/04/2018



PULMONARY PROGRESS NOTE



PROBLEM LIST:

1.  Acute respiratory failure, improved.

2.  Aspiration pneumonia, improving.

3.  Abdominal surgery, now started eating.



SYMPTOMS:  Nil, feeling better, still short of breath, but not as bad as 2-3

days ago, very minimal coughing.  Currently on a nasal cannula, in no

respiratory distress.



PHYSICAL EXAMINATION:

VITAL SIGNS:  T-max 97.2, blood pressure 154/82 and saturation on 6 liters high

90s.

NECK:  Veins not visualized.

CHEST:  Shows occasional rhonchi with diminished air entry.

HEART:  Regular.

ABDOMEN:  Soft, nontender.

EXTREMITIES:  Show slight trace of peripheral edema.



LABORATORY DATA:  White count is 18,000, hemoglobin 4.2 and ABG this morning,

pO2 of 64 on 4 liters per minute with pH of 7.42 and patient's chest x-ray shows

some right basal atelectasis with some haziness on the right mid lung area,

which seems to be improving.



ASSESSMENT:  The patient overall doing better, improving respiratory wise.



PLANS AND SUGGESTIONS:  Okay to get him to step down unit.  Start OT, PT, etc.,

and go from there.





DD: 07/04/2018 18:22

DT: 07/05/2018 08:41

JOB# [id number]  [id number]

## 2018-07-05 NOTE — GENERAL PROGRESS NOTE
Subjective





- Review of Systems


Service Date: 18


Events since last encounter: 





tolerating oral intake, having BM


diet as tolerated





Objective





- Results


Result Diagrams: 


 18 04:50





 18 04:50


Recent Labs: 


 Laboratory Last Values











WBC  20.9 Th/cmm (4.8-10.8)  H*  18  04:50    


 


RBC  3.92 Mil/cmm (3.80-5.80)   18  04:50    


 


Hgb  11.6 gm/dL (12-16)  L  18  04:50    


 


Hct  34.2 % (41.0-60)  L  18  04:50    


 


MCV  87.2 fl (80-99)   18  04:50    


 


MCH  29.6 pg (27.0-31.0)   18  04:50    


 


MCHC Differential  33.9 pg (28.0-36.0)   18  04:50    


 


RDW  15.2 % (11.5-20.0)   18  04:50    


 


Plt Count  124 Th/cmm (150-400)  L  18  04:50    


 


MPV  10.1 fl  18  04:50    


 


Neutrophils %  NP   18  04:50    


 


Band Neutrophils %  4 % (0-10)   18  04:50    


 


Lymphocytes %  2.5 % (20.0-50.0)  L  18  11:35    


 


Monocytes %  1.4 % (2.0-10.0)  L  18  11:35    


 


Eosinophils %  0.1 % (0.0-5.0)   18  11:35    


 


Basophils %  0.8 % (0.0-2.0)   18  11:35    


 


Neutrophils (Manual)  90 % (40-80)  H  18  04:50    


 


Lymphocytes  3 % (20-50)  L  18  04:50    


 


Monocytes  3 % (2-10)   18  04:50    


 


Eosinophils  0 % (0-5)   18  04:50    


 


Basophils  0 % (0-3)   18  04:50    


 


Platelet Estimate  ADEQUATE  (NORMAL)   18  04:50    


 


Platelet Morphology  NORMAL  (NORMAL)   18  04:50    


 


Polychromasia  1+   18  04:40    


 


Anna Cells  1+   18  04:40    


 


RBC Morph Micro Appear  NORMAL  (NORMAL)   18  04:50    


 


PT  11.9 SECONDS (9.5-11.5)  H  18  12:08    


 


INR  1.13  (0.5-1.4)   18  12:08    


 


PTT (Actin FS)  22.3 SECONDS (26.0-38.0)  L  18  12:08    


 


Specimen Source  Arterial   18  09:40    


 


Sample Site  Right Radial   18  09:40    


 


pH  7.42  (7.35-7.45)   18  09:40    


 


pCO2  43.0 mmHg (35.0-45.0)   18  09:40    


 


pO2  64.0 mmHg (80.0-100.0)  L  18  09:40    


 


HCO3  27.1 mEq/L (20.0-26.0)  H  18  09:40    


 


Base Excess  3.0 mEq/L (-3.0-3.0)   18  09:40    


 


O2 Saturation  92.0 % (92.0-100.0)   18  09:40    


 


Ceferino Test  Positive   18  09:40    


 


Vent Rate  NA   18  09:40    


 


Inspired O2  44   18  09:40    


 


Tidal Volume  NA   18  09:40    


 


PEEP  NA   18  09:40    


 


Pressure (ins/psv/peep)  NA   18  09:40    


 


Critical Value  LZHANG   18  09:40    


 


Sodium  140 mEq/L (136-145)   18  04:50    


 


Potassium  4.4 mEq/L (3.5-5.1)   18  04:50    


 


Chloride  105 mEq/L ()   18  04:50    


 


Carbon Dioxide  29.1 mEq/L (21.0-31.0)   18  04:50    


 


Anion Gap  10.3  (7.0-16.0)   18  04:50    


 


BUN  63 mg/dL (7-25)  H  18  04:50    


 


Creatinine  1.4 mg/dL (0.7-1.3)  H  18  04:50    


 


Est GFR ( Amer)  > 60.0 ml/min (>90)   18  04:50    


 


Est GFR (Non-Af Amer)  54.1 ml/min  18  04:50    


 


BUN/Creatinine Ratio  45.0   18  04:50    


 


Glucose  128 mg/dL ()  H  18  04:50    


 


POC Glucose  129 MG/DL (70 - 105)  H  18  11:46    


 


Hemoglobin A1c %  4.8 % (4.0-6.0)   18  04:40    


 


Whole Bld Lactic Acid  1.36 mmol/L (0.60-1.99)   18  05:50    


 


Calcium  8.4 mg/dL (8.6-10.3)  L  18  04:50    


 


Phosphorus  3.0 mg/dL (2.5-5.0)   18  04:30    


 


Magnesium  2.3 mg/dL (1.9-2.7)   18  04:30    


 


Total Bilirubin  0.6 mg/dL (0.3-1.0)   18  04:50    


 


Direct Bilirubin  0.05 mg/dL (0.0-0.2)   18  05:50    


 


AST  41 U/L (13-39)  H  18  04:50    


 


ALT  45 U/L (7-52)   18  04:50    


 


Alkaline Phosphatase  100 U/L ()   18  04:50    


 


Ammonia  52 umol/L (16-53)   18  04:30    


 


Total Protein  5.4 gm/dL (6.0-8.3)  L  18  04:50    


 


Albumin  2.3 gm/dL (4.2-5.5)  L  18  04:50    


 


Globulin  3.1 gm/dL  18  04:50    


 


Albumin/Globulin Ratio  0.7  (1.0-1.8)  L  18  04:50    


 


Prealbumin  21 mg/dL (10-36)   18  11:35    


 


Triglycerides  83 mg/dL (<150)   18  04:55    


 


Cholesterol  64 mg/dL (<200)   18  06:00    


 


Urine Source  CLEAN C   18  17:35    


 


Urine Color  YELLOW   18  17:35    


 


Urine Clarity  HAZY  (CLEAR)   18  17:35    


 


Urine pH  5.0  (4.6 - 8.0)   18  17:35    


 


Ur Specific Gravity  1.020  (1.005-1.030)   18  17:35    


 


Urine Protein  30 mg/dL (NEGATIVE)  H  18  17:35    


 


Urine Glucose (UA)  NEGATIVE mg/dL (NEGATIVE)   18  17:35    


 


Urine Ketones  NEGATIVE mg/dL (NEGATIVE)   18  17:35    


 


Urine Blood  TRACE  (NEGATIVE)   18  17:35    


 


Urine Nitrate  NEGATIVE  (NEGATIVE)   18  17:35    


 


Urine Bilirubin  NEGATIVE  (NEGATIVE)   18  17:35    


 


Urine Urobilinogen  0.2 E.U./dL (0.2 - 1.0)   18  17:35    


 


Ur Leukocyte Esterase  TRACE  (NEGATIVE)  H  18  17:35    


 


Urine RBC  0-2 /hpf (0-5)  H  18  17:35    


 


Urine WBC  0-2 /hpf (0-5)   18  17:35    


 


Ur Epithelial Cells  OCCASIONAL /lpf (FEW)   18  17:35    


 


Amorphous Sediment  FEW URATES  (NONE SEEN)   18  17:35    


 


Urine Bacteria  FEW /hpf (NONE SEEN)   18  17:35    


 


Vancomycin Trough  24.3 ug/mL (5-10)  H  18  08:00    


 


Random Vancomycin  12.9 ug/mL (5.0-40.0)   18  06:00    














- Physical Exam


Vitals and I&O: 


 Vital Signs











Temp  96.7 F   18 08:00


 


Pulse  102   18 11:45


 


Resp  24   18 11:45


 


BP  128/79   18 11:00


 


Pulse Ox  95   18 11:45








 Intake & Output











 18





 18:59 06:59 18:59


 


Intake Total 550 950 50


 


Output Total  3400 


 


Balance 550 -2450 50


 


Weight (lbs)  97.432 kg 


 


Intake:   


 


  Intake, IV Amount 550 50 50


 


    Cefepime 1 gm In Dextrose 50 50 50





    5% 50 ml @ 100 mls/hr IV   





    Q12HR [facility] Rx#:[id number]   


 


    Vancomycin HCl 1.5 gm In 500  





    Sodium Chloride 0.9% 500   





    ml @ 250 mls/hr IV Q24H   





    [facility] Rx#:[id number]   


 


  Oral  900 


 


Output:   


 


  Urine  3400 


 


Other:   


 


  # Bowel Movements  2 


 


  Stool Characteristics Soft  





 Brown  


 


  Weight Source  Bedscale 











Active Medications: 


Current Medications





Acetaminophen/Hydrocodone Bitart (Norco 10 Mg/325 Mg)  1 tab PO Q6H PRN


   PRN Reason: Pain (Severe)


   Stop: 18 20:04


   Last Admin: 18 11:31 Dose:  1 tab


Albuterol/Ipratropium (Duoneb Neb)  3 ml HHN Q4HRT [facility]


   Stop: 18 14:59


   Last Admin: 18 11:45 Dose:  3 ml


Budesonide (Pulmicort)  0.5 mg HHN BIDRT [facility]


   Stop: 18 18:59


   Last Admin: 18 06:31 Dose:  0.5 mg


Cholestyramine Resin (Questran)  4 gm PO BID [facility]


   Stop: 18 16:59


   Last Admin: 18 09:00 Dose:  4 gm


Hydromorphone HCl (Dilaudid)  1 mg IVP Q3HR PRN


   PRN Reason: SEVERE PAIN


   Stop: 18 16:25


   Last Admin: 18 22:34 Dose:  1 mg


Cefepime HCl 1 gm/ Dextrose  50 mls @ 100 mls/hr IV Q12HR [facility]


   Stop: 18 20:59


   Last Infusion: 18 11:27 Dose:  Infused


Vancomycin HCl 1.25 gm/ Sodium (Chloride)  250 mls @ 165 mls/hr IV Q24H [facility]


   Stop: 18 09:59


   Last Admin: 18 12:01 Dose:  165 mls/hr


Insulin Aspart (Novolog Insulin Sliding Scale)  0 units SUBQ Q6H [facility]; Protocol


   Stop: 18 11:59


   Last Admin: 18 12:01 Dose:  Not Given


Lorazepam (Ativan)  1 mg IVP Q4HR PRN; Protocol


   PRN Reason: Anxiety


   Stop: 18 02:24


   Last Admin: 18 21:19 Dose:  1 mg


Methylprednisolone Sodium Succinate (Solu-Medrol)  60 mg IVP Q6H [facility]


   Stop: 18 16:29


   Last Admin: 18 10:00 Dose:  60 mg


Metoclopramide HCl (Reglan)  10 mg IVP Q6HR [facility]


   Stop: 18 17:59


   Last Admin: 18 12:00 Dose:  10 mg


Miscellaneous (Vancomycin Iv Per Pharmacy)  1 ea  PRN PRN


   PRN Reason: PROTOCOL


   Stop: 18 14:14


Miscellaneous (Probiotic Screen)  1 [facility] PRN PRN


   PRN Reason: PROTOCOL


   Stop: 18 12:41


Ondansetron HCl (Zofran)  4 mg IV Q4H PRN


   PRN Reason: Nausea / Vomiting


   Stop: 18 05:53


   Last Admin: 18 23:02 Dose:  4 mg


Pantoprazole Sodium (Protonix)  40 mg IVP BID [facility]


   Stop: 18 08:59


   Last Admin: 18 09:32 Dose:  40 mg








General: Alert, Cooperative, No acute distress


HEENT: Atraumatic, PERRLA (+dressing), EOMI


Neck: Supple


Cardiovascular: Regular rate, Normal S1, Normal S2


Lungs: Clear to auscultation


Abdomen: Soft


Extremities: Edema (+2 edema)





- Procedures


Procedures: 


 Procedures











Procedure Code Date


 


EXCISION OF CHEST SKIN, EXTERNAL APPROACH 6QJ3PWV 18


 


EXCISION OF ILEUM, OPEN APPROACH [id number] 18


 


EXCISION OF RIGHT LOWER ARM SKIN, EXTERNAL APPROACH [id number] 18


 


EXCISION OF RIGHT LOWER LEG SKIN, EXTERNAL APPROACH [id number] 18


 


RELEASE ILEUM, OPEN APPROACH [id number] 18


 


RESPIRATORY VENTILATION, 24-96 CONSECUTIVE HOURS [id number] 18














Nutritional Asmnt/Malnutr-PDOC





- Dietary Evaluation


Malnutrition Findings (Please click <Entered> for more info): 








Nutritional Asmnt/Malnutrition                             Start:  18 14:

24


Text:                                                      Status: Complete    

  


Freq:                                                                          

  


Protocol:                                                                      

  


 Document     18 14:24  LCHENG  (Rec: 18 14:35  LCHENG  [id number])


 Nutritional Asmnt/Malnutrition


     Patient General Information


      Nutritional Screening                      High Risk


                                                 Consult


      Diagnosis                                  exploratory laparotomy with


                                                 recersal colostomy


      Pertinent Medical Hx/Surgical Hx           acute perforation of


                                                 diverticulitis, s/p


                                                 exploratory laparotomy and


                                                 divertin gcolostomy placement


      Subjective Information                     Pt seen lying in bed at time


                                                 of visit, awake and alert. Pt


                                                 stated he tolerated clear


                                                 liquid well, adequte amount


                                                 for him at this time. Pt has


                                                 RAQUEL drain noted. Per EMR, PO


                                                 intake % of clear liquid


                                                 .


      Current Diet Order/ Nutrition Support      clear liquid


      Pertinent Medications                      D5-0.9%ns, piperacillin,


                                                 vancomycin


      Pertinent Labs                              Na 133, K 3.4, Cr 0.7,


                                                 glucose 107, Ca 7.9


     Nutritional Hx/Data


      Height                                     1.88 m


      Height (Calculated Centimeters)            188.0


      Current Weight (lbs)                       86.636 kg


      Weight (Calculated Kilograms)              86.6


      Weight (Calculated Grams)                  49589.1


      Ideal Body Weight                          190


      Body Mass Index (BMI)                      24.5


      Weight Status                              Approriate


     GI Symptoms


      GI Symptoms                                None


      Last BM                                    none


      Difficult in:                              None


      Skin Integrity/Comment:                    RIGHT LOWER LEG HEALING


                                                 incision to right arm


     Estimated Nutritional Goals


      BEE in Kcals:                              Using Current wt


      Calories/Kcals/Kg                          25-30


      Kcals Calculated                           1393-4986


      Protein:                                   Using Current wt


      Protein g/k.8-1


      Protein Calculated                         69-86


      Fluid: ml                                  2150-2580ml (1ml/kcal)


     Nutritional Problem


      1. Problem


       Problem                                   altered GI function


       Etiology                                  s/p reversal colostomy


       Signs/Symptoms:                           pt on clear liquid


     Intervention/Recommendation


      Comments                                   1. Continue with clear liquid


                                                 diet as ordered.


                                                 2. Monitor PO intake, wt, labs


                                                 and skin integrity


                                                 3. F/U as high risk in 2-3


                                                 days, -


     Expected Outcomes/Goals


      Expected Outcomes/Goals                    1. PO intake to meet at least


                                                 75% of nutritional needs.


                                                 2. Wt stability, skin to


                                                 remain intact, labs to


                                                 approach WNL.

## 2018-07-05 NOTE — INTERNAL MEDICINE PROG NOTE
Internal Medicine Subjective





- Subjective


Service Date: 18


Patient seen and examined:: with staff (HE FEELS WELL,LESS SOB.)


Patient is:: awake, verbal, in bed, talking, other (ON VENTILATOR,opening his 

eyes.)


Patient Complaints of:: SOB


Per staff patient has:: no adverse event, other (AFEBRIL,WAS SEEN BY 

PULMONOLOGIST.)





Internal Medicine Objective





- Results


Result Diagrams: 


 18 04:50





 18 04:50


Recent Labs: 


 Laboratory Last Values











WBC  20.9 Th/cmm (4.8-10.8)  H*  18  04:50    


 


RBC  3.92 Mil/cmm (3.80-5.80)   18  04:50    


 


Hgb  11.6 gm/dL (12-16)  L  18  04:50    


 


Hct  34.2 % (41.0-60)  L  18  04:50    


 


MCV  87.2 fl (80-99)   18  04:50    


 


MCH  29.6 pg (27.0-31.0)   18  04:50    


 


MCHC Differential  33.9 pg (28.0-36.0)   18  04:50    


 


RDW  15.2 % (11.5-20.0)   18  04:50    


 


Plt Count  124 Th/cmm (150-400)  L  18  04:50    


 


MPV  10.1 fl  18  04:50    


 


Neutrophils %  NP   18  04:50    


 


Band Neutrophils %  4 % (0-10)   18  04:50    


 


Lymphocytes %  2.5 % (20.0-50.0)  L  18  11:35    


 


Monocytes %  1.4 % (2.0-10.0)  L  18  11:35    


 


Eosinophils %  0.1 % (0.0-5.0)   18  11:35    


 


Basophils %  0.8 % (0.0-2.0)   18  11:35    


 


Neutrophils (Manual)  90 % (40-80)  H  18  04:50    


 


Lymphocytes  3 % (20-50)  L  18  04:50    


 


Monocytes  3 % (2-10)   18  04:50    


 


Eosinophils  0 % (0-5)   18  04:50    


 


Basophils  0 % (0-3)   18  04:50    


 


Platelet Estimate  ADEQUATE  (NORMAL)   18  04:50    


 


Platelet Morphology  NORMAL  (NORMAL)   18  04:50    


 


Polychromasia  1+   18  04:40    


 


Anna Cells  1+   18  04:40    


 


RBC Morph Micro Appear  NORMAL  (NORMAL)   18  04:50    


 


PT  11.9 SECONDS (9.5-11.5)  H  18  12:08    


 


INR  1.13  (0.5-1.4)   18  12:08    


 


PTT (Actin FS)  22.3 SECONDS (26.0-38.0)  L  18  12:08    


 


Specimen Source  Arterial   18  09:40    


 


Sample Site  Right Radial   18  09:40    


 


pH  7.42  (7.35-7.45)   18  09:40    


 


pCO2  43.0 mmHg (35.0-45.0)   18  09:40    


 


pO2  64.0 mmHg (80.0-100.0)  L  18  09:40    


 


HCO3  27.1 mEq/L (20.0-26.0)  H  18  09:40    


 


Base Excess  3.0 mEq/L (-3.0-3.0)   18  09:40    


 


O2 Saturation  92.0 % (92.0-100.0)   18  09:40    


 


Ceferino Test  Positive   18  09:40    


 


Vent Rate  NA   18  09:40    


 


Inspired O2  44   18  09:40    


 


Tidal Volume  NA   18  09:40    


 


PEEP  NA   18  09:40    


 


Pressure (ins/psv/peep)  NA   18  09:40    


 


Critical Value  LZHANG   18  09:40    


 


Sodium  140 mEq/L (136-145)   18  04:50    


 


Potassium  4.4 mEq/L (3.5-5.1)   18  04:50    


 


Chloride  105 mEq/L ()   18  04:50    


 


Carbon Dioxide  29.1 mEq/L (21.0-31.0)   18  04:50    


 


Anion Gap  10.3  (7.0-16.0)   18  04:50    


 


BUN  63 mg/dL (7-25)  H  18  04:50    


 


Creatinine  1.4 mg/dL (0.7-1.3)  H  18  04:50    


 


Est GFR ( Amer)  > 60.0 ml/min (>90)   18  04:50    


 


Est GFR (Non-Af Amer)  54.1 ml/min  18  04:50    


 


BUN/Creatinine Ratio  45.0   18  04:50    


 


Glucose  128 mg/dL ()  H  18  04:50    


 


POC Glucose  129 MG/DL (70 - 105)  H  18  11:46    


 


Hemoglobin A1c %  4.8 % (4.0-6.0)   18  04:40    


 


Whole Bld Lactic Acid  1.36 mmol/L (0.60-1.99)   18  05:50    


 


Calcium  8.4 mg/dL (8.6-10.3)  L  18  04:50    


 


Phosphorus  3.0 mg/dL (2.5-5.0)   18  04:30    


 


Magnesium  2.3 mg/dL (1.9-2.7)   18  04:30    


 


Total Bilirubin  0.6 mg/dL (0.3-1.0)   18  04:50    


 


Direct Bilirubin  0.05 mg/dL (0.0-0.2)   18  05:50    


 


AST  41 U/L (13-39)  H  18  04:50    


 


ALT  45 U/L (7-52)   18  04:50    


 


Alkaline Phosphatase  100 U/L ()   18  04:50    


 


Ammonia  52 umol/L (16-53)   18  04:30    


 


Total Protein  5.4 gm/dL (6.0-8.3)  L  18  04:50    


 


Albumin  2.3 gm/dL (4.2-5.5)  L  18  04:50    


 


Globulin  3.1 gm/dL  18  04:50    


 


Albumin/Globulin Ratio  0.7  (1.0-1.8)  L  18  04:50    


 


Prealbumin  21 mg/dL (10-36)   18  11:35    


 


Triglycerides  83 mg/dL (<150)   18  04:55    


 


Cholesterol  64 mg/dL (<200)   18  06:00    


 


Urine Source  CLEAN C   18  17:35    


 


Urine Color  YELLOW   18  17:35    


 


Urine Clarity  HAZY  (CLEAR)   18  17:35    


 


Urine pH  5.0  (4.6 - 8.0)   18  17:35    


 


Ur Specific Gravity  1.020  (1.005-1.030)   18  17:35    


 


Urine Protein  30 mg/dL (NEGATIVE)  H  18  17:35    


 


Urine Glucose (UA)  NEGATIVE mg/dL (NEGATIVE)   18  17:35    


 


Urine Ketones  NEGATIVE mg/dL (NEGATIVE)   18  17:35    


 


Urine Blood  TRACE  (NEGATIVE)   18  17:35    


 


Urine Nitrate  NEGATIVE  (NEGATIVE)   18  17:35    


 


Urine Bilirubin  NEGATIVE  (NEGATIVE)   18  17:35    


 


Urine Urobilinogen  0.2 E.U./dL (0.2 - 1.0)   18  17:35    


 


Ur Leukocyte Esterase  TRACE  (NEGATIVE)  H  18  17:35    


 


Urine RBC  0-2 /hpf (0-5)  H  18  17:35    


 


Urine WBC  0-2 /hpf (0-5)   18  17:35    


 


Ur Epithelial Cells  OCCASIONAL /lpf (FEW)   18  17:35    


 


Amorphous Sediment  FEW URATES  (NONE SEEN)   18  17:35    


 


Urine Bacteria  FEW /hpf (NONE SEEN)   18  17:35    


 


Vancomycin Trough  24.3 ug/mL (5-10)  H  18  08:00    


 


Random Vancomycin  12.9 ug/mL (5.0-40.0)   18  06:00    














- Physical Exam


Vitals and I&O: 


 Vital Signs











Temp  97.6 F   18 15:00


 


Pulse  96   18 16:31


 


Resp  24   18 16:31


 


BP  128/75   18 15:00


 


Pulse Ox  96   18 16:31








 Intake & Output











 18





 18:59 06:59 18:59


 


Intake Total 550 950 50


 


Output Total  3400 


 


Balance 550 -2450 50


 


Weight (lbs)  97.432 kg 


 


Intake:   


 


  Intake, IV Amount 550 50 50


 


    Cefepime 1 gm In Dextrose 50 50 50





    5% 50 ml @ 100 mls/hr IV   





    Q12HR [facility] Rx#:[id number]   


 


    Vancomycin HCl 1.5 gm In 500  





    Sodium Chloride 0.9% 500   





    ml @ 250 mls/hr IV Q24H   





    [facility] Rx#:[id number]   


 


  Oral  900 


 


Output:   


 


  Urine  3400 


 


Other:   


 


  # Bowel Movements  2 


 


  Stool Characteristics Soft  





 Brown  


 


  Weight Source  Bedscale 











Active Medications: 


Current Medications





Acetaminophen/Hydrocodone Bitart (Norco 10 Mg/325 Mg)  1 tab PO Q6H PRN


   PRN Reason: Pain (Severe)


   Stop: 18 20:04


   Last Admin: 18 11:31 Dose:  1 tab


Albuterol/Ipratropium (Duoneb Neb)  3 ml HHN Q4HRT [facility]


   Stop: 18 14:59


   Last Admin: 18 16:28 Dose:  3 ml


Budesonide (Pulmicort)  0.5 mg HHN BIDRT [facility]


   Stop: 18 18:59


   Last Admin: 18 06:31 Dose:  0.5 mg


Cholestyramine Resin (Questran)  4 gm PO BID [facility]


   Stop: 18 16:59


   Last Admin: 18 09:00 Dose:  4 gm


Hydromorphone HCl (Dilaudid)  1 mg IVP Q3HR PRN


   PRN Reason: SEVERE PAIN


   Stop: 18 16:25


   Last Admin: 18 22:34 Dose:  1 mg


Cefepime HCl 1 gm/ Dextrose  50 mls @ 100 mls/hr IV Q12HR [facility]


   Stop: 18 20:59


   Last Infusion: 18 11:27 Dose:  Infused


Vancomycin HCl 1.25 gm/ Sodium (Chloride)  250 mls @ 165 mls/hr IV Q24H [facility]


   Stop: 18 09:59


   Last Admin: 18 12:01 Dose:  165 mls/hr


Insulin Aspart (Novolog Insulin Sliding Scale)  0 units SUBQ Q6H KATHY; Protocol


   Stop: 18 11:59


   Last Admin: 18 12:01 Dose:  Not Given


Lorazepam (Ativan)  1 mg IVP Q4HR PRN; Protocol


   PRN Reason: Anxiety


   Stop: 18 02:24


   Last Admin: 18 21:19 Dose:  1 mg


Methylprednisolone Sodium Succinate (Solu-Medrol)  60 mg IVP Q6H KATHY


   Stop: 18 16:29


   Last Admin: 18 10:00 Dose:  60 mg


Metoclopramide HCl (Reglan)  10 mg IVP Q6HR KATHY


   Stop: 18 17:59


   Last Admin: 18 12:00 Dose:  10 mg


Miscellaneous (Vancomycin Iv Per Pharmacy)  1 [facility] PRN PRN


   PRN Reason: PROTOCOL


   Stop: 18 14:14


Miscellaneous (Probiotic Screen)  1 [facility] PRN PRN


   PRN Reason: PROTOCOL


   Stop: 18 12:41


Ondansetron HCl (Zofran)  4 mg IV Q4H PRN


   PRN Reason: Nausea / Vomiting


   Stop: 18 05:53


   Last Admin: 18 23:02 Dose:  4 mg


Pantoprazole Sodium (Protonix)  40 mg IVP BID [facility]


   Stop: 18 08:59


   Last Admin: 18 09:32 Dose:  40 mg








General: other (on 50 percent fio2,less distress.)


HEENT: NC/AT, PERRLA, EOMI, anicteric sclerae, throat clear


Neck: Supple, No JVD, No thyromegaly, +2 carotid pulse wo bruit, No LAD


Lungs: ronchi


Cardiovascular: RRR, Normal S1, Normal S2, without murmur, tachy


Abdomen: tender, distended


Extremities: clear


Neurological: unable to follow command





- Procedures


Procedures: 


 Procedures











Procedure Code Date


 


EXCISION OF CHEST SKIN, EXTERNAL APPROACH [id number] 18


 


EXCISION OF ILEUM, OPEN APPROACH [id number] 18


 


EXCISION OF RIGHT LOWER ARM SKIN, EXTERNAL APPROACH [id number] 18


 


EXCISION OF RIGHT LOWER LEG SKIN, EXTERNAL APPROACH [id number] 18


 


RELEASE ILEUM, OPEN APPROACH [id number] 18


 


RESPIRATORY VENTILATION, 24-96 CONSECUTIVE HOURS [id number] 18














Internal Medicine Assmt/Plan





- Assessment


Assessment: 





1.SP COLOSTOMY REVISION.


2.SP EXCISION BIOPSY FOR CHEST WALL LESION.


3.ACUTE BOWEL OBSTRUCTION


4.ACUTE RESPIRATORY FAILURE.


5.BILATERAL PNEUMONIA.





- Plan


Plan: 


CONTINUE ON CURRENT MEDICATION AND DIET.TRANSFER TO LTAC.





Nutritional Asmnt/Malnutr-PDOC





- Dietary Evaluation


Malnutrition Findings (Please click <Entered> for more info): 








Nutritional Asmnt/Malnutrition                             Start:  18 14:

24


Text:                                                      Status: Complete    

  


Freq:                                                                          

  


Protocol:                                                                      

  


 Document     18 14:24  LCHENG  (Rec: 18 14:35  LCHENG  [id number])


 Nutritional Asmnt/Malnutrition


     Patient General Information


      Nutritional Screening                      High Risk


                                                 Consult


      Diagnosis                                  exploratory laparotomy with


                                                 recersal colostomy


      Pertinent Medical Hx/Surgical Hx           acute perforation of


                                                 diverticulitis, s/p


                                                 exploratory laparotomy and


                                                 divertin gcolostomy placement


      Subjective Information                     Pt seen lying in bed at time


                                                 of visit, awake and alert. Pt


                                                 stated he tolerated clear


                                                 liquid well, adequte amount


                                                 for him at this time. Pt has


                                                 RAQUEL drain noted. Per EMR, PO


                                                 intake % of clear liquid


                                                 .


      Current Diet Order/ Nutrition Support      clear liquid


      Pertinent Medications                      D5-0.9%ns, piperacillin,


                                                 vancomycin


      Pertinent Labs                              Na 133, K 3.4, Cr 0.7,


                                                 glucose 107, Ca 7.9


     Nutritional Hx/Data


      Height                                     1.88 m


      Height (Calculated Centimeters)            188.0


      Current Weight (lbs)                       86.636 kg


      Weight (Calculated Kilograms)              86.6


      Weight (Calculated Grams)                  40411.1


      Ideal Body Weight                          190


      Body Mass Index (BMI)                      24.5


      Weight Status                              Approriate


     GI Symptoms


      GI Symptoms                                None


      Last BM                                    none


      Difficult in:                              None


      Skin Integrity/Comment:                    RIGHT LOWER LEG HEALING


                                                 incision to right arm


     Estimated Nutritional Goals


      BEE in Kcals:                              Using Current wt


      Calories/Kcals/Kg                          25-30


      Kcals Calculated                           3399-0985


      Protein:                                   Using Current wt


      Protein g/k.8-1


      Protein Calculated                         69-86


      Fluid: ml                                  2150-2580ml (1ml/kcal)


     Nutritional Problem


      1. Problem


       Problem                                   altered GI function


       Etiology                                  s/p reversal colostomy


       Signs/Symptoms:                           pt on clear liquid


     Intervention/Recommendation


      Comments                                   1. Continue with clear liquid


                                                 diet as ordered.


                                                 2. Monitor PO intake, wt, labs


                                                 and skin integrity


                                                 3. F/U as high risk in 2-3


                                                 days, -


     Expected Outcomes/Goals


      Expected Outcomes/Goals                    1. PO intake to meet at least


                                                 75% of nutritional needs.


                                                 2. Wt stability, skin to


                                                 remain intact, labs to


                                                 approach WNL.

## 2018-07-06 NOTE — PROGRESS NOTES
DATE:  07/05/2018



PULMONARY PROGRESS NOTE



PROBLEM LIST:

1.  Acute respiratory failure, improving.

2.  Pneumonia, significantly improved.

3.  Persistent neuromuscular weakness because of multiple medical conditions,

slowly improving, still using BiPAP at nighttime.



SUBJECTIVE:  Symptoms nil.  He is very concerned about getting more physical

therapy, etc.  Currently on nasal O2, breathing okay.



OBJECTIVE:

VITAL SIGNS:  Temperature is 99, blood pressure 148/50, and saturation 100% on 6

liters.

NECK:  Veins not visualized.

CHEST:  Shows occasional rhonchi with diminished air entry.

HEART:  Regular.

ABDOMEN:  Soft, nontender.

EXTREMITIES:  Show slight trace of peripheral edema.



LABORATORY DATA:  White count is 13.9, hemoglobin 11.6.  Electrolytes are okay

with BUN of 63.



ASSESSMENT:  The patient clinically appears to be stable, improving.



PLANS AND SUGGESTIONS:  We will go ahead and continue current treatment.  We may

consider LTAC evaluation, etc., and go from there.





DD: 07/05/2018 14:03

DT: 07/06/2018 00:20

JOB# [id number]  [id number]

## 2018-07-20 NOTE — DISCHARGE SUMMARY
DATE OF DISCHARGE:  07/05/2018



FINAL DIAGNOSES:

1.  Aspiration pneumonia.

2.  Acute respiratory failure.

3.  Septic shock.

4.  Chronic obstructive pulmonary disease.

5.  Status post revision of diverting colostomy.



REVIEW OF HISTORY:  The patient is a 63-year-old male with long history of COPD,

history of diverting colostomy, admitted to [facility] on 06/18/2018 for

revision of the diverting colostomy.  The patient underwent the surgery well and

started on IV fluid, antibiotic, pain medication.



PHYSICAL EXAMINATION:

GENERAL:  On admission, he was awake, alert, oriented.

VITAL SIGNS:  Temperature was 98.4, heart rate was 81, blood pressure 102/74.

CHEST:  Diminished breathing sound.

HEART:  S1, S2 normal.

ABDOMEN:  Soft, bowel sounds positive.  Diffuse tenderness.  Rebound negative.

EXTREMITIES:  1+ edema.  NG tube was placed for feeding.  



LABORATORY DATA:  White blood 6.7, hemoglobin 12.8, hematocrit 38.4.  Sodium

136, potassium 4.8.



COURSE OF HOSPITALIZATION:  During hospitalization, apparently the patient

aspirated and went into acute respiratory failure.  The patient transferred to

the ICU and intubated.  Pulmonary consultation and Infectious Disease

consultation obtained.  The patient's general condition started improving on

06/25/2018.  On 06/30/2018, the patient is more awake, more alert, still in

shortness of breath.  The patient was finally extubated, started on BiPAP.  The

patient transferred to the telemetry.  On 07/04/2018 the patient was evaluated

by the [facility] _____.



DISPOSITION:  On 07/05/2018, the patient transferred to [facility]. 

To continue his medication and diet.



CONDITION ON DISCHARGE:  Stable.



MEDICATIONS:  Follow discharge reconciliation.





DD: 07/20/2018 16:11

DT: 07/20/2018 21:08

JOB# [id number]  [id number]

## 2018-08-03 ENCOUNTER — HOSPITAL ENCOUNTER (INPATIENT)
Dept: HOSPITAL 36 - ER | Age: 63
LOS: 3 days | Discharge: NURSING, REHAB OR LONG-TERM CARE | DRG: 194 | End: 2018-08-06
Attending: GENERAL PRACTICE | Admitting: GENERAL PRACTICE
Payer: MEDICARE

## 2018-08-03 VITALS — DIASTOLIC BLOOD PRESSURE: 86 MMHG | SYSTOLIC BLOOD PRESSURE: 134 MMHG

## 2018-08-03 DIAGNOSIS — R65.10: ICD-10-CM

## 2018-08-03 DIAGNOSIS — D69.6: ICD-10-CM

## 2018-08-03 DIAGNOSIS — M62.81: ICD-10-CM

## 2018-08-03 DIAGNOSIS — Z93.3: ICD-10-CM

## 2018-08-03 DIAGNOSIS — Z79.4: ICD-10-CM

## 2018-08-03 DIAGNOSIS — D64.9: ICD-10-CM

## 2018-08-03 DIAGNOSIS — I25.10: ICD-10-CM

## 2018-08-03 DIAGNOSIS — J18.9: Primary | ICD-10-CM

## 2018-08-03 DIAGNOSIS — Z87.891: ICD-10-CM

## 2018-08-03 LAB
ALBUMIN SERPL-MCNC: 2.6 GM/DL (ref 4.2–5.5)
ALBUMIN/GLOB SERPL: 1 {RATIO} (ref 1–1.8)
ALP SERPL-CCNC: 156 U/L (ref 34–104)
ALT SERPL-CCNC: 46 U/L (ref 7–52)
ANION GAP SERPL CALC-SCNC: 10.7 MMOL/L (ref 7–16)
AST SERPL-CCNC: 30 U/L (ref 13–39)
BASOPHILS # BLD AUTO: 0 TH/CUMM (ref 0–0.2)
BASOPHILS NFR BLD AUTO: 0 % (ref 0–2)
BILIRUB SERPL-MCNC: 0.5 MG/DL (ref 0.3–1)
BUN SERPL-MCNC: 17 MG/DL (ref 7–25)
CALCIUM SERPL-MCNC: 7.9 MG/DL (ref 8.6–10.3)
CHLORIDE SERPL-SCNC: 98 MEQ/L (ref 98–107)
CO2 SERPL-SCNC: 28.5 MEQ/L (ref 21–31)
CREAT SERPL-MCNC: 0.7 MG/DL (ref 0.7–1.3)
EOSINOPHIL # BLD AUTO: 0 TH/CMM (ref 0.1–0.4)
EOSINOPHIL NFR BLD AUTO: 0.2 % (ref 0–5)
ERYTHROCYTE [DISTWIDTH] IN BLOOD BY AUTOMATED COUNT: 16.5 % (ref 11.5–20)
GLOBULIN SER-MCNC: 2.7 GM/DL
GLUCOSE SERPL-MCNC: 82 MG/DL (ref 70–105)
HCT VFR BLD CALC: 27.8 % (ref 41–60)
HGB BLD-MCNC: 9.5 GM/DL (ref 12–16)
LYMPHOCYTE AB SER FC-ACNC: 2 TH/CMM (ref 1.5–3)
LYMPHOCYTES NFR BLD AUTO: 13.9 % (ref 20–50)
MCH RBC QN AUTO: 27.7 PG (ref 27–31)
MCHC RBC AUTO-ENTMCNC: 34 PG (ref 28–36)
MCV RBC AUTO: 81.6 FL (ref 80–99)
MONOCYTES # BLD AUTO: 0.4 TH/CMM (ref 0.3–1)
MONOCYTES NFR BLD AUTO: 3.1 % (ref 2–10)
NEUTROPHILS # BLD: 11.9 TH/CMM (ref 1.8–8)
NEUTROPHILS NFR BLD AUTO: 82.8 % (ref 40–80)
PLATELET # BLD: 86 TH/CMM (ref 150–400)
PMV BLD AUTO: 7.9 FL
POTASSIUM SERPL-SCNC: 3.2 MEQ/L (ref 3.5–5.1)
RBC # BLD AUTO: 3.41 MIL/CMM (ref 3.8–5.8)
SODIUM SERPL-SCNC: 134 MEQ/L (ref 136–145)
WBC # BLD AUTO: 14.3 TH/CMM (ref 4.8–10.8)

## 2018-08-03 PROCEDURE — C9113 INJ PANTOPRAZOLE SODIUM, VIA: HCPCS

## 2018-08-03 PROCEDURE — Z7610: HCPCS

## 2018-08-03 PROCEDURE — X3904: HCPCS

## 2018-08-03 RX ADMIN — POTASSIUM CHLORIDE ONE MEQ: 20 TABLET, EXTENDED RELEASE ORAL at 21:02
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
## 2018-08-04 LAB
ALBUMIN SERPL-MCNC: 2.2 GM/DL (ref 4.2–5.5)
ALBUMIN/GLOB SERPL: 0.8 {RATIO} (ref 1–1.8)
ALP SERPL-CCNC: 138 U/L (ref 34–104)
ALT SERPL-CCNC: 41 U/L (ref 7–52)
ANION GAP SERPL CALC-SCNC: 7.4 MMOL/L (ref 7–16)
AST SERPL-CCNC: 27 U/L (ref 13–39)
BASOPHILS # BLD AUTO: 0 TH/CUMM (ref 0–0.2)
BASOPHILS NFR BLD AUTO: 0 % (ref 0–2)
BILIRUB SERPL-MCNC: 0.5 MG/DL (ref 0.3–1)
BUN SERPL-MCNC: 14 MG/DL (ref 7–25)
CALCIUM SERPL-MCNC: 7.6 MG/DL (ref 8.6–10.3)
CHLORIDE SERPL-SCNC: 100 MEQ/L (ref 98–107)
CO2 SERPL-SCNC: 30.1 MEQ/L (ref 21–31)
CREAT SERPL-MCNC: 0.8 MG/DL (ref 0.7–1.3)
EOSINOPHIL # BLD AUTO: 0 TH/CMM (ref 0.1–0.4)
EOSINOPHIL NFR BLD AUTO: 0.2 % (ref 0–5)
ERYTHROCYTE [DISTWIDTH] IN BLOOD BY AUTOMATED COUNT: 16.6 % (ref 11.5–20)
GLOBULIN SER-MCNC: 2.8 GM/DL
GLUCOSE SERPL-MCNC: 99 MG/DL (ref 70–105)
HCT VFR BLD CALC: 27.1 % (ref 41–60)
HGB BLD-MCNC: 9.1 GM/DL (ref 12–16)
LYMPHOCYTE AB SER FC-ACNC: 1.9 TH/CMM (ref 1.5–3)
LYMPHOCYTES NFR BLD AUTO: 13.8 % (ref 20–50)
MCH RBC QN AUTO: 27.6 PG (ref 27–31)
MCHC RBC AUTO-ENTMCNC: 33.7 PG (ref 28–36)
MCV RBC AUTO: 81.9 FL (ref 80–99)
MONOCYTES # BLD AUTO: 0.8 TH/CMM (ref 0.3–1)
MONOCYTES NFR BLD AUTO: 5.4 % (ref 2–10)
NEUTROPHILS # BLD: 11.3 TH/CMM (ref 1.8–8)
NEUTROPHILS NFR BLD AUTO: 80.6 % (ref 40–80)
PLATELET # BLD: 80 TH/CMM (ref 150–400)
PMV BLD AUTO: 8.3 FL
POTASSIUM SERPL-SCNC: 3.5 MEQ/L (ref 3.5–5.1)
RBC # BLD AUTO: 3.31 MIL/CMM (ref 3.8–5.8)
SODIUM SERPL-SCNC: 134 MEQ/L (ref 136–145)
WBC # BLD AUTO: 14 TH/CMM (ref 4.8–10.8)

## 2018-08-04 RX ADMIN — INSULIN ASPART SCH: 100 INJECTION, SOLUTION INTRAVENOUS; SUBCUTANEOUS at 11:26

## 2018-08-04 RX ADMIN — IPRATROPIUM BROMIDE AND ALBUTEROL SULFATE SCH ML: .5; 3 SOLUTION RESPIRATORY (INHALATION) at 19:15

## 2018-08-04 RX ADMIN — HYDROMORPHONE HYDROCHLORIDE PRN MG: 1 INJECTION, SOLUTION INTRAMUSCULAR; INTRAVENOUS; SUBCUTANEOUS at 03:21

## 2018-08-04 RX ADMIN — INSULIN ASPART SCH: 100 INJECTION, SOLUTION INTRAVENOUS; SUBCUTANEOUS at 17:13

## 2018-08-04 RX ADMIN — IPRATROPIUM BROMIDE AND ALBUTEROL SULFATE SCH ML: .5; 3 SOLUTION RESPIRATORY (INHALATION) at 02:54

## 2018-08-04 RX ADMIN — HYDROMORPHONE HYDROCHLORIDE PRN MG: 1 INJECTION, SOLUTION INTRAMUSCULAR; INTRAVENOUS; SUBCUTANEOUS at 20:42

## 2018-08-04 RX ADMIN — INSULIN ASPART SCH: 100 INJECTION, SOLUTION INTRAVENOUS; SUBCUTANEOUS at 06:07

## 2018-08-04 RX ADMIN — IPRATROPIUM BROMIDE AND ALBUTEROL SULFATE SCH ML: .5; 3 SOLUTION RESPIRATORY (INHALATION) at 07:22

## 2018-08-04 RX ADMIN — INSULIN ASPART SCH: 100 INJECTION, SOLUTION INTRAVENOUS; SUBCUTANEOUS at 00:34

## 2018-08-04 RX ADMIN — IPRATROPIUM BROMIDE AND ALBUTEROL SULFATE SCH ML: .5; 3 SOLUTION RESPIRATORY (INHALATION) at 14:57

## 2018-08-04 RX ADMIN — IPRATROPIUM BROMIDE AND ALBUTEROL SULFATE SCH ML: .5; 3 SOLUTION RESPIRATORY (INHALATION) at 23:47

## 2018-08-04 RX ADMIN — POTASSIUM CHLORIDE ONE: 20 TABLET, EXTENDED RELEASE ORAL at 00:27

## 2018-08-04 RX ADMIN — IPRATROPIUM BROMIDE AND ALBUTEROL SULFATE SCH ML: .5; 3 SOLUTION RESPIRATORY (INHALATION) at 11:41

## 2018-08-04 NOTE — HISTORY AND PHYSICAL
History of Present Illness





- HPI


Chief Complaint: 


General muscle weakness


HPI: 


Patient refer that he just was DC from hospital on , he had colostomy 

repair and was send to [facility], then he went to [facility]'s home and 

was to weak that he decided to come to ER. During ER evaluation was found 

increased WBC reason why he was hospitalized. 


Vital Signs: 





 Last Vital Signs











Temp  97.8 F   18 08:00


 


Pulse  79   18 08:00


 


Resp  19   18 08:00


 


BP  115/74   18 08:00


 


Pulse Ox  97   18 08:00














Past Medical History


Cardiovascular: Report: CAD


Pulmonary: Report: No Pertinent Hx


CNS: Report: No Pertinent Hx


GI: Report: Other (S/P colostomy)


Psych: Report: No Pertinent Hx


Musculoskeletal: Report: Weakness


Rheumatologic: Report: No pertinent Hx


Infectious Disease: Report: No Pertinent Hx


Renal/: Report: No Pertinent Hx


Dermatology: Report: No Pertinent Hx





- Past Surgical History


Past Surgical History: Other (S/P colostomy)





Family Medical History





- Family Member


  ** Mother


History Unknown: Yes


Ethnicity: Non-


Living Status: 


Hx Family Cancer: No


Hx Family Coronary Artery Disease: No


Hx Family Congestive Heart Failure: No


Hx Family Hypertension: No


Hx Family Stroke: No


Hx Family Diabetes: No


Hx Family Seizures: No


Hx Family Dementia: No


Hx Family AIDS: No


Hx Family HIV: No


Hx Family COPD: No


Hx Family Hepatitis: No


Hx Family Psychiatric Problems: No


Hx Family Tuberculosis: No





  ** Father


History Unknown: Yes


Ethnicity: Non-


Living Status: 


Hx Family Stroke: Yes





Social History


Smoke: No


Alcohol: None


Drugs: None


Lives: Homeless


Domestic Violence: Negative





- Medications


Home Medications: 


Home Medication











 Medication  Instructions  Recorded  Type


 


Acetaminophen [Tylenol] 650 mg PO PRN PRN  tab 06/15/18 Rx


 


Bacitracin Zinc/Neomy/Poly [Triple 1 pkt TP DAILY  packet 06/15/18 Rx





Antibiotic Pkt]   


 


Enoxaparin [Lovenox] 30 mg SUBQ Q12HR  syr 06/15/18 Rx


 


Hydrocodone/APAP 10 mg/325 mg 1 tab PO Q6H PRN  tab 06/15/18 Rx





[Norco 10 mg/325 mg]   


 


Vancomycin HCl [Vancomycin] 1 gm IV Q12H  vial 06/15/18 Rx


 


Albuterol/Ipratropium Neb [Duoneb 3 ml HHN Q4HRT  aers 18 Rx





Neb]   


 


Budesonide [Pulmicort] 0.5 mg HHN BIDRT  ud 18 Rx


 


Cefepime [Maxipime] 1 gm IV Q12HR  vial 18 Rx


 


Cholestyramine [Questran] 4 gm PO BID  pkt 18 Rx


 


HYDROmorphone [Dilaudid] 1 mg IVP Q3HR PRN  syr 18 Rx


 


Insulin Aspart Sliding Scale 0 units SUBQ Q6H  unit 18 Rx





[NovoLOG INSULIN SLIDING SCALE]   


 


Lorazepam [Ativan] 1 mg IVP Q4HR PRN  vial 18 Rx


 


Metoclopramide [Reglan] 10 mg IVP Q6HR  vial 18 Rx


 


Ondansetron HCl [Zofran*] 4 mg IV Q4H PRN  vial 18 Rx


 


Pantoprazole [Protonix] 40 mg IVP BID  vial 18 Rx


 


Vancomycin HCl [Vancomycin] 1.25 gm IV Q24H  vial 18 Rx


 


methylPREDNISolone SS [Solu-MEDROL] 60 mg IVP Q6H  vial 18 Rx














- Allergies


Allergies/Adverse Reactions: 


 Allergies











Allergy/AdvReac Type Severity Reaction Status Date / Time


 


No Known Allergies Allergy   Verified 18 16:59














Review of Systems





- Review of Systems


Constitutional: Report: Weakness


Eyes: Report: No Significant


ENT: Report: No Significant


Respiratory: Report: No Significant


Cardiovascular: Report: No Significant


Gastrointestinal: Report: No Significant


Genitourinary: Report: No Significant


Musculoskeletal: Report: No Significant


Skin: Report: No Significant


Neurological: Report: No Significant





Physical Exam





- Physical Exam


HEENT: Report: Ears Nose Throat within normal limits


Neck: Report: Within normal limits


Cardiovascular Systems: Report: Regular, Rate and Rhythm


Respiratory: Report: Other (Rude respiration)


Abdomen: Report: Non-tender to palpation


Back: Report: Inspection of back is within normal limits.


Extremities: Report: Non-tender to palpation.


Skin: Report: Color of skin is within normal limits, Warm, Dry


Neuro/Psych: Report: Mood affect is within normal limits





- Lab Results


All Lab Results last 24 hours: 





 Laboratory Results - last 24 hr











  18





  18:19 18:19 18:19


 


WBC  14.3 H  


 


RBC  3.41 L  


 


Hgb  9.5 L  


 


Hct  27.8 L  


 


MCV  81.6  


 


MCH  27.7  


 


MCHC Differential  34.0  


 


RDW  16.5  


 


Plt Count  86 L  


 


MPV  7.9  


 


Neutrophils %  82.8 H  


 


Lymphocytes %  13.9 L  


 


Monocytes %  3.1  


 


Eosinophils %  0.2  


 


Basophils %  0.0  


 


Sodium   134 L 


 


Potassium   3.2 L 


 


Chloride   98 


 


Carbon Dioxide   28.5 


 


Anion Gap   10.7 


 


BUN   17 


 


Creatinine   0.7 


 


Est GFR ( Amer)   > 60.0 


 


Est GFR (Non-Af Amer)   > 60.0 


 


BUN/Creatinine Ratio   24.3 


 


Glucose   82 


 


POC Glucose   


 


Calcium   7.9 L 


 


Total Bilirubin   0.5 


 


AST   30 


 


ALT   46 


 


Alkaline Phosphatase   156 H 


 


Troponin I    0.01


 


Total Protein   5.3 L 


 


Albumin   2.6 L 


 


Globulin   2.7 


 


Albumin/Globulin Ratio   1.0 














  18





  00:33 05:52 06:21


 


WBC    14.0 H


 


RBC    3.31 L


 


Hgb    9.1 L


 


Hct    27.1 L


 


MCV    81.9


 


MCH    27.6


 


MCHC Differential    33.7


 


RDW    16.6


 


Plt Count    80 L


 


MPV    8.3


 


Neutrophils %    80.6 H


 


Lymphocytes %    13.8 L


 


Monocytes %    5.4


 


Eosinophils %    0.2


 


Basophils %    0.0


 


Sodium   


 


Potassium   


 


Chloride   


 


Carbon Dioxide   


 


Anion Gap   


 


BUN   


 


Creatinine   


 


Est GFR ( Amer)   


 


Est GFR (Non-Af Amer)   


 


BUN/Creatinine Ratio   


 


Glucose   


 


POC Glucose  102  89 


 


Calcium   


 


Total Bilirubin   


 


AST   


 


ALT   


 


Alkaline Phosphatase   


 


Troponin I   


 


Total Protein   


 


Albumin   


 


Globulin   


 


Albumin/Globulin Ratio   














  18





  06:21


 


WBC 


 


RBC 


 


Hgb 


 


Hct 


 


MCV 


 


MCH 


 


MCHC Differential 


 


RDW 


 


Plt Count 


 


MPV 


 


Neutrophils % 


 


Lymphocytes % 


 


Monocytes % 


 


Eosinophils % 


 


Basophils % 


 


Sodium  134 L


 


Potassium  3.5


 


Chloride  100


 


Carbon Dioxide  30.1


 


Anion Gap  7.4


 


BUN  14


 


Creatinine  0.8


 


Est GFR ( Amer)  > 60.0


 


Est GFR (Non-Af Amer)  > 60.0


 


BUN/Creatinine Ratio  17.5


 


Glucose  99


 


POC Glucose 


 


Calcium  7.6 L


 


Total Bilirubin  0.5


 


AST  27


 


ALT  41


 


Alkaline Phosphatase  138 H


 


Troponin I 


 


Total Protein  5.0 L


 


Albumin  2.2 L


 


Globulin  2.8


 


Albumin/Globulin Ratio  0.8 L














- Assessment


Assessment: 


Patient is in IV NS, IV AB continue with home meds. Dx: elevated WBC, General 

muscle weakness. 





- Plan


Plan: 


Patient in IV NS, IV AB, continue with home meds. Consult with ID requested. 

Will look for SNF placement.

## 2018-08-04 NOTE — DIAGNOSTIC IMAGING REPORT
CHEST X-RAY: AP view



INDICATION: Shortness of breath



COMPARISON: Chest x-ray 7/4/2018



FINDINGS: Skin fold is seen along the left lateral hemithorax.  No

evidence of pneumothorax.  Increased interstitial lung markings are

noted.  There may be a small right effusion.  No focal consolidation

identified.  Suboptimal lung volumes are noted.  Heart size is normal. 

Degenerative changes of the spine are noted with mild scoliosis.



IMPRESSION:



Increased interstitial lung markings, which may be chronic, however, a

mild degree of congestion cannot be excluded.  Please Correlate

clinically.



Possible small right pleural effusion.



No focal consolidation identified.

## 2018-08-04 NOTE — DIAGNOSTIC IMAGING REPORT
CT abdomen and pelvis without intravenous contrast



Indication: Abdominal pain



Comparison: CT abdomen and pelvis on 6/25/2018,



Technique: Axial images were obtained from the lung bases to the

bilateral proximal femurs without IV contrast.  Coronal reconstructions

were made.  total DLP: 508,  CTDI9.5



FINDINGS:



Moderate right and small left effusion seen with bibasal infiltrates and

chronic changes of the lung bases.  Evaluation of the solid organs is

limited due to lack of IV contrast.  There is slight irregularity of the

liver margins.  No focal lesions.  No focal splenic lesions.  Punctate

calcific density in the tail of the pancreas is noted possibly due to

old inflammatory process.  Additional calcifications are seen along the

portal vein region.  No focal adrenal lesions.  No evidence of

hydronephrosis.  Nonspecific bilateral perinephric inflammatory changes

are noted.  



Postsurgical changes of the sigmoid colon are noted.  Mild surrounding

inflammatory changes are noted.  Copious stool is seen throughout the

colon.  Areas of bowel wall thickening and postsurgical changes are seen

within the right lower quadrant and region of the ascending colon and

terminal ileum.  Multiple fluid moderately distended loops of small

bowel are also noted.  Mild ascites is noted.  No evidence of free

abdominal air.  Diffuse atherosclerosis is noted.  The appendix is not

visualized.  Degenerative changes of the spine and pelvis are noted. 

There is mild spinal scoliosis.



Mild inflammatory changes of anterior abdominal wall noted greatest on

the left side with possible overlying dressing material.  There is mild

anasarca.



IMPRESSION:



Postsurgical changes within the right lower quadrant with areas of bowel

wall thickening in this region probably, likely the ascending colon and

distal ileum.  Findings may be due to infectious inflammatory

process/colitis and ileitis.  Neoplastic process cannot be excluded. 

Clinical correlation and follow-up is recommended.



Additional postsurgical changes of a rectal region with mild surrounding

inflammatory changes.



Moderate distended loops of bowel air-fluid levels.  A low-grade

obstructive process cannot be excluded.  Again follow-up is recommended.



Mild inflammatory changes of anterior abdominal wall greatest on the

left side with overlying dressing material, clinical correlation is

recommended.



Small calcifications adjacent to portal vein probably representing

calcified lymph nodes may be due to old inflammatory process.



Moderate right and small left effusion and bibasal infiltrates.  



Moderate atherosclerosis.



Mild anasarca.

## 2018-08-04 NOTE — CONSULTATION
Consult Note





- Consult Note


Service Date: 18


Referring Physician: Oz Goodman


Consult Note: 


PHYSICIAN Consultation Note:





Date of Admission: 18





Purpose of Consultation: Leukocytosis.  





Chief Complaint: Patient HUSAM JOHNSON was admitted to location [facility] 

[facility] with GENERALIZED WEAKNESS, ELEVATED WBC.





History of Present Illness:


63-year-old male with past medical history of small bowel perforation treated 

by resection of small bowel and ileostomy .  Ileostomy was reversed.  He 

developed leukocytosis and cellulitis.  He also developed pneumonia.  He was 

transferred to the ICU intubated orally on ventilator support.  He was 

extubated successfully.  Did well.  However his WC count was on higher side.  

So he was discharged to [facility].  He did well and 

ultimately discharged home in stable condition.    Now, he comes back with a 

generalized weakness and elevated WBC count.  On initial evaluation, his 

temperature was 97.8F and WBC count was 14,000.  Empirically, he was started 

on Zosyn.  ID consult was called for persistent leukocytosis.





Past Medical History: Diverticulitis and small bowel perforation.  Extraocular 

laparotomy and the small bowel resection and ileostomy.  Reversal of colostomy.

  Cellulitis of legs.  Pneumonia.





Allergies











Allergy/AdvReac Type Severity Reaction Status Date / Time


 


No Known Allergies Allergy   Verified 18 16:59








 Vital Signs











Temp  97.8 F   18 08:00


 


Pulse  79   18 08:00


 


Resp  18   18 08:00


 


BP  115/74   18 08:00


 


Pulse Ox  97   18 08:00








 Intake & Output











 18





 18:59 06:59 18:59


 


Intake Total  300 


 


Balance  300 


 


Weight (lbs) 79.832 kg 78.131 kg 


 


Intake:   


 


  Oral  300 


 


Other:   


 


  # Voids  2 


 


  # Bowel Movements  2 


 


  Stool Characteristics  Soft Soft


 


  Weight Source Estimated Bedscale 








 Laboratory Results - last 24 hr











  18





  18:19 18:19 18:19


 


WBC  14.3 H  


 


RBC  3.41 L  


 


Hgb  9.5 L  


 


Hct  27.8 L  


 


MCV  81.6  


 


MCH  27.7  


 


MCHC Differential  34.0  


 


RDW  16.5  


 


Plt Count  86 L  


 


MPV  7.9  


 


Neutrophils %  82.8 H  


 


Lymphocytes %  13.9 L  


 


Monocytes %  3.1  


 


Eosinophils %  0.2  


 


Basophils %  0.0  


 


Sodium   134 L 


 


Potassium   3.2 L 


 


Chloride   98 


 


Carbon Dioxide   28.5 


 


Anion Gap   10.7 


 


BUN   17 


 


Creatinine   0.7 


 


Est GFR ( Amer)   > 60.0 


 


Est GFR (Non-Af Amer)   > 60.0 


 


BUN/Creatinine Ratio   24.3 


 


Glucose   82 


 


POC Glucose   


 


Calcium   7.9 L 


 


Total Bilirubin   0.5 


 


AST   30 


 


ALT   46 


 


Alkaline Phosphatase   156 H 


 


Troponin I    0.01


 


Total Protein   5.3 L 


 


Albumin   2.6 L 


 


Globulin   2.7 


 


Albumin/Globulin Ratio   1.0 














  18





  00:33 05:52 06:21


 


WBC    14.0 H


 


RBC    3.31 L


 


Hgb    9.1 L


 


Hct    27.1 L


 


MCV    81.9


 


MCH    27.6


 


MCHC Differential    33.7


 


RDW    16.6


 


Plt Count    80 L


 


MPV    8.3


 


Neutrophils %    80.6 H


 


Lymphocytes %    13.8 L


 


Monocytes %    5.4


 


Eosinophils %    0.2


 


Basophils %    0.0


 


Sodium   


 


Potassium   


 


Chloride   


 


Carbon Dioxide   


 


Anion Gap   


 


BUN   


 


Creatinine   


 


Est GFR ( Amer)   


 


Est GFR (Non-Af Amer)   


 


BUN/Creatinine Ratio   


 


Glucose   


 


POC Glucose  102  89 


 


Calcium   


 


Total Bilirubin   


 


AST   


 


ALT   


 


Alkaline Phosphatase   


 


Troponin I   


 


Total Protein   


 


Albumin   


 


Globulin   


 


Albumin/Globulin Ratio   














  18





  06:21


 


WBC 


 


RBC 


 


Hgb 


 


Hct 


 


MCV 


 


MCH 


 


MCHC Differential 


 


RDW 


 


Plt Count 


 


MPV 


 


Neutrophils % 


 


Lymphocytes % 


 


Monocytes % 


 


Eosinophils % 


 


Basophils % 


 


Sodium  134 L


 


Potassium  3.5


 


Chloride  100


 


Carbon Dioxide  30.1


 


Anion Gap  7.4


 


BUN  14


 


Creatinine  0.8


 


Est GFR ( Amer)  > 60.0


 


Est GFR (Non-Af Amer)  > 60.0


 


BUN/Creatinine Ratio  17.5


 


Glucose  99


 


POC Glucose 


 


Calcium  7.6 L


 


Total Bilirubin  0.5


 


AST  27


 


ALT  41


 


Alkaline Phosphatase  138 H


 


Troponin I 


 


Total Protein  5.0 L


 


Albumin  2.2 L


 


Globulin  2.8


 


Albumin/Globulin Ratio  0.8 L








Home Medication











 Medication  Instructions  Recorded  Type


 


Acetaminophen [Tylenol] 650 mg PO PRN PRN  tab 06/15/18 Rx


 


Bacitracin Zinc/Neomy/Poly [Triple 1 pkt TP DAILY  packet 06/15/18 Rx





Antibiotic Pkt]   


 


Enoxaparin [Lovenox] 30 mg SUBQ Q12HR  syr 06/15/18 Rx


 


Hydrocodone/APAP 10 mg/325 mg 1 tab PO Q6H PRN  tab 06/15/18 Rx





[Norco 10 mg/325 mg]   


 


Vancomycin HCl [Vancomycin] 1 gm IV Q12H  vial 06/15/18 Rx


 


Albuterol/Ipratropium Neb [Duoneb 3 ml HHN Q4HRT  aers 18 Rx





Neb]   


 


Budesonide [Pulmicort] 0.5 mg HHN BIDRT  ud 18 Rx


 


Cefepime [Maxipime] 1 gm IV Q12HR  vial 18 Rx


 


Cholestyramine [Questran] 4 gm PO BID  pkt 18 Rx


 


HYDROmorphone [Dilaudid] 1 mg IVP Q3HR PRN  syr 18 Rx


 


Insulin Aspart Sliding Scale 0 units SUBQ Q6H  unit 18 Rx





[NovoLOG INSULIN SLIDING SCALE]   


 


Lorazepam [Ativan] 1 mg IVP Q4HR PRN  vial 18 Rx


 


Metoclopramide [Reglan] 10 mg IVP Q6HR  vial 18 Rx


 


Ondansetron HCl [Zofran*] 4 mg IV Q4H PRN  vial 18 Rx


 


Pantoprazole [Protonix] 40 mg IVP BID  vial 18 Rx


 


Vancomycin HCl [Vancomycin] 1.25 gm IV Q24H  vial 18 Rx


 


methylPREDNISolone SS [Solu-MEDROL] 60 mg IVP Q6H  vial 18 Rx








Current Medications











Generic Name Dose Route Start Last Admin





  Trade Name Freq  PRN Reason Stop Dose Admin


 


Acetaminophen  650 mg  18 23:05  





  Tylenol  PO  10/02/18 23:04  





  PRN PRN   





  TEMPERATURE >100C   





     





     





     


 


Albuterol/Ipratropium  3 ml  18 03:00  18 07:22





  Duoneb Neb  HHN  10/03/18 02:59  3 ml





  Q4HRT KATHY   Administration





     





     





     





     


 


Cholestyramine Resin  4 gm  18 09:00  18 09:22





  Questran  PO  10/03/18 08:59  4 gm





  BID KATHY   Administration





     





     





     





     


 


Hydromorphone HCl  1 mg  18 23:20  18 03:21





  Dilaudid  IVP  10/02/18 23:19  1 mg





  Q6HR PRN   Administration





  Pain (Severe)   





     





     





     


 


Piperacillin Sod/Tazobactam  50 mls @ 100 mls/hr  18 06:00  18 05:45





  Sod 3.375 gm/ Sodium Chloride  IV  10/03/18 05:59  100 mls/hr





  Q6HR KATHY   Administration





     





     





     





     


 


Insulin Aspart  0 units  18 23:15  18 06:07





  Novolog Insulin Sliding Scale  SUBQ  10/02/18 23:14  Not Given





  Q6H KATHY   





     





     





  Protocol   





     


 


Ondansetron HCl  4 mg  18 23:05  





  Zofran  IV  10/02/18 23:04  





  Q4H PRN   





  Nausea / Vomiting   





     





     





     


 


Pantoprazole Sodium  40 mg  18 23:15  18 09:22





  Protonix  IVP  10/02/18 23:14  40 mg





  BID KATHY   Administration





     





     





     





     








Review of Systems:


A 12 point ROS was reviewed with the pertinent positive and negatives noted in 

the HPI.





Social History





Smoking Status                   Former smoker


Drug Use                         No


Alcohol Use                      No





Family Medical History





Family Medical History                                     Start:  18 21:

27


Freq:   ONCE                                               Status: Active      

  


Protocol:                                                                      

  


 Document     18 21:27  VIRGIE  (Rec: 18 01:[id number]  VIRGIE  [id number])


 Family Medical History


     Father


      History Unknown                            Yes


      Ethnicity                                  Non-


      Living Status                              


     Mother


      History Unknown                            Yes


      Ethnicity                                  Non-


      Living Status                              





Physical Exam:





General: Comfortable not in acute distress.





HEENT:  Head: Normocephalic.  Atraumatic.  Oral cavity moist pink tongue.  Head

: NC NT.  Oral cavity: Moist, pink tongue.  Eyes: No pallor icterus.  PERRLA.  





Neck: Supple, no Sharron.  No use of accessory muscles.





Cardio: S1 and S2 within normal limits.





Respiratory: Vesicular breath sound no crackles or wheezing





Abdominal: Soft, nontender and distended bowel sounds present third.





Genital/Urinary: No cyanosis, no clubbing or edema.





Extremities: No cyanosis no clubbing no edema





Neurological: Alert awake oriented 3 no focal visit.





Assessment: 





1.  Leukocytosis.


2.  Pneumonia.


3.





Plan: 


Continue Zosyn at this time.  Follow up the labs and go from there.


Monitor CBC.





Thank you Dr. Goodman for involving me in taking care of this patient.











Nishant Guillen Devesh N., M.D.


[id number]

## 2018-08-05 LAB
ALBUMIN SERPL-MCNC: 2.1 GM/DL (ref 4.2–5.5)
ALBUMIN/GLOB SERPL: 0.8 {RATIO} (ref 1–1.8)
ALP SERPL-CCNC: 108 U/L (ref 34–104)
ALT SERPL-CCNC: 29 U/L (ref 7–52)
ANION GAP SERPL CALC-SCNC: 8.1 MMOL/L (ref 7–16)
AST SERPL-CCNC: 17 U/L (ref 13–39)
BASOPHILS # BLD AUTO: 0.1 TH/CUMM (ref 0–0.2)
BASOPHILS NFR BLD AUTO: 0.6 % (ref 0–2)
BILIRUB SERPL-MCNC: 0.5 MG/DL (ref 0.3–1)
BUN SERPL-MCNC: 14 MG/DL (ref 7–25)
CALCIUM SERPL-MCNC: 7.5 MG/DL (ref 8.6–10.3)
CHLORIDE SERPL-SCNC: 101 MEQ/L (ref 98–107)
CO2 SERPL-SCNC: 28.8 MEQ/L (ref 21–31)
CREAT SERPL-MCNC: 0.9 MG/DL (ref 0.7–1.3)
EOSINOPHIL # BLD AUTO: 0 TH/CMM (ref 0.1–0.4)
EOSINOPHIL NFR BLD AUTO: 0.2 % (ref 0–5)
ERYTHROCYTE [DISTWIDTH] IN BLOOD BY AUTOMATED COUNT: 16.5 % (ref 11.5–20)
GLOBULIN SER-MCNC: 2.6 GM/DL
GLUCOSE SERPL-MCNC: 81 MG/DL (ref 70–105)
HCT VFR BLD CALC: 24 % (ref 41–60)
HGB BLD-MCNC: 8.2 GM/DL (ref 12–16)
LYMPHOCYTE AB SER FC-ACNC: 1.6 TH/CMM (ref 1.5–3)
LYMPHOCYTES NFR BLD AUTO: 16.6 % (ref 20–50)
MCH RBC QN AUTO: 27.9 PG (ref 27–31)
MCHC RBC AUTO-ENTMCNC: 34.1 PG (ref 28–36)
MCV RBC AUTO: 81.8 FL (ref 80–99)
MONOCYTES # BLD AUTO: 0.6 TH/CMM (ref 0.3–1)
MONOCYTES NFR BLD AUTO: 6.6 % (ref 2–10)
NEUTROPHILS # BLD: 7.2 TH/CMM (ref 1.8–8)
NEUTROPHILS NFR BLD AUTO: 76 % (ref 40–80)
PLATELET # BLD: 70 TH/CMM (ref 150–400)
PMV BLD AUTO: 8.1 FL
POTASSIUM SERPL-SCNC: 3.9 MEQ/L (ref 3.5–5.1)
RBC # BLD AUTO: 2.94 MIL/CMM (ref 3.8–5.8)
SODIUM SERPL-SCNC: 134 MEQ/L (ref 136–145)
WBC # BLD AUTO: 9.5 TH/CMM (ref 4.8–10.8)

## 2018-08-05 RX ADMIN — IPRATROPIUM BROMIDE AND ALBUTEROL SULFATE SCH ML: .5; 3 SOLUTION RESPIRATORY (INHALATION) at 14:59

## 2018-08-05 RX ADMIN — INSULIN ASPART SCH: 100 INJECTION, SOLUTION INTRAVENOUS; SUBCUTANEOUS at 00:06

## 2018-08-05 RX ADMIN — IPRATROPIUM BROMIDE AND ALBUTEROL SULFATE SCH ML: .5; 3 SOLUTION RESPIRATORY (INHALATION) at 06:58

## 2018-08-05 RX ADMIN — IPRATROPIUM BROMIDE AND ALBUTEROL SULFATE SCH ML: .5; 3 SOLUTION RESPIRATORY (INHALATION) at 11:13

## 2018-08-05 RX ADMIN — IPRATROPIUM BROMIDE AND ALBUTEROL SULFATE SCH: .5; 3 SOLUTION RESPIRATORY (INHALATION) at 03:10

## 2018-08-05 RX ADMIN — INSULIN ASPART SCH: 100 INJECTION, SOLUTION INTRAVENOUS; SUBCUTANEOUS at 05:46

## 2018-08-05 RX ADMIN — IPRATROPIUM BROMIDE AND ALBUTEROL SULFATE SCH ML: .5; 3 SOLUTION RESPIRATORY (INHALATION) at 19:49

## 2018-08-05 RX ADMIN — INSULIN ASPART SCH: 100 INJECTION, SOLUTION INTRAVENOUS; SUBCUTANEOUS at 11:49

## 2018-08-05 RX ADMIN — INSULIN ASPART SCH: 100 INJECTION, SOLUTION INTRAVENOUS; SUBCUTANEOUS at 18:05

## 2018-08-05 NOTE — INFECTIOUS DISEASE PROG NOTE
Infectious Disease Subjective





- Review of Systems


Service Date: 08/05/18


Subjective: 





There is no new change.  No fever.





Infectious Disease Objective





- Results


Result Diagrams: 


 08/05/18 05:57





 08/05/18 05:57


Recent Labs: 


 Laboratory Last Values











WBC  9.5 Th/cmm (4.8-10.8)   08/05/18  05:57    


 


RBC  2.94 Mil/cmm (3.80-5.80)  L  08/05/18  05:57    


 


Hgb  8.2 gm/dL (12-16)  L  08/05/18  05:57    


 


Hct  24.0 % (41.0-60)  L  08/05/18  05:57    


 


MCV  81.8 fl (80-99)   08/05/18  05:57    


 


MCH  27.9 pg (27.0-31.0)   08/05/18  05:57    


 


MCHC Differential  34.1 pg (28.0-36.0)   08/05/18  05:57    


 


RDW  16.5 % (11.5-20.0)   08/05/18  05:57    


 


Plt Count  70 Th/cmm (150-400)  L  08/05/18  05:57    


 


MPV  8.1 fl  08/05/18  05:57    


 


Neutrophils %  76.0 % (40.0-80.0)   08/05/18  05:57    


 


Lymphocytes %  16.6 % (20.0-50.0)  L  08/05/18  05:57    


 


Monocytes %  6.6 % (2.0-10.0)   08/05/18  05:57    


 


Eosinophils %  0.2 % (0.0-5.0)   08/05/18  05:57    


 


Basophils %  0.6 % (0.0-2.0)   08/05/18  05:57    


 


Sodium  134 mEq/L (136-145)  L  08/05/18  05:57    


 


Potassium  3.9 mEq/L (3.5-5.1)   08/05/18  05:57    


 


Chloride  101 mEq/L ()   08/05/18  05:57    


 


Carbon Dioxide  28.8 mEq/L (21.0-31.0)   08/05/18  05:57    


 


Anion Gap  8.1  (7.0-16.0)   08/05/18  05:57    


 


BUN  14 mg/dL (7-25)   08/05/18  05:57    


 


Creatinine  0.9 mg/dL (0.7-1.3)   08/05/18  05:57    


 


Est GFR ( Amer)  > 60.0 ml/min (>90)   08/05/18  05:57    


 


Est GFR (Non-Af Amer)  > 60.0 ml/min  08/05/18  05:57    


 


BUN/Creatinine Ratio  15.6   08/05/18  05:57    


 


Glucose  81 mg/dL ()   08/05/18  05:57    


 


POC Glucose  94 MG/DL (70 - 105)   08/05/18  11:06    


 


Calcium  7.5 mg/dL (8.6-10.3)  L  08/05/18  05:57    


 


Total Bilirubin  0.5 mg/dL (0.3-1.0)   08/05/18  05:57    


 


AST  17 U/L (13-39)   08/05/18  05:57    


 


ALT  29 U/L (7-52)   08/05/18  05:57    


 


Alkaline Phosphatase  108 U/L ()  H  08/05/18  05:57    


 


Troponin I  0.01 ng/mL (0.01-0.05)   08/03/18  18:19    


 


Total Protein  4.7 gm/dL (6.0-8.3)  L  08/05/18  05:57    


 


Albumin  2.1 gm/dL (4.2-5.5)  L  08/05/18  05:57    


 


Globulin  2.6 gm/dL  08/05/18  05:57    


 


Albumin/Globulin Ratio  0.8  (1.0-1.8)  L  08/05/18  05:57    














- Physical Exam


Vitals and I&O: 


 Vital Signs











Temp  99.3 F   08/05/18 11:24


 


Pulse  90   08/05/18 11:24


 


Resp  18   08/05/18 11:24


 


BP  117/78   08/05/18 11:24


 


Pulse Ox  99   08/05/18 11:24








 Intake & Output











 08/04/18 08/05/18 08/05/18





 18:59 06:59 18:59


 


Intake Total 950 550 50


 


Output Total 850  


 


Balance 100 550 50


 


Weight (lbs) 78.109 kg 75.892 kg 


 


Intake:   


 


  Intake, IV Amount 100 50 50


 


    Piperacillin Sodium/ 100 50 50





    Tazobact 3.375 gm In   





    Sodium Chloride 0.9% 50   





    ml @ 100 mls/hr IV Q6HR   





    [facility] Rx#:[id number]   


 


  Oral 850 500 


 


Output:   


 


  Urine 850  


 


Other:   


 


  # Voids  3 


 


  # Bowel Movements 1 0 


 


  Stool Characteristics Soft Soft Soft


 


  Weight Source Bedscale Bedscale 











Active Medications: 


Current Medications





Acetaminophen (Tylenol)  650 mg PO PRN PRN


   PRN Reason: TEMPERATURE >100C


   Stop: 10/02/18 23:04


Albuterol/Ipratropium (Duoneb Neb)  3 ml HHN Q4HRT [facility]


   Stop: 10/03/18 02:59


   Last Admin: 08/05/18 11:13 Dose:  3 ml


Cholestyramine Resin (Questran)  4 gm PO BID [facility]


   Stop: 10/03/18 08:59


   Last Admin: 08/05/18 08:56 Dose:  4 gm


Hydromorphone HCl (Dilaudid)  1 mg IVP Q6HR PRN


   PRN Reason: Pain (Severe)


   Stop: 10/02/18 23:19


   Last Admin: 08/04/18 20:42 Dose:  1 mg


Piperacillin Sod/Tazobactam (Sod 3.375 gm/ Sodium Chloride)  50 mls @ 100 mls/

hr IV Q6HR [facility]


   Stop: 10/03/18 05:59


   Last Admin: 08/05/18 12:23 Dose:  100 mls/hr


Insulin Aspart (Novolog Insulin Sliding Scale)  0 units SUBQ Q6H [facility]; Protocol


   Stop: 10/02/18 23:14


   Last Admin: 08/05/18 11:49 Dose:  Not Given


Ondansetron HCl (Zofran)  4 mg IV Q4H PRN


   PRN Reason: Nausea / Vomiting


   Stop: 10/02/18 23:04


Pantoprazole Sodium (Protonix)  40 mg IVP BID [facility]


   Stop: 10/02/18 23:14


   Last Admin: 08/05/18 08:56 Dose:  40 mg








General: no acute distress, well developed, well nourished


HEENT: atraumatic, no normocephalic, no PERRLA, no EOMI


Neck: supple, no thyromegaly, no lymphadenopathy, no rigid


Cardiovascular: S1S2, regular


Lungs: clear to auscultation bilaterally, clear to percussion


Abdomen: soft, no tender, no distended, no mass, no rebound, no hepatomegaly


Extremities: no cyanosis, no clubbing, no edema


Neurological: awake, alert, oriented


Skin: intact





- Procedures


Procedures: 


 Procedures











Procedure Code Date


 


ASSISTANCE WITH RESPIRATORY VENTILATION, <24 HRS, CPAP [id number] 06/18/18


 


EXCISION OF CHEST SKIN, EXTERNAL APPROACH [id number] 06/18/18


 


EXCISION OF ILEUM, OPEN APPROACH [id number] 06/18/18


 


EXCISION OF RIGHT LOWER ARM SKIN, EXTERNAL APPROACH [id number] 06/09/18


 


EXCISION OF RIGHT LOWER LEG SKIN, EXTERNAL APPROACH [id number] 06/09/18


 


INSERTION OF INFUSION DEV INTO SUP VENA CAVA, PERC APPROACH [id number] 06/18/18


 


RELEASE ILEUM, OPEN APPROACH [id number] 06/18/18














Infectious Disease Assmt/Plan





- Assessment


Assessment: 


1.  Leukocytosis.  Improved.


2.  Pneumonia.








- Plan


Plan: 





Continue Zosyn.

## 2018-08-05 NOTE — GENERAL PROGRESS NOTE
Subjective





- Review of Systems


Service Date: 08/05/18


Subjective: 


I fell better.





Objective





- Results


Result Diagrams: 


 08/05/18 05:57





 08/05/18 05:57


Recent Labs: 


 Laboratory Last Values











WBC  9.5 Th/cmm (4.8-10.8)   08/05/18  05:57    


 


RBC  2.94 Mil/cmm (3.80-5.80)  L  08/05/18  05:57    


 


Hgb  8.2 gm/dL (12-16)  L  08/05/18  05:57    


 


Hct  24.0 % (41.0-60)  L  08/05/18  05:57    


 


MCV  81.8 fl (80-99)   08/05/18  05:57    


 


MCH  27.9 pg (27.0-31.0)   08/05/18  05:57    


 


MCHC Differential  34.1 pg (28.0-36.0)   08/05/18  05:57    


 


RDW  16.5 % (11.5-20.0)   08/05/18  05:57    


 


Plt Count  70 Th/cmm (150-400)  L  08/05/18  05:57    


 


MPV  8.1 fl  08/05/18  05:57    


 


Neutrophils %  76.0 % (40.0-80.0)   08/05/18  05:57    


 


Lymphocytes %  16.6 % (20.0-50.0)  L  08/05/18  05:57    


 


Monocytes %  6.6 % (2.0-10.0)   08/05/18  05:57    


 


Eosinophils %  0.2 % (0.0-5.0)   08/05/18  05:57    


 


Basophils %  0.6 % (0.0-2.0)   08/05/18  05:57    


 


Sodium  134 mEq/L (136-145)  L  08/05/18  05:57    


 


Potassium  3.9 mEq/L (3.5-5.1)   08/05/18  05:57    


 


Chloride  101 mEq/L ()   08/05/18  05:57    


 


Carbon Dioxide  28.8 mEq/L (21.0-31.0)   08/05/18  05:57    


 


Anion Gap  8.1  (7.0-16.0)   08/05/18  05:57    


 


BUN  14 mg/dL (7-25)   08/05/18  05:57    


 


Creatinine  0.9 mg/dL (0.7-1.3)   08/05/18  05:57    


 


Est GFR ( Amer)  > 60.0 ml/min (>90)   08/05/18  05:57    


 


Est GFR (Non-Af Amer)  > 60.0 ml/min  08/05/18  05:57    


 


BUN/Creatinine Ratio  15.6   08/05/18  05:57    


 


Glucose  81 mg/dL ()   08/05/18  05:57    


 


POC Glucose  83 MG/DL (70 - 105)   08/05/18  05:45    


 


Calcium  7.5 mg/dL (8.6-10.3)  L  08/05/18  05:57    


 


Total Bilirubin  0.5 mg/dL (0.3-1.0)   08/05/18  05:57    


 


AST  17 U/L (13-39)   08/05/18  05:57    


 


ALT  29 U/L (7-52)   08/05/18  05:57    


 


Alkaline Phosphatase  108 U/L ()  H  08/05/18  05:57    


 


Troponin I  0.01 ng/mL (0.01-0.05)   08/03/18  18:19    


 


Total Protein  4.7 gm/dL (6.0-8.3)  L  08/05/18  05:57    


 


Albumin  2.1 gm/dL (4.2-5.5)  L  08/05/18  05:57    


 


Globulin  2.6 gm/dL  08/05/18  05:57    


 


Albumin/Globulin Ratio  0.8  (1.0-1.8)  L  08/05/18  05:57    














- Physical Exam


Vitals and I&O: 


 Vital Signs











Temp  98.8 F   08/05/18 08:00


 


Pulse  113   08/05/18 08:00


 


Resp  18   08/05/18 08:00


 


BP  124/77   08/05/18 08:00


 


Pulse Ox  96   08/05/18 08:00








 Intake & Output











 08/04/18 08/05/18 08/05/18





 18:59 06:59 18:59


 


Intake Total 950 550 50


 


Output Total 850  


 


Balance 100 550 50


 


Weight (lbs) 78.109 kg 75.892 kg 


 


Intake:   


 


  Intake, IV Amount 100 50 50


 


    Piperacillin Sodium/ 100 50 50





    Tazobact 3.375 gm In   





    Sodium Chloride 0.9% 50   





    ml @ 100 mls/hr IV Q6HR   





    [facility] Rx#:[id number]   


 


  Oral 850 500 


 


Output:   


 


  Urine 850  


 


Other:   


 


  # Voids  3 


 


  # Bowel Movements 1 0 


 


  Stool Characteristics Soft Soft 


 


  Weight Source Bedscale Bedscale 











Active Medications: 


Current Medications





Acetaminophen (Tylenol)  650 mg PO PRN PRN


   PRN Reason: TEMPERATURE >100C


   Stop: 10/02/18 23:04


Albuterol/Ipratropium (Duoneb Neb)  3 ml HHN Q4HRT [facility]


   Stop: 10/03/18 02:59


   Last Admin: 08/05/18 06:58 Dose:  3 ml


Cholestyramine Resin (Questran)  4 gm PO BID [facility]


   Stop: 10/03/18 08:59


   Last Admin: 08/05/18 08:56 Dose:  4 gm


Hydromorphone HCl (Dilaudid)  1 mg IVP Q6HR PRN


   PRN Reason: Pain (Severe)


   Stop: 10/02/18 23:19


   Last Admin: 08/04/18 20:42 Dose:  1 mg


Piperacillin Sod/Tazobactam (Sod 3.375 gm/ Sodium Chloride)  50 mls @ 100 mls/

hr IV Q6HR [facility]


   Stop: 10/03/18 05:59


   Last Infusion: 08/05/18 07:35 Dose:  Infused


Insulin Aspart (Novolog Insulin Sliding Scale)  0 units SUBQ Q6H [facility]; Protocol


   Stop: 10/02/18 23:14


   Last Admin: 08/05/18 05:46 Dose:  Not Given


Ondansetron HCl (Zofran)  4 mg IV Q4H PRN


   PRN Reason: Nausea / Vomiting


   Stop: 10/02/18 23:04


Pantoprazole Sodium (Protonix)  40 mg IVP BID [facility]


   Stop: 10/02/18 23:14


   Last Admin: 08/05/18 08:56 Dose:  40 mg








General: Alert, Oriented x3, No acute distress


HEENT: Atraumatic


Neck: Supple


Cardiovascular: Regular rate


Lungs: Clear to auscultation


Abdomen: Bowel sounds


Extremities: Other (No edema)


Neurological: Other (Unstable gait)


Skin: Other (Warm and dry)


Psych/Mental Status: Mental status NL





- Procedures


Procedures: 


 Procedures











Procedure Code Date


 


ASSISTANCE WITH RESPIRATORY VENTILATION, <24 HRS, CPAP [id number] 06/18/18


 


EXCISION OF CHEST SKIN, EXTERNAL APPROACH [id number] 06/18/18


 


EXCISION OF ILEUM, OPEN APPROACH [id number] 06/18/18


 


EXCISION OF RIGHT LOWER ARM SKIN, EXTERNAL APPROACH [id number] 06/09/18


 


EXCISION OF RIGHT LOWER LEG SKIN, EXTERNAL APPROACH [id number] 06/09/18


 


INSERTION OF INFUSION DEV INTO SUP VENA CAVA, PERC APPROACH [id number] 06/18/18


 


RELEASE ILEUM, OPEN APPROACH [id number] 06/18/18














Assessment/Plan





- Assessment


Assessment: 


Patient is awake, alert, calm in no acute distress.  Looking for placement.  Dx

: elevated WBC, General muscle weakness, thrombocytopenia, anemia.





- Plan


Plan: 


Patient in IV NS, IV AB, continue with home meds. Consult with PT requested. 

Will look for SNF placement.

## 2018-08-06 LAB
ALBUMIN SERPL-MCNC: 2.3 GM/DL (ref 4.2–5.5)
ALBUMIN/GLOB SERPL: 0.8 {RATIO} (ref 1–1.8)
ALP SERPL-CCNC: 114 U/L (ref 34–104)
ALT SERPL-CCNC: 24 U/L (ref 7–52)
ANION GAP SERPL CALC-SCNC: 11.1 MMOL/L (ref 7–16)
APPEARANCE UR: CLEAR
AST SERPL-CCNC: 14 U/L (ref 13–39)
BASOPHILS # BLD AUTO: 0.1 TH/CUMM (ref 0–0.2)
BASOPHILS NFR BLD AUTO: 0.9 % (ref 0–2)
BILIRUB SERPL-MCNC: 0.6 MG/DL (ref 0.3–1)
BILIRUB UR-MCNC: NEGATIVE MG/DL
BUN SERPL-MCNC: 12 MG/DL (ref 7–25)
CALCIUM SERPL-MCNC: 7.8 MG/DL (ref 8.6–10.3)
CHLORIDE SERPL-SCNC: 100 MEQ/L (ref 98–107)
CO2 SERPL-SCNC: 24.8 MEQ/L (ref 21–31)
COLOR UR: YELLOW
CREAT SERPL-MCNC: 1 MG/DL (ref 0.7–1.3)
EOSINOPHIL # BLD AUTO: 0 TH/CMM (ref 0.1–0.4)
EOSINOPHIL NFR BLD AUTO: 0.2 % (ref 0–5)
ERYTHROCYTE [DISTWIDTH] IN BLOOD BY AUTOMATED COUNT: 16.2 % (ref 11.5–20)
GLOBULIN SER-MCNC: 2.9 GM/DL
GLUCOSE SERPL-MCNC: 120 MG/DL (ref 70–105)
GLUCOSE UR STRIP-MCNC: NEGATIVE MG/DL
HCT VFR BLD CALC: 26 % (ref 41–60)
HGB BLD-MCNC: 8.8 GM/DL (ref 12–16)
KETONES UR STRIP-MCNC: NEGATIVE MG/DL
LEUKOCYTE ESTERASE UR-ACNC: NEGATIVE
LYMPHOCYTE AB SER FC-ACNC: 1.6 TH/CMM (ref 1.5–3)
LYMPHOCYTES NFR BLD AUTO: 15 % (ref 20–50)
MCH RBC QN AUTO: 27.6 PG (ref 27–31)
MCHC RBC AUTO-ENTMCNC: 33.8 PG (ref 28–36)
MCV RBC AUTO: 81.6 FL (ref 80–99)
MICRO URNS: NO
MONOCYTES # BLD AUTO: 0.6 TH/CMM (ref 0.3–1)
MONOCYTES NFR BLD AUTO: 5.9 % (ref 2–10)
NEUTROPHILS # BLD: 8.6 TH/CMM (ref 1.8–8)
NEUTROPHILS NFR BLD AUTO: 78 % (ref 40–80)
NITRITE UR QL STRIP: NEGATIVE
PH UR STRIP: 7 [PH] (ref 4.6–8)
PLATELET # BLD: 90 TH/CMM (ref 150–400)
PMV BLD AUTO: 8 FL
POTASSIUM SERPL-SCNC: 3.9 MEQ/L (ref 3.5–5.1)
PROT UR STRIP-MCNC: NEGATIVE MG/DL
RBC # BLD AUTO: 3.19 MIL/CMM (ref 3.8–5.8)
RBC # UR STRIP: NEGATIVE /UL
SODIUM SERPL-SCNC: 132 MEQ/L (ref 136–145)
SP GR UR STRIP: 1.01 (ref 1–1.03)
URINALYSIS COMPLETE PNL UR: (no result)
UROBILINOGEN UR STRIP-ACNC: 0.2 E.U./DL (ref 0.2–1)
WBC # BLD AUTO: 10.9 TH/CMM (ref 4.8–10.8)

## 2018-08-06 RX ADMIN — HYDROMORPHONE HYDROCHLORIDE PRN MG: 1 INJECTION, SOLUTION INTRAMUSCULAR; INTRAVENOUS; SUBCUTANEOUS at 00:22

## 2018-08-06 RX ADMIN — HYDROMORPHONE HYDROCHLORIDE PRN MG: 1 INJECTION, SOLUTION INTRAMUSCULAR; INTRAVENOUS; SUBCUTANEOUS at 16:07

## 2018-08-06 RX ADMIN — IPRATROPIUM BROMIDE AND ALBUTEROL SULFATE SCH: .5; 3 SOLUTION RESPIRATORY (INHALATION) at 03:27

## 2018-08-06 RX ADMIN — IPRATROPIUM BROMIDE AND ALBUTEROL SULFATE SCH ML: .5; 3 SOLUTION RESPIRATORY (INHALATION) at 00:16

## 2018-08-06 RX ADMIN — HYDROMORPHONE HYDROCHLORIDE PRN MG: 1 INJECTION, SOLUTION INTRAMUSCULAR; INTRAVENOUS; SUBCUTANEOUS at 08:23

## 2018-08-06 RX ADMIN — INSULIN ASPART SCH: 100 INJECTION, SOLUTION INTRAVENOUS; SUBCUTANEOUS at 17:13

## 2018-08-06 RX ADMIN — IPRATROPIUM BROMIDE AND ALBUTEROL SULFATE SCH ML: .5; 3 SOLUTION RESPIRATORY (INHALATION) at 11:06

## 2018-08-06 RX ADMIN — INSULIN ASPART SCH: 100 INJECTION, SOLUTION INTRAVENOUS; SUBCUTANEOUS at 06:03

## 2018-08-06 RX ADMIN — INSULIN ASPART SCH: 100 INJECTION, SOLUTION INTRAVENOUS; SUBCUTANEOUS at 00:29

## 2018-08-06 RX ADMIN — IPRATROPIUM BROMIDE AND ALBUTEROL SULFATE SCH ML: .5; 3 SOLUTION RESPIRATORY (INHALATION) at 14:50

## 2018-08-06 RX ADMIN — INSULIN ASPART SCH: 100 INJECTION, SOLUTION INTRAVENOUS; SUBCUTANEOUS at 12:08

## 2018-08-06 RX ADMIN — IPRATROPIUM BROMIDE AND ALBUTEROL SULFATE SCH ML: .5; 3 SOLUTION RESPIRATORY (INHALATION) at 06:45

## 2018-08-06 NOTE — GENERAL PROGRESS NOTE
Subjective





- Review of Systems


Service Date: 08/06/18


Subjective: 


I fell better. 





Objective





- Results


Result Diagrams: 


 08/05/18 05:57





 08/05/18 05:57


Recent Labs: 


 Laboratory Last Values











WBC  9.5 Th/cmm (4.8-10.8)   08/05/18  05:57    


 


RBC  2.94 Mil/cmm (3.80-5.80)  L  08/05/18  05:57    


 


Hgb  8.2 gm/dL (12-16)  L  08/05/18  05:57    


 


Hct  24.0 % (41.0-60)  L  08/05/18  05:57    


 


MCV  81.8 fl (80-99)   08/05/18  05:57    


 


MCH  27.9 pg (27.0-31.0)   08/05/18  05:57    


 


MCHC Differential  34.1 pg (28.0-36.0)   08/05/18  05:57    


 


RDW  16.5 % (11.5-20.0)   08/05/18  05:57    


 


Plt Count  70 Th/cmm (150-400)  L  08/05/18  05:57    


 


MPV  8.1 fl  08/05/18  05:57    


 


Neutrophils %  76.0 % (40.0-80.0)   08/05/18  05:57    


 


Lymphocytes %  16.6 % (20.0-50.0)  L  08/05/18  05:57    


 


Monocytes %  6.6 % (2.0-10.0)   08/05/18  05:57    


 


Eosinophils %  0.2 % (0.0-5.0)   08/05/18  05:57    


 


Basophils %  0.6 % (0.0-2.0)   08/05/18  05:57    


 


Sodium  134 mEq/L (136-145)  L  08/05/18  05:57    


 


Potassium  3.9 mEq/L (3.5-5.1)   08/05/18  05:57    


 


Chloride  101 mEq/L ()   08/05/18  05:57    


 


Carbon Dioxide  28.8 mEq/L (21.0-31.0)   08/05/18  05:57    


 


Anion Gap  8.1  (7.0-16.0)   08/05/18  05:57    


 


BUN  14 mg/dL (7-25)   08/05/18  05:57    


 


Creatinine  0.9 mg/dL (0.7-1.3)   08/05/18  05:57    


 


Est GFR ( Amer)  > 60.0 ml/min (>90)   08/05/18  05:57    


 


Est GFR (Non-Af Amer)  > 60.0 ml/min  08/05/18  05:57    


 


BUN/Creatinine Ratio  15.6   08/05/18  05:57    


 


Glucose  81 mg/dL ()   08/05/18  05:57    


 


POC Glucose  84 MG/DL (70 - 105)   08/06/18  05:31    


 


Calcium  7.5 mg/dL (8.6-10.3)  L  08/05/18  05:57    


 


Total Bilirubin  0.5 mg/dL (0.3-1.0)   08/05/18  05:57    


 


AST  17 U/L (13-39)   08/05/18  05:57    


 


ALT  29 U/L (7-52)   08/05/18  05:57    


 


Alkaline Phosphatase  108 U/L ()  H  08/05/18  05:57    


 


Troponin I  0.01 ng/mL (0.01-0.05)   08/03/18  18:19    


 


Total Protein  4.7 gm/dL (6.0-8.3)  L  08/05/18  05:57    


 


Albumin  2.1 gm/dL (4.2-5.5)  L  08/05/18  05:57    


 


Globulin  2.6 gm/dL  08/05/18  05:57    


 


Albumin/Globulin Ratio  0.8  (1.0-1.8)  L  08/05/18  05:57    


 


Urine Source  CLEAN C   08/06/18  08:20    


 


Urine Color  YELLOW   08/06/18  08:20    


 


Urine Clarity  CLEAR  (CLEAR)   08/06/18  08:20    


 


Urine pH  7.0  (4.6 - 8.0)   08/06/18  08:20    


 


Ur Specific Gravity  1.010  (1.005-1.030)   08/06/18  08:20    


 


Urine Protein  NEGATIVE mg/dL (NEGATIVE)   08/06/18  08:20    


 


Urine Glucose (UA)  NEGATIVE mg/dL (NEGATIVE)   08/06/18  08:20    


 


Urine Ketones  NEGATIVE mg/dL (NEGATIVE)   08/06/18  08:20    


 


Urine Blood  NEGATIVE  (NEGATIVE)   08/06/18  08:20    


 


Urine Nitrate  NEGATIVE  (NEGATIVE)   08/06/18  08:20    


 


Urine Bilirubin  NEGATIVE  (NEGATIVE)   08/06/18  08:20    


 


Urine Urobilinogen  0.2 E.U./dL (0.2 - 1.0)   08/06/18  08:20    


 


Ur Leukocyte Esterase  NEGATIVE  (NEGATIVE)   08/06/18  08:20    














- Physical Exam


Vitals and I&O: 


 Vital Signs











Temp  98.7 F   08/06/18 03:00


 


Pulse  65   08/06/18 06:45


 


Resp  16   08/06/18 06:45


 


BP  114/69   08/06/18 03:00


 


Pulse Ox  99   08/06/18 06:45








 Intake & Output











 08/05/18 08/06/18 08/06/18





 18:59 06:59 18:59


 


Intake Total 1550 350 


 


Output Total 1050 1100 


 


Balance 500 -750 


 


Weight (lbs) 75.931 kg 75.75 kg 


 


Intake:   


 


  Intake, IV Amount 150 50 


 


    Piperacillin Sodium/ 150 50 





    Tazobact 3.375 gm In   





    Sodium Chloride 0.9% 50   





    ml @ 100 mls/hr IV Q6HR   





    [facility] Rx#:[id number]   


 


  Oral 1400 300 


 


Output:   


 


  Urine 1050 1100 


 


Other:   


 


  # Bowel Movements 0 0 


 


  Stool Characteristics Soft  


 


  Weight Source Bedscale Bedscale 











Active Medications: 


Current Medications





Acetaminophen (Tylenol)  650 mg PO PRN PRN


   PRN Reason: TEMPERATURE >100C


   Stop: 10/02/18 23:04


Albuterol/Ipratropium (Duoneb Neb)  3 ml HHN Q4HRT [facility]


   Stop: 10/03/18 02:59


   Last Admin: 08/06/18 06:45 Dose:  3 ml


Cholestyramine Resin (Questran)  4 gm PO BID [facility]


   Stop: 10/03/18 08:59


   Last Admin: 08/06/18 08:23 Dose:  4 gm


Hydromorphone HCl (Dilaudid)  1 mg IVP Q6HR PRN


   PRN Reason: Pain (Severe)


   Stop: 10/02/18 23:19


   Last Admin: 08/06/18 08:23 Dose:  1 mg


Piperacillin Sod/Tazobactam (Sod 3.375 gm/ Sodium Chloride)  50 mls @ 100 mls/

hr IV Q6HR [facility]


   Stop: 10/03/18 05:59


   Last Admin: 08/06/18 06:04 Dose:  100 mls/hr


Insulin Aspart (Novolog Insulin Sliding Scale)  0 units SUBQ Q6H [facility]; Protocol


   Stop: 10/02/18 23:14


   Last Admin: 08/06/18 06:03 Dose:  Not Given


Ondansetron HCl (Zofran)  4 mg IV Q4H PRN


   PRN Reason: Nausea / Vomiting


   Stop: 10/02/18 23:04


Pantoprazole Sodium (Protonix)  40 mg IVP BID [facility]


   Stop: 10/02/18 23:14


   Last Admin: 08/06/18 08:23 Dose:  40 mg








General: Alert, Oriented x3, No acute distress


HEENT: Atraumatic


Neck: Supple


Cardiovascular: Regular rate


Lungs: Clear to auscultation


Abdomen: Bowel sounds


Extremities: Other (No edema)


Neurological: Other (Unstable gait)


Skin: Other (Warm and dry)


Psych/Mental Status: Mental status NL





- Procedures


Procedures: 


 Procedures











Procedure Code Date


 


ASSISTANCE WITH RESPIRATORY VENTILATION, <24 HRS, CPAP [id number] 06/18/18


 


EXCISION OF CHEST SKIN, EXTERNAL APPROACH [id number] 06/18/18


 


EXCISION OF ILEUM, OPEN APPROACH [id number] 06/18/18


 


EXCISION OF RIGHT LOWER ARM SKIN, EXTERNAL APPROACH [id number] 06/09/18


 


EXCISION OF RIGHT LOWER LEG SKIN, EXTERNAL APPROACH [id number] 06/09/18


 


INSERTION OF INFUSION DEV INTO SUP VENA CAVA, PERC APPROACH [id number] 06/18/18


 


RELEASE ILEUM, OPEN APPROACH [id number] 06/18/18














Assessment/Plan





- Assessment


Assessment: 


Patient is awake, alert, calm in no acute distress.  Looking for placement.  

WBC normal.  Dx: elevated WBC, General muscle weakness, thrombocytopenia, 

anemia.





- Plan


Plan: 


Patient in IV NS, IV AB, continue with home meds. Consult with PT requested. 

Will look for SNF placement.